# Patient Record
Sex: FEMALE | Race: WHITE | NOT HISPANIC OR LATINO | Employment: OTHER | URBAN - METROPOLITAN AREA
[De-identification: names, ages, dates, MRNs, and addresses within clinical notes are randomized per-mention and may not be internally consistent; named-entity substitution may affect disease eponyms.]

---

## 2017-04-27 ENCOUNTER — GENERIC CONVERSION - ENCOUNTER (OUTPATIENT)
Dept: OTHER | Facility: OTHER | Age: 52
End: 2017-04-27

## 2017-04-27 LAB
LEFT EYE DIABETIC RETINOPATHY: NORMAL
RIGHT EYE DIABETIC RETINOPATHY: NORMAL

## 2017-06-06 ENCOUNTER — OFFICE VISIT (OUTPATIENT)
Dept: LAB | Facility: CLINIC | Age: 52
End: 2017-06-06
Payer: OTHER MISCELLANEOUS

## 2017-06-06 ENCOUNTER — ALLSCRIPTS OFFICE VISIT (OUTPATIENT)
Dept: OTHER | Facility: OTHER | Age: 52
End: 2017-06-06

## 2017-06-06 ENCOUNTER — APPOINTMENT (OUTPATIENT)
Dept: LAB | Facility: CLINIC | Age: 52
End: 2017-06-06
Payer: OTHER MISCELLANEOUS

## 2017-06-06 ENCOUNTER — TRANSCRIBE ORDERS (OUTPATIENT)
Dept: LAB | Facility: CLINIC | Age: 52
End: 2017-06-06

## 2017-06-06 DIAGNOSIS — S73.191A TEAR OF RIGHT ACETABULAR LABRUM, INITIAL ENCOUNTER: Primary | ICD-10-CM

## 2017-06-06 DIAGNOSIS — S73.191A TEAR OF RIGHT ACETABULAR LABRUM, INITIAL ENCOUNTER: ICD-10-CM

## 2017-06-06 LAB
ANION GAP SERPL CALCULATED.3IONS-SCNC: 10 MMOL/L (ref 4–13)
ATRIAL RATE: 75 BPM
ATRIAL RATE: 76 BPM
BUN SERPL-MCNC: 10 MG/DL (ref 5–25)
CALCIUM SERPL-MCNC: 9.1 MG/DL (ref 8.3–10.1)
CHLORIDE SERPL-SCNC: 104 MMOL/L (ref 100–108)
CO2 SERPL-SCNC: 28 MMOL/L (ref 21–32)
CREAT SERPL-MCNC: 0.68 MG/DL (ref 0.6–1.3)
GFR SERPL CREATININE-BSD FRML MDRD: >60 ML/MIN/1.73SQ M
GLUCOSE SERPL-MCNC: 143 MG/DL (ref 65–140)
P AXIS: 60 DEGREES
P AXIS: 72 DEGREES
POTASSIUM SERPL-SCNC: 3.6 MMOL/L (ref 3.5–5.3)
PR INTERVAL: 162 MS
PR INTERVAL: 162 MS
QRS AXIS: 60 DEGREES
QRS AXIS: 68 DEGREES
QRSD INTERVAL: 74 MS
QRSD INTERVAL: 76 MS
QT INTERVAL: 390 MS
QT INTERVAL: 396 MS
QTC INTERVAL: 435 MS
QTC INTERVAL: 445 MS
SODIUM SERPL-SCNC: 142 MMOL/L (ref 136–145)
T WAVE AXIS: 15 DEGREES
T WAVE AXIS: 33 DEGREES
VENTRICULAR RATE: 75 BPM
VENTRICULAR RATE: 76 BPM

## 2017-06-06 PROCEDURE — 93005 ELECTROCARDIOGRAM TRACING: CPT

## 2017-06-06 PROCEDURE — 36415 COLL VENOUS BLD VENIPUNCTURE: CPT

## 2017-06-06 PROCEDURE — 80048 BASIC METABOLIC PNL TOTAL CA: CPT

## 2017-06-20 ENCOUNTER — ANESTHESIA EVENT (OUTPATIENT)
Dept: PERIOP | Facility: HOSPITAL | Age: 52
End: 2017-06-20
Payer: OTHER MISCELLANEOUS

## 2017-06-22 ENCOUNTER — GENERIC CONVERSION - ENCOUNTER (OUTPATIENT)
Dept: OTHER | Facility: OTHER | Age: 52
End: 2017-06-22

## 2017-07-13 ENCOUNTER — ANESTHESIA (OUTPATIENT)
Dept: PERIOP | Facility: HOSPITAL | Age: 52
End: 2017-07-13
Payer: OTHER MISCELLANEOUS

## 2017-07-13 ENCOUNTER — HOSPITAL ENCOUNTER (OUTPATIENT)
Facility: HOSPITAL | Age: 52
Setting detail: OUTPATIENT SURGERY
Discharge: HOME | End: 2017-07-13
Attending: ORTHOPAEDIC SURGERY | Admitting: ORTHOPAEDIC SURGERY
Payer: OTHER MISCELLANEOUS

## 2017-07-13 ENCOUNTER — APPOINTMENT (OUTPATIENT)
Dept: RADIOLOGY | Facility: HOSPITAL | Age: 52
End: 2017-07-13
Payer: OTHER MISCELLANEOUS

## 2017-07-13 VITALS
BODY MASS INDEX: 28.35 KG/M2 | SYSTOLIC BLOOD PRESSURE: 128 MMHG | HEART RATE: 86 BPM | WEIGHT: 160 LBS | OXYGEN SATURATION: 96 % | RESPIRATION RATE: 16 BRPM | DIASTOLIC BLOOD PRESSURE: 64 MMHG | HEIGHT: 63 IN | TEMPERATURE: 98.8 F

## 2017-07-13 LAB
GLUCOSE SERPL-MCNC: 143 MG/DL (ref 65–140)
GLUCOSE SERPL-MCNC: 168 MG/DL (ref 65–140)

## 2017-07-13 PROCEDURE — 82948 REAGENT STRIP/BLOOD GLUCOSE: CPT

## 2017-07-13 PROCEDURE — 73501 X-RAY EXAM HIP UNI 1 VIEW: CPT

## 2017-07-13 RX ORDER — ONDANSETRON 2 MG/ML
4 INJECTION INTRAMUSCULAR; INTRAVENOUS EVERY 6 HOURS PRN
Status: DISCONTINUED | OUTPATIENT
Start: 2017-07-13 | End: 2017-07-13 | Stop reason: HOSPADM

## 2017-07-13 RX ORDER — FENTANYL CITRATE/PF 50 MCG/ML
50 SYRINGE (ML) INJECTION
Status: DISCONTINUED | OUTPATIENT
Start: 2017-07-13 | End: 2017-07-13 | Stop reason: HOSPADM

## 2017-07-13 RX ORDER — ACETAMINOPHEN 325 MG/1
650 TABLET ORAL EVERY 4 HOURS PRN
Status: DISCONTINUED | OUTPATIENT
Start: 2017-07-13 | End: 2017-07-13 | Stop reason: HOSPADM

## 2017-07-13 RX ORDER — ONDANSETRON 2 MG/ML
INJECTION INTRAMUSCULAR; INTRAVENOUS AS NEEDED
Status: DISCONTINUED | OUTPATIENT
Start: 2017-07-13 | End: 2017-07-13 | Stop reason: SURG

## 2017-07-13 RX ORDER — HYDROCODONE BITARTRATE AND ACETAMINOPHEN 7.5; 325 MG/1; MG/1
TABLET ORAL
Qty: 50 TABLET | Refills: 0 | Status: SHIPPED | OUTPATIENT
Start: 2017-07-13 | End: 2018-06-05

## 2017-07-13 RX ORDER — PROPOFOL 10 MG/ML
INJECTION, EMULSION INTRAVENOUS AS NEEDED
Status: DISCONTINUED | OUTPATIENT
Start: 2017-07-13 | End: 2017-07-13 | Stop reason: SURG

## 2017-07-13 RX ORDER — LIDOCAINE HYDROCHLORIDE AND EPINEPHRINE 10; 10 MG/ML; UG/ML
INJECTION, SOLUTION INFILTRATION; PERINEURAL AS NEEDED
Status: DISCONTINUED | OUTPATIENT
Start: 2017-07-13 | End: 2017-07-13 | Stop reason: HOSPADM

## 2017-07-13 RX ORDER — SODIUM CHLORIDE, SODIUM LACTATE, POTASSIUM CHLORIDE, CALCIUM CHLORIDE 600; 310; 30; 20 MG/100ML; MG/100ML; MG/100ML; MG/100ML
INJECTION, SOLUTION INTRAVENOUS CONTINUOUS PRN
Status: DISCONTINUED | OUTPATIENT
Start: 2017-07-13 | End: 2017-07-13 | Stop reason: SURG

## 2017-07-13 RX ORDER — CLINDAMYCIN PHOSPHATE 150 MG/ML
INJECTION, SOLUTION INTRAVENOUS AS NEEDED
Status: DISCONTINUED | OUTPATIENT
Start: 2017-07-13 | End: 2017-07-13 | Stop reason: SURG

## 2017-07-13 RX ORDER — METOCLOPRAMIDE HYDROCHLORIDE 5 MG/ML
10 INJECTION INTRAMUSCULAR; INTRAVENOUS ONCE AS NEEDED
Status: DISCONTINUED | OUTPATIENT
Start: 2017-07-13 | End: 2017-07-13 | Stop reason: HOSPADM

## 2017-07-13 RX ORDER — ONDANSETRON 4 MG/1
4 TABLET, FILM COATED ORAL EVERY 8 HOURS PRN
Qty: 20 TABLET | Refills: 0 | Status: SHIPPED | OUTPATIENT
Start: 2017-07-13 | End: 2018-06-05

## 2017-07-13 RX ORDER — METOCLOPRAMIDE HYDROCHLORIDE 5 MG/ML
INJECTION INTRAMUSCULAR; INTRAVENOUS AS NEEDED
Status: DISCONTINUED | OUTPATIENT
Start: 2017-07-13 | End: 2017-07-13 | Stop reason: SURG

## 2017-07-13 RX ORDER — MORPHINE SULFATE 2 MG/ML
2 INJECTION, SOLUTION INTRAMUSCULAR; INTRAVENOUS EVERY 2 HOUR PRN
Status: DISCONTINUED | OUTPATIENT
Start: 2017-07-13 | End: 2017-07-13 | Stop reason: HOSPADM

## 2017-07-13 RX ORDER — CLINDAMYCIN PHOSPHATE 900 MG/50ML
900 INJECTION INTRAVENOUS ONCE
Status: DISCONTINUED | OUTPATIENT
Start: 2017-07-13 | End: 2017-07-13 | Stop reason: HOSPADM

## 2017-07-13 RX ORDER — EPHEDRINE SULFATE 50 MG/ML
INJECTION, SOLUTION INTRAVENOUS AS NEEDED
Status: DISCONTINUED | OUTPATIENT
Start: 2017-07-13 | End: 2017-07-13 | Stop reason: SURG

## 2017-07-13 RX ORDER — ONDANSETRON 2 MG/ML
4 INJECTION INTRAMUSCULAR; INTRAVENOUS ONCE AS NEEDED
Status: DISCONTINUED | OUTPATIENT
Start: 2017-07-13 | End: 2017-07-13 | Stop reason: HOSPADM

## 2017-07-13 RX ORDER — HYDROCODONE BITARTRATE AND ACETAMINOPHEN 5; 325 MG/1; MG/1
2 TABLET ORAL EVERY 4 HOURS PRN
Status: DISCONTINUED | OUTPATIENT
Start: 2017-07-13 | End: 2017-07-13 | Stop reason: HOSPADM

## 2017-07-13 RX ORDER — MIDAZOLAM HYDROCHLORIDE 1 MG/ML
INJECTION INTRAMUSCULAR; INTRAVENOUS AS NEEDED
Status: DISCONTINUED | OUTPATIENT
Start: 2017-07-13 | End: 2017-07-13 | Stop reason: SURG

## 2017-07-13 RX ORDER — FENTANYL CITRATE 50 UG/ML
INJECTION, SOLUTION INTRAMUSCULAR; INTRAVENOUS AS NEEDED
Status: DISCONTINUED | OUTPATIENT
Start: 2017-07-13 | End: 2017-07-13 | Stop reason: SURG

## 2017-07-13 RX ORDER — KETOROLAC TROMETHAMINE 30 MG/ML
INJECTION, SOLUTION INTRAMUSCULAR; INTRAVENOUS AS NEEDED
Status: DISCONTINUED | OUTPATIENT
Start: 2017-07-13 | End: 2017-07-13 | Stop reason: SURG

## 2017-07-13 RX ADMIN — EPHEDRINE SULFATE 10 MG: 50 INJECTION, SOLUTION INTRAMUSCULAR; INTRAVENOUS; SUBCUTANEOUS at 14:25

## 2017-07-13 RX ADMIN — HYDROMORPHONE HYDROCHLORIDE 0.2 MG: 1 INJECTION, SOLUTION INTRAMUSCULAR; INTRAVENOUS; SUBCUTANEOUS at 14:47

## 2017-07-13 RX ADMIN — CLINDAMYCIN PHOSPHATE 900 MG: 150 INJECTION, SOLUTION INTRAMUSCULAR; INTRAVENOUS at 14:15

## 2017-07-13 RX ADMIN — HYDROCODONE BITARTRATE AND ACETAMINOPHEN 2 TABLET: 5; 325 TABLET ORAL at 15:48

## 2017-07-13 RX ADMIN — ONDANSETRON 4 MG: 2 INJECTION INTRAMUSCULAR; INTRAVENOUS at 14:58

## 2017-07-13 RX ADMIN — KETOROLAC TROMETHAMINE 30 MG: 30 INJECTION, SOLUTION INTRAMUSCULAR at 14:58

## 2017-07-13 RX ADMIN — PROPOFOL 200 MG: 10 INJECTION, EMULSION INTRAVENOUS at 14:14

## 2017-07-13 RX ADMIN — METOCLOPRAMIDE HYDROCHLORIDE 10 MG: 5 INJECTION INTRAMUSCULAR; INTRAVENOUS at 14:20

## 2017-07-13 RX ADMIN — HYDROMORPHONE HYDROCHLORIDE 0.2 MG: 1 INJECTION, SOLUTION INTRAMUSCULAR; INTRAVENOUS; SUBCUTANEOUS at 14:53

## 2017-07-13 RX ADMIN — DEXAMETHASONE SODIUM PHOSPHATE 4 MG: 10 INJECTION INTRAMUSCULAR; INTRAVENOUS at 14:20

## 2017-07-13 RX ADMIN — HYDROMORPHONE HYDROCHLORIDE 0.2 MG: 1 INJECTION, SOLUTION INTRAMUSCULAR; INTRAVENOUS; SUBCUTANEOUS at 14:56

## 2017-07-13 RX ADMIN — FENTANYL CITRATE 25 MCG: 50 INJECTION INTRAMUSCULAR; INTRAVENOUS at 14:27

## 2017-07-13 RX ADMIN — HYDROMORPHONE HYDROCHLORIDE 0.2 MG: 1 INJECTION, SOLUTION INTRAMUSCULAR; INTRAVENOUS; SUBCUTANEOUS at 15:00

## 2017-07-13 RX ADMIN — SODIUM CHLORIDE, SODIUM LACTATE, POTASSIUM CHLORIDE, AND CALCIUM CHLORIDE: .6; .31; .03; .02 INJECTION, SOLUTION INTRAVENOUS at 14:08

## 2017-07-13 RX ADMIN — MIDAZOLAM HYDROCHLORIDE 2 MG: 1 INJECTION, SOLUTION INTRAMUSCULAR; INTRAVENOUS at 14:08

## 2017-07-13 RX ADMIN — FENTANYL CITRATE 25 MCG: 50 INJECTION INTRAMUSCULAR; INTRAVENOUS at 14:19

## 2017-07-13 RX ADMIN — HYDROMORPHONE HYDROCHLORIDE 0.2 MG: 1 INJECTION, SOLUTION INTRAMUSCULAR; INTRAVENOUS; SUBCUTANEOUS at 14:52

## 2017-07-25 ENCOUNTER — ALLSCRIPTS OFFICE VISIT (OUTPATIENT)
Dept: OTHER | Facility: OTHER | Age: 52
End: 2017-07-25

## 2017-07-25 DIAGNOSIS — S73.191D OTHER SPRAIN OF RIGHT HIP, SUBSEQUENT ENCOUNTER: ICD-10-CM

## 2017-08-03 ENCOUNTER — APPOINTMENT (OUTPATIENT)
Dept: PHYSICAL THERAPY | Facility: CLINIC | Age: 52
End: 2017-08-03
Payer: OTHER MISCELLANEOUS

## 2017-08-03 DIAGNOSIS — S73.191D OTHER SPRAIN OF RIGHT HIP, SUBSEQUENT ENCOUNTER: ICD-10-CM

## 2017-08-03 PROCEDURE — 97162 PT EVAL MOD COMPLEX 30 MIN: CPT

## 2017-08-03 PROCEDURE — 97110 THERAPEUTIC EXERCISES: CPT

## 2017-08-04 ENCOUNTER — GENERIC CONVERSION - ENCOUNTER (OUTPATIENT)
Dept: OTHER | Facility: OTHER | Age: 52
End: 2017-08-04

## 2017-08-10 ENCOUNTER — APPOINTMENT (OUTPATIENT)
Dept: PHYSICAL THERAPY | Facility: CLINIC | Age: 52
End: 2017-08-10
Payer: OTHER MISCELLANEOUS

## 2017-08-10 PROCEDURE — 97140 MANUAL THERAPY 1/> REGIONS: CPT

## 2017-08-10 PROCEDURE — 97110 THERAPEUTIC EXERCISES: CPT

## 2017-08-14 ENCOUNTER — APPOINTMENT (OUTPATIENT)
Dept: PHYSICAL THERAPY | Facility: CLINIC | Age: 52
End: 2017-08-14
Payer: OTHER MISCELLANEOUS

## 2017-08-14 PROCEDURE — 97140 MANUAL THERAPY 1/> REGIONS: CPT

## 2017-08-14 PROCEDURE — 97110 THERAPEUTIC EXERCISES: CPT

## 2017-08-18 ENCOUNTER — APPOINTMENT (OUTPATIENT)
Dept: PHYSICAL THERAPY | Facility: CLINIC | Age: 52
End: 2017-08-18
Payer: OTHER MISCELLANEOUS

## 2017-08-18 PROCEDURE — 97110 THERAPEUTIC EXERCISES: CPT

## 2017-08-18 PROCEDURE — 97140 MANUAL THERAPY 1/> REGIONS: CPT

## 2017-08-22 ENCOUNTER — APPOINTMENT (OUTPATIENT)
Dept: PHYSICAL THERAPY | Facility: CLINIC | Age: 52
End: 2017-08-22
Payer: OTHER MISCELLANEOUS

## 2017-08-22 ENCOUNTER — ALLSCRIPTS OFFICE VISIT (OUTPATIENT)
Dept: OTHER | Facility: OTHER | Age: 52
End: 2017-08-22

## 2017-08-22 PROCEDURE — 97140 MANUAL THERAPY 1/> REGIONS: CPT

## 2017-08-22 PROCEDURE — 97110 THERAPEUTIC EXERCISES: CPT

## 2017-08-24 ENCOUNTER — APPOINTMENT (OUTPATIENT)
Dept: PHYSICAL THERAPY | Facility: CLINIC | Age: 52
End: 2017-08-24
Payer: OTHER MISCELLANEOUS

## 2017-08-24 ENCOUNTER — GENERIC CONVERSION - ENCOUNTER (OUTPATIENT)
Dept: OTHER | Facility: OTHER | Age: 52
End: 2017-08-24

## 2017-08-24 PROCEDURE — 97140 MANUAL THERAPY 1/> REGIONS: CPT

## 2017-08-24 PROCEDURE — 97110 THERAPEUTIC EXERCISES: CPT

## 2017-08-29 ENCOUNTER — APPOINTMENT (OUTPATIENT)
Dept: PHYSICAL THERAPY | Facility: CLINIC | Age: 52
End: 2017-08-29
Payer: OTHER MISCELLANEOUS

## 2017-08-29 PROCEDURE — 97110 THERAPEUTIC EXERCISES: CPT

## 2017-08-29 PROCEDURE — 97140 MANUAL THERAPY 1/> REGIONS: CPT

## 2017-08-31 ENCOUNTER — APPOINTMENT (OUTPATIENT)
Dept: PHYSICAL THERAPY | Facility: CLINIC | Age: 52
End: 2017-08-31
Payer: OTHER MISCELLANEOUS

## 2017-08-31 PROCEDURE — 97140 MANUAL THERAPY 1/> REGIONS: CPT

## 2017-08-31 PROCEDURE — 97110 THERAPEUTIC EXERCISES: CPT

## 2017-09-05 ENCOUNTER — APPOINTMENT (OUTPATIENT)
Dept: PHYSICAL THERAPY | Facility: CLINIC | Age: 52
End: 2017-09-05
Payer: OTHER MISCELLANEOUS

## 2017-09-05 PROCEDURE — 97110 THERAPEUTIC EXERCISES: CPT

## 2017-09-05 PROCEDURE — 97140 MANUAL THERAPY 1/> REGIONS: CPT

## 2017-09-07 ENCOUNTER — APPOINTMENT (OUTPATIENT)
Dept: PHYSICAL THERAPY | Facility: CLINIC | Age: 52
End: 2017-09-07
Payer: OTHER MISCELLANEOUS

## 2017-09-07 PROCEDURE — 97110 THERAPEUTIC EXERCISES: CPT

## 2017-09-07 PROCEDURE — 97140 MANUAL THERAPY 1/> REGIONS: CPT

## 2017-09-12 ENCOUNTER — GENERIC CONVERSION - ENCOUNTER (OUTPATIENT)
Dept: OTHER | Facility: OTHER | Age: 52
End: 2017-09-12

## 2017-09-12 ENCOUNTER — APPOINTMENT (OUTPATIENT)
Dept: PHYSICAL THERAPY | Facility: CLINIC | Age: 52
End: 2017-09-12
Payer: OTHER MISCELLANEOUS

## 2017-09-12 PROCEDURE — 97140 MANUAL THERAPY 1/> REGIONS: CPT

## 2017-09-12 PROCEDURE — 97110 THERAPEUTIC EXERCISES: CPT

## 2017-09-14 ENCOUNTER — APPOINTMENT (OUTPATIENT)
Dept: PHYSICAL THERAPY | Facility: CLINIC | Age: 52
End: 2017-09-14
Payer: OTHER MISCELLANEOUS

## 2017-09-14 PROCEDURE — 97140 MANUAL THERAPY 1/> REGIONS: CPT

## 2017-09-14 PROCEDURE — 97110 THERAPEUTIC EXERCISES: CPT

## 2017-09-19 ENCOUNTER — APPOINTMENT (OUTPATIENT)
Dept: PHYSICAL THERAPY | Facility: CLINIC | Age: 52
End: 2017-09-19
Payer: OTHER MISCELLANEOUS

## 2017-09-21 ENCOUNTER — APPOINTMENT (OUTPATIENT)
Dept: PHYSICAL THERAPY | Facility: CLINIC | Age: 52
End: 2017-09-21
Payer: OTHER MISCELLANEOUS

## 2017-09-21 PROCEDURE — 97110 THERAPEUTIC EXERCISES: CPT

## 2017-09-21 PROCEDURE — 97140 MANUAL THERAPY 1/> REGIONS: CPT

## 2017-09-22 ENCOUNTER — APPOINTMENT (OUTPATIENT)
Dept: PHYSICAL THERAPY | Facility: CLINIC | Age: 52
End: 2017-09-22
Payer: OTHER MISCELLANEOUS

## 2017-09-22 ENCOUNTER — ALLSCRIPTS OFFICE VISIT (OUTPATIENT)
Dept: OTHER | Facility: OTHER | Age: 52
End: 2017-09-22

## 2017-09-22 PROCEDURE — 97110 THERAPEUTIC EXERCISES: CPT

## 2017-09-22 PROCEDURE — 97140 MANUAL THERAPY 1/> REGIONS: CPT

## 2017-09-26 ENCOUNTER — APPOINTMENT (OUTPATIENT)
Dept: PHYSICAL THERAPY | Facility: CLINIC | Age: 52
End: 2017-09-26
Payer: OTHER MISCELLANEOUS

## 2017-09-26 PROCEDURE — 97140 MANUAL THERAPY 1/> REGIONS: CPT

## 2017-09-26 PROCEDURE — 97110 THERAPEUTIC EXERCISES: CPT

## 2017-09-28 ENCOUNTER — APPOINTMENT (OUTPATIENT)
Dept: PHYSICAL THERAPY | Facility: CLINIC | Age: 52
End: 2017-09-28
Payer: OTHER MISCELLANEOUS

## 2017-09-28 PROCEDURE — 97110 THERAPEUTIC EXERCISES: CPT

## 2017-09-28 PROCEDURE — 97140 MANUAL THERAPY 1/> REGIONS: CPT

## 2017-10-03 ENCOUNTER — APPOINTMENT (OUTPATIENT)
Dept: PHYSICAL THERAPY | Facility: CLINIC | Age: 52
End: 2017-10-03
Payer: OTHER MISCELLANEOUS

## 2017-10-05 ENCOUNTER — APPOINTMENT (OUTPATIENT)
Dept: PHYSICAL THERAPY | Facility: CLINIC | Age: 52
End: 2017-10-05
Payer: OTHER MISCELLANEOUS

## 2017-10-05 PROCEDURE — 97140 MANUAL THERAPY 1/> REGIONS: CPT

## 2017-10-05 PROCEDURE — 97110 THERAPEUTIC EXERCISES: CPT

## 2017-10-10 ENCOUNTER — APPOINTMENT (OUTPATIENT)
Dept: PHYSICAL THERAPY | Facility: CLINIC | Age: 52
End: 2017-10-10
Payer: OTHER MISCELLANEOUS

## 2017-10-10 PROCEDURE — 97140 MANUAL THERAPY 1/> REGIONS: CPT

## 2017-10-10 PROCEDURE — 97110 THERAPEUTIC EXERCISES: CPT

## 2017-10-12 ENCOUNTER — APPOINTMENT (OUTPATIENT)
Dept: PHYSICAL THERAPY | Facility: CLINIC | Age: 52
End: 2017-10-12
Payer: OTHER MISCELLANEOUS

## 2017-10-12 PROCEDURE — 97140 MANUAL THERAPY 1/> REGIONS: CPT

## 2017-10-12 PROCEDURE — 97110 THERAPEUTIC EXERCISES: CPT

## 2017-10-17 ENCOUNTER — APPOINTMENT (OUTPATIENT)
Dept: PHYSICAL THERAPY | Facility: CLINIC | Age: 52
End: 2017-10-17
Payer: OTHER MISCELLANEOUS

## 2017-10-17 PROCEDURE — 97110 THERAPEUTIC EXERCISES: CPT

## 2017-10-17 PROCEDURE — 97140 MANUAL THERAPY 1/> REGIONS: CPT

## 2017-10-19 ENCOUNTER — APPOINTMENT (OUTPATIENT)
Dept: PHYSICAL THERAPY | Facility: CLINIC | Age: 52
End: 2017-10-19
Payer: OTHER MISCELLANEOUS

## 2017-10-19 PROCEDURE — G8979 MOBILITY GOAL STATUS: HCPCS

## 2017-10-19 PROCEDURE — 97140 MANUAL THERAPY 1/> REGIONS: CPT

## 2017-10-19 PROCEDURE — G8978 MOBILITY CURRENT STATUS: HCPCS

## 2017-10-19 PROCEDURE — 97110 THERAPEUTIC EXERCISES: CPT

## 2017-10-24 ENCOUNTER — ALLSCRIPTS OFFICE VISIT (OUTPATIENT)
Dept: OTHER | Facility: OTHER | Age: 52
End: 2017-10-24

## 2017-10-24 ENCOUNTER — APPOINTMENT (OUTPATIENT)
Dept: PHYSICAL THERAPY | Facility: CLINIC | Age: 52
End: 2017-10-24
Payer: OTHER MISCELLANEOUS

## 2017-10-24 PROCEDURE — 97140 MANUAL THERAPY 1/> REGIONS: CPT

## 2017-10-24 PROCEDURE — 97110 THERAPEUTIC EXERCISES: CPT

## 2017-10-24 PROCEDURE — 97530 THERAPEUTIC ACTIVITIES: CPT

## 2017-10-24 PROCEDURE — 97010 HOT OR COLD PACKS THERAPY: CPT

## 2017-10-25 NOTE — PROGRESS NOTES
Assessment  1  Tear of right acetabular labrum, subsequent encounter (V58 89,843 8) (S73 191D)   2  Primary localized osteoarthritis of right hip (715 15) (M16 11)    Plan  Primary localized osteoarthritis of right hip    · *1 - [facility]  *  Status: Hold For - Scheduling  Requested for:  13OCV6876  Care Summary provided  : Yes   · Fluoroscopic Guidance For Needle Placement; Status:Active; Requested [id number];     Discussion/Summary    Right hip arthroscopic labral debridement 7/13/17  intra-articular steroid injection with Radiology  physical therapy for now  is unable to work secondary to ongoing pain  in 4 weeks  Anticipate maximum medical improvement at that point  Chief Complaint  1  Hip Pain    Post-Op  HPI: Postop evaluation after right hip arthroscopic labral debridement on 7/13/17  Patient continues to participate in physical therapy  She has persistent pain localizing primarily to the anterior groin, worse with activity  She has remained out of work as a result  No significant improvement overall since her last visit on 9/22/17  Active Problems  1  Arthritis (716 90) (M19 90)   2  Depression (311) (F32 9)   3  Diabetes (250 00) (E11 9)   4  Heart disorder (429 9) (I51 9)   5  Hypertension (401 9) (I10)   6  Lumbar radiculopathy (724 4) (M54 16)   7  Stomach problems (536 9) (K31 9)   8  Tear of right acetabular labrum, subsequent encounter (V58 89,843 8) (S73 191D)    Social History   · Alcohol use (V49 89) (Z78 9)   · Caffeine use (V49 89) (F15 90)   · Current every day smoker (305 1) (F17 200)    Current Meds   1  Atorvastatin Calcium 20 MG Oral Tablet Recorded   2  CarBAMazepine  MG Oral Capsule Extended Release 12 Hour Recorded   3  ClonazePAM 0 5 MG Oral Tablet Recorded   4  Ecotrin TBEC Recorded   5  Lisinopril 10 MG Oral Tablet Recorded   6  MetFORMIN HCl TABS Recorded   7  Omeprazole 20 MG Oral Capsule Delayed Release Recorded    Allergies  1   cefdinir    Vitals Recorded: 67HOL8026 09:38AM   Heart Rate 98   Systolic 777   Diastolic 76   Height 5 ft 3 in   Weight 150 lb    BMI Calculated 26 57   BSA Calculated 1 72     Physical Exam  Right hip:  No gross deformity  Range of motion is limited by pain in all planes  No gross instability  FADDIR test is positive  ALLYSON test is positive  Log roll test is negative  Straight leg raise against resistance is positive  4/5 strength hip flexion limited by pain  Decreased sensation to light touch in the medial ankle and lower leg, otherwise intact throughout the right lower extremity  No lower extremity edema          Signatures   Electronically signed by : BANDAR Rodríguez ; Oct 24 2017  9:58AM EST                       (Author)

## 2017-10-26 ENCOUNTER — APPOINTMENT (OUTPATIENT)
Dept: PHYSICAL THERAPY | Facility: CLINIC | Age: 52
End: 2017-10-26
Payer: OTHER MISCELLANEOUS

## 2017-10-26 PROCEDURE — 97530 THERAPEUTIC ACTIVITIES: CPT

## 2017-10-26 PROCEDURE — 97140 MANUAL THERAPY 1/> REGIONS: CPT

## 2017-10-26 PROCEDURE — 97110 THERAPEUTIC EXERCISES: CPT

## 2017-10-31 ENCOUNTER — APPOINTMENT (OUTPATIENT)
Dept: PHYSICAL THERAPY | Facility: CLINIC | Age: 52
End: 2017-10-31
Payer: OTHER MISCELLANEOUS

## 2017-10-31 PROCEDURE — 97010 HOT OR COLD PACKS THERAPY: CPT

## 2017-10-31 PROCEDURE — 97110 THERAPEUTIC EXERCISES: CPT

## 2017-10-31 PROCEDURE — 97140 MANUAL THERAPY 1/> REGIONS: CPT

## 2017-11-02 ENCOUNTER — APPOINTMENT (OUTPATIENT)
Dept: PHYSICAL THERAPY | Facility: CLINIC | Age: 52
End: 2017-11-02
Payer: OTHER MISCELLANEOUS

## 2017-11-07 ENCOUNTER — APPOINTMENT (OUTPATIENT)
Dept: PHYSICAL THERAPY | Facility: CLINIC | Age: 52
End: 2017-11-07
Payer: OTHER MISCELLANEOUS

## 2017-11-07 PROCEDURE — 97110 THERAPEUTIC EXERCISES: CPT

## 2017-11-07 PROCEDURE — 97530 THERAPEUTIC ACTIVITIES: CPT

## 2017-11-07 PROCEDURE — 97140 MANUAL THERAPY 1/> REGIONS: CPT

## 2017-11-08 ENCOUNTER — ALLSCRIPTS OFFICE VISIT (OUTPATIENT)
Dept: OTHER | Facility: OTHER | Age: 52
End: 2017-11-08

## 2017-11-08 DIAGNOSIS — F32.9 MAJOR DEPRESSIVE DISORDER, SINGLE EPISODE: ICD-10-CM

## 2017-11-08 DIAGNOSIS — I10 ESSENTIAL (PRIMARY) HYPERTENSION: ICD-10-CM

## 2017-11-08 DIAGNOSIS — E11.9 TYPE 2 DIABETES MELLITUS WITHOUT COMPLICATIONS (HCC): ICD-10-CM

## 2017-11-08 DIAGNOSIS — E78.5 HYPERLIPIDEMIA: ICD-10-CM

## 2017-11-09 ENCOUNTER — APPOINTMENT (OUTPATIENT)
Dept: PHYSICAL THERAPY | Facility: CLINIC | Age: 52
End: 2017-11-09
Payer: OTHER MISCELLANEOUS

## 2017-11-09 PROCEDURE — 97110 THERAPEUTIC EXERCISES: CPT

## 2017-11-09 PROCEDURE — 97140 MANUAL THERAPY 1/> REGIONS: CPT

## 2017-11-09 PROCEDURE — 97530 THERAPEUTIC ACTIVITIES: CPT

## 2017-11-13 ENCOUNTER — GENERIC CONVERSION - ENCOUNTER (OUTPATIENT)
Dept: OTHER | Facility: OTHER | Age: 52
End: 2017-11-13

## 2017-11-13 ENCOUNTER — TRANSCRIBE ORDERS (OUTPATIENT)
Dept: LAB | Facility: CLINIC | Age: 52
End: 2017-11-13

## 2017-11-13 ENCOUNTER — APPOINTMENT (OUTPATIENT)
Dept: LAB | Facility: CLINIC | Age: 52
End: 2017-11-13
Payer: COMMERCIAL

## 2017-11-13 DIAGNOSIS — I10 ESSENTIAL HYPERTENSION, MALIGNANT: ICD-10-CM

## 2017-11-13 DIAGNOSIS — F01.50 VASCULAR DEMENTIA WITH DEPRESSED MOOD (HCC): Primary | ICD-10-CM

## 2017-11-13 DIAGNOSIS — E78.5 HYPERLIPIDEMIA: ICD-10-CM

## 2017-11-13 DIAGNOSIS — I10 ESSENTIAL (PRIMARY) HYPERTENSION: ICD-10-CM

## 2017-11-13 DIAGNOSIS — E11.9 TYPE 2 DIABETES MELLITUS WITHOUT COMPLICATIONS (HCC): ICD-10-CM

## 2017-11-13 DIAGNOSIS — E78.49 OTHER HYPERLIPIDEMIA: ICD-10-CM

## 2017-11-13 DIAGNOSIS — F32.9 MAJOR DEPRESSIVE DISORDER, SINGLE EPISODE: ICD-10-CM

## 2017-11-13 DIAGNOSIS — F32.A VASCULAR DEMENTIA WITH DEPRESSED MOOD (HCC): Primary | ICD-10-CM

## 2017-11-13 LAB
ALBUMIN SERPL BCP-MCNC: 3.6 G/DL (ref 3.5–5)
ALP SERPL-CCNC: 130 U/L (ref 46–116)
ALT SERPL W P-5'-P-CCNC: 18 U/L (ref 12–78)
ANION GAP SERPL CALCULATED.3IONS-SCNC: 7 MMOL/L (ref 4–13)
AST SERPL W P-5'-P-CCNC: 18 U/L (ref 5–45)
BASOPHILS # BLD AUTO: 0.07 THOUSANDS/ΜL (ref 0–0.1)
BASOPHILS NFR BLD AUTO: 1 % (ref 0–1)
BILIRUB SERPL-MCNC: 0.37 MG/DL (ref 0.2–1)
BUN SERPL-MCNC: 12 MG/DL (ref 5–25)
CALCIUM SERPL-MCNC: 9.3 MG/DL (ref 8.3–10.1)
CHLORIDE SERPL-SCNC: 101 MMOL/L (ref 100–108)
CHOLEST SERPL-MCNC: 225 MG/DL (ref 50–200)
CO2 SERPL-SCNC: 27 MMOL/L (ref 21–32)
CREAT SERPL-MCNC: 0.78 MG/DL (ref 0.6–1.3)
CREAT UR-MCNC: 73.3 MG/DL
EOSINOPHIL # BLD AUTO: 0.27 THOUSAND/ΜL (ref 0–0.61)
EOSINOPHIL NFR BLD AUTO: 3 % (ref 0–6)
ERYTHROCYTE [DISTWIDTH] IN BLOOD BY AUTOMATED COUNT: 13.3 % (ref 11.6–15.1)
EST. AVERAGE GLUCOSE BLD GHB EST-MCNC: 143 MG/DL
GFR SERPL CREATININE-BSD FRML MDRD: 87 ML/MIN/1.73SQ M
GLUCOSE P FAST SERPL-MCNC: 98 MG/DL (ref 65–99)
HBA1C MFR BLD: 6.6 % (ref 4.2–6.3)
HCT VFR BLD AUTO: 40 % (ref 34.8–46.1)
HDLC SERPL-MCNC: 50 MG/DL (ref 40–60)
HGB BLD-MCNC: 13.3 G/DL (ref 11.5–15.4)
LDLC SERPL CALC-MCNC: 143 MG/DL (ref 0–100)
LYMPHOCYTES # BLD AUTO: 3.62 THOUSANDS/ΜL (ref 0.6–4.47)
LYMPHOCYTES NFR BLD AUTO: 41 % (ref 14–44)
MCH RBC QN AUTO: 28.4 PG (ref 26.8–34.3)
MCHC RBC AUTO-ENTMCNC: 33.3 G/DL (ref 31.4–37.4)
MCV RBC AUTO: 86 FL (ref 82–98)
MICROALBUMIN UR-MCNC: <5 MG/L (ref 0–20)
MICROALBUMIN/CREAT 24H UR: <7 MG/G CREATININE (ref 0–30)
MONOCYTES # BLD AUTO: 0.77 THOUSAND/ΜL (ref 0.17–1.22)
MONOCYTES NFR BLD AUTO: 9 % (ref 4–12)
NEUTROPHILS # BLD AUTO: 4.01 THOUSANDS/ΜL (ref 1.85–7.62)
NEUTS SEG NFR BLD AUTO: 46 % (ref 43–75)
NRBC BLD AUTO-RTO: 0 /100 WBCS
PLATELET # BLD AUTO: 267 THOUSANDS/UL (ref 149–390)
PMV BLD AUTO: 11 FL (ref 8.9–12.7)
POTASSIUM SERPL-SCNC: 4.7 MMOL/L (ref 3.5–5.3)
PROT SERPL-MCNC: 7.6 G/DL (ref 6.4–8.2)
RBC # BLD AUTO: 4.68 MILLION/UL (ref 3.81–5.12)
SODIUM SERPL-SCNC: 135 MMOL/L (ref 136–145)
TRIGL SERPL-MCNC: 159 MG/DL
TSH SERPL DL<=0.05 MIU/L-ACNC: 2.78 UIU/ML (ref 0.36–3.74)
WBC # BLD AUTO: 8.75 THOUSAND/UL (ref 4.31–10.16)

## 2017-11-13 PROCEDURE — 84443 ASSAY THYROID STIM HORMONE: CPT

## 2017-11-13 PROCEDURE — 83036 HEMOGLOBIN GLYCOSYLATED A1C: CPT

## 2017-11-13 PROCEDURE — 82043 UR ALBUMIN QUANTITATIVE: CPT

## 2017-11-13 PROCEDURE — 85025 COMPLETE CBC W/AUTO DIFF WBC: CPT

## 2017-11-13 PROCEDURE — 80061 LIPID PANEL: CPT

## 2017-11-13 PROCEDURE — 82570 ASSAY OF URINE CREATININE: CPT

## 2017-11-13 PROCEDURE — 36415 COLL VENOUS BLD VENIPUNCTURE: CPT

## 2017-11-13 PROCEDURE — 80053 COMPREHEN METABOLIC PANEL: CPT

## 2017-11-14 ENCOUNTER — APPOINTMENT (OUTPATIENT)
Dept: PHYSICAL THERAPY | Facility: CLINIC | Age: 52
End: 2017-11-14
Payer: OTHER MISCELLANEOUS

## 2017-11-14 ENCOUNTER — GENERIC CONVERSION - ENCOUNTER (OUTPATIENT)
Dept: OTHER | Facility: OTHER | Age: 52
End: 2017-11-14

## 2017-11-14 PROCEDURE — 97110 THERAPEUTIC EXERCISES: CPT

## 2017-11-14 PROCEDURE — 97140 MANUAL THERAPY 1/> REGIONS: CPT

## 2017-11-16 ENCOUNTER — APPOINTMENT (OUTPATIENT)
Dept: PHYSICAL THERAPY | Facility: CLINIC | Age: 52
End: 2017-11-16
Payer: OTHER MISCELLANEOUS

## 2017-11-16 PROCEDURE — 97110 THERAPEUTIC EXERCISES: CPT

## 2017-11-16 PROCEDURE — 97140 MANUAL THERAPY 1/> REGIONS: CPT

## 2017-11-21 ENCOUNTER — APPOINTMENT (OUTPATIENT)
Dept: PHYSICAL THERAPY | Facility: CLINIC | Age: 52
End: 2017-11-21
Payer: OTHER MISCELLANEOUS

## 2017-11-22 ENCOUNTER — GENERIC CONVERSION - ENCOUNTER (OUTPATIENT)
Dept: OTHER | Facility: OTHER | Age: 52
End: 2017-11-22

## 2017-11-22 ENCOUNTER — APPOINTMENT (OUTPATIENT)
Dept: PHYSICAL THERAPY | Facility: CLINIC | Age: 52
End: 2017-11-22
Payer: OTHER MISCELLANEOUS

## 2017-11-22 ENCOUNTER — ALLSCRIPTS OFFICE VISIT (OUTPATIENT)
Dept: OTHER | Facility: OTHER | Age: 52
End: 2017-11-22

## 2017-11-29 ENCOUNTER — ALLSCRIPTS OFFICE VISIT (OUTPATIENT)
Dept: OTHER | Facility: OTHER | Age: 52
End: 2017-11-29

## 2018-01-09 ENCOUNTER — GENERIC CONVERSION - ENCOUNTER (OUTPATIENT)
Dept: OTHER | Facility: OTHER | Age: 53
End: 2018-01-09

## 2018-01-10 NOTE — MISCELLANEOUS
Message  Return to work or school:   Grisel Yates is under my professional care  She was seen in my office on 9/22/17  She is not able to return to work until the next office evaluation in 4 weeks        Rojas Clayton MD       Signatures   Electronically signed by : BANDAR Comer ; Sep 22 2017 11:59AM EST                       (Author)    Electronically signed by : BANDAR Comer ; Sep 22 2017 12:08PM EST                       (Author)

## 2018-01-10 NOTE — PROGRESS NOTES
Assessment    1  Encounter for preventive health examination (V70 0) (Z00 00)   2  Diabetes mellitus (250 00) (E11 9)   3  Hyperlipemia (272 4) (E78 5)   4  Chronic GERD (530 81) (K21 9)   5  Bipolar depression (296 50) (F31 30)    Plan  Bipolar depression    · ClonazePAM 0 5 MG Oral Tablet; Take 1 tablet 3 times daily as needed   · CarBAMazepine  MG Oral Capsule Extended Release 12 Hour; TAKE 1  CAPSULE EVERY 12 HOURS   · *1 - [facility]  *  Status: Hold For -  Scheduling,Retrospective Authorization  Requested for: [id number]  Health Referral Questions : Patient requires Psychiatry assessment/intake      appointment (with MD/DO)  Care Summary provided  : Yes  Chronic GERD    · Omeprazole 20 MG Oral Capsule Delayed Release; TAKE 1 CAPSULE DAILY  EVERY MORNING BEFORE BREAKFAST  Depression, Diabetes, Hyperlipemia, Hypertension    · (1) CBC/PLT/DIFF; Status:Active; Requested for:08Nov2017;    · (1) COMPREHENSIVE METABOLIC PANEL; Status:Active; Requested for:08Nov2017;    · (1) HEMOGLOBIN A1C; Status:Active; Requested [id number];    · (1) LIPID PANEL, FASTING; Status:Active; Requested for:08Nov2017;    · (Q) MICROALBUMIN, RANDOM URINE (W/CREATININE); Status:Active; Requested  for:08Nov2017;    · (Q) TSH, 3RD GENERATION W/REFLEX TO FT4; Status:Active; Requested  for:08Nov2017;    · Follow-up Visit in 4 Weeks Evaluation and Treatment  Follow-up  Status: Hold For -  Scheduling  Requested for: 01VOR2725  Diabetes, Diabetes mellitus, Diabetes mellitus due to underlying condition, controlled,  with other neurologic complication, without long-term current use of insulin    · MetFORMIN HCl - 500 MG Oral Tablet; TAKE 1 TABLET TWICE DAILY WITH  FOOD  Health Maintenance    · Eat foods that are high in calcium ; Status:Complete;   Done: 86PRK6531   · We encourage all of our patients to exercise regularly    30 minutes of exercise or physical  activity five or more days a week is recommended for children and adults ;  Status:Complete;   Done: 95YTI8056  Hyperlipemia    · Atorvastatin Calcium 20 MG Oral Tablet; take 1 tablet by mouth every day  Hypertension    · Lisinopril 10 MG Oral Tablet; Take 1 tablet daily  SocHx: Current every day smoker    · Regular aerobic exercise can help reduce stress ; Status:Complete;   Done: 89XHR6498  02:53PM   · You need to quit smoking ; Status:Complete;   Done: 44MOS7274 02:53PM    Discussion/Summary  health maintenance visit Currently, she eats a healthy diet and has an adequate exercise regimen  cervical cancer screening is current cervical cancer screening is managed by Dr Kylee Dillon Breast cancer screening: the risks and benefits of breast cancer screening were discussed and breast cancer screening is managed by Dr Kylee Dillon  Colorectal cancer screening: colorectal cancer screening is managed by [facility]  The patient declines immunizations  She was advised to be evaluated by Psychiatry  Advice and education were given regarding nutrition, aerobic exercise, weight loss, calcium supplements, vitamin D supplements, reproductive health and cardiovascular risk reduction  Patient provided medication refills  She will follow up in 4 weeks with labs to review them  The patient was counseled regarding instructions for management, risk factor reductions, patient and family education, impressions, importance of compliance with treatment  Possible side effects of new medications were reviewed with the patient/guardian today  The treatment plan was reviewed with the patient/guardian  The patient/guardian understands and agrees with the treatment plan      Chief Complaint  NP- Physical ( medication refills )      History of Present Illness  HM, Adult Female: The patient is being seen for a health maintenance evaluation  The last health maintenance visit was year(s) ago  Social History: She is   Work status: not currently employed     General Health: The patient's health since the last visit is described as good  She has regular dental visits  She denies vision problems  She denies hearing loss  Lifestyle:  She consumes a diverse and healthy diet  She does not exercise regularly  Reproductive health: the patient is postmenopausal   7 yrs ago  Screening: cancer screening reviewed and current  HPI: Patient is here to establish care  She has a history of hypertension, diabetes and hyperlipidemia  Patient suffered a TIA few years ago  Her recent set of labs were more than 6 months ago  Patient also has a history of bipolar disorder, stable on daily use of carbamazepine and clonazepam  Patient needs to establish care with a psychiatrist       Review of Systems    Constitutional: as noted in HPI    ENT: no complaints of earache, no loss of hearing, no nose bleeds, no nasal discharge, no sore throat, no hoarseness  Cardiovascular: No complaints of slow heart rate, no fast heart rate, no chest pain, no palpitations, no leg claudication, no lower extremity edema  Gastrointestinal: No complaints of abdominal pain, no constipation, no nausea or vomiting, no diarrhea, no bloody stools  Genitourinary: No complaints of dysuria, no incontinence, no pelvic pain, no dysmenorrhea, no vaginal discharge or bleeding  Musculoskeletal: as noted in HPI  Integumentary: No complaints of skin rash or lesions, no itching, no skin wounds, no breast pain or lump  Psychiatric: as noted in HPI  Endocrine: as noted in HPI  Hematologic/Lymphatic: No complaints of swollen glands, no swollen glands in the neck, does not bleed easily, does not bruise easily  Active Problems    1  Arthritis (716 90) (M19 90)   2  Depression (311) (F32 9)   3  Diabetes (250 00) (E11 9)   4  Heart disorder (429 9) (I51 9)   5  Hypertension (401 9) (I10)   6  Lumbar radiculopathy (724 4) (M54 16)   7  Primary localized osteoarthritis of right hip (715 15) (M16 11)   8   Stomach problems (536 9) (K31 9)    Past Medical History    · History of Tear of right acetabular labrum, subsequent encounter (V58 89,843 8)  (S73 191D)    Surgical History    · History of Bladder Surgery   · History of Sinus Surgery   · History of Tubal Ligation    Family History  Family History    · Family history of Arthritis (716 90) (M19 90)   · Family history of Cancer (199 1) (C80 1)   · Family history of Osteoporosis (733 00) (M81 0)    Social History    · Alcohol use (V49 89) (Z78 9)   · Caffeine use (V49 89) (F15 90)   · Current every day smoker (305 1) (F17 200)    Current Meds   1  Atorvastatin Calcium 20 MG Oral Tablet Recorded   2  CarBAMazepine  MG Oral Capsule Extended Release 12 Hour Recorded   3  Ecotrin TBEC Recorded   4  Lisinopril 10 MG Oral Tablet Recorded   5  MetFORMIN HCl TABS Recorded   6  Omeprazole 20 MG Oral Capsule Delayed Release Recorded    Allergies    1  cefdinir    Vitals   Recorded: 15ARI2377 01:52PM   Heart Rate 96   Respiration 17   Systolic 916   Diastolic 70   Height 5 ft 3 in   Weight 156 lb 4 oz   BMI Calculated 27 68   BSA Calculated 1 74   O2 Saturation 98     Physical Exam    Constitutional   General appearance: No acute distress, well appearing and well nourished  Ears, Nose, Mouth, and Throat   Otoscopic examination: Tympanic membranes translucent with normal light reflex  Canals patent without erythema  Oropharynx: Normal with no erythema, edema, exudate or lesions  Neck   Neck: Supple, symmetric, trachea midline, no masses  Pulmonary   Auscultation of lungs: Clear to auscultation  Cardiovascular   Auscultation of heart: Normal rate and rhythm, normal S1 and S2, no murmurs  Examination of extremities for edema and/or varicosities: Normal     Abdomen   Abdomen: Non-tender, no masses  Liver and spleen: No hepatomegaly or splenomegaly  Lymphatic   Palpation of lymph nodes in neck: No lymphadenopathy      Musculoskeletal   Digits and nails: Normal without clubbing or cyanosis  Stability: Normal     Neurologic   Cortical function: Normal mental status  Coordination: Normal finger to nose and heel to shin  Psychiatric   Orientation to person, place, and time: Normal     Mood and affect: Normal        Procedure    Procedure:   Results: 20/20/20 in both eyes with corrective device, 20/20/20 in the right eye with corrective device, 20/20/20 in the left eye with corrective device      Future Appointments    Date/Time Provider Specialty Site   11/22/2017 09:10 AM BANDAR Randolph   [facility] [address]     Signatures   Electronically signed by : Remedios Warner MD; Nov 8 2017  2:54PM EST                       (Author)

## 2018-01-10 NOTE — MISCELLANEOUS
Message  Return to work or school:   Nilo Maria is under my professional care  She was seen in my office on 10/24/17  She is not able to return to work until the next office evaluation in 4 weeks        Donald David MD       Signatures   Electronically signed by : BANDAR Longo ; Oct 24 2017  9:54AM EST                       (Author)

## 2018-01-11 NOTE — MISCELLANEOUS
Message  Return to work or school:  8/22/17    She is not able to return to work until cleared by physician      Larance Hashimoto PA-C        Signatures   Electronically signed by : ANGELES Forte; Aug 24 2017  1:39PM EST                       (Author)

## 2018-01-12 VITALS
BODY MASS INDEX: 26.58 KG/M2 | WEIGHT: 150 LBS | SYSTOLIC BLOOD PRESSURE: 122 MMHG | HEART RATE: 79 BPM | DIASTOLIC BLOOD PRESSURE: 76 MMHG | HEIGHT: 63 IN

## 2018-01-12 NOTE — PSYCH
Behavioral Health Outpatient Intake    Referred By: Dr Daya Lopez  Intake Questions: there are no developmental disabilities  the patient does not have a hearing impairment  the patient does not have an ICM or CTT  patient is not taking injectable psychiatric medications  Employment: The patient is employed part time at [employer]  Emergency Contact Information:   Emergency Contact: Audrey Borden   Relationship to Patient: [email or web address]   Phone Number: [phone]   Previous Psychiatric Treatment: She has previously been seen by a psychiatrist  Kamille Morin June 2017  She has not been previously seen by a therapist     She had seen for 5 years previous visits for psychiatric treatment   History: no  service  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

## 2018-01-13 VITALS
SYSTOLIC BLOOD PRESSURE: 110 MMHG | HEIGHT: 63 IN | DIASTOLIC BLOOD PRESSURE: 62 MMHG | BODY MASS INDEX: 27.46 KG/M2 | HEART RATE: 84 BPM | WEIGHT: 155 LBS | OXYGEN SATURATION: 98 % | RESPIRATION RATE: 16 BRPM

## 2018-01-13 VITALS
HEART RATE: 91 BPM | SYSTOLIC BLOOD PRESSURE: 154 MMHG | WEIGHT: 150 LBS | DIASTOLIC BLOOD PRESSURE: 83 MMHG | HEIGHT: 63 IN | BODY MASS INDEX: 26.58 KG/M2

## 2018-01-13 NOTE — RESULT NOTES
Verified Results  (1) CBC/PLT/DIFF [id number] 09:32AM Purcell Spatz Order Number: [id number]     Test Name Result Flag Reference   WBC COUNT 8 75 Thousand/uL  4 31-10 16   RBC COUNT 4 68 Million/uL  3 81-5 12   HEMOGLOBIN 13 3 g/dL  11 5-15 4   HEMATOCRIT 40 0 %  34 8-46  1   MCV 86 fL  82-98   MCH 28 4 pg  26 8-34 3   MCHC 33 3 g/dL  31 4-37 4   RDW 13 3 %  11 6-15 1   MPV 11 0 fL  8 9-12 7   PLATELET COUNT 490 Thousands/uL  149-390   nRBC AUTOMATED 0 /100 WBCs     NEUTROPHILS RELATIVE PERCENT 46 %  43-75   LYMPHOCYTES RELATIVE PERCENT 41 %  14-44   MONOCYTES RELATIVE PERCENT 9 %  4-12   EOSINOPHILS RELATIVE PERCENT 3 %  0-6   BASOPHILS RELATIVE PERCENT 1 %  0-1   NEUTROPHILS ABSOLUTE COUNT 4 01 Thousands/? ??L  1 85-7 62   LYMPHOCYTES ABSOLUTE COUNT 3 62 Thousands/? ??L  0 60-4 47   MONOCYTES ABSOLUTE COUNT 0 77 Thousand/? ??L  0 17-1 22   EOSINOPHILS ABSOLUTE COUNT 0 27 Thousand/? ??L  0 00-0 61   BASOPHILS ABSOLUTE COUNT 0 07 Thousands/? ??L  0 00-0 10     (1) COMPREHENSIVE METABOLIC PANEL [id number] 09:85NW Purcell Spatz Order Number: [id number]     Test Name Result Flag Reference   SODIUM 135 mmol/L L 136-145   POTASSIUM 4 7 mmol/L  3 5-5 3   CHLORIDE 101 mmol/L  100-108   CARBON DIOXIDE 27 mmol/L  21-32   ANION GAP (CALC) 7 mmol/L  4-13   BLOOD UREA NITROGEN 12 mg/dL  5-25   CREATININE 0 78 mg/dL  0 60-1 30   Standardized to IDMS reference method   CALCIUM 9 3 mg/dL  8 3-10 1   BILI, TOTAL 0 37 mg/dL  0 20-1 00   ALK PHOSPHATAS 130 U/L H    ALT (SGPT) 18 U/L  12-78   Specimen collection should occur prior to Sulfasalazine and/or Sulfapyridine administration due to the potential for falsely depressed results  AST(SGOT) 18 U/L  5-45   Specimen collection should occur prior to Sulfasalazine administration due to the potential for falsely depressed results     ALBUMIN 3 6 g/dL  3 5-5 0   TOTAL PROTEIN 7 6 g/dL  6 4-8 2   eGFR 87 ml/min/1 73sq m     National Kidney Disease Education Program recommendations are as follows:  GFR calculation is accurate only with a steady state creatinine  Chronic Kidney disease less than 60 ml/min/1 73 sq  meters  Kidney failure less than 15 ml/min/1 73 sq  meters  GLUCOSE FASTING 98 mg/dL  65-99   Specimen collection should occur prior to Sulfasalazine administration due to the potential for falsely depressed results  Specimen collection should occur prior to Sulfapyridine administration due to the potential for falsely elevated results  (1) LIPID PANEL, FASTING [id number] 09:32AM Didier Orn Order Number: [id number]     Test Name Result Flag Reference   CHOLESTEROL 225 mg/dL H    HDL,DIRECT 50 mg/dL  40-60   Specimen collection should occur prior to Metamizole administration due to the potential for falsley depressed results  LDL CHOLESTEROL CALCULATED 143 mg/dL H 0-100   Triglyceride:        Normal <150 mg/dl   Borderline High 150-199 mg/dl   High 200-499 mg/dl   Very High >499 mg/dl      Cholesterol:       Desirable <200 mg/dl    Borderline High 200-239 mg/dl    High >239 mg/dl      HDL Cholesterol:       High>59 mg/dL    Low <41 mg/dL      This screening LDL is a calculated result  It does not have the accuracy of the Direct Measured LDL in the monitoring of patients with hyperlipidemia and/or statin therapy  Direct Measure LDL (FFK122) must be ordered separately in these patients  TRIGLYCERIDES 159 mg/dL H <=150   Specimen collection should occur prior to N-Acetylcysteine or Metamizole administration due to the potential for falsely depressed results  (1) HEMOGLOBIN A1C [id number] 09:32AM Didier Orn Order Number: [id number]     Test Name Result Flag Reference   HEMOGLOBIN A1C 6 6 % H 4 2-6 3   EST  AVG   GLUCOSE 143 mg/dl       (1) TSH WITH FT4 REFLEX [id number] 09:29AM Laura Huertas     Test Name Result Flag Reference   TSH 2 780 uIU/mL  0 358-3 740   Patients undergoing fluorescein dye angiography may retain small amounts of fluorescein in the body for 48-72 hours post procedure  Samples containing fluorescein can produce falsely depressed TSH values  If the patient had this procedure,a specimen should be resubmitted post fluorescein clearance            The recommended reference ranges for TSH during pregnancy are as follows:  First trimester 0 1 to 2 5 uIU/mL  Second trimester  0 2 to 3 0 uIU/mL  Third trimester 0 3 to 3 0 uIU/m     (1) MICROALBUMIN CREATININE RATIO, RANDOM URINE [id number] 09:29AM Laura Huertas     Test Name Result Flag Reference   MICROALBUMIN/ CREAT R <7 mg/g creatinine  0-30   MICROALBUMIN,URINE <5 0 mg/L  0 0-20 0   CREATININE URINE 73 3 mg/dL

## 2018-01-14 VITALS
OXYGEN SATURATION: 98 % | DIASTOLIC BLOOD PRESSURE: 70 MMHG | RESPIRATION RATE: 17 BRPM | SYSTOLIC BLOOD PRESSURE: 118 MMHG | HEART RATE: 96 BPM | HEIGHT: 63 IN | WEIGHT: 156.25 LBS | BODY MASS INDEX: 27.68 KG/M2

## 2018-01-14 VITALS
SYSTOLIC BLOOD PRESSURE: 114 MMHG | HEART RATE: 84 BPM | WEIGHT: 150 LBS | HEIGHT: 63 IN | BODY MASS INDEX: 26.58 KG/M2 | DIASTOLIC BLOOD PRESSURE: 73 MMHG

## 2018-01-14 VITALS
HEIGHT: 63 IN | SYSTOLIC BLOOD PRESSURE: 117 MMHG | BODY MASS INDEX: 26.58 KG/M2 | DIASTOLIC BLOOD PRESSURE: 76 MMHG | HEART RATE: 98 BPM | WEIGHT: 150 LBS

## 2018-01-15 VITALS
HEIGHT: 63 IN | SYSTOLIC BLOOD PRESSURE: 118 MMHG | DIASTOLIC BLOOD PRESSURE: 79 MMHG | HEART RATE: 89 BPM | BODY MASS INDEX: 26.58 KG/M2 | WEIGHT: 150 LBS

## 2018-01-22 VITALS
BODY MASS INDEX: 27.68 KG/M2 | SYSTOLIC BLOOD PRESSURE: 118 MMHG | WEIGHT: 156.25 LBS | HEART RATE: 85 BPM | DIASTOLIC BLOOD PRESSURE: 80 MMHG | HEIGHT: 63 IN

## 2018-01-23 NOTE — MISCELLANEOUS
Message   Recorded as Task   Date: 01/09/2018 10:17 AM, Created By: Portillo Reyes   Task Name: Miscellaneous   Assigned To: Bhavin Avila   Regarding Patient: Akin Hartmann, Status: Active   CommentLora Span - 09 Jan 2018 10:17 AM     TASK CREATED  New Patient cancelled appointment for 1/15/18 at 2pm,stated does not have money to pay for appointment,going through workman's comp  Candy called to cancel appt for 1/15/2018, as per reason specified in task        Signatures   Electronically signed by : MARY Jain; Jan 9 2018 10:47AM EST                       (Author)

## 2018-02-02 RX ORDER — CARBAMAZEPINE 100 MG/1
100 TABLET, EXTENDED RELEASE ORAL 2 TIMES DAILY
Qty: 60 TABLET | Refills: 1 | Status: CANCELLED | OUTPATIENT
Start: 2018-02-02

## 2018-02-02 NOTE — TELEPHONE ENCOUNTER
Patient advised that she did not go to her appointment as it did not coincide with her schedule  I advised patient that Dr Ginny Mendoza does not usually fill these medications   Patient advised that's okay I will be okay with out it

## 2018-02-02 NOTE — TELEPHONE ENCOUNTER
Pt should call her psychiatrist for the medication  Per last note She was supposed to establish with them in January

## 2018-02-09 ENCOUNTER — TELEPHONE (OUTPATIENT)
Dept: OBGYN CLINIC | Facility: CLINIC | Age: 53
End: 2018-02-09

## 2018-02-12 ENCOUNTER — OFFICE VISIT (OUTPATIENT)
Dept: OBGYN CLINIC | Facility: OTHER | Age: 53
End: 2018-02-12
Payer: COMMERCIAL

## 2018-02-12 VITALS
WEIGHT: 152 LBS | BODY MASS INDEX: 26.93 KG/M2 | DIASTOLIC BLOOD PRESSURE: 75 MMHG | HEART RATE: 90 BPM | HEIGHT: 63 IN | SYSTOLIC BLOOD PRESSURE: 110 MMHG

## 2018-02-12 DIAGNOSIS — M16.11 PRIMARY LOCALIZED OSTEOARTHRITIS OF RIGHT HIP: Primary | ICD-10-CM

## 2018-02-12 PROBLEM — M19.90 ARTHRITIS: Status: ACTIVE | Noted: 2017-06-06

## 2018-02-12 PROBLEM — K31.9 STOMACH PROBLEMS: Status: ACTIVE | Noted: 2017-06-06

## 2018-02-12 PROBLEM — E78.5 HYPERLIPEMIA: Status: ACTIVE | Noted: 2017-11-08

## 2018-02-12 PROBLEM — I51.9 HEART DISORDER: Status: ACTIVE | Noted: 2017-06-06

## 2018-02-12 PROBLEM — I10 HYPERTENSION: Status: ACTIVE | Noted: 2017-06-06

## 2018-02-12 PROBLEM — M54.16 LUMBAR RADICULOPATHY: Status: ACTIVE | Noted: 2017-06-06

## 2018-02-12 PROBLEM — K21.9 CHRONIC GERD: Status: ACTIVE | Noted: 2017-11-08

## 2018-02-12 PROBLEM — F32.A DEPRESSION: Status: ACTIVE | Noted: 2017-06-06

## 2018-02-12 PROBLEM — E11.9 DIABETES (HCC): Status: ACTIVE | Noted: 2017-06-06

## 2018-02-12 PROBLEM — F31.9 BIPOLAR DEPRESSION (HCC): Status: ACTIVE | Noted: 2017-11-08

## 2018-02-12 PROCEDURE — 99213 OFFICE O/P EST LOW 20 MIN: CPT | Performed by: ORTHOPAEDIC SURGERY

## 2018-02-12 RX ORDER — CARBAMAZEPINE 100 MG/1
1 TABLET, EXTENDED RELEASE ORAL EVERY 12 HOURS
COMMUNITY
End: 2018-02-28 | Stop reason: SDUPTHER

## 2018-02-12 RX ORDER — ATORVASTATIN CALCIUM 20 MG/1
20 TABLET, FILM COATED ORAL DAILY
COMMUNITY
End: 2018-06-05 | Stop reason: SDUPTHER

## 2018-02-12 RX ORDER — OMEPRAZOLE 20 MG/1
1 CAPSULE, DELAYED RELEASE ORAL DAILY
COMMUNITY
End: 2018-06-05

## 2018-02-12 RX ORDER — ASPIRIN 81 MG/1
81 TABLET, CHEWABLE ORAL
COMMUNITY

## 2018-02-12 NOTE — PROGRESS NOTES
Patient Name:  Desi Braden  MRN:  [id number]    Assessment & Plan    Right hip and low back pain after arthroscopic labral debridement 7/13/17  Referred to radiology for intra-articular right hip steroid injection under fluoroscopic guidance  Continue home exercises, activity modification, and NSAIDs as needed  Cane as needed to ambulate  Follow-up in 1 month  Consider MRI arthrogram right hip if symptoms persist       Subjective    Follow-up evaluation of right hip pain after arthroscopic labral debridement on 7/13/17  Patient continues to have diffuse pain in her right hip localizing to the anterior, lateral, and posterior aspect as well as the low back and radiating distally into her thigh  The pain is worse with weightbearing activity or hip flexion activities such as forward bending  She reports a 2 block walking tolerance  No numbness or tingling  She tried a course of physical therapy after surgery and has been doing the home exercises as much as possible  She walks with a cane and takes OTC NSAIDs as needed  General ROS:  Negative for fever, lethargy/malaise, or night sweats  Objective    /75   Pulse 90   Ht 5' 3" (1 6 m)   Wt 68 9 kg (152 lb)   BMI 26 93 kg/m²     Right hip: No gross deformity  Tenderness to palpation diffusely over the anterior, lateral, and posterior hip as well as the sacroiliac region and lower lumbar spine  Full range of motion of the hip without instability  Impingement test is positive  Rockne Moores test is positive  Passive supine rotation test is positive  Straight leg raise against resistance is positive  4/5 strength globally in the right hip secondary to pain  Sensation intact to light touch L2-S1 distribution  No lower extremity edema

## 2018-02-19 ENCOUNTER — HOSPITAL ENCOUNTER (OUTPATIENT)
Dept: RADIOLOGY | Facility: HOSPITAL | Age: 53
Discharge: HOME | End: 2018-02-19
Attending: ORTHOPAEDIC SURGERY | Admitting: RADIOLOGY
Payer: COMMERCIAL

## 2018-02-19 DIAGNOSIS — M16.11 PRIMARY LOCALIZED OSTEOARTHRITIS OF RIGHT HIP: ICD-10-CM

## 2018-02-19 PROCEDURE — 20610 DRAIN/INJ JOINT/BURSA W/O US: CPT

## 2018-02-19 PROCEDURE — 77002 NEEDLE LOCALIZATION BY XRAY: CPT

## 2018-02-19 RX ORDER — BUPIVACAINE HYDROCHLORIDE 2.5 MG/ML
20 INJECTION, SOLUTION EPIDURAL; INFILTRATION; INTRACAUDAL ONCE
Status: COMPLETED | OUTPATIENT
Start: 2018-02-19 | End: 2018-02-19

## 2018-02-19 RX ORDER — LIDOCAINE HYDROCHLORIDE 10 MG/ML
15 INJECTION, SOLUTION EPIDURAL; INFILTRATION; INTRACAUDAL; PERINEURAL ONCE
Status: COMPLETED | OUTPATIENT
Start: 2018-02-19 | End: 2018-02-19

## 2018-02-19 RX ORDER — METHYLPREDNISOLONE ACETATE 80 MG/ML
80 INJECTION, SUSPENSION INTRA-ARTICULAR; INTRALESIONAL; INTRAMUSCULAR; SOFT TISSUE ONCE
Status: COMPLETED | OUTPATIENT
Start: 2018-02-19 | End: 2018-02-19

## 2018-02-19 RX ADMIN — BUPIVACAINE HYDROCHLORIDE 2 ML: 2.5 INJECTION, SOLUTION EPIDURAL; INFILTRATION; INTRACAUDAL; PERINEURAL at 12:20

## 2018-02-19 RX ADMIN — METHYLPREDNISOLONE ACETATE 1 MG: 80 INJECTION, SUSPENSION INTRA-ARTICULAR; INTRALESIONAL; INTRAMUSCULAR; SOFT TISSUE at 12:20

## 2018-02-19 RX ADMIN — LIDOCAINE HYDROCHLORIDE 7 ML: 10 INJECTION, SOLUTION EPIDURAL; INFILTRATION; INTRACAUDAL; PERINEURAL at 12:20

## 2018-02-19 RX ADMIN — IOHEXOL 2 ML: 300 INJECTION, SOLUTION INTRAVENOUS at 12:20

## 2018-02-28 ENCOUNTER — OFFICE VISIT (OUTPATIENT)
Dept: FAMILY MEDICINE CLINIC | Facility: CLINIC | Age: 53
End: 2018-02-28
Payer: COMMERCIAL

## 2018-02-28 VITALS
SYSTOLIC BLOOD PRESSURE: 115 MMHG | BODY MASS INDEX: 27.11 KG/M2 | DIASTOLIC BLOOD PRESSURE: 74 MMHG | HEIGHT: 63 IN | HEART RATE: 94 BPM | OXYGEN SATURATION: 98 % | WEIGHT: 153 LBS

## 2018-02-28 DIAGNOSIS — F32.A DEPRESSION, UNSPECIFIED DEPRESSION TYPE: ICD-10-CM

## 2018-02-28 DIAGNOSIS — F31.9 BIPOLAR DEPRESSION (HCC): ICD-10-CM

## 2018-02-28 DIAGNOSIS — E11.9 TYPE 2 DIABETES MELLITUS WITHOUT COMPLICATION, WITHOUT LONG-TERM CURRENT USE OF INSULIN (HCC): Primary | ICD-10-CM

## 2018-02-28 PROBLEM — M25.551 HIP PAIN, RIGHT: Status: ACTIVE | Noted: 2018-02-28

## 2018-02-28 PROBLEM — Z00.00 PREVENTATIVE HEALTH CARE: Status: ACTIVE | Noted: 2018-02-28

## 2018-02-28 PROCEDURE — 99396 PREV VISIT EST AGE 40-64: CPT | Performed by: FAMILY MEDICINE

## 2018-02-28 PROCEDURE — 99213 OFFICE O/P EST LOW 20 MIN: CPT | Performed by: FAMILY MEDICINE

## 2018-02-28 RX ORDER — CARBAMAZEPINE 100 MG/1
100 TABLET, EXTENDED RELEASE ORAL EVERY 12 HOURS
Qty: 60 TABLET | Refills: 2 | Status: SHIPPED | OUTPATIENT
Start: 2018-02-28 | End: 2018-05-29 | Stop reason: SDUPTHER

## 2018-02-28 RX ORDER — LANCETS
EACH MISCELLANEOUS
Qty: 100 EACH | Refills: 1 | Status: SHIPPED | OUTPATIENT
Start: 2018-02-28 | End: 2018-12-11 | Stop reason: SDUPTHER

## 2018-02-28 RX ORDER — CLONAZEPAM 0.5 MG/1
0.5 TABLET ORAL 2 TIMES DAILY PRN
Qty: 60 TABLET | Refills: 2 | Status: SHIPPED | OUTPATIENT
Start: 2018-02-28 | End: 2018-05-29 | Stop reason: SDUPTHER

## 2018-02-28 NOTE — ASSESSMENT & PLAN NOTE
Depression is stable on current dose of medications  Patient will continue taking the medications as prescribed  PD MP checked  Patient encouraged to maintain her follow-up with psychiatrist in May 2018

## 2018-02-28 NOTE — PROGRESS NOTES
Assessment/Plan:    Preventative health care   Patient is up-to-date on all her preventative screenings  She is going to provide us records from [facility]    patient will call us back when she is due for mammogram       Depression    Depression is stable on current dose of medications  Patient will continue taking the medications as prescribed  PD MP checked  Patient encouraged to maintain her follow-up with psychiatrist in May 2018  Diabetes ([facility] [address] )   Last A1c 6 6, at goal   Patient will continue monitoring her blood sugar 1 to 2 times per day  Routine labs advised 4 April 2018  Patient will follow up thereafter  Diagnoses and all orders for this visit:    Type 2 diabetes mellitus without complication, without long-term current use of insulin ([facility])  -     glucose blood (ONE TOUCH ULTRA TEST) test strip; Check blood sugar twice daily   -     Lancets (ONETOUCH ULTRASOFT) lancets; Check blood sugar twice daily  Bipolar depression ([facility] [address] )  -     clonazePAM (KlonoPIN) 0 5 mg tablet; Take 1 tablet (0 5 mg total) by mouth 2 (two) times a day as needed for seizures  -     carBAMazepine (TEGretol XR) 100 mg 12 hr tablet; Take 1 tablet (100 mg total) by mouth every 12 (twelve) hours    Depression, unspecified depression type  -     clonazePAM (KlonoPIN) 0 5 mg tablet; Take 1 tablet (0 5 mg total) by mouth 2 (two) times a day as needed for seizures  -     carBAMazepine (TEGretol XR) 100 mg 12 hr tablet; Take 1 tablet (100 mg total) by mouth every 12 (twelve) hours          Subjective:      Patient ID: Vickie Newell is a 47 y o  female      Prior PCP: THIS OFFICE  Previous Dental Visit: NONE  Previous Eye Exam: NONE    Prior Vaccines:   - Last Flu vaccine: DECLINES  - Last Tetanus vaccine: 2009    Diet: HEALTHY  Exercise: CANNOT DO NOW  DUE TO RIGHT HIP PAIN    LMP: 7 YRS AGO, S/P ENDOMETRIAL ABLATION  Duration: NA  Menarche: NA    Cancer screening  Last Pap: 2016  Last Mammo: 2017  Colonoscopy 2013     HPI  PATIENT IS HERE FOR ANNUAL PHYSICAL  She has applied for social security disability,  Because of pain in her right hip  Patient was a ,  Who resigned from her job  On 1/10/ 2018 due to pain in her right hip     patient was evaluated by Rose and suggested an intra-articular right hip steroid injection  Patient states that she has not experienced any symptom relief after the injection  She is here in tears reporting  Persisting pain in the right hip  She is also experiencing tingling numbness in the right leg  Patient states that her leg feels weak,  So she bought a cane and is walking with the help of a cane  She has not experienced any falls yet,  But  she feels that the leg might give in  Patient has a follow-up with Ortho after 4 weeks  she is also experiencing pain radiating to her lower back now  Patient states that she has a history of herniated lumbar discs  patient is also requesting a refill of her anxiety medications  She has a history of anxiety and depression for which she is on carbamazepine and clonazepam 0 5 milligram daily  Patient Mr  Psychiatry appointment which was due in January 2018  Unfortunately due to her hip pain she missed her appointment and does not have a follow-up with psych till May 2018  The following portions of the patient's history were reviewed and updated as appropriate: allergies, current medications, past family history, past medical history, past social history, past surgical history and problem list     Review of Systems   Constitutional: Negative  HENT: Negative  Eyes: Negative  Respiratory: Negative  Cardiovascular: Negative  Gastrointestinal: Negative  Endocrine: Negative  Genitourinary: Negative  Musculoskeletal: Positive for arthralgias, back pain and gait problem  Right hip pain, rt leg pain  Skin: Negative  Psychiatric/Behavioral: Negative for suicidal ideas   The patient is nervous/anxious  Social History     Social History    Marital status:      Spouse name: N/A    Number of children: N/A    Years of education: N/A     Occupational History    Not on file  Social History Main Topics    Smoking status: Current Every Day Smoker     Packs/day: 1 00     Years: 25 00    Smokeless tobacco: Never Used    Alcohol use Yes      Comment: occ    Drug use: No    Sexual activity: Not on file     Other Topics Concern    Not on file     Social History Narrative    Caffeine use             Objective:  /74   Pulse 94   Ht 5' 3" (1 6 m)   Wt 69 4 kg (153 lb)   SpO2 98%   BMI 27 10 kg/m²      Physical Exam   Constitutional: She is oriented to person, place, and time  She appears well-developed and well-nourished  HENT:   Head: Normocephalic  Right Ear: External ear normal    Left Ear: External ear normal    Eyes: Pupils are equal, round, and reactive to light  Neck: Normal range of motion  Neck supple  Cardiovascular: Normal rate and regular rhythm  Pulmonary/Chest: Effort normal and breath sounds normal    Abdominal: Soft  Bowel sounds are normal    Musculoskeletal: Normal range of motion  Rt hip pain, with movements of the right leg  Rt LE strength 4/5  Lt LE strength 5/5   Neurological: She is alert and oriented to person, place, and time  No cranial nerve deficit  Psychiatric: She has a normal mood and affect   Her behavior is normal  Judgment normal

## 2018-02-28 NOTE — ASSESSMENT & PLAN NOTE
Last A1c 6 6, at goal   Patient will continue monitoring her blood sugar 1 to 2 times per day  Routine labs advised 4 April 2018  Patient will follow up thereafter

## 2018-02-28 NOTE — ASSESSMENT & PLAN NOTE
Patient is up-to-date on all her preventative screenings    She is going to provide us records from [facility]    patient will call us back when she is due for mammogram

## 2018-03-14 ENCOUNTER — OFFICE VISIT (OUTPATIENT)
Dept: OBGYN CLINIC | Facility: CLINIC | Age: 53
End: 2018-03-14
Payer: COMMERCIAL

## 2018-03-14 VITALS
HEIGHT: 63 IN | BODY MASS INDEX: 26.93 KG/M2 | DIASTOLIC BLOOD PRESSURE: 79 MMHG | SYSTOLIC BLOOD PRESSURE: 120 MMHG | WEIGHT: 152 LBS | HEART RATE: 97 BPM

## 2018-03-14 DIAGNOSIS — M25.551 RIGHT HIP PAIN: Primary | ICD-10-CM

## 2018-03-14 PROCEDURE — 99910: CPT | Performed by: ORTHOPAEDIC SURGERY

## 2018-03-14 PROCEDURE — 99213 OFFICE O/P EST LOW 20 MIN: CPT | Performed by: ORTHOPAEDIC SURGERY

## 2018-03-14 NOTE — PROGRESS NOTES
Patient Name:  Sylvie Cid  MRN:  [id number]    Assessment & Plan    Right hip pain after arthroscopic labral debridement 7/13/17  1  Patient dropped off functional capacity forms for completion  2  Lack of improvement after right hip intra-articular steroid injection suggests extra-articular etiology for pain  Additional imaging of the right hip is not indicated at this time  3  Patient has maximized her treatment options in my opinion  Recommended second opinion, and I will defer to the second opinion physician regarding any additional treatment  No additional follow-up with me is planned  Subjective    Patient returns today reporting ongoing moderate to severe pain localized to the lateral aspect of her hip  She also describes pain radiating along the anterior thigh and pain in the posterior hip as well  She reports generalized numbness in her foot  She had an intra-articular steroid injection on 2/19/18 with no relief at all  She tried steroid injections for her lumbar spine in the past, also without relief  She uses a cane for ambulation  General ROS:  Negative for fever, lethargy/malaise, or night sweats  Objective    /79   Pulse 97   Ht 5' 3" (1 6 m)   Wt 68 9 kg (152 lb)   BMI 26 93 kg/m²     Right hip:  Tenderness to palpation lateral hip and proximal thigh diffusely  Range of motion is flexion 70°, external rotation 20°, and internal rotation 5°, limited by guarding secondary to pain in all planes  Margueritte Josefa FADDIR test is positive  ALLYSON test is positive  Passive supine rotation test is negative  Supine straight leg raise is positive  Straight leg raise against resistance is positive  No lower extremity edema  Sensation intact to light touch in the L2 through S1 distribution with the exception of her right foot, which is diminished throughout  Weakness globally in the right lower extremity secondary to pain

## 2018-04-30 ENCOUNTER — LAB (OUTPATIENT)
Dept: LAB | Facility: CLINIC | Age: 53
End: 2018-04-30
Payer: COMMERCIAL

## 2018-04-30 DIAGNOSIS — E11.9 TYPE 2 DIABETES MELLITUS WITHOUT COMPLICATIONS (HCC): ICD-10-CM

## 2018-04-30 DIAGNOSIS — I10 ESSENTIAL (PRIMARY) HYPERTENSION: ICD-10-CM

## 2018-04-30 DIAGNOSIS — E78.5 HYPERLIPIDEMIA: ICD-10-CM

## 2018-04-30 DIAGNOSIS — F31.30 BIPOLAR DISORDER, CURRENT EPISODE DEPRESSED, MILD OR MODERATE SEVERITY, UNSPECIFIED (HCC): ICD-10-CM

## 2018-04-30 LAB
ALBUMIN SERPL BCP-MCNC: 3.6 G/DL (ref 3.5–5)
ALP SERPL-CCNC: 153 U/L (ref 46–116)
ALT SERPL W P-5'-P-CCNC: 21 U/L (ref 12–78)
ANION GAP SERPL CALCULATED.3IONS-SCNC: 8 MMOL/L (ref 4–13)
AST SERPL W P-5'-P-CCNC: 19 U/L (ref 5–45)
BASOPHILS # BLD AUTO: 0.04 THOUSANDS/ΜL (ref 0–0.1)
BASOPHILS NFR BLD AUTO: 0 % (ref 0–1)
BILIRUB SERPL-MCNC: 0.35 MG/DL (ref 0.2–1)
BUN SERPL-MCNC: 13 MG/DL (ref 5–25)
CALCIUM SERPL-MCNC: 9.5 MG/DL (ref 8.3–10.1)
CHLORIDE SERPL-SCNC: 99 MMOL/L (ref 100–108)
CHOLEST SERPL-MCNC: 181 MG/DL (ref 50–200)
CO2 SERPL-SCNC: 27 MMOL/L (ref 21–32)
CREAT SERPL-MCNC: 0.71 MG/DL (ref 0.6–1.3)
EOSINOPHIL # BLD AUTO: 0.21 THOUSAND/ΜL (ref 0–0.61)
EOSINOPHIL NFR BLD AUTO: 2 % (ref 0–6)
ERYTHROCYTE [DISTWIDTH] IN BLOOD BY AUTOMATED COUNT: 13.7 % (ref 11.6–15.1)
EST. AVERAGE GLUCOSE BLD GHB EST-MCNC: 134 MG/DL
GFR SERPL CREATININE-BSD FRML MDRD: 97 ML/MIN/1.73SQ M
GLUCOSE P FAST SERPL-MCNC: 126 MG/DL (ref 65–99)
HBA1C MFR BLD: 6.3 % (ref 4.2–6.3)
HCT VFR BLD AUTO: 39.3 % (ref 34.8–46.1)
HDLC SERPL-MCNC: 55 MG/DL (ref 40–60)
HGB BLD-MCNC: 13.4 G/DL (ref 11.5–15.4)
LDLC SERPL CALC-MCNC: 96 MG/DL (ref 0–100)
LYMPHOCYTES # BLD AUTO: 3.98 THOUSANDS/ΜL (ref 0.6–4.47)
LYMPHOCYTES NFR BLD AUTO: 40 % (ref 14–44)
MCH RBC QN AUTO: 29.1 PG (ref 26.8–34.3)
MCHC RBC AUTO-ENTMCNC: 34.1 G/DL (ref 31.4–37.4)
MCV RBC AUTO: 85 FL (ref 82–98)
MONOCYTES # BLD AUTO: 0.71 THOUSAND/ΜL (ref 0.17–1.22)
MONOCYTES NFR BLD AUTO: 7 % (ref 4–12)
NEUTROPHILS # BLD AUTO: 5.09 THOUSANDS/ΜL (ref 1.85–7.62)
NEUTS SEG NFR BLD AUTO: 51 % (ref 43–75)
NONHDLC SERPL-MCNC: 126 MG/DL
NRBC BLD AUTO-RTO: 0 /100 WBCS
PLATELET # BLD AUTO: 292 THOUSANDS/UL (ref 149–390)
PMV BLD AUTO: 11.4 FL (ref 8.9–12.7)
POTASSIUM SERPL-SCNC: 4.7 MMOL/L (ref 3.5–5.3)
PROT SERPL-MCNC: 7.6 G/DL (ref 6.4–8.2)
RBC # BLD AUTO: 4.61 MILLION/UL (ref 3.81–5.12)
SODIUM SERPL-SCNC: 134 MMOL/L (ref 136–145)
TRIGL SERPL-MCNC: 152 MG/DL
WBC # BLD AUTO: 10.05 THOUSAND/UL (ref 4.31–10.16)

## 2018-04-30 PROCEDURE — 36415 COLL VENOUS BLD VENIPUNCTURE: CPT

## 2018-04-30 PROCEDURE — 80053 COMPREHEN METABOLIC PANEL: CPT

## 2018-04-30 PROCEDURE — 85025 COMPLETE CBC W/AUTO DIFF WBC: CPT

## 2018-04-30 PROCEDURE — 80061 LIPID PANEL: CPT

## 2018-04-30 PROCEDURE — 83036 HEMOGLOBIN GLYCOSYLATED A1C: CPT

## 2018-05-29 ENCOUNTER — OFFICE VISIT (OUTPATIENT)
Dept: PSYCHIATRY | Facility: CLINIC | Age: 53
End: 2018-05-29
Payer: COMMERCIAL

## 2018-05-29 VITALS
BODY MASS INDEX: 27.43 KG/M2 | WEIGHT: 154.8 LBS | HEIGHT: 63 IN | DIASTOLIC BLOOD PRESSURE: 76 MMHG | HEART RATE: 93 BPM | SYSTOLIC BLOOD PRESSURE: 112 MMHG

## 2018-05-29 DIAGNOSIS — F41.0 PANIC ATTACKS: ICD-10-CM

## 2018-05-29 DIAGNOSIS — F32.A DEPRESSION, UNSPECIFIED DEPRESSION TYPE: ICD-10-CM

## 2018-05-29 DIAGNOSIS — F31.9 BIPOLAR DEPRESSION (HCC): ICD-10-CM

## 2018-05-29 DIAGNOSIS — M25.551 RIGHT HIP PAIN: ICD-10-CM

## 2018-05-29 DIAGNOSIS — F41.1 GENERALIZED ANXIETY DISORDER: ICD-10-CM

## 2018-05-29 DIAGNOSIS — F33.1 MODERATE RECURRENT MAJOR DEPRESSION (HCC): Primary | ICD-10-CM

## 2018-05-29 PROCEDURE — 99205 OFFICE O/P NEW HI 60 MIN: CPT | Performed by: PHYSICIAN ASSISTANT

## 2018-05-29 RX ORDER — CLONAZEPAM 0.5 MG/1
TABLET ORAL
Qty: 60 TABLET | Refills: 2 | Status: SHIPPED | OUTPATIENT
Start: 2018-05-29 | End: 2018-07-23 | Stop reason: SDUPTHER

## 2018-05-29 RX ORDER — CARBAMAZEPINE 100 MG/1
100 TABLET, EXTENDED RELEASE ORAL
Qty: 30 TABLET | Refills: 2 | Status: SHIPPED | OUTPATIENT
Start: 2018-05-29 | End: 2018-07-23 | Stop reason: ALTCHOICE

## 2018-05-29 RX ORDER — PAROXETINE HYDROCHLORIDE 20 MG/1
TABLET, FILM COATED ORAL
Qty: 30 TABLET | Refills: 2 | Status: SHIPPED | OUTPATIENT
Start: 2018-05-29 | End: 2018-07-23 | Stop reason: SDUPTHER
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
## 2018-06-05 ENCOUNTER — OFFICE VISIT (OUTPATIENT)
Dept: FAMILY MEDICINE CLINIC | Facility: CLINIC | Age: 53
End: 2018-06-05
Payer: COMMERCIAL

## 2018-06-05 VITALS
BODY MASS INDEX: 27.29 KG/M2 | WEIGHT: 154 LBS | HEART RATE: 75 BPM | DIASTOLIC BLOOD PRESSURE: 80 MMHG | OXYGEN SATURATION: 97 % | SYSTOLIC BLOOD PRESSURE: 134 MMHG | HEIGHT: 63 IN

## 2018-06-05 DIAGNOSIS — Z12.11 SCREEN FOR COLON CANCER: ICD-10-CM

## 2018-06-05 DIAGNOSIS — Z12.31 SCREENING MAMMOGRAM, ENCOUNTER FOR: Primary | ICD-10-CM

## 2018-06-05 DIAGNOSIS — I10 ESSENTIAL HYPERTENSION: ICD-10-CM

## 2018-06-05 DIAGNOSIS — E78.5 HYPERLIPIDEMIA, UNSPECIFIED HYPERLIPIDEMIA TYPE: ICD-10-CM

## 2018-06-05 DIAGNOSIS — E11.9 TYPE 2 DIABETES MELLITUS WITHOUT COMPLICATION, WITHOUT LONG-TERM CURRENT USE OF INSULIN (HCC): ICD-10-CM

## 2018-06-05 DIAGNOSIS — K21.9 CHRONIC GERD: ICD-10-CM

## 2018-06-05 PROCEDURE — 3079F DIAST BP 80-89 MM HG: CPT | Performed by: FAMILY MEDICINE

## 2018-06-05 PROCEDURE — 99213 OFFICE O/P EST LOW 20 MIN: CPT | Performed by: FAMILY MEDICINE

## 2018-06-05 PROCEDURE — 3075F SYST BP GE 130 - 139MM HG: CPT | Performed by: FAMILY MEDICINE

## 2018-06-05 PROCEDURE — 4010F ACE/ARB THERAPY RXD/TAKEN: CPT | Performed by: FAMILY MEDICINE

## 2018-06-05 RX ORDER — ATORVASTATIN CALCIUM 20 MG/1
20 TABLET, FILM COATED ORAL DAILY
Qty: 90 TABLET | Refills: 1 | Status: SHIPPED | OUTPATIENT
Start: 2018-06-05 | End: 2018-12-06 | Stop reason: SDUPTHER

## 2018-06-05 RX ORDER — LISINOPRIL 10 MG/1
10 TABLET ORAL DAILY
Qty: 90 TABLET | Refills: 1 | Status: SHIPPED | OUTPATIENT
Start: 2018-06-05 | End: 2018-12-11

## 2018-06-05 RX ORDER — OMEPRAZOLE 20 MG/1
20 CAPSULE, DELAYED RELEASE ORAL DAILY
Qty: 90 CAPSULE | Refills: 1 | Status: SHIPPED | OUTPATIENT
Start: 2018-06-05 | End: 2018-12-06 | Stop reason: SDUPTHER

## 2018-06-05 NOTE — PROGRESS NOTES
Subjective:      Patient ID: Mike Shepherd is a 46 y o  female  PATIENT IS HERE FOR ROUTINE 6 MONTH FOLLOW-UP  SHE HAS HISTORY TYPE 2 DIABETES WITH AN A1C OF 6 3  Also has a history of hypertension hyperlipidemia with an numbers at goal     Her cholesterol panel has improved significantly  on statin  Patient is due for screening colonoscopy and a mammogram scripts  Patient has a history of GERD for which she has seen Dr Roseline Lew in The past   Patient is due for her follow-up with her G I  Her last Pap smear was  2 years ago with Dr Betancourt  Patient states that previous Paps have all been normal     Patient has a history of anxiety and depression for which she sees a psychiatrist   Diabetes   She presents for her follow-up diabetic visit  She has type 2 diabetes mellitus  No MedicAlert identification noted  Her disease course has been stable  There are no hypoglycemic associated symptoms  Pertinent negatives for hypoglycemia include no dizziness, headaches, nervousness/anxiousness or sweats  There are no diabetic associated symptoms  Pertinent negatives for diabetes include no chest pain  There are no hypoglycemic complications  Symptoms are stable (A1c 6 3)  There are no diabetic complications  Current diabetic treatments: Metformin 500 milligram twice daily  She is compliant with treatment all of the time  Her weight is stable  She is following a generally healthy diet  Meal planning includes ADA exchanges  Her breakfast blood glucose range is generally 70-90 mg/dl  Her lunch blood glucose range is generally 130-140 mg/dl  An ACE inhibitor/angiotensin II receptor blocker is being taken  She does not see a podiatrist Eye exam is not current  Hypertension   This is a chronic problem  The current episode started more than 1 year ago  The problem is unchanged  The problem is controlled   Pertinent negatives include no chest pain, headaches, palpitations, peripheral edema, shortness of breath or sweats  Risk factors for coronary artery disease include dyslipidemia, diabetes mellitus, post-menopausal state and sedentary lifestyle  Treatments tried: Lisinopril 10 milligram daily  The current treatment provides significant improvement  There are no compliance problems  Hyperlipidemia   This is a chronic problem  The current episode started more than 1 year ago  The problem is controlled  Recent lipid tests were reviewed and are normal (LDL 96)  Pertinent negatives include no chest pain, myalgias or shortness of breath  Current antihyperlipidemic treatment includes statins  The current treatment provides significant improvement of lipids  There are no compliance problems  Risk factors for coronary artery disease include diabetes mellitus, dyslipidemia, hypertension, post-menopausal and a sedentary lifestyle  Past Medical History:   Diagnosis Date    Diabetes mellitus ([address] )     GERD (gastroesophageal reflux disease)     Hypertension     Psychiatric disorder     bipolar       Family History   Problem Relation Age of Onset    Arthritis Family     Cancer Family     Osteoporosis Family     Cervical cancer Mother     Hypertension Father     Other Father      pre-diabetes    Diabetes type I Sister     Osteoporosis Sister     Depression Sister     Heart attack Brother       of an MI at 52y/o    Diabetes Brother     Other Paternal Grandmother      Parkinson's Disease    Heart attack Paternal Grandfather     Alcohol abuse Paternal Uncle     Seizures Other     Seizures Other        Past Surgical History:   Procedure Laterality Date    BLADDER SURGERY      Sling    [place] [address] BODY,PLASTY/RESECTN Right 2017    Procedure: RIGHT HIP ARTHROSCOPIC LABRAL DEBRIDEMENT;  Surgeon: Fab Christine MD;  [facility];  Service: Orthopedics    SINUS SURGERY      3 surgeries     TUBAL LIGATION          reports that she has been smoking  She has a 25 00 pack-year smoking history  She has never used smokeless tobacco  She reports that she drinks alcohol  She reports that she uses drugs, including Marijuana  Current Outpatient Prescriptions:     aspirin 325 mg tablet, Take 325 mg by mouth, Disp: , Rfl:     atorvastatin (LIPITOR) 20 mg tablet, Take 1 tablet (20 mg total) by mouth daily, Disp: 90 tablet, Rfl: 1    carBAMazepine (TEGretol XR) 100 mg 12 hr tablet, Take 1 tablet (100 mg total) by mouth daily at bedtime, Disp: 30 tablet, Rfl: 2    clonazePAM (KlonoPIN) 0 5 mg tablet, Take 1/2 tab by mouth twice daily and 1 full tab nightly, Disp: 60 tablet, Rfl: 2    glucose blood (ONE TOUCH ULTRA TEST) test strip, Check blood sugar twice daily  , Disp: 100 each, Rfl: 1    Lancets (ONETOUCH ULTRASOFT) lancets, Check blood sugar twice daily  , Disp: 100 each, Rfl: 1    lisinopril (ZESTRIL) 10 mg tablet, Take 1 tablet (10 mg total) by mouth daily, Disp: 90 tablet, Rfl: 1    metFORMIN (GLUCOPHAGE) 500 mg tablet, Take 1 tablet (500 mg total) by mouth 2 (two) times a day with meals, Disp: 90 tablet, Rfl: 1    omeprazole (PriLOSEC) 20 mg delayed release capsule, Take 1 capsule (20 mg total) by mouth daily, Disp: 90 capsule, Rfl: 1    PARoxetine (PAXIL) 20 mg tablet, Take 1/2 tab by mouth daily for 1 week, then take 1 full tab daily, Disp: 30 tablet, Rfl: 2    The following portions of the patient's history were reviewed and updated as appropriate: allergies, current medications, past family history, past medical history, past social history, past surgical history and problem list     Review of Systems   Constitutional: Negative  Respiratory: Negative  Negative for shortness of breath  Cardiovascular: Negative  Negative for chest pain and palpitations  Gastrointestinal: Negative  Musculoskeletal: Negative for myalgias  Neurological: Negative  Negative for dizziness and headaches  Psychiatric/Behavioral: Positive for behavioral problems (  ANXIETY WITH DEPRESSION)   Negative for decreased concentration and suicidal ideas  The patient is not nervous/anxious  Objective:    /80   Pulse 75   Ht 5' 3" (1 6 m)   Wt 69 9 kg (154 lb)   SpO2 97%   BMI 27 28 kg/m²      Physical Exam   Constitutional: She is oriented to person, place, and time  She appears well-developed and well-nourished  Cardiovascular: Normal rate, regular rhythm and normal heart sounds  Pulmonary/Chest: Effort normal and breath sounds normal    Abdominal: Soft  Bowel sounds are normal    Neurological: She is alert and oriented to person, place, and time     Psychiatric: Her behavior is normal  Judgment normal          Recent Results (from the past 1008 hour(s))   CBC and differential    Collection Time: 04/30/18  9:27 AM   Result Value Ref Range    WBC 10 05 4 31 - 10 16 Thousand/uL    RBC 4 61 3 81 - 5 12 Million/uL    Hemoglobin 13 4 11 5 - 15 4 g/dL    Hematocrit 39 3 34 8 - 46 1 %    MCV 85 82 - 98 fL    MCH 29 1 26 8 - 34 3 pg    MCHC 34 1 31 4 - 37 4 g/dL    RDW 13 7 11 6 - 15 1 %    MPV 11 4 8 9 - 12 7 fL    Platelets 672 013 - 001 Thousands/uL    nRBC 0 /100 WBCs    Neutrophils Relative 51 43 - 75 %    Lymphocytes Relative 40 14 - 44 %    Monocytes Relative 7 4 - 12 %    Eosinophils Relative 2 0 - 6 %    Basophils Relative 0 0 - 1 %    Neutrophils Absolute 5 09 1 85 - 7 62 Thousands/µL    Lymphocytes Absolute 3 98 0 60 - 4 47 Thousands/µL    Monocytes Absolute 0 71 0 17 - 1 22 Thousand/µL    Eosinophils Absolute 0 21 0 00 - 0 61 Thousand/µL    Basophils Absolute 0 04 0 00 - 0 10 Thousands/µL   Comprehensive metabolic panel    Collection Time: 04/30/18  9:27 AM   Result Value Ref Range    Sodium 134 (L) 136 - 145 mmol/L    Potassium 4 7 3 5 - 5 3 mmol/L    Chloride 99 (L) 100 - 108 mmol/L    CO2 27 21 - 32 mmol/L    Anion Gap 8 4 - 13 mmol/L    BUN 13 5 - 25 mg/dL    Creatinine 0 71 0 60 - 1 30 mg/dL    Glucose, Fasting 126 (H) 65 - 99 mg/dL    Calcium 9 5 8 3 - 10 1 mg/dL    AST 19 5 - 45 U/L ALT 21 12 - 78 U/L    Alkaline Phosphatase 153 (H) 46 - 116 U/L    Total Protein 7 6 6 4 - 8 2 g/dL    Albumin 3 6 3 5 - 5 0 g/dL    Total Bilirubin 0 35 0 20 - 1 00 mg/dL    eGFR 97 ml/min/1 73sq m   Hemoglobin A1c    Collection Time: 04/30/18  9:27 AM   Result Value Ref Range    Hemoglobin A1C 6 3 4 2 - 6 3 %     mg/dl   Lipid panel    Collection Time: 04/30/18  9:27 AM   Result Value Ref Range    Cholesterol 181 50 - 200 mg/dL    Triglycerides 152 (H) <=150 mg/dL    HDL, Direct 55 40 - 60 mg/dL    LDL Calculated 96 0 - 100 mg/dL    Non-HDL-Chol (CHOL-HDL) 126 mg/dl       Assessment/Plan:      Type 2 diabetes with A1c and  Blood pressure at goal   Patient will continue taking all the medications as prescribed  Advised to eat healthy and to exercise on a daily basis to help improve her LDL  She will continue statin  Provided prescriptions for preventative screening  Also encouraged to go for an annual eye exam to rule out diabetic retinopathy  Routine follow-up with labs after 6 months advised  Encouraged to follow up with the psychiatrist  For management of anxiety and depression  Diagnoses and all orders for this visit:    Screening mammogram, encounter for  -     Mammo screening bilateral w cad; Future  -     CBC and differential  -     Comprehensive metabolic panel    Type 2 diabetes mellitus without complication, without long-term current use of insulin (HCC)  -     glucose blood (ONE TOUCH ULTRA TEST) test strip; Check blood sugar twice daily  -     metFORMIN (GLUCOPHAGE) 500 mg tablet; Take 1 tablet (500 mg total) by mouth 2 (two) times a day with meals  -     CBC and differential  -     Comprehensive metabolic panel  -     HEMOGLOBIN A1C W/ EAG ESTIMATION; Future  -     Microalbumin / creatinine urine ratio; Future    Chronic GERD  -     omeprazole (PriLOSEC) 20 mg delayed release capsule;  Take 1 capsule (20 mg total) by mouth daily  -     CBC and differential  -     Comprehensive metabolic panel    Essential hypertension  -     lisinopril (ZESTRIL) 10 mg tablet; Take 1 tablet (10 mg total) by mouth daily  -     CBC and differential  -     Comprehensive metabolic panel  -     Microalbumin / creatinine urine ratio; Future    Hyperlipidemia, unspecified hyperlipidemia type  -     atorvastatin (LIPITOR) 20 mg tablet; Take 1 tablet (20 mg total) by mouth daily  -     CBC and differential  -     Comprehensive metabolic panel  -     Lipid panel    Screen for colon cancer  -     Ambulatory referral to Gastroenterology;  Future

## 2018-07-18 ENCOUNTER — TELEPHONE (OUTPATIENT)
Dept: PSYCHIATRY | Facility: CLINIC | Age: 53
End: 2018-07-18

## 2018-07-18 ENCOUNTER — APPOINTMENT (EMERGENCY)
Dept: RADIOLOGY | Facility: HOSPITAL | Age: 53
End: 2018-07-18
Payer: COMMERCIAL

## 2018-07-18 ENCOUNTER — HOSPITAL ENCOUNTER (EMERGENCY)
Facility: HOSPITAL | Age: 53
Discharge: HOME | End: 2018-07-18
Attending: EMERGENCY MEDICINE | Admitting: EMERGENCY MEDICINE
Payer: COMMERCIAL

## 2018-07-18 ENCOUNTER — APPOINTMENT (OUTPATIENT)
Dept: LAB | Facility: CLINIC | Age: 53
End: 2018-07-18
Payer: COMMERCIAL

## 2018-07-18 VITALS
SYSTOLIC BLOOD PRESSURE: 118 MMHG | RESPIRATION RATE: 20 BRPM | DIASTOLIC BLOOD PRESSURE: 60 MMHG | BODY MASS INDEX: 26.57 KG/M2 | TEMPERATURE: 98.3 F | WEIGHT: 150 LBS | HEART RATE: 74 BPM | OXYGEN SATURATION: 93 %

## 2018-07-18 DIAGNOSIS — S42.001A RIGHT CLAVICLE FRACTURE: Primary | ICD-10-CM

## 2018-07-18 DIAGNOSIS — F41.1 GENERALIZED ANXIETY DISORDER: ICD-10-CM

## 2018-07-18 DIAGNOSIS — F33.1 MODERATE RECURRENT MAJOR DEPRESSION (HCC): ICD-10-CM

## 2018-07-18 LAB
ANION GAP SERPL CALCULATED.3IONS-SCNC: 8 MMOL/L (ref 4–13)
BASOPHILS # BLD AUTO: 0.06 THOUSANDS/ΜL (ref 0–0.1)
BASOPHILS NFR BLD AUTO: 1 % (ref 0–1)
BUN SERPL-MCNC: 6 MG/DL (ref 5–25)
CALCIUM SERPL-MCNC: 9.2 MG/DL (ref 8.3–10.1)
CHLORIDE SERPL-SCNC: 98 MMOL/L (ref 100–108)
CO2 SERPL-SCNC: 26 MMOL/L (ref 21–32)
CREAT SERPL-MCNC: 0.87 MG/DL (ref 0.6–1.3)
EOSINOPHIL # BLD AUTO: 0.19 THOUSAND/ΜL (ref 0–0.61)
EOSINOPHIL NFR BLD AUTO: 2 % (ref 0–6)
ERYTHROCYTE [DISTWIDTH] IN BLOOD BY AUTOMATED COUNT: 13.5 % (ref 11.6–15.1)
GFR SERPL CREATININE-BSD FRML MDRD: 76 ML/MIN/1.73SQ M
GLUCOSE P FAST SERPL-MCNC: 159 MG/DL (ref 65–99)
HCT VFR BLD AUTO: 41.2 % (ref 34.8–46.1)
HGB BLD-MCNC: 13.4 G/DL (ref 11.5–15.4)
IMM GRANULOCYTES # BLD AUTO: 0.03 THOUSAND/UL (ref 0–0.2)
IMM GRANULOCYTES NFR BLD AUTO: 0 % (ref 0–2)
LYMPHOCYTES # BLD AUTO: 3.06 THOUSANDS/ΜL (ref 0.6–4.47)
LYMPHOCYTES NFR BLD AUTO: 31 % (ref 14–44)
MCH RBC QN AUTO: 28.5 PG (ref 26.8–34.3)
MCHC RBC AUTO-ENTMCNC: 32.5 G/DL (ref 31.4–37.4)
MCV RBC AUTO: 88 FL (ref 82–98)
MONOCYTES # BLD AUTO: 0.77 THOUSAND/ΜL (ref 0.17–1.22)
MONOCYTES NFR BLD AUTO: 8 % (ref 4–12)
NEUTROPHILS # BLD AUTO: 5.73 THOUSANDS/ΜL (ref 1.85–7.62)
NEUTS SEG NFR BLD AUTO: 58 % (ref 43–75)
NRBC BLD AUTO-RTO: 0 /100 WBCS
PLATELET # BLD AUTO: 269 THOUSANDS/UL (ref 149–390)
PMV BLD AUTO: 11.8 FL (ref 8.9–12.7)
POTASSIUM SERPL-SCNC: 3.8 MMOL/L (ref 3.5–5.3)
RBC # BLD AUTO: 4.7 MILLION/UL (ref 3.81–5.12)
SODIUM SERPL-SCNC: 132 MMOL/L (ref 136–145)
TSH 30M P TRH SERPL-ACNC: 2.34 UIU/ML
TSH SERPL DL<=0.05 MIU/L-ACNC: 2.37 UIU/ML
WBC # BLD AUTO: 9.84 THOUSAND/UL (ref 4.31–10.16)

## 2018-07-18 PROCEDURE — 80048 BASIC METABOLIC PNL TOTAL CA: CPT

## 2018-07-18 PROCEDURE — 36415 COLL VENOUS BLD VENIPUNCTURE: CPT

## 2018-07-18 PROCEDURE — 84443 ASSAY THYROID STIM HORMONE: CPT

## 2018-07-18 PROCEDURE — 99283 EMERGENCY DEPT VISIT LOW MDM: CPT

## 2018-07-18 PROCEDURE — 73030 X-RAY EXAM OF SHOULDER: CPT

## 2018-07-18 PROCEDURE — 85025 COMPLETE CBC W/AUTO DIFF WBC: CPT

## 2018-07-18 PROCEDURE — 72100 X-RAY EXAM L-S SPINE 2/3 VWS: CPT

## 2018-07-18 PROCEDURE — 86592 SYPHILIS TEST NON-TREP QUAL: CPT

## 2018-07-18 RX ORDER — IBUPROFEN 600 MG/1
600 TABLET ORAL EVERY 6 HOURS PRN
Qty: 30 TABLET | Refills: 0 | Status: SHIPPED | OUTPATIENT
Start: 2018-07-18 | End: 2020-09-21 | Stop reason: ALTCHOICE

## 2018-07-18 NOTE — DISCHARGE INSTRUCTIONS
Ibuprofen 600mg by mouth every 6 hours as needed for mild to moderate pain or fever  Acetaminophen 650mg by mouth every 8 hours as needed for mild to moderate pain or fever  You can take Ibuprofen and Acetaminophen together safely  Clavicle Fracture   WHAT YOU NEED TO KNOW:   A clavicle fracture is a crack or break in your clavicle (collarbone)  DISCHARGE INSTRUCTIONS:   Return to the emergency department if:   · Your shoulder, arm, hand, or fingers turn bluish or pale, or feel cold or numb  · Your pain gets worse, even after rest and medicine  · Your splint feels tight, or you have increased swelling  · You cannot move your fingers  Contact your healthcare provider if:   · Your sling or wrap comes off or gets damaged  · You have questions about your condition or care  Medicines: You may  need any of the following:  · Acetaminophen  decreases pain and fever  It is available without a doctor's order  Ask how much to take and how often to take it  Follow directions  Read the labels of all other medicines you are using to see if they also contain acetaminophen, or ask your doctor or pharmacist  Acetaminophen can cause liver damage if not taken correctly  Do not use more than 4 grams (4,000 milligrams) total of acetaminophen in one day  · NSAIDs , such as ibuprofen, help decrease swelling, pain, and fever  This medicine is available with or without a doctor's order  NSAIDs can cause stomach bleeding or kidney problems in certain people  If you take blood thinner medicine, always ask your healthcare provider if NSAIDs are safe for you  Always read the medicine label and follow directions  · Take your medicine as directed  Contact your healthcare provider if you think your medicine is not helping or if you have side effects  Tell him or her if you are allergic to any medicine  Keep a list of the medicines, vitamins, and herbs you take   Include the amounts, and when and why you take them  Bring the list or the pill bottles to follow-up visits  Carry your medicine list with you in case of an emergency  Follow up with your healthcare provider within 1 week or as directed: You may need to return for more x-rays to see how well your clavicle is healing  Write down your questions so you remember to ask them during your visits  Sling or brace care: You will have a sling or a brace to keep your clavicle from moving while it heals  Ask your healthcare provider for more information on how to care for the sling or brace, including how to adjust it  Ice:  Apply ice on your clavicle for 15 to 20 minutes every hour or as directed  Use an ice pack, or put crushed ice in a plastic bag  Cover it with a towel  Ice decreases swelling and pain  Activity:  Limit activity as directed by your healthcare provider  Slowly start to do more each day as the pain decreases  Physical therapy:  Your healthcare provider may recommend physical therapy after your clavicle heals  A physical therapist teaches you exercises to help improve movement and strength, and to decrease pain  © 2017 [facility] Information is for End User's use only and may not be sold, redistributed or otherwise used for commercial purposes  All illustrations and images included in CareNotes® are the copyrighted property of A D A M , Inc  or Michael Fwoler  The above information is an  only  It is not intended as medical advice for individual conditions or treatments  Talk to your doctor, nurse or pharmacist before following any medical regimen to see if it is safe and effective for you

## 2018-07-18 NOTE — TELEPHONE ENCOUNTER
Natalie's 7/18/2018 labwork returned, with CBC and TSH WNL, and BMP significant for low NA+ of 132 and   The RPR result is still pending at this time,   I attempted to contact Pt on home and cell phone #s of record but there was no answer on each, and I had to leave a msg on both voice mails for her to call me to discuss results  Also that It seems her PMD Dr Waqar Zepeda has seen the results and made a comment in the system to review results at next f/u visit

## 2018-07-18 NOTE — ED PROVIDER NOTES
History  Chief Complaint   Patient presents with    Fall     c/o injury/pain of right clavical/shoulder, tailbone, left elbow, back, and left toes with multiple areas of bruising noted  Pt admitted to falling down " a half of a flight of stairs and crashing into a metal shelfing 2 days ago"  Pt denies LOC, c-spine tenderness, or numbness/tingling at present time  50-year-old female with past medical history of GERD, diabetes, hypertension, who presents to the emergency department for right shoulder pain and right sacral pain status post fall down 8 steps 3 days ago  She has pain with movement of the right shoulder, which is described as a 6/10 sharp pain  Did not take anything for pain prior to arrival   Patient denies any head strike or loss of consciousness  Denies any neck pain or stiffness  Denies any numbness, tingling, weakness, swelling, redness, warmth  History provided by:  Patient   used: No        Prior to Admission Medications   Prescriptions Last Dose Informant Patient Reported? Taking? Lancets (ONETOUCH ULTRASOFT) lancets   No Yes   Sig: Check blood sugar twice daily  PARoxetine (PAXIL) 20 mg tablet   No Yes   Sig: Take 1/2 tab by mouth daily for 1 week, then take 1 full tab daily   aspirin 325 mg tablet   Yes Yes   Sig: Take 325 mg by mouth   atorvastatin (LIPITOR) 20 mg tablet   No Yes   Sig: Take 1 tablet (20 mg total) by mouth daily   carBAMazepine (TEGretol XR) 100 mg 12 hr tablet   No Yes   Sig: Take 1 tablet (100 mg total) by mouth daily at bedtime   clonazePAM (KlonoPIN) 0 5 mg tablet   No Yes   Sig: Take 1/2 tab by mouth twice daily and 1 full tab nightly   glucose blood (ONE TOUCH ULTRA TEST) test strip   No Yes   Sig: Check blood sugar twice daily     lisinopril (ZESTRIL) 10 mg tablet   No Yes   Sig: Take 1 tablet (10 mg total) by mouth daily   metFORMIN (GLUCOPHAGE) 500 mg tablet   No Yes   Sig: Take 1 tablet (500 mg total) by mouth 2 (two) times a day with meals   omeprazole (PriLOSEC) 20 mg delayed release capsule   No Yes   Sig: Take 1 capsule (20 mg total) by mouth daily      Facility-Administered Medications: None       Past Medical History:   Diagnosis Date    Diabetes mellitus ([address] )     GERD (gastroesophageal reflux disease)     Hypertension     Psychiatric disorder     bipolar       Past Surgical History:   Procedure Laterality Date    BLADDER SURGERY      Sling    [place] HIP SCOPE/REMV BODY,PLASTY/RESECTN Right 2017    Procedure: RIGHT HIP ARTHROSCOPIC LABRAL DEBRIDEMENT;  Surgeon: Sadie Fairchild MD;  [facility];  Service: Orthopedics    SINUS SURGERY      3 surgeries     TUBAL LIGATION         Family History   Problem Relation Age of Onset    Arthritis Family     Cancer Family     Osteoporosis Family     Cervical cancer Mother     Hypertension Father     Other Father         pre-diabetes    Diabetes type I Sister     Osteoporosis Sister     Depression Sister     Heart attack Brother          of an MI at 50y/o    Diabetes Brother     Other Paternal Grandmother         Parkinson's Disease    Heart attack Paternal Grandfather     Alcohol abuse Paternal Uncle     Seizures Other     Seizures Other      I have reviewed and agree with the history as documented  Social History   Substance Use Topics    Smoking status: Current Every Day Smoker     Packs/day: 1 00     Years: 25 00    Smokeless tobacco: Never Used    Alcohol use Yes      Comment: will have a couple of beers or a couple of rum and cokes once per week        Review of Systems   Constitutional: Negative for chills and fever  HENT: Negative  Eyes: Negative  Respiratory: Negative for cough, chest tightness, shortness of breath and wheezing  Cardiovascular: Negative for chest pain, palpitations and leg swelling  Gastrointestinal: Negative for abdominal pain, constipation, diarrhea, nausea and vomiting     Genitourinary: Negative for dysuria, flank pain, frequency, hematuria and urgency  Musculoskeletal: Positive for arthralgias and back pain  Negative for gait problem, joint swelling, myalgias, neck pain and neck stiffness  Skin: Positive for color change  Negative for pallor, rash and wound  Neurological: Negative for dizziness, syncope, weakness, light-headedness, numbness and headaches  Psychiatric/Behavioral: Negative  Physical Exam  Physical Exam   Constitutional: She is oriented to person, place, and time  She appears well-developed and well-nourished  No distress  HENT:   Head: Normocephalic and atraumatic  Mouth/Throat: Oropharynx is clear and moist    Eyes: Conjunctivae and EOM are normal  Pupils are equal, round, and reactive to light  Neck: Normal range of motion  Neck supple  Cardiovascular: Normal rate, regular rhythm and intact distal pulses  Pulmonary/Chest: Effort normal and breath sounds normal  She has no wheezes  She has no rales  Abdominal: Soft  Bowel sounds are normal  She exhibits no distension  There is no tenderness  There is no rebound and no guarding  Musculoskeletal: She exhibits tenderness (Distal right clavicle, right posterior shoulder, right humeral head  L5-S3 TTP )  She exhibits no edema  Decreased range of motion of the right shoulder with flexion, extension, abduction secondary to pain  Neurological: She is alert and oriented to person, place, and time  No cranial nerve deficit or sensory deficit  She exhibits normal muscle tone  Coordination normal    Skin: Skin is warm and dry  Capillary refill takes less than 2 seconds  She is not diaphoretic  Psychiatric: She has a normal mood and affect  Her behavior is normal    Nursing note and vitals reviewed        Vital Signs  ED Triage Vitals [07/18/18 1020]   Temperature Pulse Respirations Blood Pressure SpO2   98 3 °F (36 8 °C) 82 20 153/83 100 %      Temp Source Heart Rate Source Patient Position - Orthostatic VS BP Location FiO2 (%) Oral Monitor Sitting Right arm --      Pain Score       7           Vitals:    07/18/18 1020 07/18/18 1244   BP: 153/83 118/60   Pulse: 82 67   Patient Position - Orthostatic VS: Sitting Lying       Visual Acuity      ED Medications  Medications - No data to display    Diagnostic Studies  Results Reviewed     None                 XR spine lumbar 2 or 3 views injury   Final Result by Selina Schmitz MD (07/18 1236)      No acute findings  Workstation performed: [id number]         XR shoulder 2+ views RIGHT   Final Result by Selina Schmitz MD (07/18 1235)      There is a fracture of the distal right clavicle without significant displacement  Workstation performed: [id number]                    Procedures  Orthopedic Injury  Date/Time: 7/18/2018 1:01 PM  Performed by: Ever Villa  Authorized by: Lakeshia Duncan     Patient Location:  ED  Verbal consent obtained?: Yes    Risks and benefits: Risks, benefits and alternatives were discussed    Consent given by:  Patient  Patient identity confirmed:  Verbally with patient  Injury location:  Other (comment) (right clavicle)  Injury type:  Fracture  Neurovascular status: Neurovascularly intact    Distal perfusion: normal    Neurological function: normal    Range of motion: reduced    Local anesthesia used?: No    General anesthesia used?: No    Manipulation performed?: No    Immobilization:  Sling  Neurovascular status: Neurovascularly intact    Distal perfusion: normal    Neurological function: normal    Range of motion: unchanged    Patient tolerance:  Patient tolerated the procedure well with no immediate complications   Sling applied by technician  Evaluated by me prior to d/c             Phone Contacts  ED Phone Contact    ED Course  ED Course as of Jul 18 1304   Wed Jul 18, 2018   1151 Patient declining pain Rx on initial eval                                 MDM  Number of Diagnoses or Management Options  Diagnosis management comments: 18-year-old female with past medical history of GERD, diabetes, hypertension, who presents to the emergency department for right shoulder pain and right sacral pain status post fall down 8 steps 3 days ago  Differential Diagnosis includes but is not limited to: sprain/strain, contusion, fracture/dislocation, musculoskeletal pain  XR LS shows no acute osseous abnormality  XR shoulder right shows distal clavicle fx  Discussed with patient and placed in sling  Dc home with ortho follow up  Amount and/or Complexity of Data Reviewed  Tests in the radiology section of CPT®: ordered and reviewed      CritCare Time    Disposition  Final diagnoses:   Right clavicle fracture     Time reflects when diagnosis was documented in both MDM as applicable and the Disposition within this note     Time User Action Codes Description Comment    7/18/2018  1:03 PM Monica Magaña Add [[id number]] Right clavicle fracture       ED Disposition     ED Disposition Condition Comment    Discharge  Natalie Groves discharge to home/self care  Condition at discharge: Good        Follow-up Information     Follow up With Specialties Details Why [address] [facility] In 1 week  [address] [facility] [address]  [phone]          Patient's Medications   Discharge Prescriptions    IBUPROFEN (MOTRIN) 600 MG TABLET    Take 1 tablet (600 mg total) by mouth every 6 (six) hours as needed for mild pain       Start Date: 7/18/2018 End Date: --       Order Dose: 600 mg       Quantity: 30 tablet    Refills: 0     No discharge procedures on file      ED Provider  Electronically Signed by           Rosie Coon PA-C  07/18/18 4951

## 2018-07-19 ENCOUNTER — TELEPHONE (OUTPATIENT)
Dept: FAMILY MEDICINE CLINIC | Facility: CLINIC | Age: 53
End: 2018-07-19

## 2018-07-19 ENCOUNTER — TELEPHONE (OUTPATIENT)
Dept: PSYCHIATRY | Facility: CLINIC | Age: 53
End: 2018-07-19

## 2018-07-19 LAB — RPR SER QL: NORMAL

## 2018-07-19 NOTE — TELEPHONE ENCOUNTER
Patient called in questioning the labs results that were forwarded to you from her psychiatrist   Patient stated that she was informed that her results would be reviewed at her next follow up however that is not until December and she would like to know what was abnormal on the results  Can this be discussed over the phone or should she be scheduled to follow up sooner

## 2018-07-19 NOTE — TELEPHONE ENCOUNTER
Candy's 7/18/2018 RPR results returned (non reactive)  Second attempt to contact Pt on cell phone # of record today was successful and discussed all results from 7/18/2018 with her  She presently denies any Sxs of significant hyponatremia or excessive thirst, and states she normally just drinks a lot of water daily out of personal habit --drinks 4-5 20oz bottles of water per day  Carbamazepine can be a culprit, but she has been taking this medicine for a long time without very much reduction in Na +  Also, she states she actually stopped the Carbamazepine 3 weeks ago of her own volition since I was already reducing it  She feels mood stable at present and will f/u with the undersigned on 7/23/2018

## 2018-07-20 DIAGNOSIS — E87.1 HYPONATREMIA: Primary | ICD-10-CM

## 2018-07-20 NOTE — TELEPHONE ENCOUNTER
Call patient back and left a message on her answering system regarding her low-sodium  Patient is on antipsychotics which can  Be the cause for hyponatremia  I advised her to recheck her sodium level after 5 days  If the number continues to be below normal then she needs to discuss changing the medications with her psychiatrist   She can come  this order for rechecking sodium from the office and we can mail a copy to her as well  Patient can call us back if she has any questions or concerns

## 2018-07-23 ENCOUNTER — OFFICE VISIT (OUTPATIENT)
Dept: PSYCHIATRY | Facility: CLINIC | Age: 53
End: 2018-07-23
Payer: COMMERCIAL

## 2018-07-23 ENCOUNTER — APPOINTMENT (OUTPATIENT)
Dept: LAB | Facility: CLINIC | Age: 53
End: 2018-07-23
Payer: COMMERCIAL

## 2018-07-23 VITALS — HEIGHT: 63 IN | WEIGHT: 150 LBS | BODY MASS INDEX: 26.58 KG/M2

## 2018-07-23 DIAGNOSIS — E87.1 HYPONATREMIA: ICD-10-CM

## 2018-07-23 DIAGNOSIS — M54.16 LUMBAR RADICULOPATHY: ICD-10-CM

## 2018-07-23 DIAGNOSIS — F41.0 PANIC ATTACKS: ICD-10-CM

## 2018-07-23 DIAGNOSIS — S42.001A CLOSED NONDISPLACED FRACTURE OF RIGHT CLAVICLE, UNSPECIFIED PART OF CLAVICLE, INITIAL ENCOUNTER: ICD-10-CM

## 2018-07-23 DIAGNOSIS — F32.A DEPRESSION, UNSPECIFIED DEPRESSION TYPE: ICD-10-CM

## 2018-07-23 DIAGNOSIS — F33.1 MODERATE RECURRENT MAJOR DEPRESSION (HCC): Primary | ICD-10-CM

## 2018-07-23 DIAGNOSIS — F41.1 GENERALIZED ANXIETY DISORDER: ICD-10-CM

## 2018-07-23 PROBLEM — F31.9 BIPOLAR AFFECTIVE DISORDER (HCC): Status: ACTIVE | Noted: 2018-07-23

## 2018-07-23 LAB — SODIUM SERPL-SCNC: 138 MMOL/L (ref 136–145)

## 2018-07-23 PROCEDURE — 36415 COLL VENOUS BLD VENIPUNCTURE: CPT

## 2018-07-23 PROCEDURE — 99214 OFFICE O/P EST MOD 30 MIN: CPT | Performed by: PHYSICIAN ASSISTANT

## 2018-07-23 PROCEDURE — 84295 ASSAY OF SERUM SODIUM: CPT

## 2018-07-23 RX ORDER — CLONAZEPAM 0.5 MG/1
TABLET ORAL
Qty: 60 TABLET | Refills: 1 | Status: SHIPPED | OUTPATIENT
Start: 2018-07-23 | End: 2018-09-18 | Stop reason: SDUPTHER

## 2018-07-23 RX ORDER — PAROXETINE HYDROCHLORIDE 20 MG/1
TABLET, FILM COATED ORAL
Qty: 30 TABLET | Refills: 1 | Status: SHIPPED | OUTPATIENT
Start: 2018-07-23 | End: 2018-09-18 | Stop reason: SDUPTHER

## 2018-07-23 RX ORDER — ARIPIPRAZOLE 2 MG/1
2 TABLET ORAL
Qty: 30 TABLET | Refills: 1 | Status: SHIPPED | OUTPATIENT
Start: 2018-07-23 | End: 2018-09-18 | Stop reason: SDUPTHER
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
## 2018-07-26 ENCOUNTER — OFFICE VISIT (OUTPATIENT)
Dept: OBGYN CLINIC | Facility: OTHER | Age: 53
End: 2018-07-26
Payer: COMMERCIAL

## 2018-07-26 VITALS
WEIGHT: 153.8 LBS | DIASTOLIC BLOOD PRESSURE: 73 MMHG | HEART RATE: 84 BPM | HEIGHT: 63 IN | BODY MASS INDEX: 27.25 KG/M2 | SYSTOLIC BLOOD PRESSURE: 122 MMHG

## 2018-07-26 DIAGNOSIS — M25.511 ACUTE PAIN OF RIGHT SHOULDER: ICD-10-CM

## 2018-07-26 DIAGNOSIS — S42.031A CLOSED DISPLACED FRACTURE OF ACROMIAL END OF RIGHT CLAVICLE, INITIAL ENCOUNTER: Primary | ICD-10-CM

## 2018-07-26 PROCEDURE — 99214 OFFICE O/P EST MOD 30 MIN: CPT | Performed by: INTERNAL MEDICINE

## 2018-07-26 NOTE — PROGRESS NOTES
Assessment/Plan:  Assessment/Plan   Diagnoses and all orders for this visit:    Closed displaced fracture of acromial end of right clavicle, initial encounter    Acute pain of right shoulder       I have recommended that she continue using the shoulder sling for now  She will follow up for re-evaluation in 2-3 weeks  I will repeat her x-ray during that encounter  If there is any significant sign of healing, we will discontinue the shoulder sling at that time she will most likely require physical therapy rehabilitation as she continues to heal    At the meantime, she can take Tylenol, Aleve, or ibuprofen p r n  For pain control  Subjective:   Patient ID: Mike Shepherd is a 46 y o  female  HPI      Ms  Rustam Jeronimo presents for initial evaluation of a right shoulder injury which she sustained After losing her footing on the basement stairs at home and fell 8 steps dowm on 7/15/2018  She subsequently went to the ED on 7/18/2018 with shoulder x-ray was done and he her injury was noted  She was placed in a shoulder sling and referred to my service for further evaluation and management  on today's presentation, she reports that she has noticed some "bruising in the anterior aspect of her shoulder  She has a pre-existing history of  intermittent unsteadiness which she sometimes use straight cane for ambulation  She think that this may have contributed to her fall at home  Otherwise, no other complaints  The following portions of the patient's history were reviewed and updated as appropriate: allergies, current medications, past family history, past medical history, past social history, past surgical history and problem list     Review of Systems  Review of Systems   Constitutional: Negative  HENT: Negative  Eyes: Negative  Respiratory: Negative  Cardiovascular: Negative  Musculoskeletal:        As per history of present illness   Skin: Negative      Neurological:   Negative Psychiatric/Behavioral: Negative  Objective:  Vitals:    07/26/18 1319   BP: 122/73   Pulse: 84   Weight: 69 8 kg (153 lb 12 8 oz)   Height: 5' 3" (1 6 m)       Right Shoulder Exam     Tenderness   Right shoulder tenderness location: Mild tenderness over the right sternoclavicular joint  Right clavicular tenderness  Exquisite tenderness over the distal right clavicle  Muscle Strength   Right shoulder normal muscle strength: Detail muscle strength examination was deferred today  However, she is able to move her elbow, all of her fingers  Other   Erythema: absent  Sensation: normal  Pulse: present            Physical Exam  Constitutional: Oriented to person, place, and time  Well-developed and well-nourished  HENT:   Head: Normocephalic and atraumatic  Eyes: Conjunctivae are normal    Cardiovascular: Normal rate  Pulmonary/Chest: Effort normal    Neurological: Alert and oriented to person, place, and time  Skin: Skin is warm and dry  Psychiatric: Normal mood and affect  I have personally reviewed pertinent films in PACS and my interpretation is Right shoulder x-ray done on 7/18/2018 is significant for a a nondisplaced distal the clavicular fracture  Tate Spruce

## 2018-08-13 ENCOUNTER — APPOINTMENT (OUTPATIENT)
Dept: RADIOLOGY | Facility: CLINIC | Age: 53
End: 2018-08-13
Payer: COMMERCIAL

## 2018-08-13 ENCOUNTER — OFFICE VISIT (OUTPATIENT)
Dept: OBGYN CLINIC | Facility: CLINIC | Age: 53
End: 2018-08-13
Payer: COMMERCIAL

## 2018-08-13 VITALS
HEIGHT: 63 IN | HEART RATE: 82 BPM | WEIGHT: 153 LBS | BODY MASS INDEX: 27.11 KG/M2 | DIASTOLIC BLOOD PRESSURE: 89 MMHG | SYSTOLIC BLOOD PRESSURE: 163 MMHG

## 2018-08-13 DIAGNOSIS — S42.031A CLOSED DISPLACED FRACTURE OF ACROMIAL END OF RIGHT CLAVICLE, INITIAL ENCOUNTER: ICD-10-CM

## 2018-08-13 DIAGNOSIS — S42.031A CLOSED DISPLACED FRACTURE OF ACROMIAL END OF RIGHT CLAVICLE, INITIAL ENCOUNTER: Primary | ICD-10-CM

## 2018-08-13 DIAGNOSIS — M75.31 CALCIFIC TENDONITIS OF RIGHT SHOULDER: ICD-10-CM

## 2018-08-13 PROCEDURE — 73030 X-RAY EXAM OF SHOULDER: CPT

## 2018-08-13 PROCEDURE — 73000 X-RAY EXAM OF COLLAR BONE: CPT

## 2018-08-13 PROCEDURE — 99213 OFFICE O/P EST LOW 20 MIN: CPT | Performed by: INTERNAL MEDICINE

## 2018-08-13 NOTE — PROGRESS NOTES
Assessment/Plan:  Assessment/Plan   Diagnoses and all orders for this visit:    Closed displaced fracture of acromial end of right clavicle, initial encounter  -     XR shoulder 2+ vw right; Future  -     XR clavicle right; Future  -     Ambulatory referral to Physical Therapy; Future    Calcific tendonitis of right shoulder  -     Ambulatory referral to Physical Therapy; Future        I have referred her to physical therapy for rehabilitation with initial focus on shoulder range of motion and subsequently progressed shoulder strengthening and functional rehabilitation  She will follow up with my partner (Dr Laura Singletary) for re-evaluation in 2 months  We will repeat her clavicular x-ray during that encounter  Subjective:   Patient ID: Rae Bain is a 46 y o  female  HPI    Pleasant 58-year-old female presents for follow-up re-evaluation of her right distal clavicular fracture (type 1)  On today's presentation, she reports that she stopped using her shoulder sling approximately 1 week ago  She reports some residual mild right shoulder pain with use of her shoulder especially when doing her hair and over the head activities  Otherwise, no other complaints  The following portions of the patient's history were reviewed and updated as appropriate: allergies, current medications, past family history, past medical history, past social history, past surgical history and problem list     Review of Systems   Constitutional: Negative  HENT: Negative  Eyes: Negative  Respiratory: Negative  Cardiovascular: Negative  Musculoskeletal:        As per history of present illness   Skin: Negative  Neurological:  Negative   Psychiatric/Behavioral: Negative          Objective:  Vitals:    08/13/18 0945   BP: 163/89   Pulse: 82   Weight: 69 4 kg (153 lb)   Height: 5' 3" (1 6 m)     Right Shoulder Exam     Tenderness   The patient is experiencing tenderness in the acromioclavicular joint (The distal clavicular and the acromioclavicular joint tenderness is mild  )  Comments:  Mild right shoulder stiffness noted  Physical Exam  Constitutional: Oriented to person, place, and time  Well-developed and well-nourished  HENT:   Head: Normocephalic and atraumatic  Eyes: Conjunctivae are normal    Cardiovascular: Normal rate  Pulmonary/Chest: Effort normal    Neurological: Alert and oriented to person, place, and time  Skin: Skin is warm and dry  Psychiatric: Normal mood and affect  I have personally reviewed pertinent films in PACS and my interpretation is Right clavicular x-ray done today shows interval callus formation  The residual Fracture line is still visible underneath the callus formation     Anatomical position is maintained

## 2018-08-21 ENCOUNTER — APPOINTMENT (OUTPATIENT)
Dept: PHYSICAL THERAPY | Facility: CLINIC | Age: 53
End: 2018-08-21
Payer: COMMERCIAL

## 2018-08-27 DIAGNOSIS — E11.9 TYPE 2 DIABETES MELLITUS WITHOUT COMPLICATION, WITHOUT LONG-TERM CURRENT USE OF INSULIN (HCC): ICD-10-CM

## 2018-08-29 DIAGNOSIS — E11.9 TYPE 2 DIABETES MELLITUS WITHOUT COMPLICATION, WITHOUT LONG-TERM CURRENT USE OF INSULIN (HCC): ICD-10-CM

## 2018-08-29 NOTE — TELEPHONE ENCOUNTER
PHARMACY CALLED FOR RAIMUNDO JUST WANTING TO LET HER KNOW THAT June 5TH 2018 PATIENT WAS GIVEN A QUAINTLY OF 90 WHICH WAS ONLY A 45 DAY SUPPLY  THEY NEED A NEW SCRIPT FOR PATIENT AND I DID NOT CALL ONE IN BECAUSE THEY WANTED TO SPEAK WITH RAIMUNDO TO MAKE SURE SHE CALLED IN THE SCRIPT IN THE VM

## 2018-08-30 ENCOUNTER — EVALUATION (OUTPATIENT)
Dept: PHYSICAL THERAPY | Facility: CLINIC | Age: 53
End: 2018-08-30
Payer: COMMERCIAL

## 2018-08-30 DIAGNOSIS — M75.31 CALCIFIC TENDONITIS OF RIGHT SHOULDER: ICD-10-CM

## 2018-08-30 DIAGNOSIS — S42.031A CLOSED DISPLACED FRACTURE OF ACROMIAL END OF RIGHT CLAVICLE, INITIAL ENCOUNTER: Primary | ICD-10-CM

## 2018-08-30 DIAGNOSIS — E11.9 CONTROLLED TYPE 2 DIABETES MELLITUS WITHOUT COMPLICATION, WITHOUT LONG-TERM CURRENT USE OF INSULIN (HCC): Primary | ICD-10-CM

## 2018-08-30 DIAGNOSIS — E11.9 TYPE 2 DIABETES MELLITUS WITHOUT COMPLICATION, WITHOUT LONG-TERM CURRENT USE OF INSULIN (HCC): ICD-10-CM

## 2018-08-30 PROCEDURE — G8985 CARRY GOAL STATUS: HCPCS | Performed by: PHYSICAL THERAPIST

## 2018-08-30 PROCEDURE — 97140 MANUAL THERAPY 1/> REGIONS: CPT | Performed by: PHYSICAL THERAPIST

## 2018-08-30 PROCEDURE — 97110 THERAPEUTIC EXERCISES: CPT | Performed by: PHYSICAL THERAPIST

## 2018-08-30 PROCEDURE — G8984 CARRY CURRENT STATUS: HCPCS | Performed by: PHYSICAL THERAPIST

## 2018-08-30 PROCEDURE — 97162 PT EVAL MOD COMPLEX 30 MIN: CPT | Performed by: PHYSICAL THERAPIST

## 2018-08-30 NOTE — PROGRESS NOTES
PT Evaluation     Today's date: 2018  Patient name: Everton Barajas  : 1965  MRN: [id number]  Referring provider: Jany Irvin MD  Dx:   Encounter Diagnosis     ICD-10-CM    1  Closed displaced fracture of acromial end of right clavicle, initial encounter S42 [id number] Ambulatory referral to Physical Therapy   2  Calcific tendonitis of right shoulder M75 [id number] Ambulatory referral to Physical Therapy                  Assessment  Impairments: abnormal or restricted ROM, activity intolerance, impaired physical strength, lacks appropriate home exercise program and pain with function    Assessment details: Pt presents with signs and symptoms synonymous of admitting diagnosis  Pt presents with pain, decreased strength, decreased range, flexibility, as well as tolerance to activity and postural awareness  Pt would benefit skilled PT intervention in order to address these impairments in order to be able to perform all desired activities with minimal to nil symptom exacerbation  She is going to have difficulty attending formal PT intervention at this time because of financial reasons and is requesting every other week which at this time and as long as she is compliant with PT intervention and HEP will perform  If there is minimal improvement then more frequency will be necessary to visit  Thank you very much for this referral      Understanding of Dx/Px/POC: good   Prognosis: good    Goals  STG 4 Weeks:  Decrease pain at worst to 5/10  Improve range by 10 degrees all planes AROM   Improve strength to 4- within range  Independent with HEP  LTG 8 Weeks:  Decrease pain at worst to 2/10  Improve range to equal that of L  Improve strength to 4/5 within range    Able to perform all desired activities with minimal to nil symptom exacerbation      Plan  Patient would benefit from: skilled speech therapy  Planned modality interventions: cryotherapy  Planned therapy interventions: joint mobilization, manual therapy, patient education, postural training, strengthening, stretching, therapeutic activities, therapeutic exercise, therapeutic training, home exercise program, graded motor, graded exercise, graded activity, gait training, functional ROM exercises and flexibility  Duration in weeks: 12  Treatment plan discussed with: patient  Plan details: 1 visit every other week per financial reasons or unless otherwise specified  Subjective Evaluation    History of Present Illness  Date of onset: 2018  Mechanism of injury: Pt is a 46 yofemale who is RHD and presents today stating 18 she fell on her [place]  She reports that her L hip gave out and fell on her R shoulder  Pt reports that she had a lot of pain and she states that she thought it had only been a bruise  Pt decided to go to the ER on 18 where an X-ray was indicative of fractured R collar bone  Pt reports that she was placed in a sling for 3 weeks and followed up with Dr Marifer De Leon who indicated that the shoulder was slowly healing as well as calcific tendonitis  Pt reports there are periods of time without pain but at worst it can get up to a 7/10  Pt reports that she is having trouble bathing, reaching, cooking, cleaning and generalized activity  Pt reports that her goals at this time are to be able decrease pain, improve range and be able to perform all desired activities as she was prior to injury  Pt reports that she follows up with another physician in October  Pt reports that she is on a strict income and a 40 dollar copay is tight for her so she would like to meet once every other week      Pain  Current pain ratin  At best pain ratin  At worst pain ratin  Quality: burning, dull ache and sharp  Relieving factors: ice and rest  Aggravating factors: overhead activity and lifting  Progression: improved      Diagnostic Tests  X-ray: abnormal  Patient Goals  Patient goals for therapy: increased motion, decreased pain, increased strength and return to sport/leisure activities          Objective     Active Range of Motion   Left Shoulder   Flexion: 160 degrees   Abduction: 150 degrees   External rotation BTH: C7   Internal rotation BTB: T10     Right Shoulder   Flexion: 110 degrees with pain  Abduction: 95 degrees with pain  External rotation BTH: C5     Additional Active Range of Motion Details  Forward head, rounded shoulders  B/L Sensation intact to light touch C3,4,5,6,7,8,T1,T2  MMT  L Flex 3-  Abd 3- ER 3 IR 3  R Flex 4 Abd 4 ER 5 IR 5  Elbow screen on L strong and painless, on R elbow strong and painless   L 35# R 35#  CS Screen WFL and without Pain     PROM   R Flex 140, Abd 130, ER 50 IR 50  L WFL  Palpation: pain along posterior Scapula, mid distal clavical          Flowsheet Rows      Most Recent Value   PT/OT G-Codes   Current Score  53   Projected Score  64   Assessment Type  Evaluation   G code set  Carrying, Moving & Handling Objects   Carrying, Moving and Handling Objects Current Status ()  CK   Carrying, Moving and Handling Objects Goal Status ()  CI        Precautions: DM2, Falls, HTN, Hx of Depression    Daily Treatment Diary       Manual 8/30            PROM of R shoulder 10 mins                                                                Exercise Diary             Pulleys             Table slide flex, abd, er 6" x 10            scaption 2 x 10            Scap Add  10" x 10            Wall Climbs             Wall Slides             Tband Row + Ext  YTB           Tband IR + ER  YTB                                                                                                                                                                                    Modalities             CP prn

## 2018-08-30 NOTE — TELEPHONE ENCOUNTER
Please send a # 90 day supply to the pharmacy last refill was supposed to be for a 90 day supply but on # 90 was sent needed to be # 180

## 2018-09-12 ENCOUNTER — OFFICE VISIT (OUTPATIENT)
Dept: PHYSICAL THERAPY | Facility: CLINIC | Age: 53
End: 2018-09-12
Payer: COMMERCIAL

## 2018-09-12 DIAGNOSIS — S42.031A CLOSED DISPLACED FRACTURE OF ACROMIAL END OF RIGHT CLAVICLE, INITIAL ENCOUNTER: Primary | ICD-10-CM

## 2018-09-12 DIAGNOSIS — M75.31 CALCIFIC TENDONITIS OF RIGHT SHOULDER: ICD-10-CM

## 2018-09-12 PROCEDURE — 97140 MANUAL THERAPY 1/> REGIONS: CPT

## 2018-09-12 PROCEDURE — 97110 THERAPEUTIC EXERCISES: CPT

## 2018-09-12 NOTE — PROGRESS NOTES
Daily Note     Today's date: 2018  Patient name: Ortiz Wong  : 1965  MRN: [id number]  Referring provider: Ashok Donaldson MD  Dx:   Encounter Diagnosis     ICD-10-CM    1  Closed displaced fracture of acromial end of right clavicle, initial encounter S42 031A    2  Calcific tendonitis of right shoulder M75 31                   Subjective: Patient states that he has been complaint with HEP  States that she notices "fatigue and heaviness" in R shoulder when complete  Objective: See treatment diary below  Precautions: DM2, Falls, HTN, Hx of Depression    Daily Treatment Diary       Manual            PROM of R shoulder 10 mins 10 min                                                               Exercise Diary             Pulleys  3'/3'           Table slide flex, abd, er 6" x 10 5" x 10           scaption 2 x 10            Scap Add  10" x 10            Wall Climbs  :10x10            Wall Slides- flex/abd  :05x10           Tband Row + Ext  YTB x 20            Tband IR + ER  YTB x 20                                                                                                                                                                                     Modalities             CP prn                                  Assessment: Tolerated treatment well  Patient exhibited good technique with therapeutic exercises      Plan: Continue per plan of care

## 2018-09-18 ENCOUNTER — OFFICE VISIT (OUTPATIENT)
Dept: PSYCHIATRY | Facility: CLINIC | Age: 53
End: 2018-09-18
Payer: COMMERCIAL

## 2018-09-18 VITALS — BODY MASS INDEX: 27.11 KG/M2 | HEIGHT: 63 IN | WEIGHT: 153 LBS

## 2018-09-18 DIAGNOSIS — F41.1 GENERALIZED ANXIETY DISORDER: ICD-10-CM

## 2018-09-18 DIAGNOSIS — F33.1 MODERATE RECURRENT MAJOR DEPRESSION (HCC): Primary | ICD-10-CM

## 2018-09-18 DIAGNOSIS — F41.0 PANIC ATTACKS: ICD-10-CM

## 2018-09-18 DIAGNOSIS — M16.11 PRIMARY OSTEOARTHRITIS OF RIGHT HIP: ICD-10-CM

## 2018-09-18 DIAGNOSIS — M54.16 LUMBAR RADICULOPATHY: ICD-10-CM

## 2018-09-18 DIAGNOSIS — S42.031D CLOSED DISPLACED FRACTURE OF ACROMIAL END OF RIGHT CLAVICLE WITH ROUTINE HEALING, SUBSEQUENT ENCOUNTER: ICD-10-CM

## 2018-09-18 PROBLEM — S42.031A: Status: ACTIVE | Noted: 2018-07-23

## 2018-09-18 PROCEDURE — 99214 OFFICE O/P EST MOD 30 MIN: CPT | Performed by: PHYSICIAN ASSISTANT

## 2018-09-18 RX ORDER — PAROXETINE HYDROCHLORIDE 20 MG/1
20 TABLET, FILM COATED ORAL
Qty: 30 TABLET | Refills: 2 | Status: SHIPPED | OUTPATIENT
Start: 2018-09-18 | End: 2018-11-27 | Stop reason: SDUPTHER

## 2018-09-18 RX ORDER — ARIPIPRAZOLE 2 MG/1
1 TABLET ORAL
Qty: 15 TABLET | Refills: 2 | Status: SHIPPED | OUTPATIENT
Start: 2018-09-18 | End: 2018-11-27 | Stop reason: SDUPTHER

## 2018-09-18 RX ORDER — CLONAZEPAM 0.5 MG/1
TABLET ORAL
Qty: 60 TABLET | Refills: 2 | Status: SHIPPED | OUTPATIENT
Start: 2018-09-18 | End: 2018-11-27 | Stop reason: SDUPTHER
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
## 2018-09-24 ENCOUNTER — HOSPITAL ENCOUNTER (OUTPATIENT)
Dept: MAMMOGRAPHY | Facility: HOSPITAL | Age: 53
Discharge: HOME | End: 2018-09-24
Payer: COMMERCIAL

## 2018-09-24 DIAGNOSIS — Z12.31 SCREENING MAMMOGRAM, ENCOUNTER FOR: ICD-10-CM

## 2018-09-24 PROCEDURE — 77067 SCR MAMMO BI INCL CAD: CPT

## 2018-09-26 ENCOUNTER — EVALUATION (OUTPATIENT)
Dept: PHYSICAL THERAPY | Facility: CLINIC | Age: 53
End: 2018-09-26
Payer: COMMERCIAL

## 2018-09-26 DIAGNOSIS — S42.031A CLOSED DISPLACED FRACTURE OF ACROMIAL END OF RIGHT CLAVICLE, INITIAL ENCOUNTER: Primary | ICD-10-CM

## 2018-09-26 DIAGNOSIS — M75.31 CALCIFIC TENDONITIS OF RIGHT SHOULDER: ICD-10-CM

## 2018-09-26 PROCEDURE — 97140 MANUAL THERAPY 1/> REGIONS: CPT | Performed by: PHYSICAL THERAPIST

## 2018-09-26 PROCEDURE — 97110 THERAPEUTIC EXERCISES: CPT | Performed by: PHYSICAL THERAPIST

## 2018-09-26 PROCEDURE — G8985 CARRY GOAL STATUS: HCPCS | Performed by: PHYSICAL THERAPIST

## 2018-09-26 PROCEDURE — G8986 CARRY D/C STATUS: HCPCS | Performed by: PHYSICAL THERAPIST

## 2018-09-26 PROCEDURE — G8984 CARRY CURRENT STATUS: HCPCS | Performed by: PHYSICAL THERAPIST

## 2018-09-26 NOTE — PROGRESS NOTES
PT Evaluation     Today's date: 2018  Patient name: Mike Shepherd  : 1965  MRN: [id number]  Referring provider: Amy Aguiar MD  Dx:   Encounter Diagnosis     ICD-10-CM    1  Closed displaced fracture of acromial end of right clavicle, initial encounter S42 031A    2  Calcific tendonitis of right shoulder M75 31                   Assessment  Impairments: abnormal or restricted ROM, activity intolerance, impaired physical strength, lacks appropriate home exercise program and pain with function    Assessment details: Pt is progressing well at this time  They have demonstrated improvement in pain levels, range, strength, tolerance to activity as well  They have achieved all STGs sought out for them as well as by them with the exception of pain and this may be generalized soreness with increased activity  They will benefit continued Skilled PT intervention in order to achieve all LTGs sought out for them as well as by them in order to perform all desired activities with minimal to nil symptom exacerbation  Thank you very much for this kind and motivated referral         Understanding of Dx/Px/POC: good   Prognosis: good    Goals  STG 4 Weeks:  Decrease pain at worst to 5/10 -partial  Improve range by 10 degrees all planes AROM  -met  Improve strength to 4- within range -met  Independent with HEP -met  LTG 8 Weeks:  Decrease pain at worst to 2/10  Improve range to equal that of L  Improve strength to 4/5 within range    Able to perform all desired activities with minimal to nil symptom exacerbation      Plan  Patient would benefit from: skilled speech therapy  Planned modality interventions: cryotherapy  Planned therapy interventions: joint mobilization, manual therapy, patient education, postural training, strengthening, stretching, therapeutic activities, therapeutic exercise, therapeutic training, home exercise program, graded motor, graded exercise, graded activity, gait training, functional ROM exercises and flexibility  Duration in weeks: 12  Treatment plan discussed with: patient  Plan details: 1 visit every other week per financial reasons or unless otherwise specified  Subjective Evaluation    History of Present Illness  Date of onset: 2018  Mechanism of injury: Pt presents today stating that her range and use is getting better with range and functional use, but she is now encountering generalized achiness from use of her shoulder  Pt reports compliance with HEP and states that sleeping is a slight challenge in regards of using that side  Pt wishes to continue to decrease her pain levels, improve her strength and use of the shoulder  Pt follows up with her physician on 10/4/18  Quality of life: good    Pain  Current pain ratin  At best pain ratin  At worst pain ratin  Quality: burning, dull ache and sharp  Relieving factors: ice and rest  Aggravating factors: overhead activity and lifting  Progression: improved      Diagnostic Tests  X-ray: abnormal  Treatments  Current treatment: physical therapy  Patient Goals  Patient goals for therapy: increased motion, decreased pain, increased strength and return to sport/leisure activities          Objective     Active Range of Motion   Left Shoulder   Flexion: 160 degrees   Abduction: 150 degrees   External rotation BTH: C7   Internal rotation BTB: T6     Right Shoulder   Flexion: 150 degrees with pain  Abduction: 125 degrees with pain  External rotation BTH: C7   Internal rotation BTB: T8     Additional Active Range of Motion Details  Forward head, rounded shoulders  B/L Sensation intact to light touch C3,4,5,6,7,8,T1,T2  MMT  L Flex 4-  Abd 4- ER 4-IR 4-  (within range)  R Flex 4 Abd 4 ER 5 IR 5  Elbow screen on L strong and painless, on R elbow strong and painless   L 35# R 35#  CS Screen WFL and without Pain     PROM   R Flex 160, Abd 140, ER 55 IR   L WFL  Palpation: pain along posterior Scapula, mid distal clavicle (improved less)            Precautions: DM2, Falls, HTN, Hx of Depression    Daily Treatment Diary       Manual 8/30 9/12 9/26          PROM of R shoulder 10 mins 10 min 10 mins                                                              Exercise Diary             Pulleys  3'/3' 3/3          Table slide flex, abd, er 6" x 10 5" x 10 5" x 10          scaption 2 x 10  2 x 10          Scap Add  10" x 10  10" x 10          Wall Climbs  :10x10  10" x 10          Wall Slides- flex/abd  :05x10 5" x 10          Tband Row + Ext  YTB x 20  YTB x 20          Tband IR + ER  YTB x 20  YTB x 20                                                                                                                                                                                   Modalities             CP prn   HP x 10

## 2018-09-27 NOTE — PROGRESS NOTES
Your mammogram has been reported normal   Please call the office to schedule a follow-up if you have any questions or concerns with the results

## 2018-10-04 ENCOUNTER — APPOINTMENT (OUTPATIENT)
Dept: RADIOLOGY | Facility: CLINIC | Age: 53
End: 2018-10-04
Payer: COMMERCIAL

## 2018-10-04 ENCOUNTER — OFFICE VISIT (OUTPATIENT)
Dept: OBGYN CLINIC | Facility: CLINIC | Age: 53
End: 2018-10-04
Payer: COMMERCIAL

## 2018-10-04 VITALS
HEIGHT: 63 IN | BODY MASS INDEX: 29.06 KG/M2 | HEART RATE: 72 BPM | DIASTOLIC BLOOD PRESSURE: 85 MMHG | SYSTOLIC BLOOD PRESSURE: 159 MMHG | WEIGHT: 164 LBS

## 2018-10-04 DIAGNOSIS — S42.031G CLOSED DISPLACED FRACTURE OF ACROMIAL END OF RIGHT CLAVICLE WITH DELAYED HEALING, SUBSEQUENT ENCOUNTER: ICD-10-CM

## 2018-10-04 DIAGNOSIS — M89.8X1 CLAVICLE PAIN: ICD-10-CM

## 2018-10-04 DIAGNOSIS — M25.511 RIGHT SHOULDER PAIN, UNSPECIFIED CHRONICITY: ICD-10-CM

## 2018-10-04 DIAGNOSIS — M89.8X1 CLAVICLE PAIN: Primary | ICD-10-CM

## 2018-10-04 PROCEDURE — 73000 X-RAY EXAM OF COLLAR BONE: CPT

## 2018-10-04 PROCEDURE — 99213 OFFICE O/P EST LOW 20 MIN: CPT | Performed by: ORTHOPAEDIC SURGERY

## 2018-10-04 PROCEDURE — 73030 X-RAY EXAM OF SHOULDER: CPT

## 2018-10-04 NOTE — PROGRESS NOTES
Assessment:       1  Clavicle pain    2  Right shoulder pain, unspecified chronicity    3  Closed displaced fracture of acromial end of right clavicle with delayed healing, subsequent encounter          Plan:        Explained my current clinical findings and reviewed radiological findings with Mrs Abi Zacarias  I explained that the lateral end clavicular fractures do have somewhat high risk of delayed or nonhealing  However, she is clinically doing very well with good progression  Radiologically, she still has some persistent fracture line but there is some evidence of callus formation  Hence, I have advised her to continue with her shoulder exercises at this time  I will see her back in about 4 weeks time  She still persists to have any tenderness or no progression of healing on the radiograph, we may consider the use of a bone stimulator as needed  Subjective:     Patient ID: Marian Graf is a 46 y o  female  Chief Complaint:    HPI  Mrs Abi Zacarias is a 58-year-old right-hand-dominant lady who is here today for a follow-up hope her right clavicular closed displaced fracture of the lateral end of the right clavicle  This injury was sustained over 10 weeks ago on 7/15/2018 when she lost her footing on the basement stairs at home and fell down about 8 steps  She has initially been evaluated in the emergency room on 7/18/2018 and subsequently seen by my colleague Dr Carmina Chavez on 7/26/2018 and thereafter on 8/13/2018  She has been treated in a right arm sling  She stopped using her sling approximately 6 weeks ago and has been doing physical therapy rehabilitation  Today, she reports good improvement of her right shoulder range of motion as well as pain but still complains of some discomfort on the superior aspect of her right shoulder as well as some discomfort while lying on her affected side  Notably, she does have a history of calcific tendinitis of the right shoulder    She denies any significant radiation of her right shoulder pain and is currently able to perform most of her activities of daily living from the right shoulder without much difficulty  Social History     Occupational History    Unemployed due to Rt hip injury      and lower back injury    Used to be a       Social History Main Topics    Smoking status: Current Every Day Smoker     Packs/day: 1 00     Years: 25 00    Smokeless tobacco: Never Used    Alcohol use Yes      Comment: will have a couple of beers or a couple of rum and cokes once per week    Drug use: Yes     Types: Marijuana      Comment: Smoked THC between 19 and 30y/o    Sexual activity: Not Currently      Review of Systems   Constitutional: Negative  HENT: Negative  Eyes: Negative  Respiratory: Negative  Cardiovascular: Negative  Gastrointestinal: Negative  Endocrine: Negative  Genitourinary: Negative  Skin: Negative  Allergic/Immunologic: Negative  Neurological: Negative  Hematological: Negative  Psychiatric/Behavioral: Negative  Objective:     Ortho ExamPhysical Exam   Constitutional: She is oriented to person, place, and time  She appears well-developed and well-nourished  HENT:   Head: Normocephalic and atraumatic  Eyes: Pupils are equal, round, and reactive to light  Conjunctivae are normal    Cardiovascular: Normal rate and regular rhythm  Pulmonary/Chest: Effort normal  No respiratory distress  Neurological: She is alert and oriented to person, place, and time  No cranial nerve deficit  Skin: Skin is warm and dry  No erythema  Psychiatric: She has a normal mood and affect  Her behavior is normal  Judgment and thought content normal        Right shoulder examination:   No swelling or deformity noted  No significant step-off of the acromioclavicular joint noted  There is some persistent tenderness over the lateral end of the clavicle   Some discomfort with acromioclavicular Jobes compression test   Forward flexion is 170°, abduction is 170°, internal rotation is at the level of T11-T12  External rotation in abduction is 70° and in abduction is 85°  Abduction strength grade 4 +/5, internal rotation strength grade 4 +/5, external rotation strength grade 5/5 forward flexion strength grade 4 +/5  No shoulder apprehension noted  Negative Craft sign  Equivocal Neer's test     I have personally reviewed pertinent films in PACS and my interpretation is Plain radiograph of the right shoulder and clavicle done today reveals a persistent fracture line of the distal lateral end of the right clavicle  However, some bony callus formation is now evident on the radiograph  Wilfred Red

## 2018-10-17 NOTE — PROGRESS NOTES
PT Discharge    Today's date: 10/17/2018  Patient name: Cindy Roca  : 1965  MRN: [id number]  Referring provider: Favian Kaur MD  Dx:   Encounter Diagnosis     ICD-10-CM    1  Closed displaced fracture of acromial end of right clavicle, initial encounter S42 031A PT plan of care cert/re-cert   2  Calcific tendonitis of right shoulder M75 31 PT plan of care cert/re-cert       Start Time: 0900  Stop Time: 945  Total time in clinic (min): 45 minutes    Assessment/Plan Pt has not been present since 18  She also requested self dc due to copay reasons  Pt's chart will be DC in compliance of facility policy as all Charts are DC within 30 days of last scheduled visit          Subjective    Objective    Flowsheet Rows      Most Recent Value   PT/OT G-Codes   Current Score  62   Projected Score  64   Assessment Type  Discharge   G code set  Carrying, Moving & Handling Objects   Carrying, Moving and Handling Objects Current Status ()  CJ   Carrying, Moving and Handling Objects Goal Status ()  CI   Carrying, Moving and Handling Objects Discharge Status ()  CI

## 2018-11-01 ENCOUNTER — APPOINTMENT (OUTPATIENT)
Dept: RADIOLOGY | Facility: CLINIC | Age: 53
End: 2018-11-01
Payer: COMMERCIAL

## 2018-11-01 ENCOUNTER — OFFICE VISIT (OUTPATIENT)
Dept: OBGYN CLINIC | Facility: CLINIC | Age: 53
End: 2018-11-01
Payer: COMMERCIAL

## 2018-11-01 VITALS
WEIGHT: 164 LBS | BODY MASS INDEX: 29.06 KG/M2 | HEART RATE: 91 BPM | SYSTOLIC BLOOD PRESSURE: 146 MMHG | DIASTOLIC BLOOD PRESSURE: 89 MMHG | HEIGHT: 63 IN

## 2018-11-01 DIAGNOSIS — Z09 FRACTURE FOLLOW-UP: Primary | ICD-10-CM

## 2018-11-01 DIAGNOSIS — S42.031G CLOSED DISPLACED FRACTURE OF ACROMIAL END OF RIGHT CLAVICLE WITH DELAYED HEALING, SUBSEQUENT ENCOUNTER: ICD-10-CM

## 2018-11-01 DIAGNOSIS — R20.2 PARESTHESIAS IN RIGHT HAND: ICD-10-CM

## 2018-11-01 DIAGNOSIS — Z09 FRACTURE FOLLOW-UP: ICD-10-CM

## 2018-11-01 PROCEDURE — 99214 OFFICE O/P EST MOD 30 MIN: CPT | Performed by: ORTHOPAEDIC SURGERY

## 2018-11-01 PROCEDURE — 73000 X-RAY EXAM OF COLLAR BONE: CPT

## 2018-11-01 NOTE — PROGRESS NOTES
Assessment:       1  Fracture follow-up    2  Closed displaced fracture of acromial end of right clavicle with delayed healing, subsequent encounter    3  Paresthesias in right hand          Plan:        Explained my current clinical findings and reviewed radiological findings with Natalie:  1  Right clavicle fracture- would obtain a CT scan of the right shoulder for further assessment regarding any bony callus formation  Would also request a bone stimulator for the same  She does have good range of motion of her right shoulder with a mild diffuse decrease of shoulder strength  Will follow up after CT scan of the right shoulder  If no significant callus formation, consider taking surgical opinion    2   Right hand tingling and numbness- clinical suspicion of a potential carpal tunnel syndrome for which I will request a nerve conduction study/EMG study    3  Hand tremors - I do suspect that this could potentially be related to her or medications example Abilify  Follow-up after upper extremity EMG study and also suggest follow-up by psychiatric with regards to her psychiatric medications  Subjective:     Patient ID: Jennifer Marina is a 46 y o  female  Chief Complaint:  Follow-up right clavicle fracture    HPI  50-year-old right-hand-dominant female with a history of right clavicle lateral and minimally displaced fracture sustained over 3 months ago on 7/15/2018 after falling down 8 steps  Initially evaluated and treated conservatively by Dr Leopold Offer on 7/26/2018 and subsequently on 8/13/2018  Last office visit was on 10/4/2018 by myself when the patient had reported good improvement of her right shoulder range of motion along with some improvement of her right shoulder pain  Background history is also significant for right shoulder calcific tendinitis but denies any significant symptoms of her right shoulder prior to her most recent injury    Today, she continues to experience intermittent mild to moderate discomfort of her right shoulder mostly on the superior and slightly on the lateral aspect  Also reports some mild generalized weakness of her right upper extremity with tremors  Over the last 2-3 months she has also noticed some tingling and numbness of her right hand mostly on the radial aspect with a mild decrease of  strength  Complains of some associated neck pain as well  Other background medical history is significant for moderate recurrent major depression, bipolar affective disorder, panic attacks, generalized anxiety disorder and type 2 diabetes mellitus  Social History     Occupational History    Unemployed due to Rt hip injury      and lower back injury    Used to be a       Social History Main Topics    Smoking status: Current Every Day Smoker     Packs/day: 1 00     Years: 25 00    Smokeless tobacco: Never Used    Alcohol use Yes      Comment: will have a couple of beers or a couple of rum and cokes once per week    Drug use: Yes     Types: Marijuana      Comment: Smoked THC between 19 and 30y/o    Sexual activity: Not Currently      Review of Systems   Constitutional: Negative  HENT: Negative  Eyes: Negative  Respiratory: Negative  Cardiovascular: Negative  Gastrointestinal: Negative  Endocrine: Negative  Genitourinary: Negative  Skin: Negative  Allergic/Immunologic: Negative  Neurological: Positive for tremors and numbness  Hematological: Negative  Psychiatric/Behavioral: Negative  Objective:         Neurological Testing     Additional Neurological Details  Coarse intentional tremors noted of bilateral upper extremities worse on the right  Physical Exam   Constitutional: She is oriented to person, place, and time  She appears well-developed and well-nourished  HENT:   Head: Normocephalic and atraumatic  Eyes: Pupils are equal, round, and reactive to light   Conjunctivae are normal    Cardiovascular: Normal rate and regular rhythm  Pulmonary/Chest: Effort normal  No respiratory distress  Neurological: She is alert and oriented to person, place, and time  No cranial nerve deficit  Coarse intentional tremors noted of bilateral upper extremities worse on the right   Skin: Skin is warm and dry  No erythema  Psychiatric: She has a normal mood and affect  Her behavior is normal  Judgment and thought content normal        Right shoulder examination:  There is tenderness to palpation over the acromioclavicular joint and the lateral and of the right clavicle  Active range of motion is forward flexion of 170°, abduction of 170°, internal rotation of T12-L1 compared to T8-T10 on the contralateral side  External rotation of 85 ° in abduction compared to 90° on the contralateral side  External rotation in abduction of about 70°  Right shoulder strength is grade 4 +/5 diffusely in all directions  Negative Craft and negative Neer's  Mild discomfort on acromioclavicular Jobes compression test   No apprehension  Negative speed's and negative Yergason's  Mild discomfort with McKenzie's  Right wrist examination:   No thenar or  hypothenar atrophy  No significant tenderness over the carpal tunnel  Positive Phalen's test   Positive carpal tunnel compression test   Tinel's negative over the carpal tunnel  Right thumb abduction and opposition is grade 4 +/5  Mild hypoesthesia to light touch diffusely in all digits but more prominently in the right thumb compared to the contralateral side  Mild decrease of right hand  strength compared to the left side    Cervical spine examination:   Mild tenderness at the C6-C7-C7-T1  Negative cervical axial load and equivocal right Spurling's  I have personally reviewed pertinent films in PACS and my interpretation is Plain radiograph of the right shoulder is unchanged in its appearance compared to the last shoulder radiograph on 10/4/2018    There is presence of calcific tendinitis as well as a persistent fracture line of the lateral end of the clavicle  A mild sclerosis of the fracture margin is noted indicative of potential delayed/nonhealing

## 2018-11-12 ENCOUNTER — HOSPITAL ENCOUNTER (OUTPATIENT)
Dept: CT IMAGING | Facility: HOSPITAL | Age: 53
Discharge: HOME | End: 2018-11-12
Attending: ORTHOPAEDIC SURGERY
Payer: COMMERCIAL

## 2018-11-12 DIAGNOSIS — S42.031G CLOSED DISPLACED FRACTURE OF ACROMIAL END OF RIGHT CLAVICLE WITH DELAYED HEALING, SUBSEQUENT ENCOUNTER: ICD-10-CM

## 2018-11-12 PROCEDURE — 73200 CT UPPER EXTREMITY W/O DYE: CPT

## 2018-11-13 ENCOUNTER — TELEPHONE (OUTPATIENT)
Dept: OBGYN CLINIC | Facility: HOSPITAL | Age: 53
End: 2018-11-13

## 2018-11-13 NOTE — TELEPHONE ENCOUNTER
Patient sees Dr Ekaterina Logan from radiology calling to let the doctor know there are significant findings on the patient's CT scan in [facility]

## 2018-11-14 DIAGNOSIS — F41.0 PANIC ATTACKS: ICD-10-CM

## 2018-11-14 DIAGNOSIS — F33.1 MODERATE RECURRENT MAJOR DEPRESSION (HCC): ICD-10-CM

## 2018-11-14 DIAGNOSIS — F41.1 GENERALIZED ANXIETY DISORDER: ICD-10-CM

## 2018-11-14 DIAGNOSIS — E78.5 HYPERLIPIDEMIA, UNSPECIFIED HYPERLIPIDEMIA TYPE: ICD-10-CM

## 2018-11-14 RX ORDER — ARIPIPRAZOLE 2 MG/1
1 TABLET ORAL
Qty: 15 TABLET | Refills: 0 | OUTPATIENT
Start: 2018-11-14 | End: 2018-12-14

## 2018-11-14 RX ORDER — CLONAZEPAM 0.5 MG/1
TABLET ORAL
Qty: 60 TABLET | Refills: 0 | OUTPATIENT
Start: 2018-11-14

## 2018-11-14 RX ORDER — PAROXETINE HYDROCHLORIDE 20 MG/1
20 TABLET, FILM COATED ORAL
Qty: 30 TABLET | Refills: 0 | OUTPATIENT
Start: 2018-11-14 | End: 2018-12-14

## 2018-11-20 ENCOUNTER — DOCUMENTATION (OUTPATIENT)
Dept: PSYCHIATRY | Facility: CLINIC | Age: 53
End: 2018-11-20

## 2018-11-21 ENCOUNTER — APPOINTMENT (OUTPATIENT)
Dept: LAB | Facility: CLINIC | Age: 53
End: 2018-11-21
Payer: COMMERCIAL

## 2018-11-21 DIAGNOSIS — E11.9 TYPE 2 DIABETES MELLITUS WITHOUT COMPLICATION, WITHOUT LONG-TERM CURRENT USE OF INSULIN (HCC): ICD-10-CM

## 2018-11-21 DIAGNOSIS — I10 ESSENTIAL HYPERTENSION: ICD-10-CM

## 2018-11-21 LAB
ALBUMIN SERPL BCP-MCNC: 3.2 G/DL (ref 3.5–5)
ALP SERPL-CCNC: 131 U/L (ref 46–116)
ALT SERPL W P-5'-P-CCNC: 25 U/L (ref 12–78)
ANION GAP SERPL CALCULATED.3IONS-SCNC: 4 MMOL/L (ref 4–13)
AST SERPL W P-5'-P-CCNC: 23 U/L (ref 5–45)
BASOPHILS # BLD AUTO: 0.06 THOUSANDS/ΜL (ref 0–0.1)
BASOPHILS NFR BLD AUTO: 1 % (ref 0–1)
BILIRUB SERPL-MCNC: 0.62 MG/DL (ref 0.2–1)
BUN SERPL-MCNC: 9 MG/DL (ref 5–25)
CALCIUM SERPL-MCNC: 9.4 MG/DL (ref 8.3–10.1)
CHLORIDE SERPL-SCNC: 102 MMOL/L (ref 100–108)
CHOLEST SERPL-MCNC: 167 MG/DL (ref 50–200)
CO2 SERPL-SCNC: 29 MMOL/L (ref 21–32)
CREAT SERPL-MCNC: 0.76 MG/DL (ref 0.6–1.3)
CREAT UR-MCNC: 127 MG/DL
EOSINOPHIL # BLD AUTO: 0.33 THOUSAND/ΜL (ref 0–0.61)
EOSINOPHIL NFR BLD AUTO: 4 % (ref 0–6)
ERYTHROCYTE [DISTWIDTH] IN BLOOD BY AUTOMATED COUNT: 14.7 % (ref 11.6–15.1)
EST. AVERAGE GLUCOSE BLD GHB EST-MCNC: 134 MG/DL
GFR SERPL CREATININE-BSD FRML MDRD: 89 ML/MIN/1.73SQ M
GLUCOSE P FAST SERPL-MCNC: 108 MG/DL (ref 65–99)
HBA1C MFR BLD: 6.3 % (ref 4.2–6.3)
HCT VFR BLD AUTO: 39.4 % (ref 34.8–46.1)
HDLC SERPL-MCNC: 67 MG/DL (ref 40–60)
HGB BLD-MCNC: 12.8 G/DL (ref 11.5–15.4)
IMM GRANULOCYTES # BLD AUTO: 0.02 THOUSAND/UL (ref 0–0.2)
IMM GRANULOCYTES NFR BLD AUTO: 0 % (ref 0–2)
LDLC SERPL CALC-MCNC: 78 MG/DL (ref 0–100)
LYMPHOCYTES # BLD AUTO: 2.59 THOUSANDS/ΜL (ref 0.6–4.47)
LYMPHOCYTES NFR BLD AUTO: 33 % (ref 14–44)
MCH RBC QN AUTO: 27.8 PG (ref 26.8–34.3)
MCHC RBC AUTO-ENTMCNC: 32.5 G/DL (ref 31.4–37.4)
MCV RBC AUTO: 86 FL (ref 82–98)
MICROALBUMIN UR-MCNC: 77.5 MG/L (ref 0–20)
MICROALBUMIN/CREAT 24H UR: 61 MG/G CREATININE (ref 0–30)
MONOCYTES # BLD AUTO: 0.46 THOUSAND/ΜL (ref 0.17–1.22)
MONOCYTES NFR BLD AUTO: 6 % (ref 4–12)
NEUTROPHILS # BLD AUTO: 4.38 THOUSANDS/ΜL (ref 1.85–7.62)
NEUTS SEG NFR BLD AUTO: 56 % (ref 43–75)
NONHDLC SERPL-MCNC: 100 MG/DL
NRBC BLD AUTO-RTO: 0 /100 WBCS
PLATELET # BLD AUTO: 219 THOUSANDS/UL (ref 149–390)
PMV BLD AUTO: 11.1 FL (ref 8.9–12.7)
POTASSIUM SERPL-SCNC: 4.1 MMOL/L (ref 3.5–5.3)
PROT SERPL-MCNC: 7 G/DL (ref 6.4–8.2)
RBC # BLD AUTO: 4.6 MILLION/UL (ref 3.81–5.12)
SODIUM SERPL-SCNC: 135 MMOL/L (ref 136–145)
TRIGL SERPL-MCNC: 112 MG/DL
WBC # BLD AUTO: 7.84 THOUSAND/UL (ref 4.31–10.16)

## 2018-11-21 PROCEDURE — 85025 COMPLETE CBC W/AUTO DIFF WBC: CPT | Performed by: FAMILY MEDICINE

## 2018-11-21 PROCEDURE — 83036 HEMOGLOBIN GLYCOSYLATED A1C: CPT

## 2018-11-21 PROCEDURE — 80053 COMPREHEN METABOLIC PANEL: CPT | Performed by: FAMILY MEDICINE

## 2018-11-21 PROCEDURE — 36415 COLL VENOUS BLD VENIPUNCTURE: CPT | Performed by: FAMILY MEDICINE

## 2018-11-21 PROCEDURE — 82570 ASSAY OF URINE CREATININE: CPT

## 2018-11-21 PROCEDURE — 80061 LIPID PANEL: CPT | Performed by: FAMILY MEDICINE

## 2018-11-21 PROCEDURE — 82043 UR ALBUMIN QUANTITATIVE: CPT

## 2018-11-21 PROCEDURE — 3060F POS MICROALBUMINURIA REV: CPT | Performed by: FAMILY MEDICINE

## 2018-11-26 DIAGNOSIS — F33.1 MODERATE RECURRENT MAJOR DEPRESSION (HCC): ICD-10-CM

## 2018-11-26 DIAGNOSIS — F41.1 GENERALIZED ANXIETY DISORDER: ICD-10-CM

## 2018-11-26 DIAGNOSIS — F41.0 PANIC ATTACKS: ICD-10-CM

## 2018-11-27 RX ORDER — ARIPIPRAZOLE 2 MG/1
2 TABLET ORAL
Qty: 30 TABLET | Refills: 0 | Status: SHIPPED | OUTPATIENT
Start: 2018-11-27 | End: 2018-12-06 | Stop reason: DRUGHIGH

## 2018-11-27 RX ORDER — CLONAZEPAM 0.5 MG/1
TABLET ORAL
Qty: 60 TABLET | Refills: 0 | Status: SHIPPED | OUTPATIENT
Start: 2018-11-27 | End: 2019-01-10 | Stop reason: SDUPTHER

## 2018-11-27 RX ORDER — PAROXETINE HYDROCHLORIDE 20 MG/1
20 TABLET, FILM COATED ORAL
Qty: 30 TABLET | Refills: 0 | Status: SHIPPED | OUTPATIENT
Start: 2018-11-27 | End: 2019-01-10 | Stop reason: SDUPTHER

## 2018-11-27 NOTE — TELEPHONE ENCOUNTER
Natalie rescheduled her appt to 1/10/2019  Her current Rxs will last her until 12/17/2018    I e-scribed the following to her designated [place]:  Aripiprazole 2mg (1) tab po qhs # 30   Paroxetine 20mg (1) tab po qhs # 30   Clonazepam 0 5mg (1/2) tab po bid and (1) tab po qhs # 60--not to be filled before 12/14/2018

## 2018-11-29 ENCOUNTER — OFFICE VISIT (OUTPATIENT)
Dept: OBGYN CLINIC | Facility: CLINIC | Age: 53
End: 2018-11-29
Payer: COMMERCIAL

## 2018-11-29 VITALS
SYSTOLIC BLOOD PRESSURE: 175 MMHG | BODY MASS INDEX: 28.14 KG/M2 | DIASTOLIC BLOOD PRESSURE: 98 MMHG | HEART RATE: 82 BPM | WEIGHT: 158.8 LBS | HEIGHT: 63 IN

## 2018-11-29 DIAGNOSIS — S42.031K CLOSED DISPLACED FRACTURE OF ACROMIAL END OF RIGHT CLAVICLE WITH NONUNION, SUBSEQUENT ENCOUNTER: Primary | ICD-10-CM

## 2018-11-29 DIAGNOSIS — M75.31 CALCIFIC TENDINITIS OF RIGHT SHOULDER: ICD-10-CM

## 2018-11-29 PROCEDURE — 99213 OFFICE O/P EST LOW 20 MIN: CPT | Performed by: ORTHOPAEDIC SURGERY

## 2018-11-29 NOTE — PROGRESS NOTES
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, subsequent encounter    2  Calcific tendinitis of right shoulder          Plan:        Explained my current clinical and radiological findings to [facility] today  Her right shoulder discomfort is likely multifactorial possibly more likely secondary to her calcific tendinitis of the shoulder  I explained her the possibility of ultrasound-guided barbotage and lavage which would likely help with her lateral shoulder pain  She does have good range of motion and functional strength of her right shoulder  In case, she does persist with right shoulder discomfort despite her calcific tendinitis needle barbotage, we may consider surgical opinion for possible surgical excision of her lateral clavicular nonunion fragment  I will see her back next week for her right shoulder calcific tendinitis ultrasound-guided needle barbotage  Subjective:     Patient ID: Abril Campo is a 46 y o  female  Chief Complaint:  Follow-up right shoulder pain    HPI  [facility]) is here today for a follow-up of her right shoulder pain  She has a history of right lateral and clavicle fracture for which she was earlier under the care of Dr Marc Bejarano and was being treated conservatively  Her care was then transferred over to me and conservative management was continued  Her follow-up plain radiograph of the right shoulder did not reveal any significant bone healing and hence CT scan was obtained  This reveals a 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  She does have calcific tendinitis of the right shoulder as well as evidenced on her plain radiograph and the CT scan  She is currently using a bone stimulator the last 2 weeks for her clavicular fracture non union    Currently, Natalie does report good improvement of her right shoulder range of motion but continues to experience discomfort which is mostly lateral and posterior lateral and intermittently superior  It is made worse with arm abduction  This sometimes radiates down her right lateral arm  No new injuries in this regard  Social History     Occupational History    Unemployed due to Rt hip injury      and lower back injury    Used to be a       Social History Main Topics    Smoking status: Current Every Day Smoker     Packs/day: 1 00     Years: 25 00    Smokeless tobacco: Never Used    Alcohol use Yes      Comment: will have a couple of beers or a couple of rum and cokes once per week    Drug use: Yes     Types: Marijuana      Comment: Smoked THC between 19 and 28y/o    Sexual activity: Not Currently      Review of Systems   Constitutional: Negative  HENT: Negative  Eyes: Negative  Respiratory: Negative  Cardiovascular: Negative  Gastrointestinal: Negative  Endocrine: Negative  Genitourinary: Negative  Skin: Negative  Allergic/Immunologic: Negative  Neurological: Negative  Hematological: Negative  Psychiatric/Behavioral: Negative  Objective:     Ortho ExamPhysical Exam   Constitutional: She is oriented to person, place, and time  She appears well-developed and well-nourished  HENT:   Head: Normocephalic and atraumatic  Eyes: Pupils are equal, round, and reactive to light  Conjunctivae are normal    Cardiovascular: Normal rate and regular rhythm  Pulmonary/Chest: Effort normal  No respiratory distress  Neurological: She is alert and oriented to person, place, and time  No cranial nerve deficit  Skin: Skin is warm and dry  No erythema  Psychiatric: She has a normal mood and affect  Her behavior is normal  Judgment and thought content normal        Right shoulder exam:  There is mild tenderness to palpation over the lateral end of the clavicle  There is also tenderness to palpation over the subacromial bursa and the supraspinatus  Active range of motion is forward flexion up to about 170°    Abduction is 170°  Internal rotation is at the level of T12-L1 and external rotation in adduction is 70°  Right shoulder abduction is 4+/5, internal rotation is 4+/5, external rotation is 4+/5 and forward flexion is 4+/5  I have personally reviewed pertinent films in PACS and my interpretation is As noted in the HPI

## 2018-12-01 DIAGNOSIS — E78.5 HYPERLIPIDEMIA, UNSPECIFIED HYPERLIPIDEMIA TYPE: ICD-10-CM

## 2018-12-01 DIAGNOSIS — K21.9 CHRONIC GERD: ICD-10-CM

## 2018-12-03 DIAGNOSIS — K21.9 CHRONIC GERD: ICD-10-CM

## 2018-12-03 RX ORDER — OMEPRAZOLE 20 MG/1
CAPSULE, DELAYED RELEASE ORAL
Qty: 90 CAPSULE | Refills: 1 | OUTPATIENT
Start: 2018-12-03

## 2018-12-03 RX ORDER — ATORVASTATIN CALCIUM 20 MG/1
TABLET, FILM COATED ORAL
Qty: 90 TABLET | Refills: 1 | OUTPATIENT
Start: 2018-12-03

## 2018-12-06 DIAGNOSIS — F41.0 PANIC ATTACKS: ICD-10-CM

## 2018-12-06 DIAGNOSIS — E78.5 HYPERLIPIDEMIA, UNSPECIFIED HYPERLIPIDEMIA TYPE: ICD-10-CM

## 2018-12-06 DIAGNOSIS — F41.1 GENERALIZED ANXIETY DISORDER: ICD-10-CM

## 2018-12-06 DIAGNOSIS — K21.9 CHRONIC GERD: ICD-10-CM

## 2018-12-06 DIAGNOSIS — F33.1 MODERATE RECURRENT MAJOR DEPRESSION (HCC): ICD-10-CM

## 2018-12-06 RX ORDER — ARIPIPRAZOLE 2 MG/1
2 TABLET ORAL
Qty: 30 TABLET | Refills: 0 | OUTPATIENT
Start: 2018-12-06 | End: 2019-01-05

## 2018-12-06 RX ORDER — OMEPRAZOLE 20 MG/1
20 CAPSULE, DELAYED RELEASE ORAL DAILY
Qty: 30 CAPSULE | Refills: 0 | Status: SHIPPED | OUTPATIENT
Start: 2018-12-06 | End: 2018-12-11 | Stop reason: SDUPTHER

## 2018-12-06 RX ORDER — ATORVASTATIN CALCIUM 20 MG/1
20 TABLET, FILM COATED ORAL DAILY
Qty: 30 TABLET | Refills: 0 | Status: SHIPPED | OUTPATIENT
Start: 2018-12-06 | End: 2018-12-11 | Stop reason: SDUPTHER

## 2018-12-06 RX ORDER — ARIPIPRAZOLE 2 MG/1
1 TABLET ORAL
Qty: 15 TABLET | Refills: 0 | Status: SHIPPED | OUTPATIENT
Start: 2018-12-06 | End: 2019-01-10 | Stop reason: SDUPTHER

## 2018-12-06 NOTE — TELEPHONE ENCOUNTER
She stated that the rx for aripiprazole was only for 15 days b/c she was only taking a 1/2 tablet to start

## 2018-12-06 NOTE — TELEPHONE ENCOUNTER
Pt called and stated she needs another refill for 15 days on ariprizole for this month  States last rx was only sent to pharmacy w/ 15 days

## 2018-12-06 NOTE — TELEPHONE ENCOUNTER
Patient called stating that she is completely out of her Omeprazole and Atorvastatin  She is scheduled for an appointment on 12/11/18 with you for her 6 month follow up  She requested a 90 day supply which I advised her that we may only be able to send over a short supply until her appointment on Tuesday  Pharmacy is [address]  Please call patient

## 2018-12-07 ENCOUNTER — OFFICE VISIT (OUTPATIENT)
Dept: OBGYN CLINIC | Facility: OTHER | Age: 53
End: 2018-12-07
Payer: COMMERCIAL

## 2018-12-07 VITALS
SYSTOLIC BLOOD PRESSURE: 166 MMHG | HEART RATE: 79 BPM | WEIGHT: 158 LBS | HEIGHT: 63 IN | DIASTOLIC BLOOD PRESSURE: 94 MMHG | BODY MASS INDEX: 28 KG/M2

## 2018-12-07 DIAGNOSIS — M75.31 CALCIFIC TENDINITIS OF RIGHT SHOULDER: Primary | ICD-10-CM

## 2018-12-07 PROCEDURE — 99214 OFFICE O/P EST MOD 30 MIN: CPT | Performed by: ORTHOPAEDIC SURGERY

## 2018-12-07 PROCEDURE — 20611 DRAIN/INJ JOINT/BURSA W/US: CPT | Performed by: ORTHOPAEDIC SURGERY

## 2018-12-07 RX ADMIN — LIDOCAINE HYDROCHLORIDE 10 ML: 10 INJECTION, SOLUTION EPIDURAL; INFILTRATION; INTRACAUDAL; PERINEURAL at 14:47

## 2018-12-07 RX ADMIN — BETAMETHASONE SODIUM PHOSPHATE AND BETAMETHASONE ACETATE 6 MG: 3; 3 INJECTION, SUSPENSION INTRA-ARTICULAR; INTRALESIONAL; INTRAMUSCULAR; SOFT TISSUE at 14:47

## 2018-12-07 NOTE — PROGRESS NOTES
Assessment:       1  Calcific tendinitis of right shoulder          Plan:        Explained my current clinical findings to [facility] today  She also received a right shoulder calcific tendinitis needling procedure followed by a right subacromial cortisone injection on today's office visit  I have advised her to avoid doing any heavy lifting pulling pushing activities of the right upper extremity over 5 lb though she is encouraged to continue with range-of-motion exercises  In about a week or 10 days when she starts feeling better, she may gradually increase her right shoulder strengthening exercises  I will see her back for clinical reassessment in about 4 weeks time  Subjective:     Patient ID: Liz Cortez is a 46 y o  female  Chief Complaint:  Right shoulder pain    HPI  [facility] is here today for a follow-up of her right shoulder pain  She has a history of right shoulder calcific tendinitis as well as a 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  She is here today for a follow-up of her right shoulder pain which is mostly lateral in location and sometimes radiates posterolaterally as well as lateral upper arm  It is made worse with terminal arm abduction  Denies any new onset tingling numbness or weakness of the right upper extremity  Social History     Occupational History    Unemployed due to Rt hip injury      and lower back injury    Used to be a       Social History Main Topics    Smoking status: Current Every Day Smoker     Packs/day: 1 00     Years: 25 00    Smokeless tobacco: Never Used    Alcohol use Yes      Comment: will have a couple of beers or a couple of rum and cokes once per week    Drug use: Yes     Types: Marijuana      Comment: Smoked THC between 19 and 28y/o    Sexual activity: Not Currently      Review of Systems   Constitutional: Negative  HENT: Negative  Eyes: Negative  Respiratory: Negative  Cardiovascular: Negative  Gastrointestinal: Negative  Endocrine: Negative  Genitourinary: Negative  Skin: Negative  Allergic/Immunologic: Negative  Neurological: Negative  Hematological: Negative  Psychiatric/Behavioral: Negative  Objective:     Ortho ExamPhysical Exam   Constitutional: She is oriented to person, place, and time  She appears well-developed and well-nourished  HENT:   Head: Normocephalic and atraumatic  Eyes: Pupils are equal, round, and reactive to light  Conjunctivae are normal    Cardiovascular: Normal rate and regular rhythm  Pulmonary/Chest: Effort normal  No respiratory distress  Neurological: She is alert and oriented to person, place, and time  No cranial nerve deficit  Skin: Skin is warm and dry  No erythema  Psychiatric: She has a normal mood and affect  Her behavior is normal  Judgment and thought content normal          Right shoulder exam:  Minimal tenderness to palpation over the lateral end of the clavicle  There is tenderness to palpation over the subacromial bursa and the supraspinatus with active range of motion of forward flexion of 170° and abduction of 170°  Internal rotation is at the level of T12-L1 and external rotation in adduction is about 70°  Right shoulder abduction is 4+/5, internal rotation is 4+/5, external rotation is 4+/5 and forward flexion is 4+/5  Equivocal Craft and Neer's  Large joint arthrocentesis  Date/Time: 12/7/2018 2:47 PM  Consent given by: patient  Site marked: site marked  Timeout: Immediately prior to procedure a time out was called to verify the correct patient, procedure, equipment, support staff and site/side marked as required   Supporting Documentation  Indications: pain   Procedure Details  Location: shoulder - Shoulder joint: Dry needling procedure of the right shoulder subacromial calcific tendinitis as well as right subacromial cortisone injection    Preparation: Patient was prepped and draped in the usual sterile fashion  Needle size: 16 G  Ultrasound guidance: yes  Approach: lateral  Medications administered: 10 mL lidocaine (PF) 1 %; 6 mg betamethasone acetate-betamethasone sodium phosphate 6 (3-3) mg/mL    Patient tolerance: patient tolerated the procedure well with no immediate complications  Dressing:  Sterile dressing applied     Using a linear transducer and sterile probe with sterile gel, the right shoulder suprascapular spinatus calcific tendinitis was visualized  A dry needling of the calcific tendinitis was performed using a 16 gauge needle  Subsequently, the right subacromial bursa was injected with 4 mL of 1% plain lidocaine and 1 mL of 6 mg betamethasone  I have personally reviewed pertinent films in PACS

## 2018-12-10 RX ORDER — LIDOCAINE HYDROCHLORIDE 10 MG/ML
10 INJECTION, SOLUTION EPIDURAL; INFILTRATION; INTRACAUDAL; PERINEURAL
Status: COMPLETED | OUTPATIENT
Start: 2018-12-07 | End: 2018-12-07

## 2018-12-10 RX ORDER — BETAMETHASONE SODIUM PHOSPHATE AND BETAMETHASONE ACETATE 3; 3 MG/ML; MG/ML
6 INJECTION, SUSPENSION INTRA-ARTICULAR; INTRALESIONAL; INTRAMUSCULAR; SOFT TISSUE
Status: COMPLETED | OUTPATIENT
Start: 2018-12-07 | End: 2018-12-07

## 2018-12-11 ENCOUNTER — OFFICE VISIT (OUTPATIENT)
Dept: FAMILY MEDICINE CLINIC | Facility: CLINIC | Age: 53
End: 2018-12-11
Payer: COMMERCIAL

## 2018-12-11 VITALS
SYSTOLIC BLOOD PRESSURE: 140 MMHG | RESPIRATION RATE: 16 BRPM | BODY MASS INDEX: 28.53 KG/M2 | WEIGHT: 161 LBS | HEIGHT: 63 IN | HEART RATE: 83 BPM | OXYGEN SATURATION: 98 % | DIASTOLIC BLOOD PRESSURE: 76 MMHG

## 2018-12-11 DIAGNOSIS — E78.5 HYPERLIPIDEMIA, UNSPECIFIED HYPERLIPIDEMIA TYPE: ICD-10-CM

## 2018-12-11 DIAGNOSIS — F33.1 MODERATE RECURRENT MAJOR DEPRESSION (HCC): ICD-10-CM

## 2018-12-11 DIAGNOSIS — F31.31 BIPOLAR AFFECTIVE DISORDER, CURRENTLY DEPRESSED, MILD (HCC): ICD-10-CM

## 2018-12-11 DIAGNOSIS — R80.9 MICROALBUMINURIA: ICD-10-CM

## 2018-12-11 DIAGNOSIS — I10 ESSENTIAL HYPERTENSION: ICD-10-CM

## 2018-12-11 DIAGNOSIS — E11.9 TYPE 2 DIABETES MELLITUS WITHOUT COMPLICATION, WITHOUT LONG-TERM CURRENT USE OF INSULIN (HCC): ICD-10-CM

## 2018-12-11 DIAGNOSIS — E11.9 CONTROLLED TYPE 2 DIABETES MELLITUS WITHOUT COMPLICATION, WITHOUT LONG-TERM CURRENT USE OF INSULIN (HCC): Primary | ICD-10-CM

## 2018-12-11 DIAGNOSIS — K21.9 CHRONIC GERD: ICD-10-CM

## 2018-12-11 PROCEDURE — 3066F NEPHROPATHY DOC TX: CPT | Performed by: FAMILY MEDICINE

## 2018-12-11 PROCEDURE — 3008F BODY MASS INDEX DOCD: CPT | Performed by: FAMILY MEDICINE

## 2018-12-11 PROCEDURE — 4010F ACE/ARB THERAPY RXD/TAKEN: CPT | Performed by: FAMILY MEDICINE

## 2018-12-11 PROCEDURE — 99214 OFFICE O/P EST MOD 30 MIN: CPT | Performed by: FAMILY MEDICINE

## 2018-12-11 RX ORDER — LANCETS
EACH MISCELLANEOUS
Qty: 100 EACH | Refills: 0 | Status: SHIPPED | OUTPATIENT
Start: 2018-12-11

## 2018-12-11 RX ORDER — ATORVASTATIN CALCIUM 20 MG/1
20 TABLET, FILM COATED ORAL DAILY
Qty: 90 TABLET | Refills: 1 | Status: SHIPPED | OUTPATIENT
Start: 2018-12-11 | End: 2019-06-27 | Stop reason: SDUPTHER

## 2018-12-11 RX ORDER — LISINOPRIL 20 MG/1
20 TABLET ORAL DAILY
Qty: 90 TABLET | Refills: 1 | Status: SHIPPED | OUTPATIENT
Start: 2018-12-11 | End: 2019-06-11 | Stop reason: SDUPTHER

## 2018-12-11 RX ORDER — OMEPRAZOLE 20 MG/1
20 CAPSULE, DELAYED RELEASE ORAL DAILY
Qty: 90 CAPSULE | Refills: 1 | Status: SHIPPED | OUTPATIENT
Start: 2018-12-11 | End: 2019-06-27 | Stop reason: SDUPTHER

## 2018-12-11 NOTE — PROGRESS NOTES
Subjective:      Patient ID: Jagdish Simmons is a 46 y o  female  Diabetes   She presents for her follow-up diabetic visit  She has type 2 diabetes mellitus  Disease course: A1c 6 3  There are no hypoglycemic associated symptoms  Pertinent negatives for diabetes include no blurred vision, no chest pain, no fatigue, no foot paresthesias, no polydipsia, no polyphagia, no polyuria, no visual change, no weakness and no weight loss  There are no hypoglycemic complications  Symptoms are stable  There are no diabetic complications  Risk factors for coronary artery disease include diabetes mellitus, hypertension, post-menopausal and sedentary lifestyle  Current diabetic treatments: metformin 500 mg bid  She is compliant with treatment all of the time  An ACE inhibitor/angiotensin II receptor blocker is being taken  She does not see a podiatrist Eye exam is current  Hypertension   This is a chronic problem  The current episode started more than 1 year ago  The problem is unchanged  The problem is uncontrolled  Pertinent negatives include no blurred vision, chest pain, palpitations or shortness of breath  Risk factors for coronary artery disease include diabetes mellitus, dyslipidemia, post-menopausal state, stress and smoking/tobacco exposure  Treatments tried: lisinopril 10 mg daily  The current treatment provides moderate improvement  There are no compliance problems  Hyperlipidemia   This is a chronic problem  The current episode started more than 1 year ago  The problem is controlled  Pertinent negatives include no chest pain, myalgias or shortness of breath  Current antihyperlipidemic treatment includes statins  The current treatment provides significant improvement of lipids  There are no compliance problems  Risk factors for coronary artery disease include diabetes mellitus, dyslipidemia, hypertension, stress and post-menopausal    Heartburn   She complains of heartburn   She reports no abdominal pain or no chest pain  This is a chronic problem  The current episode started more than 1 year ago  The heartburn is located in the substernum  The symptoms are aggravated by certain foods  Pertinent negatives include no fatigue or weight loss  There are no known risk factors  She has tried a PPI for the symptoms  The treatment provided significant relief  Past procedures include an EGD  Past Medical History:   Diagnosis Date    Diabetes mellitus ([address] )     GERD (gastroesophageal reflux disease)     Hypertension     Psychiatric disorder     bipolar    Right clavicle fracture        Family History   Problem Relation Age of Onset    Arthritis Family     Cancer Family     Osteoporosis Family     Cervical cancer Mother     Hypertension Father     Other Father         pre-diabetes    Diabetes type I Sister     Osteoporosis Sister     Depression Sister     Heart attack Brother          of an MI at 52y/o    Diabetes Brother     Other Paternal Grandmother         Parkinson's Disease    Heart attack Paternal Grandfather     Alcohol abuse Paternal Uncle     Seizures Other     Seizures Other        Past Surgical History:   Procedure Laterality Date    BLADDER SURGERY      Sling    DC HIP [address] BODY,PLASTY/RESECTN Right 2017    Procedure: RIGHT HIP ARTHROSCOPIC LABRAL DEBRIDEMENT;  Surgeon: Yang Vincent MD;  [facility];  Service: Orthopedics    SINUS SURGERY      3 surgeries     TUBAL LIGATION          reports that she has been smoking  She has a 25 00 pack-year smoking history  She has never used smokeless tobacco  She reports that she drinks alcohol  She reports that she uses drugs, including Marijuana        Current Outpatient Prescriptions:     ARIPiprazole (ABILIFY) 2 mg tablet, Take 0 5 tablets (1 mg total) by mouth daily at bedtime for 30 days, Disp: 15 tablet, Rfl: 0    aspirin 325 mg tablet, Take 325 mg by mouth, Disp: , Rfl:     atorvastatin (LIPITOR) 20 mg tablet, Take 1 tablet (20 mg total) by mouth daily, Disp: 90 tablet, Rfl: 1    clonazePAM (KlonoPIN) 0 5 mg tablet, Take 1/2 tab by mouth twice daily and 1 full tab nightly, Disp: 60 tablet, Rfl: 0    glucose blood (ONE TOUCH ULTRA TEST) test strip, Check blood sugar twice/ week , Disp: 100 each, Rfl: 2    ibuprofen (MOTRIN) 600 mg tablet, Take 1 tablet (600 mg total) by mouth every 6 (six) hours as needed for mild pain, Disp: 30 tablet, Rfl: 0    Lancets (ONETOUCH ULTRASOFT) lancets, Check blood sugar twice/ week , Disp: 100 each, Rfl: 0    metFORMIN (GLUCOPHAGE) 500 mg tablet, Take 1 tablet (500 mg total) by mouth 2 (two) times a day with meals, Disp: 180 tablet, Rfl: 1    omeprazole (PriLOSEC) 20 mg delayed release capsule, Take 1 capsule (20 mg total) by mouth daily, Disp: 90 capsule, Rfl: 1    PARoxetine (PAXIL) 20 mg tablet, Take 1 tablet (20 mg total) by mouth daily at bedtime for 30 days, Disp: 30 tablet, Rfl: 0    lisinopril (ZESTRIL) 20 mg tablet, Take 1 tablet (20 mg total) by mouth daily, Disp: 90 tablet, Rfl: 1    The following portions of the patient's history were reviewed and updated as appropriate: allergies, current medications, past family history, past medical history, past social history, past surgical history and problem list     Review of Systems   Constitutional: Negative  Negative for fatigue and weight loss  Eyes: Negative for blurred vision  Respiratory: Negative  Negative for shortness of breath  Cardiovascular: Negative  Negative for chest pain and palpitations  Gastrointestinal: Positive for heartburn  Negative for abdominal pain  Endocrine: Negative for polydipsia, polyphagia and polyuria  Musculoskeletal: Negative for myalgias  Neurological: Negative  Negative for weakness  Psychiatric/Behavioral: Negative              Objective:    /76   Pulse 83   Resp 16   Ht 5' 3" (1 6 m)   Wt 73 kg (161 lb)   SpO2 98%   BMI 28 52 kg/m²      Physical Exam   Constitutional: She is oriented to person, place, and time  She appears well-developed and well-nourished  Cardiovascular: Normal rate, regular rhythm and normal heart sounds  Pulses are no weak pulses  Pulmonary/Chest: Effort normal and breath sounds normal    Abdominal: Soft  Bowel sounds are normal    Feet:   Right Foot:   Skin Integrity: Negative for ulcer, skin breakdown, erythema, warmth, callus or dry skin  Left Foot:   Skin Integrity: Negative for ulcer, skin breakdown, erythema, warmth, callus or dry skin  Neurological: She is alert and oriented to person, place, and time  Psychiatric: Her behavior is normal  Judgment normal        Patient's shoes and socks removed  Right Foot/Ankle   Right Foot Inspection  Skin Exam: skin normal and skin intact no dry skin, no warmth, no callus, no erythema, no maceration, no abnormal color, no pre-ulcer, no ulcer and no callus                          Toe Exam: ROM and strength within normal limits  Sensory   Vibration: intact  Proprioception: intact   Monofilament testing: intact  Vascular  Capillary refills: < 3 seconds      Left Foot/Ankle  Left Foot Inspection  Skin Exam: skin normal and skin intactno dry skin, no warmth, no erythema, no maceration, normal color, no pre-ulcer, no ulcer and no callus                         Toe Exam: ROM and strength within normal limits                   Sensory   Vibration: intact  Proprioception: intact  Monofilament: intact  Vascular  Capillary refills: < 3 seconds    Assign Risk Category:  No deformity present; No loss of protective sensation;  No weak pulses       Risk: 0    Recent Results (from the past 1008 hour(s))   CBC and differential    Collection Time: 11/21/18  9:11 AM   Result Value Ref Range    WBC 7 84 4 31 - 10 16 Thousand/uL    RBC 4 60 3 81 - 5 12 Million/uL    Hemoglobin 12 8 11 5 - 15 4 g/dL    Hematocrit 39 4 34 8 - 46 1 %    MCV 86 82 - 98 fL    MCH 27 8 26 8 - 34 3 pg    MCHC 32 5 31 4 - 37 4 g/dL    RDW 14 7 11 6 - 15 1 %    MPV 11 1 8 9 - 12 7 fL    Platelets 482 479 - 423 Thousands/uL    nRBC 0 /100 WBCs    Neutrophils Relative 56 43 - 75 %    Immat GRANS % 0 0 - 2 %    Lymphocytes Relative 33 14 - 44 %    Monocytes Relative 6 4 - 12 %    Eosinophils Relative 4 0 - 6 %    Basophils Relative 1 0 - 1 %    Neutrophils Absolute 4 38 1 85 - 7 62 Thousands/µL    Immature Grans Absolute 0 02 0 00 - 0 20 Thousand/uL    Lymphocytes Absolute 2 59 0 60 - 4 47 Thousands/µL    Monocytes Absolute 0 46 0 17 - 1 22 Thousand/µL    Eosinophils Absolute 0 33 0 00 - 0 61 Thousand/µL    Basophils Absolute 0 06 0 00 - 0 10 Thousands/µL   Comprehensive metabolic panel    Collection Time: 11/21/18  9:11 AM   Result Value Ref Range    Sodium 135 (L) 136 - 145 mmol/L    Potassium 4 1 3 5 - 5 3 mmol/L    Chloride 102 100 - 108 mmol/L    CO2 29 21 - 32 mmol/L    ANION GAP 4 4 - 13 mmol/L    BUN 9 5 - 25 mg/dL    Creatinine 0 76 0 60 - 1 30 mg/dL    Glucose, Fasting 108 (H) 65 - 99 mg/dL    Calcium 9 4 8 3 - 10 1 mg/dL    AST 23 5 - 45 U/L    ALT 25 12 - 78 U/L    Alkaline Phosphatase 131 (H) 46 - 116 U/L    Total Protein 7 0 6 4 - 8 2 g/dL    Albumin 3 2 (L) 3 5 - 5 0 g/dL    Total Bilirubin 0 62 0 20 - 1 00 mg/dL    eGFR 89 ml/min/1 73sq m   Lipid panel    Collection Time: 11/21/18  9:11 AM   Result Value Ref Range    Cholesterol 167 50 - 200 mg/dL    Triglycerides 112 <=150 mg/dL    HDL, Direct 67 (H) 40 - 60 mg/dL    LDL Calculated 78 0 - 100 mg/dL    Non-HDL-Chol (CHOL-HDL) 100 mg/dl   HEMOGLOBIN A1C W/ EAG ESTIMATION    Collection Time: 11/21/18  9:11 AM   Result Value Ref Range    Hemoglobin A1C 6 3 4 2 - 6 3 %     mg/dl   Microalbumin / creatinine urine ratio    Collection Time: 11/21/18  9:12 AM   Result Value Ref Range    Creatinine, Ur 127 0 mg/dL    Microalbum  ,U,Random 77 5 (H) 0 0 - 20 0 mg/L    Microalb Creat Ratio 61 (H) 0 - 30 mg/g creatinine       Assessment/Plan:  Patient's diabetes is at goal, continue metformin 500 milligram twice daily  Her blood pressure is elevated and she has microalbuminuria  I advised her to increase lisinopril to 20 milligram daily  Patient advised to follow up after 2 weeks for blood pressure check  LDL at goal continue statin  Continue low-dose PPI for GERD  Patient encouraged to go for eye check up to rule out diabetic retinopathy  Patient declines  both Adacel and Prevnar today  Patient is currently smoking but wants to quit  Educate on the negative effects of Tobacco   Recommend quitting smoking  Listed cessation options,  Including smoking cessation program    Patient wants to  Make an effort on his own by reducing the number of cigarettes  If he is unable to do so then he will follow up again to discuss treatment options  Continue management of depression and bipolar disorder per Psychiatry  Healthy diet and regular exercise emphasized  Labs with 6 month followup advised  I have spent 25 minutes with Patient  today in which greater than 50% of this time was spent in counseling/coordination of care regarding Diagnostic results, Prognosis, Risks and benefits of tx options, Intructions for management, Patient and family education, Importance of tx compliance, Risk factor reductions and Impressions  Diagnoses and all orders for this visit:    Controlled type 2 diabetes mellitus without complication, without long-term current use of insulin ([facility] [address] )  -     Diabetic foot exam; Future    Hyperlipidemia, unspecified hyperlipidemia type  -     atorvastatin (LIPITOR) 20 mg tablet; Take 1 tablet (20 mg total) by mouth daily  -     Lipid panel; Future    Type 2 diabetes mellitus without complication, without long-term current use of insulin ([facility])  -     glucose blood (ONE TOUCH ULTRA TEST) test strip; Check blood sugar twice/ week  -     Lancets (ONETOUCH ULTRASOFT) lancets; Check blood sugar twice/ week  -     metFORMIN (GLUCOPHAGE) 500 mg tablet;  Take 1 tablet (500 mg total) by mouth 2 (two) times a day with meals  -     Comprehensive metabolic panel; Future  -     Hemoglobin A1C; Future    Chronic GERD  -     omeprazole (PriLOSEC) 20 mg delayed release capsule; Take 1 capsule (20 mg total) by mouth daily    Essential hypertension  -     lisinopril (ZESTRIL) 20 mg tablet; Take 1 tablet (20 mg total) by mouth daily    Moderate recurrent major depression (HCC)    Bipolar affective disorder, currently depressed, mild (HCC)    Microalbuminuria  -     Microalbumin / creatinine urine ratio;  Future    Other orders  -     Cancel: TDAP VACCINE GREATER THAN OR EQUAL TO 6YO IM

## 2019-01-08 ENCOUNTER — ANNUAL EXAM (OUTPATIENT)
Dept: FAMILY MEDICINE CLINIC | Facility: CLINIC | Age: 54
End: 2019-01-08
Payer: COMMERCIAL

## 2019-01-08 ENCOUNTER — OFFICE VISIT (OUTPATIENT)
Dept: OBGYN CLINIC | Facility: OTHER | Age: 54
End: 2019-01-08
Payer: COMMERCIAL

## 2019-01-08 VITALS
OXYGEN SATURATION: 98 % | HEART RATE: 72 BPM | HEIGHT: 63 IN | WEIGHT: 161 LBS | DIASTOLIC BLOOD PRESSURE: 90 MMHG | SYSTOLIC BLOOD PRESSURE: 158 MMHG | BODY MASS INDEX: 28.53 KG/M2

## 2019-01-08 VITALS
BODY MASS INDEX: 28.52 KG/M2 | WEIGHT: 160.94 LBS | HEIGHT: 63 IN | DIASTOLIC BLOOD PRESSURE: 93 MMHG | SYSTOLIC BLOOD PRESSURE: 167 MMHG | HEART RATE: 70 BPM

## 2019-01-08 DIAGNOSIS — G25.2 COARSE TREMORS: ICD-10-CM

## 2019-01-08 DIAGNOSIS — M75.31 CALCIFIC TENDINITIS OF RIGHT SHOULDER: Primary | ICD-10-CM

## 2019-01-08 DIAGNOSIS — R80.9 MICROALBUMINURIA: ICD-10-CM

## 2019-01-08 DIAGNOSIS — E11.21 DIABETIC NEPHROPATHY ASSOCIATED WITH TYPE 2 DIABETES MELLITUS (HCC): ICD-10-CM

## 2019-01-08 DIAGNOSIS — F41.0 PANIC ATTACKS: ICD-10-CM

## 2019-01-08 DIAGNOSIS — F33.1 MODERATE RECURRENT MAJOR DEPRESSION (HCC): ICD-10-CM

## 2019-01-08 DIAGNOSIS — E11.9 CONTROLLED TYPE 2 DIABETES MELLITUS WITHOUT COMPLICATION, WITHOUT LONG-TERM CURRENT USE OF INSULIN (HCC): ICD-10-CM

## 2019-01-08 DIAGNOSIS — Z01.419 ENCOUNTER FOR ANNUAL ROUTINE GYNECOLOGICAL EXAMINATION: Primary | ICD-10-CM

## 2019-01-08 DIAGNOSIS — F41.1 GENERALIZED ANXIETY DISORDER: ICD-10-CM

## 2019-01-08 DIAGNOSIS — S42.031K CLOSED DISPLACED FRACTURE OF ACROMIAL END OF RIGHT CLAVICLE WITH NONUNION, SUBSEQUENT ENCOUNTER: ICD-10-CM

## 2019-01-08 DIAGNOSIS — I10 ESSENTIAL HYPERTENSION: ICD-10-CM

## 2019-01-08 PROBLEM — M75.101 ROTATOR CUFF SYNDROME OF RIGHT SHOULDER: Status: ACTIVE | Noted: 2019-01-08

## 2019-01-08 PROCEDURE — 90471 IMMUNIZATION ADMIN: CPT | Performed by: FAMILY MEDICINE

## 2019-01-08 PROCEDURE — 99213 OFFICE O/P EST LOW 20 MIN: CPT | Performed by: ORTHOPAEDIC SURGERY

## 2019-01-08 PROCEDURE — 99396 PREV VISIT EST AGE 40-64: CPT | Performed by: FAMILY MEDICINE

## 2019-01-08 PROCEDURE — 99213 OFFICE O/P EST LOW 20 MIN: CPT | Performed by: FAMILY MEDICINE

## 2019-01-08 PROCEDURE — 90670 PCV13 VACCINE IM: CPT | Performed by: FAMILY MEDICINE

## 2019-01-08 PROCEDURE — 3066F NEPHROPATHY DOC TX: CPT | Performed by: FAMILY MEDICINE

## 2019-01-08 RX ORDER — PAROXETINE HYDROCHLORIDE 20 MG/1
TABLET, FILM COATED ORAL
Qty: 30 TABLET | Refills: 0 | OUTPATIENT
Start: 2019-01-08

## 2019-01-08 RX ORDER — HYDROCHLOROTHIAZIDE 12.5 MG/1
12.5 CAPSULE, GELATIN COATED ORAL DAILY
Qty: 30 CAPSULE | Refills: 0 | Status: SHIPPED | OUTPATIENT
Start: 2019-01-08 | End: 2019-02-05 | Stop reason: SDUPTHER

## 2019-01-08 NOTE — PROGRESS NOTES
Subjective:      Patient ID: Chino Vasquez is a 46 y o  female  Hypertension   This is a chronic problem  The current episode started more than 1 year ago  The problem is unchanged  The problem is uncontrolled  Pertinent negatives include no blurred vision, chest pain, headaches, palpitations, peripheral edema or shortness of breath  Risk factors for coronary artery disease include smoking/tobacco exposure, stress, sedentary lifestyle, diabetes mellitus and dyslipidemia  Treatments tried: lisinopril 20 mg daily  The current treatment provides no improvement  There are no compliance problems  Past Medical History:   Diagnosis Date    Diabetes mellitus ([address] )     GERD (gastroesophageal reflux disease)     Hypertension     Psychiatric disorder     bipolar    Right clavicle fracture        Family History   Problem Relation Age of Onset    Arthritis Family     Cancer Family     Osteoporosis Family     Cervical cancer Mother     Hypertension Father     Other Father         pre-diabetes    Diabetes type I Sister     Osteoporosis Sister     Depression Sister     Heart attack Brother          of an MI at 52y/o    Diabetes Brother     Other Paternal Grandmother         Parkinson's Disease    Heart attack Paternal Grandfather     Alcohol abuse Paternal Uncle     Seizures Other     Seizures Other        Past Surgical History:   Procedure Laterality Date    BLADDER SURGERY      Sling    [place] HIP [address] BODY,PLASTY/RESECTN Right 2017    Procedure: RIGHT HIP ARTHROSCOPIC LABRAL DEBRIDEMENT;  Surgeon: Rojas Clayton MD;  [facility];  Service: Orthopedics    SINUS SURGERY      3 surgeries     TUBAL LIGATION          reports that she has been smoking  She has a 25 00 pack-year smoking history  She has never used smokeless tobacco  She reports that she drinks alcohol  She reports that she uses drugs, including Marijuana        Current Outpatient Prescriptions:     aspirin 325 mg tablet, Take 325 mg by mouth, Disp: , Rfl:     atorvastatin (LIPITOR) 20 mg tablet, Take 1 tablet (20 mg total) by mouth daily, Disp: 90 tablet, Rfl: 1    clonazePAM (KlonoPIN) 0 5 mg tablet, Take 1/2 tab by mouth twice daily and 1 full tab nightly, Disp: 60 tablet, Rfl: 0    glucose blood (ONE TOUCH ULTRA TEST) test strip, Check blood sugar twice/ week , Disp: 100 each, Rfl: 2    ibuprofen (MOTRIN) 600 mg tablet, Take 1 tablet (600 mg total) by mouth every 6 (six) hours as needed for mild pain, Disp: 30 tablet, Rfl: 0    Lancets (ONETOUCH ULTRASOFT) lancets, Check blood sugar twice/ week , Disp: 100 each, Rfl: 0    lisinopril (ZESTRIL) 20 mg tablet, Take 1 tablet (20 mg total) by mouth daily, Disp: 90 tablet, Rfl: 1    metFORMIN (GLUCOPHAGE) 500 mg tablet, Take 1 tablet (500 mg total) by mouth 2 (two) times a day with meals, Disp: 180 tablet, Rfl: 1    omeprazole (PriLOSEC) 20 mg delayed release capsule, Take 1 capsule (20 mg total) by mouth daily, Disp: 90 capsule, Rfl: 1    ARIPiprazole (ABILIFY) 2 mg tablet, Take 0 5 tablets (1 mg total) by mouth daily at bedtime for 30 days, Disp: 15 tablet, Rfl: 0    PARoxetine (PAXIL) 20 mg tablet, Take 1 tablet (20 mg total) by mouth daily at bedtime for 30 days, Disp: 30 tablet, Rfl: 0    The following portions of the patient's history were reviewed and updated as appropriate: allergies, current medications, past family history, past medical history, past social history, past surgical history and problem list     Review of Systems   Constitutional: Negative  Eyes: Negative for blurred vision  Respiratory: Negative  Negative for shortness of breath  Cardiovascular: Negative  Negative for chest pain and palpitations  Musculoskeletal: Negative for myalgias  Neurological: Negative  Negative for headaches  Psychiatric/Behavioral: Negative              Objective:    /90   Pulse 72   Ht 5' 3" (1 6 m)   Wt 73 kg (161 lb)   LMP  (Within Years) SpO2 98%   Breastfeeding? No Comment: last apa 2 5 years   BMI 28 52 kg/m²      Physical Exam   Constitutional: She is oriented to person, place, and time  She appears well-developed and well-nourished  Cardiovascular: Normal rate, regular rhythm and normal heart sounds  Pulmonary/Chest: Effort normal and breath sounds normal    Abdominal: Soft  Bowel sounds are normal    Neurological: She is alert and oriented to person, place, and time  Psychiatric: Her behavior is normal  Judgment normal          No results found for this or any previous visit (from the past 1008 hour(s))  Assessment/Plan:    Patient's blood pressure did not improve on increasing the lisinopril to 20 milligram daily  Her goal blood pressure is less than 130/80 considering that she has type 2 diabetes with microalbuminuria  Patient advised to start hydrochlorothiazide  Follow-up 4 weeks for blood pressure check  Diagnoses and all orders for this visit:    Encounter for annual routine gynecological examination    Essential hypertension  -     hydrochlorothiazide (MICROZIDE) 12 5 mg capsule; Take 1 capsule (12 5 mg total) by mouth daily    Controlled type 2 diabetes mellitus without complication, without long-term current use of insulin (HCC)  -     PNEUMOCOCCAL CONJUGATE VACCINE 13-VALENT GREATER THAN 6 MONTHS    Coarse tremors  -     Ambulatory referral to Neurology;  Future    Other orders  -     Cancel: TDAP VACCINE GREATER THAN OR EQUAL TO 6YO IM

## 2019-01-08 NOTE — PROGRESS NOTES
ASSESSMENT & PLAN: Dania Henao is a 46 y o  No obstetric history on file  with normal gynecologic exam     1   Routine well woman exam done today  2    Pap and HPV:Pap with HPV was done today  Current ASCCP Guidelines reviewed  Last Pap  [unfilled] :  no abnormalities  3   The patient declined STD testing  HPV testing performed  Safe sex practices have been discussed  4  The patient is sexually active  She declined contraception and options have been discussed  5  The following were reviewed in today's visit: breast self exam, adequate intake of calcium and vitamin D, exercise, healthy diet and tobacco cessation  6  Patient to return to office in 12 months for preventative  All questions have been answered to her satisfaction  CC:  Annual Gynecologic Examination    HPI: Dania Henao is a 46 y o  No obstetric history on file  who presents for annual gynecologic examination  She has the following concerns:      Health Maintenance:    She exercises 5 days per week  She wears her seatbelt routinely  She does perform regular monthly self breast exams  She feels safe at home  Patients does follow a healthy diet  Past Medical History:   Diagnosis Date    Diabetes mellitus ([address] )     GERD (gastroesophageal reflux disease)     Hypertension     Psychiatric disorder     bipolar    Right clavicle fracture        Past Surgical History:   Procedure Laterality Date    BLADDER SURGERY      Sling    [place] HIP SCOPE/REMV BODY,PLASTY/RESECTN Right 7/13/2017    Procedure: RIGHT HIP ARTHROSCOPIC LABRAL DEBRIDEMENT;  Surgeon: Tommi Boas, MD;  [facility];  Service: Orthopedics    SINUS SURGERY      3 surgeries     TUBAL LIGATION         Past OB/Gyn History:   No LMP recorded (within years)  Patient has had an ablation  History of sexually transmitted infection No  Patient is currently sexually active  Birth control: none  Last Pap 2015 :  no abnormalities      Family History Problem Relation Age of Onset    Arthritis Family     Cancer Family     Osteoporosis Family     Cervical cancer Mother     Hypertension Father     Other Father         pre-diabetes    Diabetes type I Sister     Osteoporosis Sister     Depression Sister     Heart attack Brother          of an MI at 52y/o    Diabetes Brother     Other Paternal Grandmother         Parkinson's Disease    Heart attack Paternal Grandfather     Alcohol abuse Paternal Uncle     Seizures Other     Seizures Other        Social History:  Social History     Social History    Marital status:      Spouse name: N/A    Number of children: 2    Years of education: N/A     Occupational History    Unemployed due to Rt hip injury      and lower back injury    Used to be a       Social History Main Topics    Smoking status: Current Every Day Smoker     Packs/day: 1 00     Years: 25 00    Smokeless tobacco: Never Used    Alcohol use Yes      Comment: will have a couple of beers or a couple of rum and cokes once per week    Drug use: Yes     Types: Marijuana      Comment: Smoked THC between 20 and 28y/o    Sexual activity: Not Currently     Other Topics Concern    Not on file     Social History Narrative    Caffeine use        Home: Lives with boyfriend        Children from first  and most recent boyfriend respectively: Son born  Daughter         Education:    Pt denies any h/o learning disabilities and reached childhood milestones on time as far as she knows    Graduated HS     Did a 9 month Business Ops course in the              Presently lives with   Patient is     Patient is currently unemployed   Allergies   Allergen Reactions    Cefdinir Hives       Current Outpatient Prescriptions:     aspirin 325 mg tablet, Take 325 mg by mouth, Disp: , Rfl:     atorvastatin (LIPITOR) 20 mg tablet, Take 1 tablet (20 mg total) by mouth daily, Disp: 90 tablet, Rfl: 1    clonazePAM (KlonoPIN) 0 5 mg tablet, Take 1/2 tab by mouth twice daily and 1 full tab nightly, Disp: 60 tablet, Rfl: 0    glucose blood (ONE TOUCH ULTRA TEST) test strip, Check blood sugar twice/ week , Disp: 100 each, Rfl: 2    ibuprofen (MOTRIN) 600 mg tablet, Take 1 tablet (600 mg total) by mouth every 6 (six) hours as needed for mild pain, Disp: 30 tablet, Rfl: 0    Lancets (ONETOUCH ULTRASOFT) lancets, Check blood sugar twice/ week , Disp: 100 each, Rfl: 0    lisinopril (ZESTRIL) 20 mg tablet, Take 1 tablet (20 mg total) by mouth daily, Disp: 90 tablet, Rfl: 1    metFORMIN (GLUCOPHAGE) 500 mg tablet, Take 1 tablet (500 mg total) by mouth 2 (two) times a day with meals, Disp: 180 tablet, Rfl: 1    omeprazole (PriLOSEC) 20 mg delayed release capsule, Take 1 capsule (20 mg total) by mouth daily, Disp: 90 capsule, Rfl: 1    ARIPiprazole (ABILIFY) 2 mg tablet, Take 0 5 tablets (1 mg total) by mouth daily at bedtime for 30 days, Disp: 15 tablet, Rfl: 0    PARoxetine (PAXIL) 20 mg tablet, Take 1 tablet (20 mg total) by mouth daily at bedtime for 30 days, Disp: 30 tablet, Rfl: 0    Review of Systems:  A complete review of systems was performed and was negative, except as listed  Denies weight changes, excessive fatigue, urinary incontinence, urinary frequency, constipation or bowel changes, blood in stool, severe headaches, vaginal bleeding, vaginal discharge  Physical Exam:  /90   Pulse 72   Ht 5' 3" (1 6 m)   Wt 73 kg (161 lb)   LMP  (Within Years)   SpO2 98%   Breastfeeding? No Comment: last apa 2 5 years   BMI 28 52 kg/m²    GEN: The patient was alert and oriented x3, pleasant well-appearing female in no acute distress  HEENT:  Unremarkable, no anterior or posterior lymphadenopathy, no thyromegaly  CV:  RRR, no murmurs  RESP:  Clear to auscultation bilaterally  BREAST:  Symmetric breasts with no palpable breast masses or obvious breast lesions   She has no retractions or nipple discharge  She has no axillary abnormalities or palpable masses  Self breast exam is taught  ABD:  Soft, nontender, non-distended  EXT: nontender, no edema  PELVIC:  Normal appearing external female genitalia, normal vaginal epithelium, No discharge  Cervix present   Bimanual: absent CMT,  normal uterus, non-tender  No palpable adnexal masses  Pap was collected

## 2019-01-09 DIAGNOSIS — F33.1 MODERATE RECURRENT MAJOR DEPRESSION (HCC): ICD-10-CM

## 2019-01-09 DIAGNOSIS — F41.0 PANIC ATTACKS: ICD-10-CM

## 2019-01-09 DIAGNOSIS — F41.1 GENERALIZED ANXIETY DISORDER: ICD-10-CM

## 2019-01-09 LAB
CLINICAL INFO: NORMAL
CYTO CVX: NORMAL
CYTOLOGY CMNT CVX/VAG CYTO-IMP: NORMAL
DATE PREVIOUS BX: NORMAL
HPV E6+E7 MRNA CVX QL NAA+PROBE: NOT DETECTED
LMP START DATE: NORMAL
SL AMB PREV. PAP:: NORMAL
SPECIMEN SOURCE CVX/VAG CYTO: NORMAL

## 2019-01-09 RX ORDER — PAROXETINE HYDROCHLORIDE 20 MG/1
TABLET, FILM COATED ORAL
Qty: 30 TABLET | Refills: 0 | OUTPATIENT
Start: 2019-01-09

## 2019-01-09 NOTE — PROGRESS NOTES
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, subsequent encounter          Plan:        I explained my current clinical findings to Natalie today  Unfortunately, she does persist with right shoulder pain and stiffness  I suspect that this is more attributable to her calcific tendinitis and rotator cuff syndrome since she has reasonably good range of motion of the right shoulder and no significant tenderness of her lateral and clavicle  I will request an MRI of her right shoulder to exclude any underlying rotator cuff tear in conjunction with her other pathology of calcific tendinitis and lateral end clavicular fracture  I will see her back in the office following her shoulder MRI  Subjective:     Patient ID: Tomas Moy is a 46 y o  female  Chief Complaint:   Follow-up right shoulder pain    HPI  Elaine Zaldivar is here today for a follow-up of her right shoulder pain  Her background history significant for a 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  Additionally, she does have a history of right shoulder calcific tendinitis  At her last office visit about 4 weeks ago, she had an ultrasound-guided needle barbotage procedure of her calcific tendinitis  The calcification was found to be hard and hence only a dry needling was attempted  The procedure was followed with subacromial cortisone injection  She had very good relief of symptoms but this only lasted a few days following which her right shoulder pain has recurred  Currently she denies any significant superior shoulder pain and most of her symptoms are lateral and posterior lateral in location  It is made worse with arm abduction and reaching behind her back  Denies any associated neck pain or distal tingling numbness or weakness of the right upper extremity       Social History     Occupational History    Unemployed due to Rt hip injury      and lower back injury    Used to be a       Social History Main Topics    Smoking status: Current Every Day Smoker     Packs/day: 1 00     Years: 25 00    Smokeless tobacco: Never Used    Alcohol use Yes      Comment: will have a couple of beers or a couple of rum and cokes once per week    Drug use: Yes     Types: Marijuana      Comment: Smoked THC between 19 and 28y/o    Sexual activity: Not Currently      Review of Systems   Constitutional: Negative  HENT: Negative  Eyes: Negative  Respiratory: Negative  Cardiovascular: Negative  Gastrointestinal: Negative  Endocrine: Negative  Genitourinary: Negative  Skin: Negative  Allergic/Immunologic: Negative  Neurological: Negative  Hematological: Negative  Psychiatric/Behavioral: Negative  Objective:     Ortho ExamPhysical Exam   Constitutional: She is oriented to person, place, and time  She appears well-developed and well-nourished  HENT:   Head: Normocephalic and atraumatic  Eyes: Pupils are equal, round, and reactive to light  Conjunctivae are normal    Cardiovascular: Normal rate and regular rhythm  Pulmonary/Chest: Effort normal  No respiratory distress  Neurological: She is alert and oriented to person, place, and time  No cranial nerve deficit  Skin: Skin is warm and dry  No erythema  Psychiatric: She has a normal mood and affect  Her behavior is normal  Judgment and thought content normal          Right shoulder examination:  No significant swelling or deformity noted  There is tenderness to palpation over the subacromial bursa  This only mild discomfort on palpation of the lateral end of the clavicle in the acromioclavicular joint  Forward flexion is 170° and abduction is 160°  Internal rotation is at the level of L1-L2 and external rotation in adduction is about 70°  Craft and Neer's are positive    Right shoulder abduction strength is 4/5, internal rotation is 4/5 and external rotation is 4+/5, forward flexion is 4+/5  Equivocal Nipomo's and negative speed's, negative Roxanne's  I have personally reviewed pertinent films in PACS

## 2019-01-10 ENCOUNTER — OFFICE VISIT (OUTPATIENT)
Dept: PSYCHIATRY | Facility: CLINIC | Age: 54
End: 2019-01-10
Payer: COMMERCIAL

## 2019-01-10 VITALS — BODY MASS INDEX: 27.82 KG/M2 | WEIGHT: 157 LBS | HEIGHT: 63 IN

## 2019-01-10 DIAGNOSIS — F41.1 GENERALIZED ANXIETY DISORDER: ICD-10-CM

## 2019-01-10 DIAGNOSIS — M25.551 RIGHT HIP PAIN: ICD-10-CM

## 2019-01-10 DIAGNOSIS — R29.818 EXTRAPYRAMIDAL SYMPTOM: ICD-10-CM

## 2019-01-10 DIAGNOSIS — R20.2 PARESTHESIAS IN RIGHT HAND: ICD-10-CM

## 2019-01-10 DIAGNOSIS — F33.1 MODERATE RECURRENT MAJOR DEPRESSION (HCC): Primary | ICD-10-CM

## 2019-01-10 DIAGNOSIS — M75.31 CALCIFIC TENDINITIS OF RIGHT SHOULDER: ICD-10-CM

## 2019-01-10 DIAGNOSIS — F41.0 PANIC ATTACKS: ICD-10-CM

## 2019-01-10 PROCEDURE — 99214 OFFICE O/P EST MOD 30 MIN: CPT | Performed by: PHYSICIAN ASSISTANT

## 2019-01-10 RX ORDER — ARIPIPRAZOLE 2 MG/1
1 TABLET ORAL
Qty: 15 TABLET | Refills: 0 | Status: SHIPPED | OUTPATIENT
Start: 2019-01-10 | End: 2019-02-25 | Stop reason: SDUPTHER

## 2019-01-10 RX ORDER — PAROXETINE HYDROCHLORIDE 20 MG/1
30 TABLET, FILM COATED ORAL
Qty: 30 TABLET | Refills: 1 | Status: SHIPPED | OUTPATIENT
Start: 2019-01-10 | End: 2019-03-19 | Stop reason: SDUPTHER

## 2019-01-10 RX ORDER — BENZTROPINE MESYLATE 1 MG/1
1 TABLET ORAL
Qty: 30 TABLET | Refills: 1 | Status: SHIPPED | OUTPATIENT
Start: 2019-01-10 | End: 2019-03-19 | Stop reason: SINTOL

## 2019-01-10 RX ORDER — CLONAZEPAM 0.5 MG/1
TABLET ORAL
Qty: 60 TABLET | Refills: 1 | Status: SHIPPED | OUTPATIENT
Start: 2019-01-10 | End: 2019-03-19 | Stop reason: SDUPTHER

## 2019-01-22 ENCOUNTER — HOSPITAL ENCOUNTER (OUTPATIENT)
Dept: MRI IMAGING | Facility: HOSPITAL | Age: 54
Discharge: HOME | End: 2019-01-22
Attending: ORTHOPAEDIC SURGERY
Payer: COMMERCIAL

## 2019-01-22 DIAGNOSIS — M75.31 CALCIFIC TENDINITIS OF RIGHT SHOULDER: ICD-10-CM

## 2019-01-22 DIAGNOSIS — S42.031K CLOSED DISPLACED FRACTURE OF ACROMIAL END OF RIGHT CLAVICLE WITH NONUNION, SUBSEQUENT ENCOUNTER: ICD-10-CM

## 2019-01-22 PROCEDURE — 73221 MRI JOINT UPR EXTREM W/O DYE: CPT

## 2019-01-24 ENCOUNTER — OFFICE VISIT (OUTPATIENT)
Dept: OBGYN CLINIC | Facility: CLINIC | Age: 54
End: 2019-01-24
Payer: COMMERCIAL

## 2019-01-24 VITALS
SYSTOLIC BLOOD PRESSURE: 114 MMHG | WEIGHT: 160 LBS | HEART RATE: 97 BPM | DIASTOLIC BLOOD PRESSURE: 79 MMHG | BODY MASS INDEX: 28.35 KG/M2 | HEIGHT: 63 IN

## 2019-01-24 DIAGNOSIS — M75.101 ROTATOR CUFF SYNDROME OF RIGHT SHOULDER: ICD-10-CM

## 2019-01-24 DIAGNOSIS — S42.034K: ICD-10-CM

## 2019-01-24 DIAGNOSIS — M75.31 CALCIFIC TENDINITIS OF RIGHT SHOULDER: Primary | ICD-10-CM

## 2019-01-24 PROCEDURE — 99213 OFFICE O/P EST LOW 20 MIN: CPT | Performed by: ORTHOPAEDIC SURGERY

## 2019-01-24 RX ORDER — LIDOCAINE 50 MG/G
1 PATCH TOPICAL DAILY
Qty: 30 PATCH | Refills: 1 | Status: SHIPPED | OUTPATIENT
Start: 2019-01-24 | End: 2019-04-10

## 2019-01-24 NOTE — PROGRESS NOTES
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          Plan:         I had a very detailed discussion with Natalie with regards to her prolonged course of right shoulder discomfort  This appears to be multifactorial but I do suspect that the major contributor of her right shoulder pain is her calcific tendinitis  Today, she did have some mild tenderness over the lateral and clavicle as well  Moving forward, I explained the option of re-trial  Ultrasound-guided right infraspinatus calcific tendinitis barbotage /needing procedure verses arthroscopic debridement of the same with/without lateral and clavicle excision  She is currently not keen on any surgical intervention and is also on sure on a retrial of the ultrasound procedure  She would like to call us back after giving more thought in this regard  In the interim, I have advised her to continue with range-of-motion exercises of her right shoulder to avoid any worsening of her shoulder stiffness  Subjective:     Patient ID: Cheryl Plasencia is a 46 y o  female  Chief Complaint:  Right shoulder pain    HPI     Valentine Torrez is here today for f/u of right shoulder pain  She is right-hand dominant and had an injury to the right shoulder after falling about  8 steps down at home on 7/15/2018  X-ray revealed a nondisplaced lateral end of the clavicle  Fracture  She was initially evaluated by Dr Isa Betancourt and was treated conservatively  I  Initially evaluated her on 10/ 4/18  She had continued discomfort of the right shoulder and further radiographic studies including follow-up radial x-ray and CT scan revealed a 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  Additionally, she was noted to have calcific tendinitis measuring about 8 mm of the right infraspinatus    Her clinical exam finding on 11/29/2018 revealed only minimal tenderness of the lateral clavicle and most of her discomfort  was with rotator cuff testing  Hence,  She was offered an ultrasound-guided needling/ Barbotage procedure  Subsequently, an ultrasound-guided needling of the right shoulder calcific tendinitis was performed on 12/7/2018  No significant calcium was aspirated and hence only dry needling could be performed  This was combined with a subacromial cortisone injection  Natalie   Had some good symptom relief which only lasted about 10 days following which her right shoulder discomfort returned  Hence, an MRI of the right shoulder was performed to exclude an underlying rotator cuff injury  This has revealed a small subacromial spur with supraspinatus and infraspinatus insertional  Tendinosis including infraspinatus insertional calcific tendinitis  The calcific tendinitis measures up to 8 mm on the MRI scan  Some mild biceps tendinosis without tear along with a tiny intra-articular body formation noted along the biceps tendon sheath  Glenohumeral degenerative changes were noted along with an intact glenoid labrum  Additionally, a chronic nonunited distal clavicle fracture was noted  Today, Natalie  Reports continued discomfort of the right shoulder  She points to her lateral and posterior lateral shoulder as well as reports occasional pain for right superior shoulder  This some radiation down her right lateral arm  Symptoms are made worse with lifting activities, reaching behind her back  Denies any new onset tingling or numbness in this regard  No new injuries in this regard             Social History     Occupational History    Unemployed due to Rt hip injury      and lower back injury    Used to be a       Social History Main Topics    Smoking status: Current Every Day Smoker     Packs/day: 1 00     Years: 25 00    Smokeless tobacco: Never Used    Alcohol use Yes      Comment: will have a couple of beers or a couple of rum and cokes once per week    Drug use: Yes     Types: Marijuana      Comment: Smoked THC between 19 and 30y/o    Sexual activity: Yes     Partners: Male      Comment: Boyfriend      Review of Systems   Constitutional: Negative  HENT: Negative  Eyes: Negative  Respiratory: Negative  Cardiovascular: Negative  Gastrointestinal: Negative  Endocrine: Negative  Genitourinary: Negative  Skin: Negative  Allergic/Immunologic: Negative  Neurological: Negative  Hematological: Negative  Psychiatric/Behavioral: Negative  Objective:     Ortho ExamPhysical Exam   Constitutional: She is oriented to person, place, and time  She appears well-developed and well-nourished  HENT:   Head: Normocephalic and atraumatic  Eyes: Pupils are equal, round, and reactive to light  Conjunctivae are normal    Cardiovascular: Normal rate and regular rhythm  Pulmonary/Chest: Effort normal  No respiratory distress  Neurological: She is alert and oriented to person, place, and time  No cranial nerve deficit  Skin: Skin is warm and dry  No erythema  Psychiatric: She has a normal mood and affect   Her behavior is normal  Judgment and thought content normal        right Shoulder    Tenderness:   Tender to palpation over the subacromial bursa as well as mild tenderness over the lateral end of the clavicle       Range of Motion:      Active Abduction: 150     Passive Abduction: 150     Forward Flexion: 120     Extension: Normal     External Rotation: 70     Internal Rotation 0 Deg: L3     Internal Rotation 90 De       Muscle Strength:      Abduction: 4+/5     Internal Rotation: 4+/5     External Rotation: 4/5     Supraspinatus: 4+/5     Subscapularis: 5/5     Biceps: 5/5       Tests:      Impingement: Positive     Craft Post: Positive     Cross Arm: Negative     Apprehension: Negative     Sulcus SIgn: Negative     Drop Arm Test: Not Tested     I have personally reviewed pertinent films in PACS and my interpretation is  as noted in the HPI

## 2019-02-05 ENCOUNTER — OFFICE VISIT (OUTPATIENT)
Dept: FAMILY MEDICINE CLINIC | Facility: CLINIC | Age: 54
End: 2019-02-05
Payer: COMMERCIAL

## 2019-02-05 VITALS
HEART RATE: 89 BPM | OXYGEN SATURATION: 97 % | BODY MASS INDEX: 28.35 KG/M2 | DIASTOLIC BLOOD PRESSURE: 60 MMHG | SYSTOLIC BLOOD PRESSURE: 110 MMHG | HEIGHT: 63 IN | WEIGHT: 160 LBS

## 2019-02-05 DIAGNOSIS — I10 ESSENTIAL HYPERTENSION: Primary | ICD-10-CM

## 2019-02-05 PROCEDURE — 99214 OFFICE O/P EST MOD 30 MIN: CPT | Performed by: FAMILY MEDICINE

## 2019-02-05 PROCEDURE — 3078F DIAST BP <80 MM HG: CPT | Performed by: FAMILY MEDICINE

## 2019-02-05 PROCEDURE — 3074F SYST BP LT 130 MM HG: CPT | Performed by: FAMILY MEDICINE

## 2019-02-05 RX ORDER — HYDROCHLOROTHIAZIDE 12.5 MG/1
12.5 CAPSULE, GELATIN COATED ORAL DAILY
Qty: 90 CAPSULE | Refills: 1 | Status: SHIPPED | OUTPATIENT
Start: 2019-02-05 | End: 2019-06-27

## 2019-02-05 NOTE — PROGRESS NOTES
Subjective:      Patient ID: Kinsey Pena is a 46 y o  female  Patient is here for blood pressure check  Started on hydrochlorothiazide 12 5 milligram it addition to lisinopril that she was already on  Patient has tolerated both the medications well  Denies any side effects  Does not report any headaches or dizziness with sudden changes in posture  Hypertension   This is a chronic problem  The current episode started more than 1 year ago  The problem is controlled  Pertinent negatives include no blurred vision, chest pain, headaches, palpitations or shortness of breath  Risk factors for coronary artery disease include diabetes mellitus, dyslipidemia and post-menopausal state  Treatments tried: Lisinopril 20 mg, hctz 12 5 mg daily  The current treatment provides significant improvement  There are no compliance problems  She is a known type 2 diabetic with an A1c of 6 3 November 2018  Currently on metformin 500 milligram twice daily  History of hyperlipidemia, LDL at goal on statin  Will continue same  Patient encouraged to go for eye exam to rule out diabetic retinopathy  Continues to decline flu vaccine        Past Medical History:   Diagnosis Date    Diabetes mellitus ([address] )     GERD (gastroesophageal reflux disease)     Hypertension     Psychiatric disorder     bipolar    Right clavicle fracture        Family History   Problem Relation Age of Onset    Arthritis Family     Cancer Family     Osteoporosis Family     Cervical cancer Mother     Hypertension Father     Other Father         pre-diabetes    Diabetes type I Sister     Osteoporosis Sister     Depression Sister     Heart attack Brother          of an MI at 52y/o    Diabetes Brother     Other Paternal Grandmother         Parkinson's Disease    Heart attack Paternal Grandfather     Alcohol abuse Paternal Uncle     Seizures Other     Seizures Other        Past Surgical History:   Procedure Laterality Date    BLADDER SURGERY      Sling    NM HIP [address] BODY,PLASTY/RESECTN Right 7/13/2017    Procedure: RIGHT HIP ARTHROSCOPIC LABRAL DEBRIDEMENT;  Surgeon: Pepito Connor MD;  Location: [facility];  Service: Orthopedics    SINUS SURGERY      3 surgeries     TUBAL LIGATION          reports that she has been smoking  She has a 25 00 pack-year smoking history  She has never used smokeless tobacco  She reports that she drinks alcohol  She reports that she uses drugs, including Marijuana        Current Outpatient Prescriptions:     ARIPiprazole (ABILIFY) 2 mg tablet, Take 0 5 tablets (1 mg total) by mouth daily at bedtime for 30 days, Disp: 15 tablet, Rfl: 0    aspirin 325 mg tablet, Take 325 mg by mouth, Disp: , Rfl:     atorvastatin (LIPITOR) 20 mg tablet, Take 1 tablet (20 mg total) by mouth daily, Disp: 90 tablet, Rfl: 1    benztropine (COGENTIN) 1 mg tablet, Take 1 tablet (1 mg total) by mouth daily at bedtime as needed for tremors for up to 30 days, Disp: 30 tablet, Rfl: 1    clonazePAM (KlonoPIN) 0 5 mg tablet, Take 1/2 tab by mouth twice daily and 1 full tab nightly, Disp: 60 tablet, Rfl: 1    glucose blood (ONE TOUCH ULTRA TEST) test strip, Check blood sugar twice/ week , Disp: 100 each, Rfl: 2    hydrochlorothiazide (MICROZIDE) 12 5 mg capsule, Take 1 capsule (12 5 mg total) by mouth daily, Disp: 90 capsule, Rfl: 1    ibuprofen (MOTRIN) 600 mg tablet, Take 1 tablet (600 mg total) by mouth every 6 (six) hours as needed for mild pain, Disp: 30 tablet, Rfl: 0    Lancets (ONETOUCH ULTRASOFT) lancets, Check blood sugar twice/ week , Disp: 100 each, Rfl: 0    lidocaine (LIDODERM) 5 %, Apply 1 patch topically daily Remove & Discard patch within 12 hours or as directed by MD, Disp: 30 patch, Rfl: 1    lisinopril (ZESTRIL) 20 mg tablet, Take 1 tablet (20 mg total) by mouth daily, Disp: 90 tablet, Rfl: 1    metFORMIN (GLUCOPHAGE) 500 mg tablet, Take 1 tablet (500 mg total) by mouth 2 (two) times a day with meals, Disp: 180 tablet, Rfl: 1    omeprazole (PriLOSEC) 20 mg delayed release capsule, Take 1 capsule (20 mg total) by mouth daily, Disp: 90 capsule, Rfl: 1    PARoxetine (PAXIL) 20 mg tablet, Take 1 5 tablets (30 mg total) by mouth daily at bedtime for 30 days, Disp: 30 tablet, Rfl: 1    The following portions of the patient's history were reviewed and updated as appropriate: allergies, current medications, past family history, past medical history, past social history, past surgical history and problem list     Review of Systems   Constitutional: Negative  Eyes: Negative for blurred vision  Respiratory: Negative  Negative for shortness of breath  Cardiovascular: Negative  Negative for chest pain and palpitations  Musculoskeletal: Negative for myalgias  Neurological: Negative  Negative for headaches  Psychiatric/Behavioral: Negative  Objective:    /60   Pulse 89   Ht 5' 3" (1 6 m)   Wt 72 6 kg (160 lb)   SpO2 97%   BMI 28 34 kg/m²      Physical Exam   Constitutional: She is oriented to person, place, and time  She appears well-developed and well-nourished  Cardiovascular: Normal rate, regular rhythm and normal heart sounds  Pulmonary/Chest: Effort normal and breath sounds normal    Abdominal: Soft  Bowel sounds are normal    Neurological: She is alert and oriented to person, place, and time  Psychiatric: Her behavior is normal  Judgment normal          Recent Results (from the past 1008 hour(s))   Thinprep Tis and HPV mRNA E6/E7    Collection Time: 01/08/19 12:00 AM   Result Value Ref Range    WILLIAM ADORNO CLINICAL INFORMATION Normal exam     LMP 7 YEARS  AGO     Prev  PAP 3 YEARS     Prev  BX NA     Source Cervix     Statement of Adequacy      Interpretation/Result Negative for intraepithelial lesion or malignancy  Comment       This Pap test has been evaluated with computer assisted technology      WILLIAM ADORNO CYTOTECHNOLOGIST:      Comment      HPV mRNA E6/E7 Not Detected Not Detected Assessment/Plan:       Diagnoses and all orders for this visit:    Essential hypertension  -     hydrochlorothiazide (MICROZIDE) 12 5 mg capsule; Take 1 capsule (12 5 mg total) by mouth daily      BP at goal, continue lisinopril, hctz  Fup x 3 months with labs  Continue metformin, statin for control of diabetes and hyperlipidemia  Eye exam emphasized

## 2019-02-25 DIAGNOSIS — F33.1 MODERATE RECURRENT MAJOR DEPRESSION (HCC): ICD-10-CM

## 2019-02-25 RX ORDER — ARIPIPRAZOLE 2 MG/1
TABLET ORAL
Qty: 15 TABLET | Refills: 0 | Status: SHIPPED | OUTPATIENT
Start: 2019-02-25 | End: 2019-03-19 | Stop reason: SDUPTHER

## 2019-02-28 ENCOUNTER — DOCUMENTATION (OUTPATIENT)
Dept: PSYCHIATRY | Facility: CLINIC | Age: 54
End: 2019-02-28

## 2019-02-28 NOTE — PROGRESS NOTES
Treatment Plan not completed within required time limits due to:  cancelled appointment  on 3/1/2019
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
## 2019-03-19 ENCOUNTER — OFFICE VISIT (OUTPATIENT)
Dept: PSYCHIATRY | Facility: CLINIC | Age: 54
End: 2019-03-19
Payer: COMMERCIAL

## 2019-03-19 VITALS — HEIGHT: 63 IN | BODY MASS INDEX: 27.5 KG/M2 | WEIGHT: 155.2 LBS

## 2019-03-19 DIAGNOSIS — F41.1 GENERALIZED ANXIETY DISORDER: ICD-10-CM

## 2019-03-19 DIAGNOSIS — M75.101 ROTATOR CUFF SYNDROME OF RIGHT SHOULDER: ICD-10-CM

## 2019-03-19 DIAGNOSIS — M25.551 RIGHT HIP PAIN: ICD-10-CM

## 2019-03-19 DIAGNOSIS — F41.0 PANIC ATTACKS: ICD-10-CM

## 2019-03-19 DIAGNOSIS — F33.1 MODERATE RECURRENT MAJOR DEPRESSION (HCC): Primary | ICD-10-CM

## 2019-03-19 DIAGNOSIS — R29.818 EXTRAPYRAMIDAL SYMPTOM: ICD-10-CM

## 2019-03-19 PROCEDURE — 99214 OFFICE O/P EST MOD 30 MIN: CPT | Performed by: PHYSICIAN ASSISTANT

## 2019-03-19 RX ORDER — ARIPIPRAZOLE 2 MG/1
2 TABLET ORAL
Qty: 90 TABLET | Refills: 0 | Status: SHIPPED | OUTPATIENT
Start: 2019-03-19 | End: 2019-05-06 | Stop reason: SDUPTHER

## 2019-03-19 RX ORDER — CLONAZEPAM 0.5 MG/1
TABLET ORAL
Qty: 60 TABLET | Refills: 1 | Status: SHIPPED | OUTPATIENT
Start: 2019-03-19 | End: 2019-05-06 | Stop reason: SDUPTHER

## 2019-03-19 RX ORDER — PAROXETINE 30 MG/1
30 TABLET, FILM COATED ORAL
Qty: 90 TABLET | Refills: 0 | Status: SHIPPED | OUTPATIENT
Start: 2019-03-19 | End: 2019-05-06 | Stop reason: SDUPTHER

## 2019-04-10 ENCOUNTER — OFFICE VISIT (OUTPATIENT)
Dept: FAMILY MEDICINE CLINIC | Facility: CLINIC | Age: 54
End: 2019-04-10
Payer: COMMERCIAL

## 2019-04-10 VITALS
WEIGHT: 162 LBS | SYSTOLIC BLOOD PRESSURE: 100 MMHG | HEART RATE: 95 BPM | DIASTOLIC BLOOD PRESSURE: 52 MMHG | OXYGEN SATURATION: 97 % | HEIGHT: 63 IN | BODY MASS INDEX: 28.7 KG/M2

## 2019-04-10 DIAGNOSIS — M62.838 MUSCLE SPASM: ICD-10-CM

## 2019-04-10 DIAGNOSIS — M54.50 ACUTE RIGHT-SIDED LOW BACK PAIN WITHOUT SCIATICA: Primary | ICD-10-CM

## 2019-04-10 PROCEDURE — 3008F BODY MASS INDEX DOCD: CPT | Performed by: FAMILY MEDICINE

## 2019-04-10 PROCEDURE — 99214 OFFICE O/P EST MOD 30 MIN: CPT | Performed by: FAMILY MEDICINE

## 2019-04-10 RX ORDER — CYCLOBENZAPRINE HCL 5 MG
10 TABLET ORAL
Qty: 15 TABLET | Refills: 0 | Status: SHIPPED | OUTPATIENT
Start: 2019-04-10 | End: 2019-11-27

## 2019-05-06 ENCOUNTER — OFFICE VISIT (OUTPATIENT)
Dept: PSYCHIATRY | Facility: CLINIC | Age: 54
End: 2019-05-06
Payer: COMMERCIAL

## 2019-05-06 VITALS — BODY MASS INDEX: 29.06 KG/M2 | HEIGHT: 63 IN | WEIGHT: 164 LBS

## 2019-05-06 DIAGNOSIS — F41.0 PANIC ATTACKS: ICD-10-CM

## 2019-05-06 DIAGNOSIS — F33.1 MODERATE RECURRENT MAJOR DEPRESSION (HCC): ICD-10-CM

## 2019-05-06 DIAGNOSIS — F41.1 GENERALIZED ANXIETY DISORDER: ICD-10-CM

## 2019-05-06 PROCEDURE — 99213 OFFICE O/P EST LOW 20 MIN: CPT | Performed by: PHYSICIAN ASSISTANT

## 2019-05-06 RX ORDER — CLONAZEPAM 0.5 MG/1
TABLET ORAL
Qty: 60 TABLET | Refills: 1 | Status: SHIPPED | OUTPATIENT
Start: 2019-05-06 | End: 2019-07-17 | Stop reason: SDUPTHER

## 2019-05-06 RX ORDER — PAROXETINE HYDROCHLORIDE 20 MG/1
20 TABLET, FILM COATED ORAL
Qty: 90 TABLET | Refills: 0 | Status: SHIPPED | OUTPATIENT
Start: 2019-05-06 | End: 2019-06-27

## 2019-05-06 RX ORDER — ARIPIPRAZOLE 2 MG/1
2 TABLET ORAL
Qty: 90 TABLET | Refills: 0 | Status: SHIPPED | OUTPATIENT
Start: 2019-05-06 | End: 2019-07-17 | Stop reason: SDUPTHER

## 2019-06-10 DIAGNOSIS — I10 ESSENTIAL HYPERTENSION: ICD-10-CM

## 2019-06-10 DIAGNOSIS — E11.9 TYPE 2 DIABETES MELLITUS WITHOUT COMPLICATION, WITHOUT LONG-TERM CURRENT USE OF INSULIN (HCC): ICD-10-CM

## 2019-06-11 DIAGNOSIS — I10 ESSENTIAL HYPERTENSION: ICD-10-CM

## 2019-06-11 RX ORDER — LISINOPRIL 20 MG/1
20 TABLET ORAL DAILY
Qty: 30 TABLET | Refills: 0 | Status: SHIPPED | OUTPATIENT
Start: 2019-06-11 | End: 2019-06-27 | Stop reason: SDUPTHER

## 2019-06-11 RX ORDER — LISINOPRIL 20 MG/1
TABLET ORAL
Qty: 90 TABLET | Refills: 1 | OUTPATIENT
Start: 2019-06-11

## 2019-06-18 ENCOUNTER — DOCUMENTATION (OUTPATIENT)
Dept: PSYCHIATRY | Facility: CLINIC | Age: 54
End: 2019-06-18

## 2019-06-20 ENCOUNTER — APPOINTMENT (OUTPATIENT)
Dept: LAB | Facility: CLINIC | Age: 54
End: 2019-06-20
Payer: COMMERCIAL

## 2019-06-20 ENCOUNTER — TELEPHONE (OUTPATIENT)
Dept: PSYCHIATRY | Facility: CLINIC | Age: 54
End: 2019-06-20

## 2019-06-20 DIAGNOSIS — E11.9 TYPE 2 DIABETES MELLITUS WITHOUT COMPLICATION, WITHOUT LONG-TERM CURRENT USE OF INSULIN (HCC): ICD-10-CM

## 2019-06-20 DIAGNOSIS — F33.1 MODERATE RECURRENT MAJOR DEPRESSION (HCC): ICD-10-CM

## 2019-06-20 DIAGNOSIS — E78.5 HYPERLIPIDEMIA, UNSPECIFIED HYPERLIPIDEMIA TYPE: ICD-10-CM

## 2019-06-20 DIAGNOSIS — R80.9 MICROALBUMINURIA: ICD-10-CM

## 2019-06-20 LAB
ALBUMIN SERPL BCP-MCNC: 3.6 G/DL (ref 3.5–5)
ALP SERPL-CCNC: 134 U/L (ref 46–116)
ALT SERPL W P-5'-P-CCNC: 22 U/L (ref 12–78)
ANION GAP SERPL CALCULATED.3IONS-SCNC: 8 MMOL/L (ref 4–13)
AST SERPL W P-5'-P-CCNC: 18 U/L (ref 5–45)
BILIRUB SERPL-MCNC: 0.47 MG/DL (ref 0.2–1)
BUN SERPL-MCNC: 15 MG/DL (ref 5–25)
CALCIUM SERPL-MCNC: 9.2 MG/DL (ref 8.3–10.1)
CHLORIDE SERPL-SCNC: 97 MMOL/L (ref 100–108)
CHOLEST SERPL-MCNC: 176 MG/DL (ref 50–200)
CO2 SERPL-SCNC: 28 MMOL/L (ref 21–32)
CREAT SERPL-MCNC: 0.99 MG/DL (ref 0.6–1.3)
CREAT UR-MCNC: 55.3 MG/DL
EST. AVERAGE GLUCOSE BLD GHB EST-MCNC: 140 MG/DL
GFR SERPL CREATININE-BSD FRML MDRD: 64 ML/MIN/1.73SQ M
GLUCOSE P FAST SERPL-MCNC: 120 MG/DL (ref 65–99)
HBA1C MFR BLD: 6.5 % (ref 4.2–6.3)
HDLC SERPL-MCNC: 63 MG/DL (ref 40–60)
LDLC SERPL CALC-MCNC: 83 MG/DL (ref 0–100)
MICROALBUMIN UR-MCNC: <5 MG/L (ref 0–20)
MICROALBUMIN/CREAT 24H UR: <9 MG/G CREATININE (ref 0–30)
NONHDLC SERPL-MCNC: 113 MG/DL
POTASSIUM SERPL-SCNC: 4.2 MMOL/L (ref 3.5–5.3)
PROT SERPL-MCNC: 7.5 G/DL (ref 6.4–8.2)
SODIUM SERPL-SCNC: 133 MMOL/L (ref 136–145)
TRIGL SERPL-MCNC: 149 MG/DL

## 2019-06-20 PROCEDURE — 82570 ASSAY OF URINE CREATININE: CPT

## 2019-06-20 PROCEDURE — 82043 UR ALBUMIN QUANTITATIVE: CPT

## 2019-06-20 PROCEDURE — 80053 COMPREHEN METABOLIC PANEL: CPT

## 2019-06-20 PROCEDURE — 83036 HEMOGLOBIN GLYCOSYLATED A1C: CPT

## 2019-06-20 PROCEDURE — 80061 LIPID PANEL: CPT

## 2019-06-20 PROCEDURE — 36415 COLL VENOUS BLD VENIPUNCTURE: CPT

## 2019-06-27 ENCOUNTER — OFFICE VISIT (OUTPATIENT)
Dept: FAMILY MEDICINE CLINIC | Facility: CLINIC | Age: 54
End: 2019-06-27
Payer: COMMERCIAL

## 2019-06-27 VITALS
HEART RATE: 84 BPM | SYSTOLIC BLOOD PRESSURE: 122 MMHG | WEIGHT: 164 LBS | OXYGEN SATURATION: 98 % | DIASTOLIC BLOOD PRESSURE: 72 MMHG | BODY MASS INDEX: 29.06 KG/M2 | HEIGHT: 63 IN

## 2019-06-27 DIAGNOSIS — R80.9 MICROALBUMINURIA: ICD-10-CM

## 2019-06-27 DIAGNOSIS — K21.9 CHRONIC GERD: ICD-10-CM

## 2019-06-27 DIAGNOSIS — E78.5 HYPERLIPIDEMIA, UNSPECIFIED HYPERLIPIDEMIA TYPE: ICD-10-CM

## 2019-06-27 DIAGNOSIS — Z12.31 VISIT FOR SCREENING MAMMOGRAM: ICD-10-CM

## 2019-06-27 DIAGNOSIS — E87.1 HYPONATREMIA: ICD-10-CM

## 2019-06-27 DIAGNOSIS — E11.9 CONTROLLED TYPE 2 DIABETES MELLITUS WITHOUT COMPLICATION, WITHOUT LONG-TERM CURRENT USE OF INSULIN (HCC): Primary | ICD-10-CM

## 2019-06-27 DIAGNOSIS — F33.1 MODERATE RECURRENT MAJOR DEPRESSION (HCC): ICD-10-CM

## 2019-06-27 DIAGNOSIS — I10 ESSENTIAL HYPERTENSION: ICD-10-CM

## 2019-06-27 DIAGNOSIS — E11.9 TYPE 2 DIABETES MELLITUS WITHOUT COMPLICATION, WITHOUT LONG-TERM CURRENT USE OF INSULIN (HCC): ICD-10-CM

## 2019-06-27 PROCEDURE — 3074F SYST BP LT 130 MM HG: CPT | Performed by: FAMILY MEDICINE

## 2019-06-27 PROCEDURE — 4010F ACE/ARB THERAPY RXD/TAKEN: CPT | Performed by: FAMILY MEDICINE

## 2019-06-27 PROCEDURE — 3078F DIAST BP <80 MM HG: CPT | Performed by: FAMILY MEDICINE

## 2019-06-27 PROCEDURE — 99215 OFFICE O/P EST HI 40 MIN: CPT | Performed by: FAMILY MEDICINE

## 2019-06-27 RX ORDER — OMEPRAZOLE 20 MG/1
20 CAPSULE, DELAYED RELEASE ORAL DAILY
Qty: 90 CAPSULE | Refills: 1 | Status: SHIPPED | OUTPATIENT
Start: 2019-06-27 | End: 2019-11-27 | Stop reason: SDUPTHER

## 2019-06-27 RX ORDER — ATORVASTATIN CALCIUM 20 MG/1
20 TABLET, FILM COATED ORAL DAILY
Qty: 90 TABLET | Refills: 1 | Status: SHIPPED | OUTPATIENT
Start: 2019-06-27 | End: 2019-11-27 | Stop reason: SDUPTHER

## 2019-06-27 RX ORDER — LISINOPRIL 20 MG/1
20 TABLET ORAL DAILY
Qty: 30 TABLET | Refills: 0 | Status: SHIPPED | OUTPATIENT
Start: 2019-06-27 | End: 2019-07-18 | Stop reason: SDUPTHER

## 2019-07-16 ENCOUNTER — APPOINTMENT (OUTPATIENT)
Dept: LAB | Facility: CLINIC | Age: 54
End: 2019-07-16
Payer: COMMERCIAL

## 2019-07-16 LAB
ALBUMIN SERPL BCP-MCNC: 3.4 G/DL (ref 3.5–5)
ALP SERPL-CCNC: 136 U/L (ref 46–116)
ALT SERPL W P-5'-P-CCNC: 24 U/L (ref 12–78)
ANION GAP SERPL CALCULATED.3IONS-SCNC: 8 MMOL/L (ref 4–13)
AST SERPL W P-5'-P-CCNC: 19 U/L (ref 5–45)
BILIRUB SERPL-MCNC: 0.43 MG/DL (ref 0.2–1)
BUN SERPL-MCNC: 9 MG/DL (ref 5–25)
CALCIUM SERPL-MCNC: 8.9 MG/DL (ref 8.3–10.1)
CHLORIDE SERPL-SCNC: 106 MMOL/L (ref 100–108)
CO2 SERPL-SCNC: 27 MMOL/L (ref 21–32)
CREAT SERPL-MCNC: 0.83 MG/DL (ref 0.6–1.3)
GFR SERPL CREATININE-BSD FRML MDRD: 80 ML/MIN/1.73SQ M
GLUCOSE P FAST SERPL-MCNC: 138 MG/DL (ref 65–99)
POTASSIUM SERPL-SCNC: 4.8 MMOL/L (ref 3.5–5.3)
PROT SERPL-MCNC: 6.7 G/DL (ref 6.4–8.2)
SODIUM SERPL-SCNC: 141 MMOL/L (ref 136–145)

## 2019-07-16 PROCEDURE — 36415 COLL VENOUS BLD VENIPUNCTURE: CPT | Performed by: FAMILY MEDICINE

## 2019-07-16 PROCEDURE — 80053 COMPREHEN METABOLIC PANEL: CPT | Performed by: FAMILY MEDICINE

## 2019-07-17 DIAGNOSIS — F41.0 PANIC ATTACKS: ICD-10-CM

## 2019-07-17 DIAGNOSIS — F41.1 GENERALIZED ANXIETY DISORDER: ICD-10-CM

## 2019-07-17 DIAGNOSIS — F33.1 MODERATE RECURRENT MAJOR DEPRESSION (HCC): ICD-10-CM

## 2019-07-17 RX ORDER — ARIPIPRAZOLE 2 MG/1
2 TABLET ORAL
Qty: 90 TABLET | Refills: 0 | Status: SHIPPED | OUTPATIENT
Start: 2019-07-17 | End: 2019-09-26 | Stop reason: SDUPTHER

## 2019-07-17 RX ORDER — PAROXETINE HYDROCHLORIDE 20 MG/1
20 TABLET, FILM COATED ORAL DAILY
Qty: 90 TABLET | Refills: 0 | Status: SHIPPED | OUTPATIENT
Start: 2019-07-17 | End: 2019-09-26 | Stop reason: SDUPTHER

## 2019-07-17 RX ORDER — CLONAZEPAM 0.5 MG/1
TABLET ORAL
Qty: 60 TABLET | Refills: 1 | Status: SHIPPED | OUTPATIENT
Start: 2019-07-17 | End: 2019-08-27 | Stop reason: SDUPTHER

## 2019-07-17 NOTE — TELEPHONE ENCOUNTER
Rafael Seen has returned and 08/29/2019, and will require renewal of all her medications    I e-scribed the following to her her designated pharmacy:  Paroxetine 20mg (1) tab po qhs # 90  Aripiprazole 2mg (1) tab po qhs # 90  Clonazepam 0 5mg (1/2) tab po bid and (1) qhs # 60 R1

## 2019-07-18 ENCOUNTER — OFFICE VISIT (OUTPATIENT)
Dept: FAMILY MEDICINE CLINIC | Facility: CLINIC | Age: 54
End: 2019-07-18
Payer: COMMERCIAL

## 2019-07-18 VITALS
SYSTOLIC BLOOD PRESSURE: 122 MMHG | BODY MASS INDEX: 30.16 KG/M2 | RESPIRATION RATE: 18 BRPM | OXYGEN SATURATION: 98 % | HEIGHT: 63 IN | DIASTOLIC BLOOD PRESSURE: 70 MMHG | HEART RATE: 90 BPM | WEIGHT: 170.2 LBS

## 2019-07-18 DIAGNOSIS — M25.551 RIGHT HIP PAIN: Primary | ICD-10-CM

## 2019-07-18 DIAGNOSIS — E11.9 TYPE 2 DIABETES MELLITUS WITHOUT COMPLICATION, WITHOUT LONG-TERM CURRENT USE OF INSULIN (HCC): ICD-10-CM

## 2019-07-18 DIAGNOSIS — R26.89 BALANCE PROBLEM: ICD-10-CM

## 2019-07-18 DIAGNOSIS — I10 ESSENTIAL HYPERTENSION: ICD-10-CM

## 2019-07-18 PROCEDURE — 4010F ACE/ARB THERAPY RXD/TAKEN: CPT | Performed by: FAMILY MEDICINE

## 2019-07-18 PROCEDURE — 3074F SYST BP LT 130 MM HG: CPT | Performed by: FAMILY MEDICINE

## 2019-07-18 PROCEDURE — 3078F DIAST BP <80 MM HG: CPT | Performed by: FAMILY MEDICINE

## 2019-07-18 PROCEDURE — 99214 OFFICE O/P EST MOD 30 MIN: CPT | Performed by: FAMILY MEDICINE

## 2019-07-18 PROCEDURE — 3008F BODY MASS INDEX DOCD: CPT | Performed by: FAMILY MEDICINE

## 2019-07-18 RX ORDER — LISINOPRIL 20 MG/1
20 TABLET ORAL DAILY
Qty: 90 TABLET | Refills: 1 | Status: SHIPPED | OUTPATIENT
Start: 2019-07-18 | End: 2019-11-27 | Stop reason: SDUPTHER

## 2019-07-18 NOTE — ASSESSMENT & PLAN NOTE
Persisting despite intra-articular injection  Patient has sustained fall due to loss of balance and fractured her  Suggested to follow up with Ortho to discuss other treatment options

## 2019-07-18 NOTE — PROGRESS NOTES
Subjective:      Patient ID: Noemy Doctor is a 53 y o  female  Hypertension   This is a chronic problem  The current episode started more than 1 year ago  The problem is controlled  Pertinent negatives include no anxiety, blurred vision, chest pain, headaches, palpitations or shortness of breath  Treatments tried: LISINOPRIL 20 MG DAILY  STOPPED HCTZ DUE TO HYPONATREMIA  The current treatment provides significant improvement  There are no compliance problems  PATIENT'S BLOOD PRESSURE IS AT GOAL, HYPONATREMIA HAS RESOLVED  Patient is requesting a disability placard, since she has chronic Rt hip pain  Says that she sometimes looses balance due to this  She has received intra-articular steroid injection in the right hip which according to patient did not provide any relief of symptoms  She is also reporting a fall last year, due to which she fractured her clavicle  According to patient she uses cane intermittently    Past Medical History:   Diagnosis Date    Diabetes mellitus ([address] )     GERD (gastroesophageal reflux disease)     Hypertension     Psychiatric disorder     bipolar    Right clavicle fracture        Family History   Problem Relation Age of Onset    Arthritis Family     Cancer Family     Osteoporosis Family     Cervical cancer Mother     Hypertension Father     Other Father         pre-diabetes    Diabetes type I Sister     Osteoporosis Sister     Depression Sister     Heart attack Brother          of an MI at 52y/o    Diabetes Brother     Other Paternal Grandmother         Parkinson's Disease    Heart attack Paternal Grandfather     Alcohol abuse Paternal Uncle     Seizures Other     Seizures Other        Past Surgical History:   Procedure Laterality Date    BLADDER SURGERY      Sling    IN HIP [address] BODY,PLASTY/RESECTN Right 2017    Procedure: RIGHT HIP ARTHROSCOPIC LABRAL DEBRIDEMENT;  Surgeon: Windy Galaviz MD;  Location: [facility];  Service: Orthopedics    SINUS SURGERY      3 surgeries     TUBAL LIGATION          reports that she has been smoking  She has a 25 00 pack-year smoking history  She has never used smokeless tobacco  She reports that she drinks alcohol  She reports that she has current or past drug history  Drug: Marijuana        Current Outpatient Medications:     ARIPiprazole (ABILIFY) 2 mg tablet, Take 1 tablet (2 mg total) by mouth daily at bedtime for 90 days, Disp: 90 tablet, Rfl: 0    aspirin 325 mg tablet, Take 325 mg by mouth, Disp: , Rfl:     atorvastatin (LIPITOR) 20 mg tablet, Take 1 tablet (20 mg total) by mouth daily, Disp: 90 tablet, Rfl: 1    clonazePAM (KlonoPIN) 0 5 mg tablet, Take 1/2 tab by mouth twice daily and 1 full tab nightly, Disp: 60 tablet, Rfl: 1    cyclobenzaprine (FLEXERIL) 5 mg tablet, Take 2 tablets (10 mg total) by mouth daily at bedtime, Disp: 15 tablet, Rfl: 0    glucose blood (ONE TOUCH ULTRA TEST) test strip, Check blood sugar once daily, Disp: 100 each, Rfl: 2    ibuprofen (MOTRIN) 600 mg tablet, Take 1 tablet (600 mg total) by mouth every 6 (six) hours as needed for mild pain, Disp: 30 tablet, Rfl: 0    Lancets (ONETOUCH ULTRASOFT) lancets, Check blood sugar twice/ week , Disp: 100 each, Rfl: 0    lisinopril (ZESTRIL) 20 mg tablet, Take 1 tablet (20 mg total) by mouth daily, Disp: 90 tablet, Rfl: 1    metFORMIN (GLUCOPHAGE) 500 mg tablet, Take 1 tablet (500 mg total) by mouth 2 (two) times a day with meals, Disp: 180 tablet, Rfl: 1    omeprazole (PriLOSEC) 20 mg delayed release capsule, Take 1 capsule (20 mg total) by mouth daily, Disp: 90 capsule, Rfl: 1    PARoxetine (PAXIL) 20 mg tablet, Take 1 tablet (20 mg total) by mouth daily, Disp: 90 tablet, Rfl: 0    The following portions of the patient's history were reviewed and updated as appropriate: allergies, current medications, past family history, past medical history, past social history, past surgical history and problem list     Review of Systems   Constitutional: Negative  Eyes: Negative for blurred vision  Respiratory: Negative  Negative for shortness of breath  Cardiovascular: Negative  Negative for chest pain and palpitations  Musculoskeletal: Positive for gait problem  Negative for myalgias  Rt hip pain   Neurological: Negative for headaches  Psychiatric/Behavioral: Negative  Objective:    /70   Pulse 90   Resp 18   Ht 5' 3" (1 6 m)   Wt 77 2 kg (170 lb 3 2 oz)   SpO2 98%   BMI 30 15 kg/m²      Physical Exam   Constitutional: She is oriented to person, place, and time  She appears well-developed and well-nourished  Cardiovascular: Normal rate, regular rhythm and normal heart sounds  Pulmonary/Chest: Effort normal and breath sounds normal    Abdominal: Soft  Bowel sounds are normal    Neurological: She is alert and oriented to person, place, and time     Psychiatric: Her behavior is normal  Judgment normal          Recent Results (from the past 1008 hour(s))   Comprehensive metabolic panel    Collection Time: 06/20/19  9:10 AM   Result Value Ref Range    Sodium 133 (L) 136 - 145 mmol/L    Potassium 4 2 3 5 - 5 3 mmol/L    Chloride 97 (L) 100 - 108 mmol/L    CO2 28 21 - 32 mmol/L    ANION GAP 8 4 - 13 mmol/L    BUN 15 5 - 25 mg/dL    Creatinine 0 99 0 60 - 1 30 mg/dL    Glucose, Fasting 120 (H) 65 - 99 mg/dL    Calcium 9 2 8 3 - 10 1 mg/dL    AST 18 5 - 45 U/L    ALT 22 12 - 78 U/L    Alkaline Phosphatase 134 (H) 46 - 116 U/L    Total Protein 7 5 6 4 - 8 2 g/dL    Albumin 3 6 3 5 - 5 0 g/dL    Total Bilirubin 0 47 0 20 - 1 00 mg/dL    eGFR 64 ml/min/1 73sq m   Hemoglobin A1C    Collection Time: 06/20/19  9:10 AM   Result Value Ref Range    Hemoglobin A1C 6 5 (H) 4 2 - 6 3 %     mg/dl   Lipid panel    Collection Time: 06/20/19  9:10 AM   Result Value Ref Range    Cholesterol 176 50 - 200 mg/dL    Triglycerides 149 <=150 mg/dL    HDL, Direct 63 (H) 40 - 60 mg/dL    LDL Calculated 83 0 - 100 mg/dL    Non-HDL-Chol (CHOL-HDL) 113 mg/dl   Microalbumin / creatinine urine ratio    Collection Time: 06/20/19  9:10 AM   Result Value Ref Range    Creatinine, Ur 55 3 mg/dL    Microalbum  ,U,Random <5 0 0 0 - 20 0 mg/L    Microalb Creat Ratio <9 0 - 30 mg/g creatinine   Comprehensive metabolic panel    Collection Time: 07/16/19  7:57 AM   Result Value Ref Range    Sodium 141 136 - 145 mmol/L    Potassium 4 8 3 5 - 5 3 mmol/L    Chloride 106 100 - 108 mmol/L    CO2 27 21 - 32 mmol/L    ANION GAP 8 4 - 13 mmol/L    BUN 9 5 - 25 mg/dL    Creatinine 0 83 0 60 - 1 30 mg/dL    Glucose, Fasting 138 (H) 65 - 99 mg/dL    Calcium 8 9 8 3 - 10 1 mg/dL    AST 19 5 - 45 U/L    ALT 24 12 - 78 U/L    Alkaline Phosphatase 136 (H) 46 - 116 U/L    Total Protein 6 7 6 4 - 8 2 g/dL    Albumin 3 4 (L) 3 5 - 5 0 g/dL    Total Bilirubin 0 43 0 20 - 1 00 mg/dL    eGFR 80 ml/min/1 73sq m       Assessment/Plan:         Problem List Items Addressed This Visit        Endocrine    Diabetes ([facility] [address] )    Relevant Medications    glucose blood (ONE TOUCH ULTRA TEST) test strip       Cardiovascular and Mediastinum    Hypertension     Blood pressure is stable lisinopril 20  Hyponatremia improved since patient stopped the hydrochlorothiazide  Continue monitoring  Is due for routine labs and follow-up after 3 months  Relevant Medications    lisinopril (ZESTRIL) 20 mg tablet       Other    Right hip pain - Primary     Persisting despite intra-articular injection  Patient has sustained fall due to loss of balance and fractured her  Suggested to follow up with Ortho to discuss other treatment options  Relevant Orders    Ambulatory referral to Orthopedic Surgery    Balance problem     Suggested physical therapy  Follow-up if symptoms do not improved           Relevant Orders    Ambulatory referral to Physical Therapy

## 2019-07-18 NOTE — ASSESSMENT & PLAN NOTE
Blood pressure is stable lisinopril 20  Hyponatremia improved since patient stopped the hydrochlorothiazide  Continue monitoring  Is due for routine labs and follow-up after 3 months

## 2019-08-14 LAB
LEFT EYE DIABETIC RETINOPATHY: NORMAL
RIGHT EYE DIABETIC RETINOPATHY: NORMAL

## 2019-08-26 ENCOUNTER — TELEPHONE (OUTPATIENT)
Dept: BEHAVIORAL/MENTAL HEALTH CLINIC | Facility: CLINIC | Age: 54
End: 2019-08-26

## 2019-08-26 DIAGNOSIS — F33.1 MODERATE RECURRENT MAJOR DEPRESSION (HCC): ICD-10-CM

## 2019-08-26 DIAGNOSIS — F41.0 PANIC ATTACKS: ICD-10-CM

## 2019-08-26 DIAGNOSIS — F41.1 GENERALIZED ANXIETY DISORDER: ICD-10-CM

## 2019-08-27 RX ORDER — CLONAZEPAM 0.5 MG/1
TABLET ORAL
Qty: 60 TABLET | Refills: 0 | Status: SHIPPED | OUTPATIENT
Start: 2019-08-27 | End: 2019-09-26 | Stop reason: SDUPTHER

## 2019-08-27 NOTE — TELEPHONE ENCOUNTER
Juan Francisco Richey has a f/u appt 9/26/2019 and per the Rx log, she only needs Clonazepam at this time     I e-scribed a Rx to her designated [facility]:  Clonazepam 0 5mg (1/2) tab po bid and (1) qhs # 60

## 2019-09-25 ENCOUNTER — DOCUMENTATION (OUTPATIENT)
Dept: PSYCHIATRY | Facility: CLINIC | Age: 54
End: 2019-09-25
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
## 2019-09-25 NOTE — PROGRESS NOTES
Treatment Plan not completed within required time limits due to : Provider will be out of the office and will reschedule at a later time

## 2019-09-26 ENCOUNTER — OFFICE VISIT (OUTPATIENT)
Dept: PSYCHIATRY | Facility: CLINIC | Age: 54
End: 2019-09-26
Payer: COMMERCIAL

## 2019-09-26 VITALS — WEIGHT: 168 LBS | BODY MASS INDEX: 29.77 KG/M2 | HEIGHT: 63 IN

## 2019-09-26 DIAGNOSIS — F33.1 MODERATE RECURRENT MAJOR DEPRESSION (HCC): Primary | ICD-10-CM

## 2019-09-26 DIAGNOSIS — M25.551 RIGHT HIP PAIN: ICD-10-CM

## 2019-09-26 DIAGNOSIS — F41.1 GENERALIZED ANXIETY DISORDER: ICD-10-CM

## 2019-09-26 DIAGNOSIS — F41.0 PANIC ATTACKS: ICD-10-CM

## 2019-09-26 PROBLEM — F31.9 BIPOLAR AFFECTIVE DISORDER (HCC): Status: RESOLVED | Noted: 2018-07-23 | Resolved: 2019-09-26

## 2019-09-26 PROCEDURE — 99213 OFFICE O/P EST LOW 20 MIN: CPT | Performed by: PHYSICIAN ASSISTANT

## 2019-09-26 RX ORDER — CLONAZEPAM 0.5 MG/1
TABLET ORAL
Qty: 60 TABLET | Refills: 0 | Status: SHIPPED | OUTPATIENT
Start: 2019-09-26 | End: 2020-01-13 | Stop reason: SDUPTHER

## 2019-09-26 RX ORDER — ARIPIPRAZOLE 2 MG/1
2 TABLET ORAL
Qty: 90 TABLET | Refills: 0 | Status: SHIPPED | OUTPATIENT
Start: 2019-09-26 | End: 2020-07-17 | Stop reason: SDUPTHER

## 2019-09-26 RX ORDER — PAROXETINE HYDROCHLORIDE 20 MG/1
20 TABLET, FILM COATED ORAL DAILY
Qty: 90 TABLET | Refills: 0 | Status: SHIPPED | OUTPATIENT
Start: 2019-09-26 | End: 2020-01-13 | Stop reason: SDUPTHER

## 2019-09-26 NOTE — BH TREATMENT PLAN
TREATMENT PLAN (Medication Management Only)        [facility]    Name and Date of Birth:  Branden Hong 53 y o  1965  Date of Treatment Plan: September 26, 2019  Diagnosis/Diagnoses:    1  Moderate recurrent major depression ([address] )    2  Generalized anxiety disorder    3  Panic attacks    4  Right hip pain      Strengths/Personal Resources for Self-Care: "Learning to deal with problems better, and how to handle them"  Area/Areas of need (in own words): "I've been trying to let it blow off some"--in regards to anxiety, so that it does not worsen  Anxiety had increased after 1/2019  "  1  Long Term Goal:  Maintain mood stability and anxiety control  Target Date: 2 months - 11/26/2019  Person/Persons responsible for completion of goal: Chino Lisa 2  Short Term Objective (s) - How will we reach this goal?:   A  Provider new recommended medication/dosage changes and/or continue medication(s): Pt is having moderate depression and anxiety, but no recent panic attacks  She has been able to cope on the present regimen and feels she can continue to cope  Pt is not interested in having psychotherapy  Treatment plan done and Pt accepts the plan     Continue:  Paroxetine 20mg (1) tab po qhs # 90  Aripiprazole 2mg (1) tab po qhs # 90  Clonazepam 0 5mg (1/2) tab po bid and (1) qhs # 60 R2  Pt to have CMP (for recent low Na+ and elevated BG), Lipids, HgbA1C, Microalbumin/Creatinine urine ratio as ordered by PMD   Add a CBC with diff  F/U PMD for hip pain   Return 12 weeks--Pt cannot return sooner, call sooner prn     Target Date: 3-6 months  Person/Persons Responsible for Completion of Goal: Laura Estrada   Progress Towards Goals: stable, continuing treatment  Treatment Modality: Medication mgt  Review due 90 to 120 days from date of this plan: 3 months - 12/26/2019  Expected length of service: ongoing treatment  My Physician/PA/NP and I have developed this plan together and I agree to work on the goals and objectives  I understand the treatment goals that were developed for my treatment

## 2019-11-20 ENCOUNTER — APPOINTMENT (OUTPATIENT)
Dept: LAB | Facility: CLINIC | Age: 54
End: 2019-11-20
Payer: MEDICARE

## 2019-11-20 DIAGNOSIS — E78.5 HYPERLIPIDEMIA, UNSPECIFIED HYPERLIPIDEMIA TYPE: ICD-10-CM

## 2019-11-20 DIAGNOSIS — E11.9 CONTROLLED TYPE 2 DIABETES MELLITUS WITHOUT COMPLICATION, WITHOUT LONG-TERM CURRENT USE OF INSULIN (HCC): ICD-10-CM

## 2019-11-20 DIAGNOSIS — F41.1 GENERALIZED ANXIETY DISORDER: ICD-10-CM

## 2019-11-20 DIAGNOSIS — R80.9 MICROALBUMINURIA: ICD-10-CM

## 2019-11-20 DIAGNOSIS — I10 ESSENTIAL HYPERTENSION: ICD-10-CM

## 2019-11-20 LAB
ALBUMIN SERPL BCP-MCNC: 3.5 G/DL (ref 3.5–5)
ALP SERPL-CCNC: 143 U/L (ref 46–116)
ALT SERPL W P-5'-P-CCNC: 30 U/L (ref 12–78)
ANION GAP SERPL CALCULATED.3IONS-SCNC: 5 MMOL/L (ref 4–13)
AST SERPL W P-5'-P-CCNC: 23 U/L (ref 5–45)
BASOPHILS # BLD AUTO: 0.1 THOUSANDS/ΜL (ref 0–0.1)
BASOPHILS NFR BLD AUTO: 1 % (ref 0–1)
BILIRUB SERPL-MCNC: 0.49 MG/DL (ref 0.2–1)
BUN SERPL-MCNC: 8 MG/DL (ref 5–25)
CALCIUM SERPL-MCNC: 9.6 MG/DL (ref 8.3–10.1)
CHLORIDE SERPL-SCNC: 100 MMOL/L (ref 100–108)
CHOLEST SERPL-MCNC: 202 MG/DL (ref 50–200)
CO2 SERPL-SCNC: 28 MMOL/L (ref 21–32)
CREAT SERPL-MCNC: 0.9 MG/DL (ref 0.6–1.3)
CREAT UR-MCNC: 124 MG/DL
EOSINOPHIL # BLD AUTO: 0.42 THOUSAND/ΜL (ref 0–0.61)
EOSINOPHIL NFR BLD AUTO: 4 % (ref 0–6)
ERYTHROCYTE [DISTWIDTH] IN BLOOD BY AUTOMATED COUNT: 14.5 % (ref 11.6–15.1)
EST. AVERAGE GLUCOSE BLD GHB EST-MCNC: 154 MG/DL
GFR SERPL CREATININE-BSD FRML MDRD: 72 ML/MIN/1.73SQ M
GLUCOSE P FAST SERPL-MCNC: 159 MG/DL (ref 65–99)
HBA1C MFR BLD: 7 % (ref 4.2–6.3)
HCT VFR BLD AUTO: 43.2 % (ref 34.8–46.1)
HDLC SERPL-MCNC: 66 MG/DL
HGB BLD-MCNC: 13.8 G/DL (ref 11.5–15.4)
IMM GRANULOCYTES # BLD AUTO: 0.04 THOUSAND/UL (ref 0–0.2)
IMM GRANULOCYTES NFR BLD AUTO: 0 % (ref 0–2)
LDLC SERPL CALC-MCNC: 104 MG/DL (ref 0–100)
LYMPHOCYTES # BLD AUTO: 2.89 THOUSANDS/ΜL (ref 0.6–4.47)
LYMPHOCYTES NFR BLD AUTO: 30 % (ref 14–44)
MCH RBC QN AUTO: 27.9 PG (ref 26.8–34.3)
MCHC RBC AUTO-ENTMCNC: 31.9 G/DL (ref 31.4–37.4)
MCV RBC AUTO: 87 FL (ref 82–98)
MICROALBUMIN UR-MCNC: 20.3 MG/L (ref 0–20)
MICROALBUMIN/CREAT 24H UR: 16 MG/G CREATININE (ref 0–30)
MONOCYTES # BLD AUTO: 0.73 THOUSAND/ΜL (ref 0.17–1.22)
MONOCYTES NFR BLD AUTO: 8 % (ref 4–12)
NEUTROPHILS # BLD AUTO: 5.46 THOUSANDS/ΜL (ref 1.85–7.62)
NEUTS SEG NFR BLD AUTO: 57 % (ref 43–75)
NONHDLC SERPL-MCNC: 136 MG/DL
NRBC BLD AUTO-RTO: 0 /100 WBCS
PLATELET # BLD AUTO: 227 THOUSANDS/UL (ref 149–390)
PMV BLD AUTO: 10.9 FL (ref 8.9–12.7)
POTASSIUM SERPL-SCNC: 4.8 MMOL/L (ref 3.5–5.3)
PROT SERPL-MCNC: 7.2 G/DL (ref 6.4–8.2)
RBC # BLD AUTO: 4.95 MILLION/UL (ref 3.81–5.12)
SODIUM SERPL-SCNC: 133 MMOL/L (ref 136–145)
TRIGL SERPL-MCNC: 161 MG/DL
WBC # BLD AUTO: 9.64 THOUSAND/UL (ref 4.31–10.16)

## 2019-11-20 PROCEDURE — 83036 HEMOGLOBIN GLYCOSYLATED A1C: CPT

## 2019-11-20 PROCEDURE — 80061 LIPID PANEL: CPT

## 2019-11-20 PROCEDURE — 36415 COLL VENOUS BLD VENIPUNCTURE: CPT

## 2019-11-20 PROCEDURE — 82570 ASSAY OF URINE CREATININE: CPT

## 2019-11-20 PROCEDURE — 85025 COMPLETE CBC W/AUTO DIFF WBC: CPT

## 2019-11-20 PROCEDURE — 82043 UR ALBUMIN QUANTITATIVE: CPT

## 2019-11-20 PROCEDURE — 80053 COMPREHEN METABOLIC PANEL: CPT

## 2019-11-27 ENCOUNTER — OFFICE VISIT (OUTPATIENT)
Dept: FAMILY MEDICINE CLINIC | Facility: CLINIC | Age: 54
End: 2019-11-27
Payer: MEDICARE

## 2019-11-27 VITALS
DIASTOLIC BLOOD PRESSURE: 80 MMHG | BODY MASS INDEX: 30.12 KG/M2 | OXYGEN SATURATION: 97 % | WEIGHT: 170 LBS | SYSTOLIC BLOOD PRESSURE: 128 MMHG | HEIGHT: 63 IN | HEART RATE: 89 BPM

## 2019-11-27 DIAGNOSIS — F41.1 GENERALIZED ANXIETY DISORDER: ICD-10-CM

## 2019-11-27 DIAGNOSIS — Z23 NEED FOR VACCINATION: Primary | ICD-10-CM

## 2019-11-27 DIAGNOSIS — R80.9 MICROALBUMINURIA: ICD-10-CM

## 2019-11-27 DIAGNOSIS — K21.9 CHRONIC GERD: ICD-10-CM

## 2019-11-27 DIAGNOSIS — E11.9 TYPE 2 DIABETES MELLITUS WITHOUT COMPLICATION, WITHOUT LONG-TERM CURRENT USE OF INSULIN (HCC): ICD-10-CM

## 2019-11-27 DIAGNOSIS — E78.5 HYPERLIPIDEMIA, UNSPECIFIED HYPERLIPIDEMIA TYPE: ICD-10-CM

## 2019-11-27 DIAGNOSIS — I10 ESSENTIAL HYPERTENSION: ICD-10-CM

## 2019-11-27 DIAGNOSIS — E11.9 CONTROLLED TYPE 2 DIABETES MELLITUS WITHOUT COMPLICATION, WITHOUT LONG-TERM CURRENT USE OF INSULIN (HCC): ICD-10-CM

## 2019-11-27 PROBLEM — E87.1 HYPONATREMIA: Status: RESOLVED | Noted: 2018-07-20 | Resolved: 2019-11-27

## 2019-11-27 PROCEDURE — G0008 ADMIN INFLUENZA VIRUS VAC: HCPCS | Performed by: FAMILY MEDICINE

## 2019-11-27 PROCEDURE — G0402 INITIAL PREVENTIVE EXAM: HCPCS | Performed by: FAMILY MEDICINE

## 2019-11-27 PROCEDURE — 99214 OFFICE O/P EST MOD 30 MIN: CPT | Performed by: FAMILY MEDICINE

## 2019-11-27 PROCEDURE — 90682 RIV4 VACC RECOMBINANT DNA IM: CPT | Performed by: FAMILY MEDICINE

## 2019-11-27 PROCEDURE — 4010F ACE/ARB THERAPY RXD/TAKEN: CPT | Performed by: FAMILY MEDICINE

## 2019-11-27 RX ORDER — ATORVASTATIN CALCIUM 20 MG/1
20 TABLET, FILM COATED ORAL DAILY
Qty: 90 TABLET | Refills: 1 | Status: SHIPPED | OUTPATIENT
Start: 2019-11-27 | End: 2020-06-04 | Stop reason: SDUPTHER

## 2019-11-27 RX ORDER — LISINOPRIL 20 MG/1
20 TABLET ORAL DAILY
Qty: 90 TABLET | Refills: 1 | Status: SHIPPED | OUTPATIENT
Start: 2019-11-27 | End: 2020-03-12

## 2019-11-27 RX ORDER — OMEPRAZOLE 20 MG/1
20 CAPSULE, DELAYED RELEASE ORAL DAILY
Qty: 90 CAPSULE | Refills: 1 | Status: SHIPPED | OUTPATIENT
Start: 2019-11-27 | End: 2020-06-04 | Stop reason: SDUPTHER

## 2019-11-27 NOTE — ASSESSMENT & PLAN NOTE
Lab Results   Component Value Date    HGBA1C 7 0 (H) 11/20/2019     Patient's A1c is trending upwards  She is upset about her weight gain  Is unable to exercise due to chronic hip pain  Advised to increase metformin to 1000 milligram twice daily  Recheck A1c after 3 months and follow up thereafter  Patient encouraged to start walking for shorter duration of time throughout the day to help improve glycemic control

## 2019-11-27 NOTE — PROGRESS NOTES
Subjective:      Patient ID: Mohamud Conroy is a 53 y o  female  Diabetes   She presents for her follow-up diabetic visit  She has type 2 diabetes mellitus  Her disease course has been worsening (A1c 7 0)  There are no hypoglycemic associated symptoms  Pertinent negatives for hypoglycemia include no headaches  Pertinent negatives for diabetes include no chest pain, no fatigue, no foot paresthesias, no polydipsia, no polyphagia, no polyuria, no weakness and no weight loss  There are no hypoglycemic complications  Symptoms are stable  There are no diabetic complications  Risk factors for coronary artery disease include diabetes mellitus, dyslipidemia, hypertension, obesity, post-menopausal and tobacco exposure  Current diabetic treatments: Metfomin 500 mg bid  She is compliant with treatment all of the time  She is following a generally healthy diet  Meal planning includes avoidance of concentrated sweets  She participates in exercise intermittently  An ACE inhibitor/angiotensin II receptor blocker is being taken  She does not see a podiatrist Eye exam is current  Hypertension   This is a chronic problem  The current episode started more than 1 year ago  The problem has been gradually improving since onset  The problem is controlled  Pertinent negatives include no chest pain, headaches, orthopnea, palpitations, peripheral edema or shortness of breath  Risk factors for coronary artery disease include diabetes mellitus, dyslipidemia, obesity, post-menopausal state, sedentary lifestyle, smoking/tobacco exposure and stress  Treatments tried: Lisinopril 20 mg daily  The current treatment provides significant improvement  There are no compliance problems  Hyperlipidemia   This is a chronic problem  The current episode started more than 1 year ago  The problem is uncontrolled  Recent lipid tests were reviewed and are high ()  Pertinent negatives include no chest pain, myalgias or shortness of breath  Current antihyperlipidemic treatment includes statins  The current treatment provides mild improvement of lipids  There are no compliance problems  Risk factors for coronary artery disease include diabetes mellitus, dyslipidemia, hypertension, post-menopausal, a sedentary lifestyle and stress  Heartburn   She complains of heartburn  She reports no chest pain  This is a chronic problem  The current episode started more than 1 year ago  The problem occurs occasionally  The heartburn is located in the substernum  The heartburn is of mild intensity  Pertinent negatives include no fatigue or weight loss  She has tried a PPI for the symptoms  The treatment provided significant relief  Past procedures include an EGD  Past Medical History:   Diagnosis Date    [facility] [address] [facility]     GERD (gastroesophageal reflux disease)     Hypertension     Psychiatric disorder     bipolar    Right clavicle fracture        Family History   Problem Relation Age of Onset    Arthritis Family     Cancer Family     Osteoporosis Family     Cervical cancer Mother     Hypertension Father     Other Father         pre-diabetes    Diabetes type I Sister     Osteoporosis Sister     Depression Sister     Heart attack Brother          of an MI at 50y/o    Diabetes Brother     Other Paternal Grandmother         Parkinson's Disease    Heart attack Paternal Grandfather     Alcohol abuse Paternal Uncle     Seizures Other     Seizures Other        Past Surgical History:   Procedure Laterality Date    BLADDER SURGERY      Sling    MS HIP [address] BODY,PLASTY/RESECTN Right 2017    Procedure: RIGHT HIP ARTHROSCOPIC LABRAL DEBRIDEMENT;  Surgeon: Jericho Maria MD;  [facility];  Service: Orthopedics    SINUS SURGERY      3 surgeries     TUBAL LIGATION          reports that she has been smoking  She has a 25 00 pack-year smoking history   She has never used smokeless tobacco  She reports that she drinks alcohol  She reports that she has current or past drug history  Drug: Marijuana  Current Outpatient Medications:     ARIPiprazole (ABILIFY) 2 mg tablet, Take 1 tablet (2 mg total) by mouth daily at bedtime, Disp: 90 tablet, Rfl: 0    aspirin 325 mg tablet, Take 325 mg by mouth, Disp: , Rfl:     atorvastatin (LIPITOR) 20 mg tablet, Take 1 tablet (20 mg total) by mouth daily, Disp: 90 tablet, Rfl: 1    clonazePAM (KlonoPIN) 0 5 mg tablet, Take 1/2 tab by mouth twice daily and 1 full tab nightly, Disp: 60 tablet, Rfl: 0    glucose blood (ONE TOUCH ULTRA TEST) test strip, Check blood sugar once daily, Disp: 100 each, Rfl: 2    ibuprofen (MOTRIN) 600 mg tablet, Take 1 tablet (600 mg total) by mouth every 6 (six) hours as needed for mild pain, Disp: 30 tablet, Rfl: 0    Lancets (ONETOUCH ULTRASOFT) lancets, Check blood sugar twice/ week , Disp: 100 each, Rfl: 0    lisinopril (ZESTRIL) 20 mg tablet, Take 1 tablet (20 mg total) by mouth daily, Disp: 90 tablet, Rfl: 1    omeprazole (PriLOSEC) 20 mg delayed release capsule, Take 1 capsule (20 mg total) by mouth daily, Disp: 90 capsule, Rfl: 1    PARoxetine (PAXIL) 20 mg tablet, Take 1 tablet (20 mg total) by mouth daily, Disp: 90 tablet, Rfl: 0    metFORMIN (GLUCOPHAGE) 1000 MG tablet, Take 1 tablet (1,000 mg total) by mouth 2 (two) times a day with meals, Disp: 180 tablet, Rfl: 1    The following portions of the patient's history were reviewed and updated as appropriate: allergies, current medications, past family history, past medical history, past social history, past surgical history and problem list     Review of Systems   Constitutional: Negative  Negative for fatigue and weight loss  Eyes: Negative  Respiratory: Negative  Negative for shortness of breath  Cardiovascular: Negative  Negative for chest pain, palpitations and orthopnea  Gastrointestinal: Positive for heartburn  Endocrine: Negative    Negative for polydipsia, polyphagia and polyuria  Genitourinary: Negative  Musculoskeletal: Negative  Negative for myalgias  Neurological: Negative  Negative for weakness and headaches  Psychiatric/Behavioral: Negative  Objective:    /80 (BP Location: Left arm, Patient Position: Sitting, Cuff Size: Large)   Pulse 89   Ht 5' 3" (1 6 m)   Wt 77 1 kg (170 lb)   SpO2 97%   BMI 30 11 kg/m²   Repeat 128/80   Physical Exam   Constitutional: She is oriented to person, place, and time  She appears well-developed and well-nourished  HENT:   Head: Normocephalic  Right Ear: External ear normal    Left Ear: External ear normal    Eyes: Pupils are equal, round, and reactive to light  Neck: Normal range of motion  Neck supple  Cardiovascular: Normal rate and regular rhythm  Pulses are no weak pulses  Pulmonary/Chest: Effort normal and breath sounds normal    Abdominal: Soft  Bowel sounds are normal    Musculoskeletal: Normal range of motion  Feet:   Right Foot:   Skin Integrity: Negative for ulcer, skin breakdown, erythema, warmth, callus or dry skin  Left Foot:   Skin Integrity: Negative for ulcer, skin breakdown, erythema, warmth, callus or dry skin  Neurological: She is alert and oriented to person, place, and time  Psychiatric: She has a normal mood and affect  Her behavior is normal  Judgment normal      Patient's shoes and socks removed  Right Foot/Ankle   Right Foot Inspection  Skin Exam: skin normal and skin intact no dry skin, no warmth, no callus, no erythema, no maceration, no abnormal color, no pre-ulcer, no ulcer and no callus                          Toe Exam: ROM and strength within normal limits  Sensory   Vibration: intact  Proprioception: intact   Monofilament testing: intact  Vascular  Capillary refills: < 3 seconds      Left Foot/Ankle  Left Foot Inspection  Skin Exam: skin normal and skin intactno dry skin, no warmth, no erythema, no maceration, normal color, no pre-ulcer, no ulcer and no callus Toe Exam: ROM and strength within normal limits                   Sensory   Vibration: intact  Proprioception: intact  Monofilament: intact  Vascular  Capillary refills: < 3 seconds    Assign Risk Category:  No deformity present; No loss of protective sensation; No weak pulses       Risk: 0      Recent Results (from the past 1008 hour(s))   Comprehensive metabolic panel    Collection Time: 11/20/19  8:10 AM   Result Value Ref Range    Sodium 133 (L) 136 - 145 mmol/L    Potassium 4 8 3 5 - 5 3 mmol/L    Chloride 100 100 - 108 mmol/L    CO2 28 21 - 32 mmol/L    ANION GAP 5 4 - 13 mmol/L    BUN 8 5 - 25 mg/dL    Creatinine 0 90 0 60 - 1 30 mg/dL    Glucose, Fasting 159 (H) 65 - 99 mg/dL    Calcium 9 6 8 3 - 10 1 mg/dL    AST 23 5 - 45 U/L    ALT 30 12 - 78 U/L    Alkaline Phosphatase 143 (H) 46 - 116 U/L    Total Protein 7 2 6 4 - 8 2 g/dL    Albumin 3 5 3 5 - 5 0 g/dL    Total Bilirubin 0 49 0 20 - 1 00 mg/dL    eGFR 72 ml/min/1 73sq m   Hemoglobin A1C    Collection Time: 11/20/19  8:10 AM   Result Value Ref Range    Hemoglobin A1C 7 0 (H) 4 2 - 6 3 %     mg/dl   Lipid panel    Collection Time: 11/20/19  8:10 AM   Result Value Ref Range    Cholesterol 202 (H) 50 - 200 mg/dL    Triglycerides 161 (H) <=150 mg/dL    HDL, Direct 66 >=40 mg/dL    LDL Calculated 104 (H) 0 - 100 mg/dL    Non-HDL-Chol (CHOL-HDL) 136 mg/dl   Microalbumin / creatinine urine ratio    Collection Time: 11/20/19  8:10 AM   Result Value Ref Range    Creatinine, Ur 124 0 mg/dL    Microalbum  ,U,Random 20 3 (H) 0 0 - 20 0 mg/L    Microalb Creat Ratio 16 0 - 30 mg/g creatinine   CBC and differential    Collection Time: 11/20/19  8:10 AM   Result Value Ref Range    WBC 9 64 4 31 - 10 16 Thousand/uL    RBC 4 95 3 81 - 5 12 Million/uL    Hemoglobin 13 8 11 5 - 15 4 g/dL    Hematocrit 43 2 34 8 - 46 1 %    MCV 87 82 - 98 fL    MCH 27 9 26 8 - 34 3 pg    MCHC 31 9 31 4 - 37 4 g/dL    RDW 14 5 11 6 - 15 1 %    MPV 10 9 8 9 - 12 7 fL Platelets 500 503 - 127 Thousands/uL    nRBC 0 /100 WBCs    Neutrophils Relative 57 43 - 75 %    Immat GRANS % 0 0 - 2 %    Lymphocytes Relative 30 14 - 44 %    Monocytes Relative 8 4 - 12 %    Eosinophils Relative 4 0 - 6 %    Basophils Relative 1 0 - 1 %    Neutrophils Absolute 5 46 1 85 - 7 62 Thousands/µL    Immature Grans Absolute 0 04 0 00 - 0 20 Thousand/uL    Lymphocytes Absolute 2 89 0 60 - 4 47 Thousands/µL    Monocytes Absolute 0 73 0 17 - 1 22 Thousand/µL    Eosinophils Absolute 0 42 0 00 - 0 61 Thousand/µL    Basophils Absolute 0 10 0 00 - 0 10 Thousands/µL       Assessment/Plan:       Problem List Items Addressed This Visit        Digestive    Chronic GERD     Symptoms stable on omeprazole 20 milligram   Patient encouraged to call [facility] gastroenterology to check regarding EGD  Relevant Medications    omeprazole (PriLOSEC) 20 mg delayed release capsule       Endocrine    Diabetes ([facility] [address] )       Lab Results   Component Value Date    HGBA1C 7 0 (H) 11/20/2019     Patient's A1c is trending upwards  She is upset about her weight gain  Is unable to exercise due to chronic hip pain  Advised to increase metformin to 1000 milligram twice daily  Recheck A1c after 3 months and follow up thereafter  Patient encouraged to start walking for shorter duration of time throughout the day to help improve glycemic control  Relevant Medications    metFORMIN (GLUCOPHAGE) 1000 MG tablet    glucose blood (ONE TOUCH ULTRA TEST) test strip    Controlled type 2 diabetes mellitus without complication, without long-term current use of insulin (HCC)    Relevant Medications    metFORMIN (GLUCOPHAGE) 1000 MG tablet    Other Relevant Orders    Comprehensive metabolic panel    Hemoglobin A1C       Cardiovascular and Mediastinum    Hypertension     Patient's blood pressure is at goal on lisinopril 20 milligram   Continue same           Relevant Medications    lisinopril (ZESTRIL) 20 mg tablet       Other Hyperlipemia     Continue atorvastatin 20 milligram   Recheck lipid panel at 6 month interval         Relevant Medications    atorvastatin (LIPITOR) 20 mg tablet    Generalized anxiety disorder     Continue management per psychiatrist          Microalbuminuria     Patient on ACE-inhibitor    Continue monitoring         Need for vaccination - Primary    Relevant Orders    influenza vaccine, [id number], quadrivalent, recombinant, PF, 0 5 mL, for patients 18 yr+ (FLUBLOK) (Completed)

## 2019-11-27 NOTE — PROGRESS NOTES
Assessment and Plan:     Problem List Items Addressed This Visit     None           Preventive health issues were discussed with patient, and age appropriate screening tests were ordered as noted in patient's After Visit Summary  Personalized health advice and appropriate referrals for health education or preventive services given if needed, as noted in patient's After Visit Summary       History of Present Illness:     Patient presents for Medicare Annual Wellness visit    Patient Care Team:  Gordon Perez MD as PCP - General (Family Medicine)  MD Tadeo Vivar MD     Problem List:     Patient Active Problem List   Diagnosis    Arthritis    Moderate recurrent major depression ([address] )    Chronic GERD    Diabetes ([address] )    Heart disorder    Hyperlipemia    Hypertension    Lumbar radiculopathy    Primary osteoarthritis of right hip    Stomach problems    Encounter for annual routine gynecological examination    Generalized anxiety disorder    Panic attacks    Right hip pain    Visit for screening mammogram    Hyponatremia    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    Controlled type 2 diabetes mellitus without complication, without long-term current use of insulin ([address] )    Paresthesias in right hand    Calcific tendinitis of right shoulder    Coarse tremors    Microalbuminuria    Rotator cuff syndrome of right shoulder    Extrapyramidal symptom    Acute right-sided low back pain without sciatica    Muscle spasm    Balance problem      Past Medical and Surgical History:     Past Medical History:   Diagnosis Date    Diabetes mellitus ([address] )     GERD (gastroesophageal reflux disease)     Hypertension     Psychiatric disorder     bipolar    Right clavicle fracture      Past Surgical History:   Procedure Laterality Date    BLADDER SURGERY      Sling    OR HIP SCOPE/REMV BODY,PLASTY/RESECTN Right 7/13/2017    Procedure: RIGHT HIP ARTHROSCOPIC LABRAL DEBRIDEMENT;  Surgeon: Marcus Smiley MD;  [facility];  Service: Orthopedics    SINUS SURGERY      3 surgeries     TUBAL LIGATION        Family History:     Family History   Problem Relation Age of Onset    Arthritis Family     Cancer Family     Osteoporosis Family     Cervical cancer Mother     Hypertension Father     Other Father         pre-diabetes    Diabetes type I Sister     Osteoporosis Sister     Depression Sister     Heart attack Brother          of an MI at 50y/o    Diabetes Brother     Other Paternal Grandmother         Parkinson's Disease    Heart attack Paternal Grandfather     Alcohol abuse Paternal Uncle     Seizures Other     Seizures Other       Social History:     Social History     Socioeconomic History    Marital status:      Spouse name: None    Number of children: 2    Years of education: None    Highest education level: None   Occupational History    Occupation: Unemployed due to Rt hip injury     Comment: and lower back injury    Occupation: Used to be a    Social Needs    Financial resource strain: Hard    Food insecurity:     Worry: Never true     Inability: Never true    Transportation needs:     Medical: No     Non-medical: No   Tobacco Use    Smoking status: Current Every Day Smoker     Packs/day: 1 00     Years: 25 00     Pack years: 25 00    Smokeless tobacco: Never Used   Substance and Sexual Activity    Alcohol use: Yes     Comment: will have a couple of beers or a couple of rum and cokes once per week    Drug use: Yes     Types: Marijuana     Comment: Smoked THC between 19 and 30y/o    Sexual activity: Yes     Partners: Male     Comment: Boyfriend   Lifestyle    Physical activity:     Days per week: 0 days     Minutes per session: None    Stress: None   Relationships    Social connections:     Talks on phone: None     Gets together: None     Attends Muslim service: None     Active member of club or organization: None     Attends meetings of clubs or organizations: None     Relationship status: None    Intimate partner violence:     Fear of current or ex partner: None     Emotionally abused: None     Physically abused: None     Forced sexual activity: None   Other Topics Concern    None   Social History Narrative    Caffeine use        Home: Lives with boyfriend        Children from first  and most recent boyfriend respectively: Son born 1984 Daughter 1989        Education:    Pt denies any h/o learning disabilities and reached childhood milestones on time as far as she knows    Graduated HS 1982    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           Medications and Allergies:     Current Outpatient Medications   Medication Sig Dispense Refill    ARIPiprazole (ABILIFY) 2 mg tablet Take 1 tablet (2 mg total) by mouth daily at bedtime 90 tablet 0    aspirin 325 mg tablet Take 325 mg by mouth      atorvastatin (LIPITOR) 20 mg tablet Take 1 tablet (20 mg total) by mouth daily 90 tablet 1    clonazePAM (KlonoPIN) 0 5 mg tablet Take 1/2 tab by mouth twice daily and 1 full tab nightly 60 tablet 0    glucose blood (ONE TOUCH ULTRA TEST) test strip Check blood sugar once daily 100 each 2    ibuprofen (MOTRIN) 600 mg tablet Take 1 tablet (600 mg total) by mouth every 6 (six) hours as needed for mild pain 30 tablet 0    Lancets (ONETOUCH ULTRASOFT) lancets Check blood sugar twice/ week  100 each 0    lisinopril (ZESTRIL) 20 mg tablet Take 1 tablet (20 mg total) by mouth daily 90 tablet 1    metFORMIN (GLUCOPHAGE) 500 mg tablet Take 1 tablet (500 mg total) by mouth 2 (two) times a day with meals 180 tablet 1    omeprazole (PriLOSEC) 20 mg delayed release capsule Take 1 capsule (20 mg total) by mouth daily 90 capsule 1    PARoxetine (PAXIL) 20 mg tablet Take 1 tablet (20 mg total) by mouth daily 90 tablet 0     No current facility-administered medications for this visit        Allergies Allergen Reactions    Cefdinir Hives      Immunizations:     Immunization History   Administered Date(s) Administered    INFLUENZA 01/01/2013    Pneumococcal Conjugate 13-Valent 01/08/2019    Tetanus, adsorbed 01/01/2009      Health Maintenance:         Topic Date Due    Hepatitis C Screening  1965    MAMMOGRAM  09/24/2019    Cervical Cancer Screening  01/08/2022    CRC Screening: Colonoscopy  12/02/2023         Topic Date Due    DTaP,Tdap,and Td Vaccines (1 - Tdap) 04/05/1975      Medicare Health Risk Assessment:     /80 (BP Location: Left arm, Patient Position: Sitting, Cuff Size: Large)   Pulse 89   Ht 5' 3" (1 6 m)   Wt 77 1 kg (170 lb)   SpO2 97%   BMI 30 11 kg/m²      Jacinto Rodriguez is here for her Welcome to Medicare visit  Health Risk Assessment:   Patient rates overall health as good  Patient feels that their physical health rating is same  Eyesight was rated as same  Hearing was rated as same  Patient feels that their emotional and mental health rating is slightly better  Pain experienced in the last 7 days has been some  Patient's pain rating has been 4/10  Patient states that she has experienced no weight loss or gain in last 6 months  Depression Screening:   PHQ-2 Score: 0  PHQ-9 Score: 0      Fall Risk Screening: In the past year, patient has experienced: no history of falling in past year      Urinary Incontinence Screening:   Patient has not leaked urine accidently in the last six months  Home Safety:  Patient does not have trouble with stairs inside or outside of their home  Patient has working smoke alarms and has working carbon monoxide detector  Home safety hazards include: none  Nutrition:   Current diet is Regular  Activities of Daily Living (ADLs)/Instrumental Activities of Daily Living (IADLs):   Walk and transfer into and out of bed and chair?: Yes  Dress and groom yourself?: Yes    Bathe or shower yourself?: Yes    Feed yourself?  Yes  Do your laundry/housekeeping?: Yes  Manage your money, pay your bills and track your expenses?: Yes  Make your own meals?: Yes    Do your own shopping?: Yes    Previous Hospitalizations:   Any hospitalizations or ED visits within the last 12 months?: No      Advance Care Planning:   Living will: No    Durable POA for healthcare: No    Advanced directive: Yes    Provider agrees with end of life decisions: Yes      PREVENTIVE SCREENINGS      Cardiovascular Screening:    General: Screening Not Indicated and History Lipid Disorder      Diabetes Screening:     General: Screening Not Indicated and History Diabetes      Colorectal Cancer Screening:     General: Screening Current      Breast Cancer Screening:     General: Screening Current      Cervical Cancer Screening:    General: Screening Current      Osteoporosis Screening:    General: Screening Not Indicated      Abdominal Aortic Aneurysm (AAA) Screening:        General: Screening Not Indicated      Lung Cancer Screening:     General: Patient Declines    Other Counseling Topics:   Car/seat belt/driving safety, skin self-exam, sunscreen and regular weightbearing exercise and calcium and vitamin D intake         David Kovacs MD

## 2019-11-27 NOTE — ASSESSMENT & PLAN NOTE
Symptoms stable on omeprazole 20 milligram   Patient encouraged to call [facility] gastroenterology to check regarding EGD

## 2019-11-27 NOTE — PATIENT INSTRUCTIONS
Medicare Preventive Visit Patient Instructions  Thank you for completing your Welcome to Medicare Visit or Medicare Annual Wellness Visit today  Your next wellness visit will be due in one year (11/27/2020)  The screening/preventive services that you may require over the next 5-10 years are detailed below  Some tests may not apply to you based off risk factors and/or age  Screening tests ordered at today's visit but not completed yet may show as past due  Also, please note that scanned in results may not display below  Preventive Screenings:  Service Recommendations Previous Testing/Comments   Colorectal Cancer Screening  * Colonoscopy    * Fecal Occult Blood Test (FOBT)/Fecal Immunochemical Test (FIT)  * Fecal DNA/Cologuard Test  * Flexible Sigmoidoscopy Age: 54-65 years old   Colonoscopy: every 10 years (may be performed more frequently if at higher risk)  OR  FOBT/FIT: every 1 year  OR  Cologuard: every 3 years  OR  Sigmoidoscopy: every 5 years  Screening may be recommended earlier than age 48 if at higher risk for colorectal cancer  Also, an individualized decision between you and your healthcare provider will decide whether screening between the ages of 74-80 would be appropriate  Colonoscopy: 12/02/2013  FOBT/FIT: Not on file  Cologuard: Not on file  Sigmoidoscopy: Not on file    Screening Current     Breast Cancer Screening Age: 36 years old  Frequency: every 1-2 years  Not required if history of left and right mastectomy Mammogram: 09/24/2018    Screening Current   Cervical Cancer Screening Between the ages of 21-29, pap smear recommended once every 3 years  Between the ages of 33-67, can perform pap smear with HPV co-testing every 5 years     Recommendations may differ for women with a history of total hysterectomy, cervical cancer, or abnormal pap smears in past  Pap Smear: 01/08/2019    Screening Current   Hepatitis C Screening Once for adults born between 1945 and 1965  More frequently in patients at high risk for Hepatitis C Hep C Antibody: Not on file       Diabetes Screening 1-2 times per year if you're at risk for diabetes or have pre-diabetes Fasting glucose: 159 mg/dL   A1C: 7 0 %    Screening Not Indicated  History Diabetes   Cholesterol Screening Once every 5 years if you don't have a lipid disorder  May order more often based on risk factors  Lipid panel: 11/20/2019    Screening Not Indicated  History Lipid Disorder     Other Preventive Screenings Covered by Medicare:  1  Abdominal Aortic Aneurysm (AAA) Screening: covered once if your at risk  You're considered to be at risk if you have a family history of AAA  2  Lung Cancer Screening: covers low dose CT scan once per year if you meet all of the following conditions: (1) Age 50-69; (2) No signs or symptoms of lung cancer; (3) Current smoker or have quit smoking within the last 15 years; (4) You have a tobacco smoking history of at least 30 pack years (packs per day multiplied by number of years you smoked); (5) You get a written order from a healthcare provider  3  Glaucoma Screening: covered annually if you're considered high risk: (1) You have diabetes OR (2) Family history of glaucoma OR (3)  aged 48 and older OR (3)  American aged 72 and older  3  Osteoporosis Screening: covered every 2 years if you meet one of the following conditions: (1) You're estrogen deficient and at risk for osteoporosis based off medical history and other findings; (2) Have a vertebral abnormality; (3) On glucocorticoid therapy for more than 3 months; (4) Have primary hyperparathyroidism; (5) On osteoporosis medications and need to assess response to drug therapy  · Last bone density test (DXA Scan): Not on file  5  HIV Screening: covered annually if you're between the age of 12-76  Also covered annually if you are younger than 13 and older than 72 with risk factors for HIV infection   For pregnant patients, it is covered up to 3 times per pregnancy  Immunizations:  Immunization Recommendations   Influenza Vaccine Annual influenza vaccination during flu season is recommended for all persons aged >= 6 months who do not have contraindications   Pneumococcal Vaccine (Prevnar and Pneumovax)  * Prevnar = PCV13  * Pneumovax = PPSV23   Adults 25-60 years old: 1-3 doses may be recommended based on certain risk factors  Adults 72 years old: Prevnar (PCV13) vaccine recommended followed by Pneumovax (PPSV23) vaccine  If already received PPSV23 since turning 65, then PCV13 recommended at least one year after PPSV23 dose  Hepatitis B Vaccine 3 dose series if at intermediate or high risk (ex: diabetes, end stage renal disease, liver disease)   Tetanus (Td) Vaccine - COST NOT COVERED BY MEDICARE PART B Following completion of primary series, a booster dose should be given every 10 years to maintain immunity against tetanus  Td may also be given as tetanus wound prophylaxis  Tdap Vaccine - COST NOT COVERED BY MEDICARE PART B Recommended at least once for all adults  For pregnant patients, recommended with each pregnancy  Shingles Vaccine (Shingrix) - COST NOT COVERED BY MEDICARE PART B  2 shot series recommended in those aged 48 and above     Health Maintenance Due:      Topic Date Due    Hepatitis C Screening  1965    MAMMOGRAM  09/24/2019    Cervical Cancer Screening  01/08/2022    CRC Screening: Colonoscopy  12/02/2023     Immunizations Due:      Topic Date Due    DTaP,Tdap,and Td Vaccines (1 - Tdap) 04/05/1975     Advance Directives   What are advance directives? Advance directives are legal documents that state your wishes and plans for medical care  These plans are made ahead of time in case you lose your ability to make decisions for yourself  Advance directives can apply to any medical decision, such as the treatments you want, and if you want to donate organs  What are the types of advance directives?   There are many types of advance directives, and each state has rules about how to use them  You may choose a combination of any of the following:  · Living will: This is a written record of the treatment you want  You can also choose which treatments you do not want, which to limit, and which to stop at a certain time  This includes surgery, medicine, IV fluid, and tube feedings  · Durable power of  for healthcare [facility]): This is a written record that states who you want to make healthcare choices for you when you are unable to make them for yourself  This person, called a proxy, is usually a family member or a friend  You may choose more than 1 proxy  · Do not resuscitate (DNR) order:  A DNR order is used in case your heart stops beating or you stop breathing  It is a request not to have certain forms of treatment, such as CPR  A DNR order may be included in other types of advance directives  · Medical directive: This covers the care that you want if you are in a coma, near death, or unable to make decisions for yourself  You can list the treatments you want for each condition  Treatment may include pain medicine, surgery, blood transfusions, dialysis, IV or tube feedings, and a ventilator (breathing machine)  · Values history: This document has questions about your views, beliefs, and how you feel and think about life  This information can help others choose the care that you would choose  Why are advance directives important? An advance directive helps you control your care  Although spoken wishes may be used, it is better to have your wishes written down  Spoken wishes can be misunderstood, or not followed  Treatments may be given even if you do not want them  An advance directive may make it easier for your family to make difficult choices about your care  Cigarette Smoking and Your Health   Risks to your health if you smoke:  Nicotine and other chemicals found in tobacco damage every cell in your body   Even if you are a light smoker, you have an increased risk for cancer, heart disease, and lung disease  If you are pregnant or have diabetes, smoking increases your risk for complications  Benefits to your health if you stop smoking:   · You decrease respiratory symptoms such as coughing, wheezing, and shortness of breath  · You reduce your risk for cancers of the lung, mouth, throat, kidney, bladder, pancreas, stomach, and cervix  If you already have cancer, you increase the benefits of chemotherapy  You also reduce your risk for cancer returning or a second cancer from developing  · You reduce your risk for heart disease, blood clots, heart attack, and stroke  · You reduce your risk for lung infections, and diseases such as pneumonia, asthma, chronic bronchitis, and emphysema  · Your circulation improves  More oxygen can be delivered to your body  If you have diabetes, you lower your risk for complications, such as kidney, artery, and eye diseases  You also lower your risk for nerve damage  Nerve damage can lead to amputations, poor vision, and blindness  · You improve your body's ability to heal and to fight infections  For more information and support to stop smoking:   · [employer]  Phone: 7- 640 - [phone]  Web Address: www [place]  Weight Management   Why it is important to manage your weight:  Being overweight increases your risk of health conditions such as heart disease, high blood pressure, type 2 diabetes, and certain types of cancer  It can also increase your risk for osteoarthritis, sleep apnea, and other respiratory problems  Aim for a slow, steady weight loss  Even a small amount of weight loss can lower your risk of health problems  How to lose weight safely:  A safe and healthy way to lose weight is to eat fewer calories and get regular exercise  You can lose up about 1 pound a week by decreasing the number of calories you eat by 500 calories each day     Healthy meal plan for weight management: A healthy meal plan includes a variety of foods, contains fewer calories, and helps you stay healthy  A healthy meal plan includes the following:  · Eat whole-grain foods more often  A healthy meal plan should contain fiber  Fiber is the part of grains, fruits, and vegetables that is not broken down by your body  Whole-grain foods are healthy and provide extra fiber in your diet  Some examples of whole-grain foods are whole-wheat breads and pastas, oatmeal, brown rice, and bulgur  · Eat a variety of vegetables every day  Include dark, leafy greens such as spinach, kale, nissa greens, and mustard greens  Eat yellow and orange vegetables such as carrots, sweet potatoes, and winter squash  · Eat a variety of fruits every day  Choose fresh or canned fruit (canned in its own juice or light syrup) instead of juice  Fruit juice has very little or no fiber  · Eat low-fat dairy foods  Drink fat-free (skim) milk or 1% milk  Eat fat-free yogurt and low-fat cottage cheese  Try low-fat cheeses such as mozzarella and other reduced-fat cheeses  · Choose meat and other protein foods that are low in fat  Choose beans or other legumes such as split peas or lentils  Choose fish, skinless poultry (chicken or turkey), or lean cuts of red meat (beef or pork)  Before you cook meat or poultry, cut off any visible fat  · Use less fat and oil  Try baking foods instead of frying them  Add less fat, such as margarine, sour cream, regular salad dressing and mayonnaise to foods  Eat fewer high-fat foods  Some examples of high-fat foods include french fries, doughnuts, ice cream, and cakes  · Eat fewer sweets  Limit foods and drinks that are high in sugar  This includes candy, cookies, regular soda, and sweetened drinks  Exercise:  Exercise at least 30 minutes per day on most days of the week  Some examples of exercise include walking, biking, dancing, and swimming   You can also fit in more physical activity by taking the stairs instead of the elevator or parking farther away from stores  Ask your healthcare provider about the best exercise plan for you  © Copyright [employer] 2018 Information is for End User's use only and may not be sold, redistributed or otherwise used for commercial purposes   All illustrations and images included in CareNotes® are the copyrighted property of [employer]  or [address] [place] [address]

## 2019-12-16 ENCOUNTER — HOSPITAL ENCOUNTER (OUTPATIENT)
Dept: MAMMOGRAPHY | Facility: HOSPITAL | Age: 54
Discharge: HOME | End: 2019-12-16
Payer: MEDICARE

## 2019-12-16 VITALS — HEIGHT: 63 IN | BODY MASS INDEX: 30.12 KG/M2 | WEIGHT: 170 LBS

## 2019-12-16 DIAGNOSIS — Z12.31 VISIT FOR SCREENING MAMMOGRAM: ICD-10-CM

## 2019-12-16 PROCEDURE — 77067 SCR MAMMO BI INCL CAD: CPT

## 2019-12-17 DIAGNOSIS — E11.9 TYPE 2 DIABETES MELLITUS WITHOUT COMPLICATION, WITHOUT LONG-TERM CURRENT USE OF INSULIN (HCC): ICD-10-CM

## 2020-01-09 ENCOUNTER — HOSPITAL ENCOUNTER (OUTPATIENT)
Dept: RADIOLOGY | Facility: HOSPITAL | Age: 55
Discharge: HOME | End: 2020-01-09
Payer: MEDICARE

## 2020-01-09 ENCOUNTER — TRANSCRIBE ORDERS (OUTPATIENT)
Dept: ADMINISTRATIVE | Facility: HOSPITAL | Age: 55
End: 2020-01-09

## 2020-01-09 VITALS — BODY MASS INDEX: 30.12 KG/M2 | HEIGHT: 63 IN | WEIGHT: 170 LBS

## 2020-01-09 DIAGNOSIS — R92.8 ABNORMAL MAMMOGRAM: ICD-10-CM

## 2020-01-09 PROCEDURE — G0279 TOMOSYNTHESIS, MAMMO: HCPCS

## 2020-01-09 PROCEDURE — 77065 DX MAMMO INCL CAD UNI: CPT

## 2020-01-09 PROCEDURE — 76642 ULTRASOUND BREAST LIMITED: CPT

## 2020-01-10 NOTE — RESULT ENCOUNTER NOTE
Please call patient and notify that results are normal   Diagnostic mammogram of the right breast showed that the abnormality noted on mammogram were a cluster of micro cysts which are completely benign    Radiologist recommended resuming annual screening mammography next year

## 2020-01-13 ENCOUNTER — OFFICE VISIT (OUTPATIENT)
Dept: PSYCHIATRY | Facility: CLINIC | Age: 55
End: 2020-01-13
Payer: MEDICARE

## 2020-01-13 VITALS
BODY MASS INDEX: 30.64 KG/M2 | HEIGHT: 63 IN | DIASTOLIC BLOOD PRESSURE: 88 MMHG | HEART RATE: 81 BPM | SYSTOLIC BLOOD PRESSURE: 147 MMHG | WEIGHT: 172.9 LBS

## 2020-01-13 DIAGNOSIS — F33.1 MODERATE RECURRENT MAJOR DEPRESSION (HCC): Primary | ICD-10-CM

## 2020-01-13 DIAGNOSIS — F41.1 GENERALIZED ANXIETY DISORDER: ICD-10-CM

## 2020-01-13 DIAGNOSIS — M25.551 RIGHT HIP PAIN: ICD-10-CM

## 2020-01-13 DIAGNOSIS — F41.0 PANIC ATTACKS: ICD-10-CM

## 2020-01-13 PROCEDURE — 99214 OFFICE O/P EST MOD 30 MIN: CPT | Performed by: PHYSICIAN ASSISTANT

## 2020-01-13 RX ORDER — PAROXETINE HYDROCHLORIDE 20 MG/1
20 TABLET, FILM COATED ORAL DAILY
Qty: 90 TABLET | Refills: 0 | Status: SHIPPED | OUTPATIENT
Start: 2020-01-13 | End: 2020-07-17 | Stop reason: SDUPTHER

## 2020-01-13 RX ORDER — CLONAZEPAM 0.5 MG/1
TABLET ORAL
Qty: 60 TABLET | Refills: 1 | Status: SHIPPED | OUTPATIENT
Start: 2020-01-13 | End: 2020-07-17 | Stop reason: SDUPTHER
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
## 2020-02-24 ENCOUNTER — APPOINTMENT (OUTPATIENT)
Dept: LAB | Facility: CLINIC | Age: 55
End: 2020-02-24
Payer: MEDICARE

## 2020-02-24 DIAGNOSIS — E11.9 CONTROLLED TYPE 2 DIABETES MELLITUS WITHOUT COMPLICATION, WITHOUT LONG-TERM CURRENT USE OF INSULIN (HCC): ICD-10-CM

## 2020-02-24 LAB
ALBUMIN SERPL BCP-MCNC: 3.1 G/DL (ref 3.5–5)
ALP SERPL-CCNC: 150 U/L (ref 46–116)
ALT SERPL W P-5'-P-CCNC: 19 U/L (ref 12–78)
ANION GAP SERPL CALCULATED.3IONS-SCNC: 7 MMOL/L (ref 4–13)
AST SERPL W P-5'-P-CCNC: 13 U/L (ref 5–45)
BILIRUB SERPL-MCNC: 0.36 MG/DL (ref 0.2–1)
BUN SERPL-MCNC: 10 MG/DL (ref 5–25)
CALCIUM SERPL-MCNC: 8.9 MG/DL (ref 8.3–10.1)
CHLORIDE SERPL-SCNC: 103 MMOL/L (ref 100–108)
CO2 SERPL-SCNC: 27 MMOL/L (ref 21–32)
CREAT SERPL-MCNC: 0.89 MG/DL (ref 0.6–1.3)
EST. AVERAGE GLUCOSE BLD GHB EST-MCNC: 157 MG/DL
GFR SERPL CREATININE-BSD FRML MDRD: 73 ML/MIN/1.73SQ M
GLUCOSE P FAST SERPL-MCNC: 159 MG/DL (ref 65–99)
HBA1C MFR BLD: 7.1 %
POTASSIUM SERPL-SCNC: 4.2 MMOL/L (ref 3.5–5.3)
PROT SERPL-MCNC: 7.1 G/DL (ref 6.4–8.2)
SODIUM SERPL-SCNC: 137 MMOL/L (ref 136–145)

## 2020-02-24 PROCEDURE — 83036 HEMOGLOBIN GLYCOSYLATED A1C: CPT

## 2020-02-24 PROCEDURE — 36415 COLL VENOUS BLD VENIPUNCTURE: CPT

## 2020-02-24 PROCEDURE — 80053 COMPREHEN METABOLIC PANEL: CPT

## 2020-02-27 ENCOUNTER — OFFICE VISIT (OUTPATIENT)
Dept: FAMILY MEDICINE CLINIC | Facility: CLINIC | Age: 55
End: 2020-02-27
Payer: MEDICARE

## 2020-02-27 VITALS
DIASTOLIC BLOOD PRESSURE: 88 MMHG | HEART RATE: 88 BPM | OXYGEN SATURATION: 98 % | RESPIRATION RATE: 18 BRPM | SYSTOLIC BLOOD PRESSURE: 140 MMHG | HEIGHT: 63 IN | BODY MASS INDEX: 30.55 KG/M2 | WEIGHT: 172.4 LBS

## 2020-02-27 DIAGNOSIS — Z23 NEED FOR VACCINATION: ICD-10-CM

## 2020-02-27 DIAGNOSIS — R80.9 MICROALBUMINURIA: ICD-10-CM

## 2020-02-27 DIAGNOSIS — I10 ESSENTIAL HYPERTENSION: ICD-10-CM

## 2020-02-27 DIAGNOSIS — E78.5 HYPERLIPIDEMIA, UNSPECIFIED HYPERLIPIDEMIA TYPE: ICD-10-CM

## 2020-02-27 DIAGNOSIS — M16.11 PRIMARY OSTEOARTHRITIS OF RIGHT HIP: ICD-10-CM

## 2020-02-27 DIAGNOSIS — E11.9 CONTROLLED TYPE 2 DIABETES MELLITUS WITHOUT COMPLICATION, WITHOUT LONG-TERM CURRENT USE OF INSULIN (HCC): Primary | ICD-10-CM

## 2020-02-27 DIAGNOSIS — E11.21 DIABETIC NEPHROPATHY ASSOCIATED WITH TYPE 2 DIABETES MELLITUS (HCC): ICD-10-CM

## 2020-02-27 PROBLEM — M54.16 LUMBAR RADICULOPATHY: Status: RESOLVED | Noted: 2017-06-06 | Resolved: 2020-02-27

## 2020-02-27 PROCEDURE — 3066F NEPHROPATHY DOC TX: CPT | Performed by: FAMILY MEDICINE

## 2020-02-27 PROCEDURE — 99214 OFFICE O/P EST MOD 30 MIN: CPT | Performed by: FAMILY MEDICINE

## 2020-02-27 PROCEDURE — 3079F DIAST BP 80-89 MM HG: CPT | Performed by: FAMILY MEDICINE

## 2020-02-27 PROCEDURE — 3008F BODY MASS INDEX DOCD: CPT | Performed by: FAMILY MEDICINE

## 2020-02-27 PROCEDURE — 3051F HG A1C>EQUAL 7.0%<8.0%: CPT | Performed by: FAMILY MEDICINE

## 2020-02-27 PROCEDURE — 3077F SYST BP >= 140 MM HG: CPT | Performed by: FAMILY MEDICINE

## 2020-02-27 PROCEDURE — 2022F DILAT RTA XM EVC RTNOPTHY: CPT | Performed by: FAMILY MEDICINE

## 2020-02-27 PROCEDURE — G0009 ADMIN PNEUMOCOCCAL VACCINE: HCPCS | Performed by: FAMILY MEDICINE

## 2020-02-27 PROCEDURE — 90732 PPSV23 VACC 2 YRS+ SUBQ/IM: CPT | Performed by: FAMILY MEDICINE

## 2020-02-27 NOTE — ASSESSMENT & PLAN NOTE
Patient uses a cane to walk    Is requesting a temporary disability placard since she is experiencing intermittent episodes of pain

## 2020-02-27 NOTE — PROGRESS NOTES
Subjective:      Patient ID: Dafne Fuchs is a 53 y o  female  Diabetes   She presents for her follow-up diabetic visit  She has type 2 diabetes mellitus  Disease course: A1c 7 1, trending up  There are no hypoglycemic associated symptoms  Pertinent negatives for hypoglycemia include no headaches  Pertinent negatives for diabetes include no chest pain, no fatigue, no foot paresthesias, no polydipsia, no polyphagia and no polyuria  There are no hypoglycemic complications  There are no diabetic complications  Risk factors for coronary artery disease include diabetes mellitus, dyslipidemia, hypertension, obesity and sedentary lifestyle  Current diabetic treatments: metformin 1000 milligram b i d  She is compliant with treatment all of the time  She is following a generally healthy diet  Meal planning includes avoidance of concentrated sweets  An ACE inhibitor/angiotensin II receptor blocker is being taken  She does not see a podiatrist Eye exam is current  Hypertension   This is a chronic problem  The current episode started more than 1 year ago  The problem is unchanged  The problem is uncontrolled  Pertinent negatives include no chest pain, headaches, palpitations, peripheral edema or shortness of breath  Risk factors for coronary artery disease include diabetes mellitus, dyslipidemia and post-menopausal state  Treatments tried: Lisinopril 20 milligram  The current treatment provides significant improvement  There are no compliance problems  Hyperlipidemia   This is a chronic problem  The current episode started more than 1 year ago  The problem is controlled  Recent lipid tests were reviewed and are normal  Pertinent negatives include no chest pain, myalgias or shortness of breath  Current antihyperlipidemic treatment includes statins  The current treatment provides moderate improvement of lipids  There are no compliance problems    Risk factors for coronary artery disease include diabetes mellitus, dyslipidemia, hypertension, obesity and post-menopausal    Patient is requesting a disability placard complaining of symptoms of right hip and low back pain  Apparently she had an arthroscopic labral debridement in 2017  Patient continues to experience diffuse pain in her right lip localized to the anterior lateral and posterior aspect as well as low back radiating to her thigh  She was thereafter referred for an intra-articular steroid injection  According to the note from her orthopedic specialist in 2018 her symptoms did not improved  She was advised a 2nd opinion by the specialist for any further treatment  Patient states that sometimes her symptoms flare up and she is unable to walk  Uses a cane to walk      Past Medical History:   Diagnosis Date    Diabetes mellitus ([facility] [address] )     GERD (gastroesophageal reflux disease)     Hypertension     Psychiatric disorder     bipolar    Right clavicle fracture        Family History   Problem Relation Age of Onset    Arthritis Family     Cancer Family     Osteoporosis Family     Cervical cancer Mother     Ovarian cancer Mother 35    Hypertension Father     Other Father         pre-diabetes    Diabetes type I Sister     Osteoporosis Sister     Depression Sister     Breast cancer Sister     Heart attack Brother          of an MI at 52y/o    Diabetes Brother     No Known Problems Maternal Grandmother     No Known Problems Maternal Grandfather     Other Paternal Grandmother         Parkinson's Disease    Heart attack Paternal Grandfather     Alcohol abuse Paternal Uncle     Seizures Other     Seizures Other     No Known Problems Daughter     No Known Problems Sister     No Known Problems Sister     No Known Problems Sister     No Known Problems Son        Past Surgical History:   Procedure Laterality Date    BLADDER SURGERY      Sling    OH HIP [address] BODY,PLASTY/RESECTN Right 2017    Procedure: RIGHT HIP ARTHROSCOPIC LABRAL DEBRIDEMENT;  Surgeon: Todd Harmon MD;  Location: [facility];  Service: Orthopedics    SINUS SURGERY      3 surgeries     TUBAL LIGATION          reports that she has been smoking  She has a 25 00 pack-year smoking history  She has never used smokeless tobacco  She reports that she drinks alcohol  She reports that she has current or past drug history  Drug: Marijuana        Current Outpatient Medications:     aspirin 325 mg tablet, Take 325 mg by mouth, Disp: , Rfl:     atorvastatin (LIPITOR) 20 mg tablet, Take 1 tablet (20 mg total) by mouth daily, Disp: 90 tablet, Rfl: 1    clonazePAM (KlonoPIN) 0 5 mg tablet, Take 1/2 tab by mouth twice daily and 1 full tab nightly, Disp: 60 tablet, Rfl: 1    glucose blood (ONE TOUCH ULTRA TEST) test strip, Check blood sugar once daily, Disp: 100 each, Rfl: 2    ibuprofen (MOTRIN) 600 mg tablet, Take 1 tablet (600 mg total) by mouth every 6 (six) hours as needed for mild pain (Patient taking differently: Take 200 mg by mouth every 6 (six) hours as needed for mild pain (only as needed) ), Disp: 30 tablet, Rfl: 0    Lancets (ONETOUCH ULTRASOFT) lancets, Check blood sugar twice/ week , Disp: 100 each, Rfl: 0    lisinopril (ZESTRIL) 20 mg tablet, Take 1 tablet (20 mg total) by mouth daily, Disp: 90 tablet, Rfl: 1    metFORMIN (GLUCOPHAGE) 1000 MG tablet, Take 1 tablet (1,000 mg total) by mouth 2 (two) times a day with meals, Disp: 180 tablet, Rfl: 1    omeprazole (PriLOSEC) 20 mg delayed release capsule, Take 1 capsule (20 mg total) by mouth daily, Disp: 90 capsule, Rfl: 1    PARoxetine (PAXIL) 20 mg tablet, Take 1 tablet (20 mg total) by mouth daily, Disp: 90 tablet, Rfl: 0    ARIPiprazole (ABILIFY) 2 mg tablet, Take 1 tablet (2 mg total) by mouth daily at bedtime, Disp: 90 tablet, Rfl: 0    The following portions of the patient's history were reviewed and updated as appropriate: allergies, current medications, past family history, past medical history, past social history, past surgical history and problem list     Review of Systems   Constitutional: Negative  Negative for fatigue  Eyes: Negative  Respiratory: Negative  Negative for shortness of breath  Cardiovascular: Negative  Negative for chest pain and palpitations  Gastrointestinal: Negative  Endocrine: Negative  Negative for polydipsia, polyphagia and polyuria  Genitourinary: Negative  Musculoskeletal: Negative  Negative for myalgias  Neurological: Negative  Negative for headaches  Psychiatric/Behavioral: Negative  Objective:    /88   Pulse 88   Resp 18   Ht 5' 3" (1 6 m)   Wt 78 2 kg (172 lb 6 4 oz)   SpO2 98%   BMI 30 54 kg/m²      Physical Exam   Constitutional: She is oriented to person, place, and time  She appears well-developed and well-nourished  HENT:   Mouth/Throat: Oropharynx is clear and moist    Eyes: Pupils are equal, round, and reactive to light  Neck: Normal range of motion  Cardiovascular: Normal rate, regular rhythm and normal heart sounds  Pulmonary/Chest: Effort normal and breath sounds normal    Abdominal: Soft  Bowel sounds are normal    Musculoskeletal: Normal range of motion  Uses cane to balance   Neurological: She is alert and oriented to person, place, and time     Psychiatric: Her behavior is normal  Judgment normal          Recent Results (from the past 1008 hour(s))   Comprehensive metabolic panel    Collection Time: 02/24/20  7:11 AM   Result Value Ref Range    Sodium 137 136 - 145 mmol/L    Potassium 4 2 3 5 - 5 3 mmol/L    Chloride 103 100 - 108 mmol/L    CO2 27 21 - 32 mmol/L    ANION GAP 7 4 - 13 mmol/L    BUN 10 5 - 25 mg/dL    Creatinine 0 89 0 60 - 1 30 mg/dL    Glucose, Fasting 159 (H) 65 - 99 mg/dL    Calcium 8 9 8 3 - 10 1 mg/dL    AST 13 5 - 45 U/L    ALT 19 12 - 78 U/L    Alkaline Phosphatase 150 (H) 46 - 116 U/L    Total Protein 7 1 6 4 - 8 2 g/dL    Albumin 3 1 (L) 3 5 - 5 0 g/dL    Total Bilirubin 0 36 0 20 - 1 00 mg/dL    eGFR 73 ml/min/1 73sq m   Hemoglobin A1C    Collection Time: 02/24/20  7:11 AM   Result Value Ref Range    Hemoglobin A1C 7 1 (H) Normal 3 8-5 6%; PreDiabetic 5 7-6 4%; Diabetic >=6 5%; Glycemic control for adults with diabetes <7 0% %     mg/dl       Assessment/Plan:             Problem List Items Addressed This Visit        Endocrine    Controlled type 2 diabetes mellitus without complication, without long-term current use of insulin ([facility] ) - Primary       Lab Results   Component Value Date    HGBA1C 7 1 (H) 02/24/2020     Continue metformin 1000 milligram b i d   Patient advised to reduce intake carbs in diet specially during dinner since fasting blood sugar elevated  Will repeat A1c after 3 months follow up thereafter  Relevant Orders    Comprehensive metabolic panel    Hemoglobin A1C    Diabetic nephropathy associated with type 2 diabetes mellitus ([facility] )       Cardiovascular and Mediastinum    Hypertension     Patient's blood pressure is borderline high  Suggested to increase lisinopril to 40 milligram   Patient will take 2 of the 20 milligram tablets  Will follow up after 2 weeks to recheck blood pressure  Musculoskeletal and Integument    Primary osteoarthritis of right hip     Patient uses a cane to walk  Is requesting a temporary disability placard since she is experiencing intermittent episodes of pain            Other    Hyperlipemia     Continue statin  Metabolic labs at 3 months interval          Relevant Orders    Lipid panel    Microalbuminuria     Continue lisinopril           Relevant Orders    Microalbumin / creatinine urine ratio    Need for vaccination    Relevant Orders    PNEUMOCOCCAL POLYSACCHARIDE VACCINE 23-VALENT =>1YO SQ IM (Completed)

## 2020-02-27 NOTE — ASSESSMENT & PLAN NOTE
Lab Results   Component Value Date    HGBA1C 7 1 (H) 02/24/2020     Continue metformin 1000 milligram b i d   Patient advised to reduce intake carbs in diet specially during dinner since fasting blood sugar elevated  Will repeat A1c after 3 months follow up thereafter

## 2020-02-27 NOTE — ASSESSMENT & PLAN NOTE
Patient's blood pressure is borderline high  Suggested to increase lisinopril to 40 milligram   Patient will take 2 of the 20 milligram tablets  Will follow up after 2 weeks to recheck blood pressure

## 2020-03-12 ENCOUNTER — OFFICE VISIT (OUTPATIENT)
Dept: FAMILY MEDICINE CLINIC | Facility: CLINIC | Age: 55
End: 2020-03-12
Payer: MEDICARE

## 2020-03-12 VITALS
OXYGEN SATURATION: 97 % | BODY MASS INDEX: 31.01 KG/M2 | HEART RATE: 93 BPM | SYSTOLIC BLOOD PRESSURE: 120 MMHG | DIASTOLIC BLOOD PRESSURE: 70 MMHG | HEIGHT: 63 IN | WEIGHT: 175 LBS

## 2020-03-12 DIAGNOSIS — F17.200 SMOKING: ICD-10-CM

## 2020-03-12 DIAGNOSIS — I10 ESSENTIAL HYPERTENSION: Primary | ICD-10-CM

## 2020-03-12 PROBLEM — IMO0001 SMOKING: Status: ACTIVE | Noted: 2020-03-12

## 2020-03-12 PROCEDURE — 3074F SYST BP LT 130 MM HG: CPT | Performed by: FAMILY MEDICINE

## 2020-03-12 PROCEDURE — 3051F HG A1C>EQUAL 7.0%<8.0%: CPT | Performed by: FAMILY MEDICINE

## 2020-03-12 PROCEDURE — 3078F DIAST BP <80 MM HG: CPT | Performed by: FAMILY MEDICINE

## 2020-03-12 PROCEDURE — 3008F BODY MASS INDEX DOCD: CPT | Performed by: FAMILY MEDICINE

## 2020-03-12 PROCEDURE — 2022F DILAT RTA XM EVC RTNOPTHY: CPT | Performed by: FAMILY MEDICINE

## 2020-03-12 PROCEDURE — 3066F NEPHROPATHY DOC TX: CPT | Performed by: FAMILY MEDICINE

## 2020-03-12 PROCEDURE — 4010F ACE/ARB THERAPY RXD/TAKEN: CPT | Performed by: FAMILY MEDICINE

## 2020-03-12 PROCEDURE — 99213 OFFICE O/P EST LOW 20 MIN: CPT | Performed by: FAMILY MEDICINE

## 2020-03-12 RX ORDER — VARENICLINE TARTRATE 25 MG
KIT ORAL
Qty: 53 TABLET | Refills: 0 | Status: SHIPPED | OUTPATIENT
Start: 2020-03-12 | End: 2020-09-21 | Stop reason: SDUPTHER

## 2020-03-12 RX ORDER — LISINOPRIL 40 MG/1
40 TABLET ORAL DAILY
Qty: 90 TABLET | Refills: 1 | Status: SHIPPED | OUTPATIENT
Start: 2020-03-12 | End: 2020-06-04 | Stop reason: SDUPTHER

## 2020-03-12 NOTE — ASSESSMENT & PLAN NOTE
Blood pressure is improved increasing lisinopril 40 milligram   Continue same  Is due for metabolic labs and follow-up after 3 months

## 2020-03-12 NOTE — PROGRESS NOTES
Subjective:      Patient ID: Godfrey Mejia is a 53 y o  female  Hypertension   This is a chronic problem  The current episode started more than 1 year ago  The problem is controlled  Pertinent negatives include no blurred vision, chest pain, headaches, orthopnea, palpitations, peripheral edema or shortness of breath  Risk factors for coronary artery disease include dyslipidemia, diabetes mellitus, post-menopausal state and obesity  Treatments tried: lisinopril 40 mg daily  The current treatment provides significant improvement  There are no compliance problems          Past Medical History:   Diagnosis Date    Diabetes mellitus ([address] )     GERD (gastroesophageal reflux disease)     Hypertension     Psychiatric disorder     bipolar    Right clavicle fracture        Family History   Problem Relation Age of Onset    Arthritis Family     Cancer Family     Osteoporosis Family     Cervical cancer Mother     Ovarian cancer Mother 35    Hypertension Father     Other Father         pre-diabetes    Diabetes type I Sister     Osteoporosis Sister     Depression Sister     Breast cancer Sister     Heart attack Brother          of an MI at 50y/o    Diabetes Brother     No Known Problems Maternal Grandmother     No Known Problems Maternal Grandfather     Other Paternal Grandmother         Parkinson's Disease    Heart attack Paternal Grandfather     Alcohol abuse Paternal Uncle     Seizures Other     Seizures Other     No Known Problems Daughter     No Known Problems Sister     No Known Problems Sister     No Known Problems Sister     No Known Problems Son        Past Surgical History:   Procedure Laterality Date    BLADDER SURGERY      Sling    GA HIP [address] BODY,PLASTY/RESECTN Right 2017    Procedure: RIGHT HIP ARTHROSCOPIC LABRAL DEBRIDEMENT;  Surgeon: Rudolph Lee MD;  [facility]: [facility];  Service: Orthopedics    SINUS SURGERY      3 surgeries     TUBAL LIGATION reports that she has been smoking  She has a 25 00 pack-year smoking history  She has never used smokeless tobacco  She reports that she drinks alcohol  She reports that she has current or past drug history  Drug: Marijuana  Current Outpatient Medications:     aspirin 325 mg tablet, Take 325 mg by mouth, Disp: , Rfl:     atorvastatin (LIPITOR) 20 mg tablet, Take 1 tablet (20 mg total) by mouth daily, Disp: 90 tablet, Rfl: 1    clonazePAM (KlonoPIN) 0 5 mg tablet, Take 1/2 tab by mouth twice daily and 1 full tab nightly, Disp: 60 tablet, Rfl: 1    glucose blood (ONE TOUCH ULTRA TEST) test strip, Check blood sugar once daily, Disp: 100 each, Rfl: 2    ibuprofen (MOTRIN) 600 mg tablet, Take 1 tablet (600 mg total) by mouth every 6 (six) hours as needed for mild pain (Patient taking differently: Take 200 mg by mouth every 6 (six) hours as needed for mild pain (only as needed) ), Disp: 30 tablet, Rfl: 0    Lancets (ONETOUCH ULTRASOFT) lancets, Check blood sugar twice/ week , Disp: 100 each, Rfl: 0    metFORMIN (GLUCOPHAGE) 1000 MG tablet, Take 1 tablet (1,000 mg total) by mouth 2 (two) times a day with meals, Disp: 180 tablet, Rfl: 1    omeprazole (PriLOSEC) 20 mg delayed release capsule, Take 1 capsule (20 mg total) by mouth daily, Disp: 90 capsule, Rfl: 1    PARoxetine (PAXIL) 20 mg tablet, Take 1 tablet (20 mg total) by mouth daily, Disp: 90 tablet, Rfl: 0    ARIPiprazole (ABILIFY) 2 mg tablet, Take 1 tablet (2 mg total) by mouth daily at bedtime, Disp: 90 tablet, Rfl: 0    lisinopril (ZESTRIL) 40 mg tablet, Take 1 tablet (40 mg total) by mouth daily, Disp: 90 tablet, Rfl: 1    The following portions of the patient's history were reviewed and updated as appropriate: allergies, current medications, past family history, past medical history, past social history, past surgical history and problem list     Review of Systems   Constitutional: Negative  Eyes: Negative  Negative for blurred vision  Respiratory: Negative  Negative for shortness of breath  Cardiovascular: Negative  Negative for chest pain, palpitations and orthopnea  Gastrointestinal: Negative  Endocrine: Negative  Genitourinary: Negative  Musculoskeletal: Negative  Negative for myalgias  Neurological: Negative  Negative for headaches  Psychiatric/Behavioral: Negative  Objective:    /70   Pulse 93   Ht 5' 3" (1 6 m)   Wt 79 4 kg (175 lb)   SpO2 97%   BMI 31 00 kg/m²      Physical Exam   Constitutional: She is oriented to person, place, and time  She appears well-developed and well-nourished  HENT:   Mouth/Throat: Oropharynx is clear and moist    Eyes: Pupils are equal, round, and reactive to light  Neck: Normal range of motion  Cardiovascular: Normal rate, regular rhythm and normal heart sounds  Pulmonary/Chest: Effort normal and breath sounds normal    Abdominal: Soft  Bowel sounds are normal    Musculoskeletal: Normal range of motion  Neurological: She is alert and oriented to person, place, and time  Psychiatric: Her behavior is normal  Judgment normal          Recent Results (from the past 1008 hour(s))   Comprehensive metabolic panel    Collection Time: 02/24/20  7:11 AM   Result Value Ref Range    Sodium 137 136 - 145 mmol/L    Potassium 4 2 3 5 - 5 3 mmol/L    Chloride 103 100 - 108 mmol/L    CO2 27 21 - 32 mmol/L    ANION GAP 7 4 - 13 mmol/L    BUN 10 5 - 25 mg/dL    Creatinine 0 89 0 60 - 1 30 mg/dL    Glucose, Fasting 159 (H) 65 - 99 mg/dL    Calcium 8 9 8 3 - 10 1 mg/dL    AST 13 5 - 45 U/L    ALT 19 12 - 78 U/L    Alkaline Phosphatase 150 (H) 46 - 116 U/L    Total Protein 7 1 6 4 - 8 2 g/dL    Albumin 3 1 (L) 3 5 - 5 0 g/dL    Total Bilirubin 0 36 0 20 - 1 00 mg/dL    eGFR 73 ml/min/1 73sq m   Hemoglobin A1C    Collection Time: 02/24/20  7:11 AM   Result Value Ref Range    Hemoglobin A1C 7 1 (H) Normal 3 8-5 6%; PreDiabetic 5 7-6 4%;  Diabetic >=6 5%; Glycemic control for adults with diabetes <7 0% %     mg/dl       Assessment/Plan:    No problem-specific Assessment & Plan notes found for this encounter  Problem List Items Addressed This Visit        Cardiovascular and Mediastinum    Hypertension - Primary     Blood pressure is improved increasing lisinopril 40 milligram   Continue same  Is due for metabolic labs and follow-up after 3 months           Relevant Medications    lisinopril (ZESTRIL) 40 mg tablet       Other    Smoking    Relevant Medications    varenicline (Chantix Starting Month Pak) 0 5 MG X 11 & 1 MG X 42 tablet

## 2020-05-27 ENCOUNTER — APPOINTMENT (OUTPATIENT)
Dept: LAB | Facility: CLINIC | Age: 55
End: 2020-05-27
Payer: MEDICARE

## 2020-05-27 DIAGNOSIS — E78.5 HYPERLIPIDEMIA, UNSPECIFIED HYPERLIPIDEMIA TYPE: ICD-10-CM

## 2020-05-27 DIAGNOSIS — E11.9 CONTROLLED TYPE 2 DIABETES MELLITUS WITHOUT COMPLICATION, WITHOUT LONG-TERM CURRENT USE OF INSULIN (HCC): ICD-10-CM

## 2020-05-27 LAB
ALBUMIN SERPL BCP-MCNC: 3.2 G/DL (ref 3.5–5)
ALP SERPL-CCNC: 135 U/L (ref 46–116)
ALT SERPL W P-5'-P-CCNC: 20 U/L (ref 12–78)
ANION GAP SERPL CALCULATED.3IONS-SCNC: 5 MMOL/L (ref 4–13)
AST SERPL W P-5'-P-CCNC: 19 U/L (ref 5–45)
BILIRUB SERPL-MCNC: 0.4 MG/DL (ref 0.2–1)
BUN SERPL-MCNC: 7 MG/DL (ref 5–25)
CALCIUM SERPL-MCNC: 8.6 MG/DL (ref 8.3–10.1)
CHLORIDE SERPL-SCNC: 102 MMOL/L (ref 100–108)
CHOLEST SERPL-MCNC: 187 MG/DL (ref 50–200)
CO2 SERPL-SCNC: 29 MMOL/L (ref 21–32)
CREAT SERPL-MCNC: 0.78 MG/DL (ref 0.6–1.3)
CREAT UR-MCNC: 49.7 MG/DL
EST. AVERAGE GLUCOSE BLD GHB EST-MCNC: 157 MG/DL
GFR SERPL CREATININE-BSD FRML MDRD: 85 ML/MIN/1.73SQ M
GLUCOSE P FAST SERPL-MCNC: 136 MG/DL (ref 65–99)
HBA1C MFR BLD: 7.1 %
HDLC SERPL-MCNC: 51 MG/DL
LDLC SERPL CALC-MCNC: 112 MG/DL (ref 0–100)
MICROALBUMIN UR-MCNC: <5 MG/L (ref 0–20)
MICROALBUMIN/CREAT 24H UR: <10 MG/G CREATININE (ref 0–30)
NONHDLC SERPL-MCNC: 136 MG/DL
POTASSIUM SERPL-SCNC: 4.1 MMOL/L (ref 3.5–5.3)
PROT SERPL-MCNC: 7.1 G/DL (ref 6.4–8.2)
SODIUM SERPL-SCNC: 136 MMOL/L (ref 136–145)
TRIGL SERPL-MCNC: 122 MG/DL

## 2020-05-27 PROCEDURE — 36415 COLL VENOUS BLD VENIPUNCTURE: CPT

## 2020-05-27 PROCEDURE — 82043 UR ALBUMIN QUANTITATIVE: CPT | Performed by: FAMILY MEDICINE

## 2020-05-27 PROCEDURE — 80053 COMPREHEN METABOLIC PANEL: CPT

## 2020-05-27 PROCEDURE — 83036 HEMOGLOBIN GLYCOSYLATED A1C: CPT

## 2020-05-27 PROCEDURE — 80061 LIPID PANEL: CPT

## 2020-05-27 PROCEDURE — 82570 ASSAY OF URINE CREATININE: CPT | Performed by: FAMILY MEDICINE

## 2020-05-28 ENCOUNTER — OFFICE VISIT (OUTPATIENT)
Dept: OBGYN CLINIC | Facility: CLINIC | Age: 55
End: 2020-05-28
Payer: MEDICARE

## 2020-05-28 ENCOUNTER — APPOINTMENT (OUTPATIENT)
Dept: RADIOLOGY | Facility: AMBULARY SURGERY CENTER | Age: 55
End: 2020-05-28
Attending: ORTHOPAEDIC SURGERY
Payer: MEDICARE

## 2020-05-28 VITALS
SYSTOLIC BLOOD PRESSURE: 163 MMHG | HEART RATE: 87 BPM | DIASTOLIC BLOOD PRESSURE: 107 MMHG | BODY MASS INDEX: 31 KG/M2 | HEIGHT: 63 IN

## 2020-05-28 DIAGNOSIS — M79.671 HEEL PAIN, CHRONIC, RIGHT: ICD-10-CM

## 2020-05-28 DIAGNOSIS — M79.671 HEEL PAIN, CHRONIC, RIGHT: Primary | ICD-10-CM

## 2020-05-28 DIAGNOSIS — G89.29 HEEL PAIN, CHRONIC, RIGHT: ICD-10-CM

## 2020-05-28 DIAGNOSIS — G89.29 HEEL PAIN, CHRONIC, RIGHT: Primary | ICD-10-CM

## 2020-05-28 DIAGNOSIS — M72.2 PLANTAR FASCIITIS OF RIGHT FOOT: ICD-10-CM

## 2020-05-28 PROCEDURE — 2022F DILAT RTA XM EVC RTNOPTHY: CPT | Performed by: ORTHOPAEDIC SURGERY

## 2020-05-28 PROCEDURE — 3066F NEPHROPATHY DOC TX: CPT | Performed by: ORTHOPAEDIC SURGERY

## 2020-05-28 PROCEDURE — 3051F HG A1C>EQUAL 7.0%<8.0%: CPT | Performed by: ORTHOPAEDIC SURGERY

## 2020-05-28 PROCEDURE — 99214 OFFICE O/P EST MOD 30 MIN: CPT | Performed by: ORTHOPAEDIC SURGERY

## 2020-05-28 PROCEDURE — 3077F SYST BP >= 140 MM HG: CPT | Performed by: ORTHOPAEDIC SURGERY

## 2020-05-28 PROCEDURE — 3080F DIAST BP >= 90 MM HG: CPT | Performed by: ORTHOPAEDIC SURGERY

## 2020-05-28 PROCEDURE — 73630 X-RAY EXAM OF FOOT: CPT

## 2020-06-01 ENCOUNTER — OFFICE VISIT (OUTPATIENT)
Dept: FAMILY MEDICINE CLINIC | Facility: CLINIC | Age: 55
End: 2020-06-01
Payer: MEDICARE

## 2020-06-01 ENCOUNTER — TELEPHONE (OUTPATIENT)
Dept: FAMILY MEDICINE CLINIC | Facility: CLINIC | Age: 55
End: 2020-06-01

## 2020-06-01 VITALS — TEMPERATURE: 98.6 F

## 2020-06-01 DIAGNOSIS — Z20.828 EXPOSURE TO SARS-ASSOCIATED CORONAVIRUS: ICD-10-CM

## 2020-06-01 DIAGNOSIS — R19.7 DIARRHEA, UNSPECIFIED TYPE: Primary | ICD-10-CM

## 2020-06-01 PROCEDURE — 3051F HG A1C>EQUAL 7.0%<8.0%: CPT | Performed by: FAMILY MEDICINE

## 2020-06-01 PROCEDURE — 2022F DILAT RTA XM EVC RTNOPTHY: CPT | Performed by: FAMILY MEDICINE

## 2020-06-01 PROCEDURE — U0003 INFECTIOUS AGENT DETECTION BY NUCLEIC ACID (DNA OR RNA); SEVERE ACUTE RESPIRATORY SYNDROME CORONAVIRUS 2 (SARS-COV-2) (CORONAVIRUS DISEASE [COVID-19]), AMPLIFIED PROBE TECHNIQUE, MAKING USE OF HIGH THROUGHPUT TECHNOLOGIES AS DESCRIBED BY CMS-2020-01-R: HCPCS

## 2020-06-01 PROCEDURE — 99214 OFFICE O/P EST MOD 30 MIN: CPT | Performed by: FAMILY MEDICINE

## 2020-06-01 PROCEDURE — 3077F SYST BP >= 140 MM HG: CPT | Performed by: FAMILY MEDICINE

## 2020-06-01 PROCEDURE — 3080F DIAST BP >= 90 MM HG: CPT | Performed by: FAMILY MEDICINE

## 2020-06-01 PROCEDURE — 3066F NEPHROPATHY DOC TX: CPT | Performed by: FAMILY MEDICINE

## 2020-06-03 ENCOUNTER — TELEPHONE (OUTPATIENT)
Dept: FAMILY MEDICINE CLINIC | Facility: CLINIC | Age: 55
End: 2020-06-03

## 2020-06-03 LAB — SARS-COV-2 RNA SPEC QL NAA+PROBE: NOT DETECTED

## 2020-06-04 ENCOUNTER — OFFICE VISIT (OUTPATIENT)
Dept: FAMILY MEDICINE CLINIC | Facility: CLINIC | Age: 55
End: 2020-06-04
Payer: MEDICARE

## 2020-06-04 VITALS
OXYGEN SATURATION: 95 % | DIASTOLIC BLOOD PRESSURE: 100 MMHG | HEART RATE: 82 BPM | BODY MASS INDEX: 30.48 KG/M2 | SYSTOLIC BLOOD PRESSURE: 164 MMHG | TEMPERATURE: 98.5 F | HEIGHT: 63 IN | WEIGHT: 172 LBS

## 2020-06-04 DIAGNOSIS — I10 ESSENTIAL HYPERTENSION: ICD-10-CM

## 2020-06-04 DIAGNOSIS — R80.9 MICROALBUMINURIA: ICD-10-CM

## 2020-06-04 DIAGNOSIS — E11.9 TYPE 2 DIABETES MELLITUS WITHOUT COMPLICATION, WITHOUT LONG-TERM CURRENT USE OF INSULIN (HCC): ICD-10-CM

## 2020-06-04 DIAGNOSIS — E78.5 HYPERLIPIDEMIA, UNSPECIFIED HYPERLIPIDEMIA TYPE: ICD-10-CM

## 2020-06-04 DIAGNOSIS — K21.9 CHRONIC GERD: ICD-10-CM

## 2020-06-04 DIAGNOSIS — E11.21 DIABETIC NEPHROPATHY ASSOCIATED WITH TYPE 2 DIABETES MELLITUS (HCC): ICD-10-CM

## 2020-06-04 DIAGNOSIS — E11.9 CONTROLLED TYPE 2 DIABETES MELLITUS WITHOUT COMPLICATION, WITHOUT LONG-TERM CURRENT USE OF INSULIN (HCC): Primary | ICD-10-CM

## 2020-06-04 PROCEDURE — 3051F HG A1C>EQUAL 7.0%<8.0%: CPT | Performed by: FAMILY MEDICINE

## 2020-06-04 PROCEDURE — 3008F BODY MASS INDEX DOCD: CPT | Performed by: FAMILY MEDICINE

## 2020-06-04 PROCEDURE — 3077F SYST BP >= 140 MM HG: CPT | Performed by: FAMILY MEDICINE

## 2020-06-04 PROCEDURE — 2022F DILAT RTA XM EVC RTNOPTHY: CPT | Performed by: FAMILY MEDICINE

## 2020-06-04 PROCEDURE — 3080F DIAST BP >= 90 MM HG: CPT | Performed by: FAMILY MEDICINE

## 2020-06-04 PROCEDURE — 4010F ACE/ARB THERAPY RXD/TAKEN: CPT | Performed by: FAMILY MEDICINE

## 2020-06-04 PROCEDURE — 3066F NEPHROPATHY DOC TX: CPT | Performed by: FAMILY MEDICINE

## 2020-06-04 PROCEDURE — 99214 OFFICE O/P EST MOD 30 MIN: CPT | Performed by: FAMILY MEDICINE

## 2020-06-04 RX ORDER — GLIPIZIDE 2.5 MG/1
TABLET, EXTENDED RELEASE ORAL
Qty: 90 TABLET | Refills: 0 | Status: SHIPPED | OUTPATIENT
Start: 2020-06-04 | End: 2020-10-06

## 2020-06-04 RX ORDER — ATORVASTATIN CALCIUM 20 MG/1
20 TABLET, FILM COATED ORAL DAILY
Qty: 90 TABLET | Refills: 1 | Status: SHIPPED | OUTPATIENT
Start: 2020-06-04 | End: 2020-11-16 | Stop reason: SDUPTHER

## 2020-06-04 RX ORDER — OMEPRAZOLE 20 MG/1
20 CAPSULE, DELAYED RELEASE ORAL DAILY
Qty: 90 CAPSULE | Refills: 1 | Status: SHIPPED | OUTPATIENT
Start: 2020-06-04 | End: 2020-11-16 | Stop reason: SDUPTHER

## 2020-06-04 RX ORDER — LISINOPRIL 40 MG/1
40 TABLET ORAL DAILY
Qty: 90 TABLET | Refills: 1 | Status: SHIPPED | OUTPATIENT
Start: 2020-06-04 | End: 2020-11-16 | Stop reason: SDUPTHER

## 2020-06-04 RX ORDER — HYDROCHLOROTHIAZIDE 12.5 MG/1
12.5 CAPSULE, GELATIN COATED ORAL DAILY
Qty: 30 CAPSULE | Refills: 0 | Status: SHIPPED | OUTPATIENT
Start: 2020-06-04 | End: 2020-06-30

## 2020-06-08 ENCOUNTER — DOCUMENTATION (OUTPATIENT)
Dept: PSYCHIATRY | Facility: CLINIC | Age: 55
End: 2020-06-08

## 2020-06-15 ENCOUNTER — APPOINTMENT (OUTPATIENT)
Dept: LAB | Facility: CLINIC | Age: 55
End: 2020-06-15
Payer: MEDICARE

## 2020-06-15 DIAGNOSIS — E11.9 CONTROLLED TYPE 2 DIABETES MELLITUS WITHOUT COMPLICATION, WITHOUT LONG-TERM CURRENT USE OF INSULIN (HCC): ICD-10-CM

## 2020-06-15 DIAGNOSIS — I10 ESSENTIAL HYPERTENSION: ICD-10-CM

## 2020-06-15 LAB
ALBUMIN SERPL BCP-MCNC: 3.5 G/DL (ref 3.5–5)
ALP SERPL-CCNC: 157 U/L (ref 46–116)
ALT SERPL W P-5'-P-CCNC: 24 U/L (ref 12–78)
ANION GAP SERPL CALCULATED.3IONS-SCNC: 8 MMOL/L (ref 4–13)
AST SERPL W P-5'-P-CCNC: 20 U/L (ref 5–45)
BILIRUB SERPL-MCNC: 0.38 MG/DL (ref 0.2–1)
BUN SERPL-MCNC: 10 MG/DL (ref 5–25)
CALCIUM SERPL-MCNC: 9.5 MG/DL (ref 8.3–10.1)
CHLORIDE SERPL-SCNC: 90 MMOL/L (ref 100–108)
CO2 SERPL-SCNC: 28 MMOL/L (ref 21–32)
CREAT SERPL-MCNC: 0.98 MG/DL (ref 0.6–1.3)
EST. AVERAGE GLUCOSE BLD GHB EST-MCNC: 157 MG/DL
GFR SERPL CREATININE-BSD FRML MDRD: 65 ML/MIN/1.73SQ M
GLUCOSE P FAST SERPL-MCNC: 101 MG/DL (ref 65–99)
HBA1C MFR BLD: 7.1 %
POTASSIUM SERPL-SCNC: 4.9 MMOL/L (ref 3.5–5.3)
PROT SERPL-MCNC: 7.8 G/DL (ref 6.4–8.2)
SODIUM SERPL-SCNC: 126 MMOL/L (ref 136–145)

## 2020-06-15 PROCEDURE — 80053 COMPREHEN METABOLIC PANEL: CPT

## 2020-06-15 PROCEDURE — 36415 COLL VENOUS BLD VENIPUNCTURE: CPT

## 2020-06-15 PROCEDURE — 83036 HEMOGLOBIN GLYCOSYLATED A1C: CPT

## 2020-06-16 ENCOUNTER — OFFICE VISIT (OUTPATIENT)
Dept: FAMILY MEDICINE CLINIC | Facility: CLINIC | Age: 55
End: 2020-06-16
Payer: MEDICARE

## 2020-06-16 ENCOUNTER — TELEPHONE (OUTPATIENT)
Dept: FAMILY MEDICINE CLINIC | Facility: CLINIC | Age: 55
End: 2020-06-16

## 2020-06-16 VITALS
OXYGEN SATURATION: 98 % | TEMPERATURE: 98 F | BODY MASS INDEX: 30.3 KG/M2 | HEART RATE: 88 BPM | HEIGHT: 63 IN | SYSTOLIC BLOOD PRESSURE: 122 MMHG | WEIGHT: 171 LBS | DIASTOLIC BLOOD PRESSURE: 72 MMHG

## 2020-06-16 DIAGNOSIS — I10 ESSENTIAL HYPERTENSION: ICD-10-CM

## 2020-06-16 DIAGNOSIS — E87.1 HYPONATREMIA: ICD-10-CM

## 2020-06-16 DIAGNOSIS — E11.9 CONTROLLED TYPE 2 DIABETES MELLITUS WITHOUT COMPLICATION, WITHOUT LONG-TERM CURRENT USE OF INSULIN (HCC): Primary | ICD-10-CM

## 2020-06-16 DIAGNOSIS — E11.21 DIABETIC NEPHROPATHY ASSOCIATED WITH TYPE 2 DIABETES MELLITUS (HCC): ICD-10-CM

## 2020-06-16 PROCEDURE — 3078F DIAST BP <80 MM HG: CPT | Performed by: FAMILY MEDICINE

## 2020-06-16 PROCEDURE — 3008F BODY MASS INDEX DOCD: CPT | Performed by: FAMILY MEDICINE

## 2020-06-16 PROCEDURE — 3066F NEPHROPATHY DOC TX: CPT | Performed by: FAMILY MEDICINE

## 2020-06-16 PROCEDURE — 3074F SYST BP LT 130 MM HG: CPT | Performed by: FAMILY MEDICINE

## 2020-06-16 PROCEDURE — 3051F HG A1C>EQUAL 7.0%<8.0%: CPT | Performed by: FAMILY MEDICINE

## 2020-06-16 PROCEDURE — 2022F DILAT RTA XM EVC RTNOPTHY: CPT | Performed by: FAMILY MEDICINE

## 2020-06-16 PROCEDURE — 99213 OFFICE O/P EST LOW 20 MIN: CPT | Performed by: FAMILY MEDICINE

## 2020-06-24 DIAGNOSIS — I10 ESSENTIAL HYPERTENSION: ICD-10-CM

## 2020-06-24 RX ORDER — HYDROCHLOROTHIAZIDE 12.5 MG/1
CAPSULE, GELATIN COATED ORAL
Qty: 30 CAPSULE | Refills: 0 | OUTPATIENT
Start: 2020-06-24

## 2020-06-30 ENCOUNTER — APPOINTMENT (OUTPATIENT)
Dept: LAB | Facility: CLINIC | Age: 55
End: 2020-06-30
Payer: MEDICARE

## 2020-06-30 ENCOUNTER — TRANSCRIBE ORDERS (OUTPATIENT)
Dept: LAB | Facility: CLINIC | Age: 55
End: 2020-06-30

## 2020-06-30 DIAGNOSIS — E87.1 HYPONATREMIA: Primary | ICD-10-CM

## 2020-06-30 DIAGNOSIS — E87.1 HYPONATREMIA: ICD-10-CM

## 2020-06-30 DIAGNOSIS — I10 ESSENTIAL HYPERTENSION: ICD-10-CM

## 2020-06-30 LAB
ALBUMIN SERPL BCP-MCNC: 3.4 G/DL (ref 3.5–5)
ALP SERPL-CCNC: 181 U/L (ref 46–116)
ALT SERPL W P-5'-P-CCNC: 24 U/L (ref 12–78)
ANION GAP SERPL CALCULATED.3IONS-SCNC: 7 MMOL/L (ref 4–13)
AST SERPL W P-5'-P-CCNC: 26 U/L (ref 5–45)
BILIRUB SERPL-MCNC: 0.46 MG/DL (ref 0.2–1)
BUN SERPL-MCNC: 11 MG/DL (ref 5–25)
CALCIUM SERPL-MCNC: 8.9 MG/DL (ref 8.3–10.1)
CHLORIDE SERPL-SCNC: 79 MMOL/L (ref 100–108)
CO2 SERPL-SCNC: 26 MMOL/L (ref 21–32)
CREAT SERPL-MCNC: 1.09 MG/DL (ref 0.6–1.3)
GFR SERPL CREATININE-BSD FRML MDRD: 57 ML/MIN/1.73SQ M
GLUCOSE P FAST SERPL-MCNC: 114 MG/DL (ref 65–99)
POTASSIUM SERPL-SCNC: 5.8 MMOL/L (ref 3.5–5.3)
PROT SERPL-MCNC: 7.5 G/DL (ref 6.4–8.2)
SODIUM SERPL-SCNC: 112 MMOL/L (ref 136–145)

## 2020-06-30 PROCEDURE — 80053 COMPREHEN METABOLIC PANEL: CPT

## 2020-06-30 PROCEDURE — 36415 COLL VENOUS BLD VENIPUNCTURE: CPT

## 2020-06-30 RX ORDER — AMLODIPINE BESYLATE 5 MG/1
5 TABLET ORAL DAILY
Qty: 30 TABLET | Refills: 0 | Status: SHIPPED | OUTPATIENT
Start: 2020-06-30 | End: 2020-07-15

## 2020-07-06 ENCOUNTER — TELEPHONE (OUTPATIENT)
Dept: FAMILY MEDICINE CLINIC | Facility: CLINIC | Age: 55
End: 2020-07-06

## 2020-07-06 NOTE — TELEPHONE ENCOUNTER
Adkins Sam has been taking her bp since last visit  States the the reading have been ranging around 104/63 and 116/63  She was asked to call Dr Tala Rodríguez and let her know

## 2020-07-08 ENCOUNTER — APPOINTMENT (OUTPATIENT)
Dept: LAB | Facility: CLINIC | Age: 55
End: 2020-07-08
Payer: MEDICARE

## 2020-07-08 DIAGNOSIS — E87.1 HYPONATREMIA: Primary | ICD-10-CM

## 2020-07-08 DIAGNOSIS — E87.1 HYPONATREMIA: ICD-10-CM

## 2020-07-08 LAB
ALBUMIN SERPL BCP-MCNC: 3.3 G/DL (ref 3.5–5)
ALP SERPL-CCNC: 166 U/L (ref 46–116)
ALT SERPL W P-5'-P-CCNC: 25 U/L (ref 12–78)
ANION GAP SERPL CALCULATED.3IONS-SCNC: 3 MMOL/L (ref 4–13)
AST SERPL W P-5'-P-CCNC: 19 U/L (ref 5–45)
BILIRUB SERPL-MCNC: 0.36 MG/DL (ref 0.2–1)
BUN SERPL-MCNC: 8 MG/DL (ref 5–25)
CALCIUM SERPL-MCNC: 9.3 MG/DL (ref 8.3–10.1)
CHLORIDE SERPL-SCNC: 99 MMOL/L (ref 100–108)
CO2 SERPL-SCNC: 29 MMOL/L (ref 21–32)
CREAT SERPL-MCNC: 0.94 MG/DL (ref 0.6–1.3)
GFR SERPL CREATININE-BSD FRML MDRD: 68 ML/MIN/1.73SQ M
GLUCOSE P FAST SERPL-MCNC: 98 MG/DL (ref 65–99)
POTASSIUM SERPL-SCNC: 5.7 MMOL/L (ref 3.5–5.3)
PROT SERPL-MCNC: 6.9 G/DL (ref 6.4–8.2)
SODIUM SERPL-SCNC: 131 MMOL/L (ref 136–145)

## 2020-07-08 PROCEDURE — 36415 COLL VENOUS BLD VENIPUNCTURE: CPT

## 2020-07-08 PROCEDURE — 80053 COMPREHEN METABOLIC PANEL: CPT

## 2020-07-09 DIAGNOSIS — E11.9 CONTROLLED TYPE 2 DIABETES MELLITUS WITHOUT COMPLICATION, WITHOUT LONG-TERM CURRENT USE OF INSULIN (HCC): ICD-10-CM

## 2020-07-15 DIAGNOSIS — I10 ESSENTIAL HYPERTENSION: ICD-10-CM

## 2020-07-15 RX ORDER — AMLODIPINE BESYLATE 5 MG/1
TABLET ORAL
Qty: 30 TABLET | Refills: 0 | Status: SHIPPED | OUTPATIENT
Start: 2020-07-15 | End: 2020-08-07

## 2020-07-17 DIAGNOSIS — F41.0 PANIC ATTACKS: ICD-10-CM

## 2020-07-17 DIAGNOSIS — F33.1 MODERATE RECURRENT MAJOR DEPRESSION (HCC): ICD-10-CM

## 2020-07-17 DIAGNOSIS — F41.1 GENERALIZED ANXIETY DISORDER: ICD-10-CM

## 2020-07-17 RX ORDER — PAROXETINE HYDROCHLORIDE 20 MG/1
20 TABLET, FILM COATED ORAL DAILY
Qty: 90 TABLET | Refills: 0 | Status: SHIPPED | OUTPATIENT
Start: 2020-07-17 | End: 2020-09-11

## 2020-07-17 RX ORDER — CLONAZEPAM 0.5 MG/1
TABLET ORAL
Qty: 60 TABLET | Refills: 1 | Status: SHIPPED | OUTPATIENT
Start: 2020-07-17 | End: 2020-09-11

## 2020-07-17 RX ORDER — ARIPIPRAZOLE 2 MG/1
2 TABLET ORAL
Qty: 90 TABLET | Refills: 0 | Status: SHIPPED | OUTPATIENT
Start: 2020-07-17 | End: 2020-09-11

## 2020-08-07 DIAGNOSIS — I10 ESSENTIAL HYPERTENSION: ICD-10-CM

## 2020-08-07 RX ORDER — AMLODIPINE BESYLATE 5 MG/1
TABLET ORAL
Qty: 30 TABLET | Refills: 0 | Status: SHIPPED | OUTPATIENT
Start: 2020-08-07 | End: 2020-09-04

## 2020-09-03 ENCOUNTER — OFFICE VISIT (OUTPATIENT)
Dept: OBGYN CLINIC | Facility: CLINIC | Age: 55
End: 2020-09-03
Payer: MEDICARE

## 2020-09-03 VITALS
DIASTOLIC BLOOD PRESSURE: 89 MMHG | HEART RATE: 81 BPM | WEIGHT: 175 LBS | HEIGHT: 63 IN | SYSTOLIC BLOOD PRESSURE: 154 MMHG | BODY MASS INDEX: 31.01 KG/M2

## 2020-09-03 DIAGNOSIS — M72.2 PLANTAR FASCIITIS OF RIGHT FOOT: Primary | ICD-10-CM

## 2020-09-03 DIAGNOSIS — M76.61 ACHILLES TENDINITIS, RIGHT LEG: ICD-10-CM

## 2020-09-03 PROCEDURE — 99213 OFFICE O/P EST LOW 20 MIN: CPT | Performed by: ORTHOPAEDIC SURGERY

## 2020-09-03 NOTE — PROGRESS NOTES
Assessment:       1  Plantar fasciitis of right foot    2  Achilles tendinitis, right leg          Plan:        I explained my current clinical findings to Natalie today  She has had partial relief of symptoms of her right heel pain with plantar fascia cortisone injection  Her remaining symptoms are likely secondary to Achilles insertional tendinitis  She is reluctant to do formal physical therapy at this time  Hence, I demonstrated her Achilles stretching and eccentric strengthening exercises to be done at home and also provided her with a printed handout of the same  I have also recommended her to wear nighttime plantar fascia splint to help with her residual discomfort  In future if she has a significant recurrence of symptoms she may be a candidate for repeat cortisone injection of the plantar fascia verses ultrasound-guided percutaneous tenotomy  Subjective:     Patient ID: Kacy Toribio is a 63 y o  female  Chief Complaint:  Follow-up right foot pain    HPI  Leyla Greenfield is a 54-year-old lady with a background history of right plantar heel pain regarding which she was last seen on 5/28/2020 and received a right plantar fascia cortisone injection  She was also suggested to wear a nighttime plantar fascia splint and heel cushion in her footwear  Today, she reports that her right plantar heel pain has improved following the cortisone injection  However, she still complains some some mild intermittent aching type discomfort on the posterior aspect of her right heel which is occasionally radiating to the posterior left leg  Denies any associated radiation to her lower back  The pain is usually sharp and it is not associated with any tingling or numbness  The patient does have a known history of diabetes  Denies any similar symptoms on the contralateral side  Denies any new injury       Social History     Occupational History    Occupation: Unemployed due to Rt hip injury     Comment: and lower back injury    Occupation: Used to be a     Occupation: Full Disability as of late 9/2019     Comment: based on medical issues   Tobacco Use    Smoking status: Current Every Day Smoker     Packs/day: 1 00     Years: 25 00     Pack years: 25 00    Smokeless tobacco: Never Used    Tobacco comment: Former smoker- Per Danita    Substance and Sexual Activity    Alcohol use: Yes     Comment: will have a couple of beers or a couple of rum and cokes once per week    Drug use: Yes     Types: Marijuana     Comment: Smoked THC between 19 and 28y/o    Sexual activity: Yes     Partners: Male     Comment: Boyfriend      Review of Systems   Constitutional: Negative  HENT: Negative  Eyes: Negative  Respiratory: Negative  Cardiovascular: Negative  Gastrointestinal: Negative  Endocrine: Negative  Genitourinary: Negative  Skin: Negative  Allergic/Immunologic: Negative  Neurological: Negative  Hematological: Negative  Psychiatric/Behavioral: Negative  Objective:     Ortho ExamPhysical Exam  Vitals signs and nursing note reviewed  Constitutional:       Appearance: She is well-developed  HENT:      Head: Normocephalic and atraumatic  Eyes:      Conjunctiva/sclera: Conjunctivae normal       Pupils: Pupils are equal, round, and reactive to light  Cardiovascular:      Rate and Rhythm: Normal rate and regular rhythm  Pulmonary:      Effort: Pulmonary effort is normal  No respiratory distress  Skin:     General: Skin is warm and dry  Findings: No erythema  Neurological:      Mental Status: She is alert and oriented to person, place, and time  Cranial Nerves: No cranial nerve deficit  Psychiatric:         Behavior: Behavior normal          Thought Content: Thought content normal          Judgment: Judgment normal          Right foot exam:  No obvious deformity noted except mild pes planus on weight-bearing  Negative calcaneal squeeze    This some tenderness to palpation over the Achilles insertion  Active range of motion is full in all directions without discomfort  Grade 5/5 strength of right ankle in all directions without discomfort  Passive ankle dorsiflexion is limited to 2°  No midsubstance Achilles tenderness or swelling noted  No tenderness in the retrocalcaneal bursa  Negative syndesmotic squeeze  No discomfort on passive midfoot motion or metatarsal squeeze  Clinically intact distal neurovascular status  I have personally reviewed pertinent films in PACS

## 2020-09-03 NOTE — PATIENT INSTRUCTIONS
Achilles Tendinitis   WHAT YOU NEED TO KNOW:   Achilles tendinitis is swelling of the tendon that connects your calf muscle to your heel bone  It may happen suddenly or become a chronic condition  Your risk for Achilles tendinitis increases as you age  DISCHARGE INSTRUCTIONS:   Contact your healthcare provider if:   · You have a fever  · Your swelling or pain gets worse  · You feel or hear a sudden pop near your ankle  · You cannot bend your ankle or put pressure on your leg  · You have questions about your condition or care  Medicines:   · NSAIDs , such as ibuprofen, help decrease swelling, pain, and fever  This medicine is available with or without a doctor's order  NSAIDs can cause stomach bleeding or kidney problems in certain people  If you take blood thinner medicine, always ask your healthcare provider if NSAIDs are safe for you  Always read the medicine label and follow directions  · Take your medicine as directed  Contact your healthcare provider if you think your medicine is not helping or if you have side effects  Tell him or her if you are allergic to any medicine  Keep a list of the medicines, vitamins, and herbs you take  Include the amounts, and when and why you take them  Bring the list or the pill bottles to follow-up visits  Carry your medicine list with you in case of an emergency  Manage your Achilles tendinitis:   · Rest  as directed  Rest decreases swelling and prevents your tendinitis from getting worse  Your healthcare provider may tell you to stop your usual training or exercise activities  Ask him when you can return to your normal activities or exercise plan  · Apply ice  on your Achilles tendon for 15 to 20 minutes every hour or as directed  Use an ice pack, or put crushed ice in a plastic bag  Cover it with a towel  Ice helps prevent tissue damage and decreases swelling and pain  · Wear a compression bandage or use tape  as directed   This will decrease swelling and pain  Ask your healthcare provider how to wrap a compression bandage or apply tape  If you use a support device ask if you should wear a compression bandage or use tape  · Elevate  your heel above the level of your heart as often as you can  This will help decrease swelling and pain  Prop your heel on pillows or blankets to keep it elevated comfortably  · Stretch  as directed when you return to your exercise program  Always warm up your muscles and stretch before you exercise  Do cool down exercises and stretches when you are finished  This will keep your muscles loose and decrease stress on your Achilles tendon  · Do bilateral heel drop exercises as directed  Bilateral heel drops strengthen your Achilles tendon  Do not do the following exercise unless your healthcare provider says it is safe:     ¨ Stand at the edge of a stair or raised step  Hold onto the railing for balance  ¨ Place the front part of your foot on the stair or step  Let the back of your foot hang off of the stair or step  ¨ Slowly lift your heels off the ground and then slowly lower your heels past the stair  Do not move your heels quickly  This could make your injury worse  Repeat this exercise 20 times or as directed  · Slowly increase the time and intensity when you return to your exercise program   Start with short and low intensity exercises  Ask your healthcare provider how and when to increase the time and intensity of your exercise  Wear support devices or supportive shoes as directed  Support devices may include a splint, orthotic, or brace  These devices will decrease pressure on your Achilles tendon and help relieve pain  Supportive shoes will cushion your heel and protect your Achilles tendon  Replace shoes or sneakers that are worn out     Go to physical therapy and practice exercises as directed:  A physical therapist teaches you exercises to help improve movement and strength, and decrease pain  Practice these exercises at home as directed  Follow up with your healthcare provider as directed:  Write down your questions so you remember to ask them during your visits  © 2017 [employer] Information is for End User's use only and may not be sold, redistributed or otherwise used for commercial purposes  All illustrations and images included in CareNotes® are the copyrighted property of [employer]  or [employer]  The above information is an  only  It is not intended as medical advice for individual conditions or treatments  Talk to your doctor, nurse or pharmacist before following any medical regimen to see if it is safe and effective for you

## 2020-09-04 DIAGNOSIS — I10 ESSENTIAL HYPERTENSION: ICD-10-CM

## 2020-09-04 RX ORDER — AMLODIPINE BESYLATE 5 MG/1
TABLET ORAL
Qty: 30 TABLET | Refills: 0 | Status: SHIPPED | OUTPATIENT
Start: 2020-09-04 | End: 2020-09-21 | Stop reason: SDUPTHER
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
## 2020-09-10 ENCOUNTER — DOCUMENTATION (OUTPATIENT)
Dept: PSYCHIATRY | Facility: CLINIC | Age: 55
End: 2020-09-10

## 2020-09-10 NOTE — PROGRESS NOTES
Treatment Plan not completed within required Limits due to : there was an Epic technology error and Treatment Plan was lost, will do at next OV

## 2020-09-11 ENCOUNTER — OFFICE VISIT (OUTPATIENT)
Dept: PSYCHIATRY | Facility: CLINIC | Age: 55
End: 2020-09-11
Payer: MEDICARE

## 2020-09-11 DIAGNOSIS — F41.0 PANIC ATTACKS: ICD-10-CM

## 2020-09-11 DIAGNOSIS — E87.1 HYPONATREMIA: ICD-10-CM

## 2020-09-11 DIAGNOSIS — F41.1 GENERALIZED ANXIETY DISORDER: Primary | ICD-10-CM

## 2020-09-11 DIAGNOSIS — F33.1 MODERATE RECURRENT MAJOR DEPRESSION (HCC): ICD-10-CM

## 2020-09-11 DIAGNOSIS — F17.210 CIGARETTE NICOTINE DEPENDENCE WITHOUT COMPLICATION: ICD-10-CM

## 2020-09-11 PROCEDURE — 99214 OFFICE O/P EST MOD 30 MIN: CPT | Performed by: PHYSICIAN ASSISTANT

## 2020-09-11 RX ORDER — PAROXETINE 30 MG/1
30 TABLET, FILM COATED ORAL DAILY
Qty: 90 TABLET | Refills: 0 | Status: SHIPPED | OUTPATIENT
Start: 2020-09-11 | End: 2020-12-06

## 2020-09-11 RX ORDER — CLONAZEPAM 0.5 MG/1
TABLET ORAL
Qty: 60 TABLET | Refills: 0 | Status: SHIPPED | OUTPATIENT
Start: 2020-09-11 | End: 2020-12-04 | Stop reason: SDUPTHER

## 2020-09-11 RX ORDER — ARIPIPRAZOLE 2 MG/1
2 TABLET ORAL
Qty: 90 TABLET | Refills: 0 | Status: SHIPPED | OUTPATIENT
Start: 2020-09-11 | End: 2021-01-12

## 2020-09-11 NOTE — BH TREATMENT PLAN
TREATMENT PLAN (Medication Management Only)        [facility]    Name and Date of Birth:  Kerney Opitz 63 y o  1965  Date of Treatment Plan: September 11, 2020  Diagnosis/Diagnoses:    1  Generalized anxiety disorder    2  Moderate recurrent major depression ([address] )    3  Panic attacks    4  Cigarette nicotine dependence without complication    5  Hyponatremia      Strengths/Personal Resources for Self-Care: "I'm strong willed"  Area/Areas of need (in own words): [place] is happy about it but she also has some stress with it "  1  Long Term Goal: Maintain mood stability, control of anxiety  Target Date: 2 months - 11/11/2020  Person/Persons responsible for completion of goal: Alison العلي  2  Short Term Objective (s) - How will we reach this goal?:   A  Provider new recommended medication/dosage changes and/or continue medication(s): Pt is having  Ongoing mild to moderate anxiety and mild depression but no panic  Pt accepts to increase Paroxetine and will continue her other medicines unchanged  She will start Chantix from PCP on 9/21/2020  Discussed the risks, benefits and SE of Chantix and Pt verbalized awareness, as she had tried this medicine in the past   I will watch her more closely to observe for any mood decompensation--and Pt is advised to stop Chantix and call me and PCP immediately if any worsening of mood  Pt is not interested in having a psychotherapist   Pt is in the process of selling her home and will continue f/u here for now  Treatment plan done and Pt accepts the plan     Increase Paroxetine to 30mg (1) tab po qhs # 90  Aripiprazole 2mg (1) tab po qhs # 90  Clonazepam 0 5mg (1/2) tab po bid and (1) qhs # 60   Pt to have another CMP and HgbA1C  (for recent low Na+, mildly elevated K+)    F/U PMD for electrolyte derangements    Return 10/8/2020 at 2:45PM, call sooner prn    Pt declines to have a copy of her Tx plan    Target Date: 3-6 months  Person/Persons Responsible for Completion of Goal: Laura Estrada  Progress Towards Goals: stable, continuing treatment  Treatment Modality: Medication mgt  Review due 90 to 120 days from date of this plan: 4-6 months  Expected length of service: ongoing treatment  My Physician/PA/NP and I have developed this plan together and I agree to work on the goals and objectives  I understand the treatment goals that were developed for my treatment

## 2020-09-16 ENCOUNTER — TRANSCRIBE ORDERS (OUTPATIENT)
Dept: LAB | Facility: CLINIC | Age: 55
End: 2020-09-16

## 2020-09-16 ENCOUNTER — APPOINTMENT (OUTPATIENT)
Dept: LAB | Facility: CLINIC | Age: 55
End: 2020-09-16
Payer: MEDICARE

## 2020-09-16 DIAGNOSIS — I10 ESSENTIAL HYPERTENSION: ICD-10-CM

## 2020-09-16 DIAGNOSIS — E11.9 CONTROLLED TYPE 2 DIABETES MELLITUS WITHOUT COMPLICATION, WITHOUT LONG-TERM CURRENT USE OF INSULIN (HCC): ICD-10-CM

## 2020-09-16 DIAGNOSIS — E87.1 HYPONATREMIA: ICD-10-CM

## 2020-09-16 DIAGNOSIS — E11.21 DIABETIC NEPHROPATHY ASSOCIATED WITH TYPE 2 DIABETES MELLITUS (HCC): ICD-10-CM

## 2020-09-16 LAB
ALBUMIN SERPL BCP-MCNC: 3 G/DL (ref 3.5–5)
ALP SERPL-CCNC: 161 U/L (ref 46–116)
ALT SERPL W P-5'-P-CCNC: 19 U/L (ref 12–78)
ANION GAP SERPL CALCULATED.3IONS-SCNC: 8 MMOL/L (ref 4–13)
AST SERPL W P-5'-P-CCNC: 21 U/L (ref 5–45)
BILIRUB SERPL-MCNC: 0.4 MG/DL (ref 0.2–1)
BUN SERPL-MCNC: 7 MG/DL (ref 5–25)
CALCIUM ALBUM COR SERPL-MCNC: 9.6 MG/DL (ref 8.3–10.1)
CALCIUM SERPL-MCNC: 8.8 MG/DL (ref 8.3–10.1)
CHLORIDE SERPL-SCNC: 100 MMOL/L (ref 100–108)
CO2 SERPL-SCNC: 30 MMOL/L (ref 21–32)
CREAT SERPL-MCNC: 0.78 MG/DL (ref 0.6–1.3)
GFR SERPL CREATININE-BSD FRML MDRD: 85 ML/MIN/1.73SQ M
GLUCOSE P FAST SERPL-MCNC: 129 MG/DL (ref 65–99)
POTASSIUM SERPL-SCNC: 4.1 MMOL/L (ref 3.5–5.3)
PROT SERPL-MCNC: 7.1 G/DL (ref 6.4–8.2)
SODIUM SERPL-SCNC: 138 MMOL/L (ref 136–145)

## 2020-09-16 PROCEDURE — 80053 COMPREHEN METABOLIC PANEL: CPT

## 2020-09-16 PROCEDURE — 36415 COLL VENOUS BLD VENIPUNCTURE: CPT

## 2020-09-16 PROCEDURE — 83036 HEMOGLOBIN GLYCOSYLATED A1C: CPT

## 2020-09-16 RX ORDER — LISINOPRIL 40 MG/1
TABLET ORAL
Qty: 90 TABLET | Refills: 1 | OUTPATIENT
Start: 2020-09-16

## 2020-09-17 LAB
EST. AVERAGE GLUCOSE BLD GHB EST-MCNC: 151 MG/DL
HBA1C MFR BLD: 6.9 %

## 2020-09-21 ENCOUNTER — TELEPHONE (OUTPATIENT)
Dept: FAMILY MEDICINE CLINIC | Facility: CLINIC | Age: 55
End: 2020-09-21

## 2020-09-21 ENCOUNTER — OFFICE VISIT (OUTPATIENT)
Dept: FAMILY MEDICINE CLINIC | Facility: CLINIC | Age: 55
End: 2020-09-21
Payer: MEDICARE

## 2020-09-21 VITALS
HEIGHT: 63 IN | DIASTOLIC BLOOD PRESSURE: 72 MMHG | OXYGEN SATURATION: 98 % | RESPIRATION RATE: 18 BRPM | BODY MASS INDEX: 31.5 KG/M2 | SYSTOLIC BLOOD PRESSURE: 130 MMHG | HEART RATE: 93 BPM | WEIGHT: 177.8 LBS | TEMPERATURE: 97.9 F

## 2020-09-21 DIAGNOSIS — E11.9 CONTROLLED TYPE 2 DIABETES MELLITUS WITHOUT COMPLICATION, WITHOUT LONG-TERM CURRENT USE OF INSULIN (HCC): Primary | ICD-10-CM

## 2020-09-21 DIAGNOSIS — I10 ESSENTIAL HYPERTENSION: ICD-10-CM

## 2020-09-21 DIAGNOSIS — J41.0 SIMPLE CHRONIC BRONCHITIS (HCC): ICD-10-CM

## 2020-09-21 DIAGNOSIS — E11.21 DIABETIC NEPHROPATHY ASSOCIATED WITH TYPE 2 DIABETES MELLITUS (HCC): ICD-10-CM

## 2020-09-21 DIAGNOSIS — F17.200 SMOKING: ICD-10-CM

## 2020-09-21 DIAGNOSIS — R80.9 MICROALBUMINURIA: ICD-10-CM

## 2020-09-21 DIAGNOSIS — E78.5 HYPERLIPIDEMIA, UNSPECIFIED HYPERLIPIDEMIA TYPE: ICD-10-CM

## 2020-09-21 DIAGNOSIS — F17.210 CIGARETTE NICOTINE DEPENDENCE WITHOUT COMPLICATION: ICD-10-CM

## 2020-09-21 PROCEDURE — 99214 OFFICE O/P EST MOD 30 MIN: CPT | Performed by: FAMILY MEDICINE

## 2020-09-21 RX ORDER — AZITHROMYCIN 250 MG/1
TABLET, FILM COATED ORAL
Qty: 6 TABLET | Refills: 0 | Status: SHIPPED | OUTPATIENT
Start: 2020-09-21 | End: 2020-09-25

## 2020-09-21 RX ORDER — VARENICLINE TARTRATE
KIT
Qty: 53 TABLET | Refills: 0 | Status: SHIPPED | OUTPATIENT
Start: 2020-09-21 | End: 2020-11-16

## 2020-09-21 RX ORDER — AMLODIPINE BESYLATE 5 MG/1
5 TABLET ORAL DAILY
Qty: 90 TABLET | Refills: 1 | Status: SHIPPED | OUTPATIENT
Start: 2020-09-21 | End: 2020-10-05

## 2020-09-21 RX ORDER — ALBUTEROL SULFATE 90 UG/1
2 AEROSOL, METERED RESPIRATORY (INHALATION) EVERY 6 HOURS PRN
Qty: 3 INHALER | Refills: 3 | Status: SHIPPED | OUTPATIENT
Start: 2020-09-21 | End: 2021-06-24 | Stop reason: SDUPTHER

## 2020-09-21 RX ORDER — BUDESONIDE AND FORMOTEROL FUMARATE DIHYDRATE 160; 4.5 UG/1; UG/1
2 AEROSOL RESPIRATORY (INHALATION) 2 TIMES DAILY
Qty: 1 INHALER | Refills: 5 | Status: SHIPPED | OUTPATIENT
Start: 2020-09-21 | End: 2020-09-22 | Stop reason: SDUPTHER

## 2020-09-21 NOTE — ASSESSMENT & PLAN NOTE
Lab Results   Component Value Date    HGBA1C 6 9 (H) 09/16/2020     A1c has improved with diet modifications  Continue metformin 500 milligram b i d , glipizide 2 5 milligram half a tablet daily

## 2020-09-21 NOTE — PROGRESS NOTES
Subjective:      Patient ID: Rosemarie Victoria is a 63 y o  female  Diabetes   She presents for her follow-up diabetic visit  She has type 2 diabetes mellitus  Her disease course has been stable (A1c 6 9)  There are no hypoglycemic associated symptoms  Pertinent negatives for hypoglycemia include no headaches  Pertinent negatives for diabetes include no chest pain, no polydipsia and no polyphagia  Symptoms are stable  There are no diabetic complications  Risk factors for coronary artery disease include diabetes mellitus, dyslipidemia, hypertension, post-menopausal and tobacco exposure  Current diabetic treatments: Metformin 500 milligram b i d , glipizide 2 5 milligram half tablet daily  She is compliant with treatment all of the time  Her weight is stable  She is following a generally healthy diet  Meal planning includes avoidance of concentrated sweets  She participates in exercise daily  Her home blood glucose trend is decreasing steadily  An ACE inhibitor/angiotensin II receptor blocker is being taken  She does not see a podiatrist Eye exam is current  Cough   This is a new problem  The current episode started 1 to 4 weeks ago  The problem has been unchanged  The cough is productive of purulent sputum  Associated symptoms include shortness of breath and wheezing  Pertinent negatives include no chest pain, fever, headaches or myalgias  The symptoms are aggravated by exercise  She has tried nothing for the symptoms  Her past medical history is significant for COPD  Patient has history of hypertension, blood pressure is at goal on amlodipine 5 milligram, lisinopril 40 milligram   Has hyperlipidemia, LDL is at goal   Continue atorvastatin  Patient continues to smoke 1 and half to 2 per day  Patient did not start Chantix however would like to start Chantix to quit smoking  Is reporting chronic cough with increased mucus production  Patient used to take inhalers in the past but has not taken any recently  Patient is requesting refills to be called into her pharmacy  Past Medical History:   Diagnosis Date    Anxiety     Diabetes mellitus ([address] )     Diverticulosis     GERD (gastroesophageal reflux disease)     Headache     Hyperlipidemia     Hypertension     Psychiatric disorder     bipolar    Right clavicle fracture     TIA (transient ischemic attack)     Vitamin D deficiency        Family History   Problem Relation Age of Onset    Arthritis Family     Cancer Family     Osteoporosis Family     Cervical cancer Mother     Ovarian cancer Mother 35    Uterine cancer Mother     Hypertension Father     Other Father         pre-diabetes    Diabetes Father     Diabetes type I Sister     Osteoporosis Sister     Depression Sister     Breast cancer Sister     Diabetes Sister     Heart attack Brother          of an MI at 50y/o    Diabetes Brother     No Known Problems Maternal Grandmother     No Known Problems Maternal Grandfather     Other Paternal Grandmother         Parkinson's Disease    Heart attack Paternal Grandfather     Other Paternal Grandfather         coronary arteriosclerosis    Alcohol abuse Paternal Uncle     Seizures Other     Seizures Other     No Known Problems Daughter     No Known Problems Sister     No Known Problems Sister     No Known Problems Sister     No Known Problems Son        Past Surgical History:   Procedure Laterality Date    BLADDER SURGERY      Sling    [place] [address] BODY,PLASTY/RESECTN Right 2017    Procedure: RIGHT HIP ARTHROSCOPIC LABRAL DEBRIDEMENT;  Surgeon: Gerry Tsai MD;  [facility];  Service: Orthopedics    SINUS SURGERY      3 surgeries     TUBAL LIGATION          reports that she has been smoking  She has a 25 00 pack-year smoking history  She has never used smokeless tobacco  She reports current alcohol use  She reports current drug use  Drug: Marijuana        Current Outpatient Medications:     amLODIPine (NORVASC) 5 mg tablet, take 1 tablet by mouth once daily, Disp: 30 tablet, Rfl: 0    ARIPiprazole (ABILIFY) 2 mg tablet, Take 1 tablet (2 mg total) by mouth daily at bedtime, Disp: 90 tablet, Rfl: 0    aspirin 325 mg tablet, Take 325 mg by mouth, Disp: , Rfl:     atorvastatin (LIPITOR) 20 mg tablet, Take 1 tablet (20 mg total) by mouth daily, Disp: 90 tablet, Rfl: 1    clonazePAM (KlonoPIN) 0 5 mg tablet, Take 1/2 tab by mouth twice daily and 1 full tab nightly, Disp: 60 tablet, Rfl: 0    glipiZIDE (GLUCOTROL XL) 2 5 mg 24 hr tablet, Take half tablet daily, Disp: 90 tablet, Rfl: 0    glucose blood (ONE TOUCH ULTRA TEST) test strip, Check blood sugar once daily, Disp: 100 each, Rfl: 2    Lancets (ONETOUCH ULTRASOFT) lancets, Check blood sugar twice/ week , Disp: 100 each, Rfl: 0    lisinopril (ZESTRIL) 40 mg tablet, Take 1 tablet (40 mg total) by mouth daily, Disp: 90 tablet, Rfl: 1    metFORMIN (GLUCOPHAGE) 500 mg tablet, take 1 tablet by mouth twice a day WITH MEALS, Disp: 180 tablet, Rfl: 0    omeprazole (PriLOSEC) 20 mg delayed release capsule, Take 1 capsule (20 mg total) by mouth daily, Disp: 90 capsule, Rfl: 1    PARoxetine (PAXIL) 30 mg tablet, Take 1 tablet (30 mg total) by mouth daily, Disp: 90 tablet, Rfl: 0    varenicline (Chantix Starting Month Chris) 0 5 MG X 11 & 1 MG X 42 tablet, Take one 0 5mg tab by mouth 1x daily for 3 days, then increase to one 0 5mg tab 2x daily for 3 days, then increase to one 1mg tab 2x daily (Patient not taking: Reported on 9/21/2020), Disp: 53 tablet, Rfl: 0    The following portions of the patient's history were reviewed and updated as appropriate: allergies, current medications, past family history, past medical history, past social history, past surgical history and problem list     Review of Systems   Constitutional: Negative  Negative for fever  HENT: Positive for congestion  Eyes: Negative      Respiratory: Positive for cough, chest tightness, shortness of breath and wheezing  Cardiovascular: Negative  Negative for chest pain and palpitations  Gastrointestinal: Negative  Endocrine: Negative  Negative for polydipsia and polyphagia  Genitourinary: Negative  Musculoskeletal: Negative  Negative for myalgias  Neurological: Negative  Negative for headaches  Psychiatric/Behavioral: Negative  Objective:    /80   Pulse 93   Temp 97 9 °F (36 6 °C)   Resp 18   Ht 5' 3" (1 6 m)   Wt 80 6 kg (177 lb 12 8 oz)   SpO2 98%   BMI 31 50 kg/m²     Repeat 130/72   Physical Exam  Constitutional:       Appearance: She is well-developed  Eyes:      Pupils: Pupils are equal, round, and reactive to light  Neck:      Musculoskeletal: Normal range of motion  Cardiovascular:      Rate and Rhythm: Normal rate and regular rhythm  Pulses: no weak pulses     Heart sounds: Normal heart sounds  Pulmonary:      Effort: Pulmonary effort is normal       Breath sounds: Normal breath sounds  Abdominal:      General: Bowel sounds are normal       Palpations: Abdomen is soft  Musculoskeletal: Normal range of motion  Feet:      Right foot:      Skin integrity: No ulcer, skin breakdown, erythema, warmth, callus or dry skin  Left foot:      Skin integrity: No ulcer, skin breakdown, erythema, warmth, callus or dry skin  Neurological:      Mental Status: She is alert and oriented to person, place, and time  Psychiatric:         Behavior: Behavior normal          Judgment: Judgment normal          Patient's shoes and socks removed  Right Foot/Ankle   Right Foot Inspection  Skin Exam: skin normal and skin intact no dry skin, no warmth, no callus, no erythema, no maceration, no abnormal color, no pre-ulcer, no ulcer and no callus                          Toe Exam: ROM and strength within normal limits  Sensory   Vibration: intact  Proprioception: intact   Monofilament testing: intact  Vascular  Capillary refills: < 3 seconds      Left Foot/Ankle  Left Foot Inspection  Skin Exam: skin normal and skin intactno dry skin, no warmth, no erythema, no maceration, normal color, no pre-ulcer, no ulcer and no callus                         Toe Exam: ROM and strength within normal limits                   Sensory   Vibration: intact  Proprioception: intact  Monofilament: intact  Vascular  Capillary refills: < 3 seconds    Assign Risk Category:  No deformity present; No loss of protective sensation; No weak pulses       Risk: 0     Recent Results (from the past 1008 hour(s))   Comprehensive metabolic panel    Collection Time: 09/16/20  7:21 AM   Result Value Ref Range    Sodium 138 136 - 145 mmol/L    Potassium 4 1 3 5 - 5 3 mmol/L    Chloride 100 100 - 108 mmol/L    CO2 30 21 - 32 mmol/L    ANION GAP 8 4 - 13 mmol/L    BUN 7 5 - 25 mg/dL    Creatinine 0 78 0 60 - 1 30 mg/dL    Glucose, Fasting 129 (H) 65 - 99 mg/dL    Calcium 8 8 8 3 - 10 1 mg/dL    Corrected Calcium 9 6 8 3 - 10 1 mg/dL    AST 21 5 - 45 U/L    ALT 19 12 - 78 U/L    Alkaline Phosphatase 161 (H) 46 - 116 U/L    Total Protein 7 1 6 4 - 8 2 g/dL    Albumin 3 0 (L) 3 5 - 5 0 g/dL    Total Bilirubin 0 40 0 20 - 1 00 mg/dL    eGFR 85 ml/min/1 73sq m   Hemoglobin A1C    Collection Time: 09/16/20  7:21 AM   Result Value Ref Range    Hemoglobin A1C 6 9 (H) Normal 3 8-5 6%; PreDiabetic 5 7-6 4%; Diabetic >=6 5%; Glycemic control for adults with diabetes <7 0% %     mg/dl       Assessment/Plan:             Problem List Items Addressed This Visit        Endocrine    Controlled type 2 diabetes mellitus without complication, without long-term current use of insulin ([facility] [address] ) - Primary       Lab Results   Component Value Date    HGBA1C 6 9 (H) 09/16/2020     A1c has improved with diet modifications  Continue metformin 500 milligram b i d , glipizide 2 5 milligram half a tablet daily             Relevant Medications    metFORMIN (GLUCOPHAGE) 500 mg tablet    Diabetic nephropathy associated with type 2 diabetes mellitus ([facility] )       Lab Results   Component Value Date    HGBA1C 6 9 (H) 09/16/2020     Stable  Blood pressure is at goal, Continue lisinopril  Relevant Medications    metFORMIN (GLUCOPHAGE) 500 mg tablet    Other Relevant Orders    Comprehensive metabolic panel    Hemoglobin A1C       Respiratory    Simple chronic bronchitis ([facility] )     Patient started on Symbicort, albuterol  Oxygen saturation is stable  Will call the office if symptoms persist or worsen         Relevant Medications    albuterol (Ventolin HFA) 90 mcg/act inhaler    budesonide-formoterol (SYMBICORT) 160-4 5 mcg/act inhaler    azithromycin (ZITHROMAX) 250 mg tablet       Cardiovascular and Mediastinum    Hypertension     Stable on lisinopril 40 milligram, amlodipine 5 milligram   Continue same  Relevant Medications    amLODIPine (NORVASC) 5 mg tablet       Other    Hyperlipemia     Stable on atorvastatin 20 milligram   Continue same  Relevant Orders    Lipid panel    Microalbuminuria     On lisinopril  Continue same  Blood pressure and A1c are at goal          Relevant Orders    Microalbumin / creatinine urine ratio    Nicotine addiction     Patient started on Chantix  Advised to discontinue smoking           Relevant Medications    varenicline (Chantix Starting Month Chris) 0 5 MG X 11 & 1 MG X 42 tablet    Other Relevant Orders    CT lung screening program      Other Visit Diagnoses     Smoking        Relevant Medications    varenicline (Chantix Starting Month Pak) 0 5 MG X 11 & 1 MG X 42 tablet

## 2020-09-21 NOTE — TELEPHONE ENCOUNTER
[employer] Prior auth dept called and said the inhaler you sent is denied because pt needs to take Symbicort for at least 3 months before this one can be approved

## 2020-09-21 NOTE — ASSESSMENT & PLAN NOTE
Lab Results   Component Value Date    HGBA1C 6 9 (H) 09/16/2020     Stable  Blood pressure is at goal, Continue lisinopril

## 2020-09-21 NOTE — ASSESSMENT & PLAN NOTE
Patient started on Symbicort, albuterol  Oxygen saturation is stable    Will call the office if symptoms persist or worsen

## 2020-09-22 ENCOUNTER — TELEPHONE (OUTPATIENT)
Dept: FAMILY MEDICINE CLINIC | Facility: CLINIC | Age: 55
End: 2020-09-22

## 2020-09-22 DIAGNOSIS — J41.0 SIMPLE CHRONIC BRONCHITIS (HCC): ICD-10-CM

## 2020-09-22 RX ORDER — BUDESONIDE AND FORMOTEROL FUMARATE DIHYDRATE 160; 4.5 UG/1; UG/1
2 AEROSOL RESPIRATORY (INHALATION) 2 TIMES DAILY
Qty: 3 INHALER | Refills: 1 | Status: SHIPPED | OUTPATIENT
Start: 2020-09-22 | End: 2020-09-22 | Stop reason: SDUPTHER

## 2020-09-22 RX ORDER — BUDESONIDE AND FORMOTEROL FUMARATE DIHYDRATE 160; 4.5 UG/1; UG/1
2 AEROSOL RESPIRATORY (INHALATION) 2 TIMES DAILY
Qty: 1 INHALER | Refills: 5 | Status: SHIPPED | OUTPATIENT
Start: 2020-09-22 | End: 2020-09-23

## 2020-09-22 NOTE — TELEPHONE ENCOUNTER
I called the insurance company and the Symbicort needs to be BRAND NAME make sure it is MISA    The gentlemen I talked to ran it through and it was covered

## 2020-09-23 DIAGNOSIS — J41.0 SIMPLE CHRONIC BRONCHITIS (HCC): Primary | ICD-10-CM

## 2020-09-23 RX ORDER — FLUTICASONE FUROATE AND VILANTEROL 200; 25 UG/1; UG/1
1 POWDER RESPIRATORY (INHALATION) DAILY
Qty: 1 INHALER | Refills: 0 | Status: SHIPPED | COMMUNITY
Start: 2020-09-23 | End: 2020-11-16 | Stop reason: SDUPTHER

## 2020-09-23 NOTE — TELEPHONE ENCOUNTER
Please provide her with samples of Breo  If it helps and let her call us back I will send the prescription over to her pharmacy

## 2020-10-01 ENCOUNTER — HOSPITAL ENCOUNTER (OUTPATIENT)
Dept: CT IMAGING | Facility: HOSPITAL | Age: 55
Discharge: HOME | End: 2020-10-01
Payer: COMMERCIAL

## 2020-10-01 DIAGNOSIS — F17.210 CIGARETTE NICOTINE DEPENDENCE WITHOUT COMPLICATION: ICD-10-CM

## 2020-10-01 DIAGNOSIS — I10 ESSENTIAL HYPERTENSION: ICD-10-CM

## 2020-10-01 PROCEDURE — G0297 LDCT FOR LUNG CA SCREEN: HCPCS

## 2020-10-01 PROCEDURE — G1004 CDSM NDSC: HCPCS

## 2020-10-02 DIAGNOSIS — I10 ESSENTIAL HYPERTENSION: ICD-10-CM

## 2020-10-02 RX ORDER — LISINOPRIL 40 MG/1
TABLET ORAL
Qty: 90 TABLET | Refills: 1 | OUTPATIENT
Start: 2020-10-02

## 2020-10-05 ENCOUNTER — TELEPHONE (OUTPATIENT)
Dept: FAMILY MEDICINE CLINIC | Facility: CLINIC | Age: 55
End: 2020-10-05

## 2020-10-05 RX ORDER — AMLODIPINE BESYLATE 5 MG/1
TABLET ORAL
Qty: 90 TABLET | Refills: 0 | Status: SHIPPED | OUTPATIENT
Start: 2020-10-05 | End: 2020-11-16 | Stop reason: SDUPTHER

## 2020-10-06 DIAGNOSIS — E11.9 CONTROLLED TYPE 2 DIABETES MELLITUS WITHOUT COMPLICATION, WITHOUT LONG-TERM CURRENT USE OF INSULIN (HCC): ICD-10-CM

## 2020-10-06 RX ORDER — GLIPIZIDE 2.5 MG/1
TABLET, EXTENDED RELEASE ORAL
Qty: 90 TABLET | Refills: 0 | Status: SHIPPED | OUTPATIENT
Start: 2020-10-06 | End: 2020-11-16 | Stop reason: SDUPTHER

## 2020-10-09 DIAGNOSIS — F41.1 GENERALIZED ANXIETY DISORDER: ICD-10-CM

## 2020-10-09 DIAGNOSIS — F41.0 PANIC ATTACKS: ICD-10-CM

## 2020-10-09 DIAGNOSIS — F33.1 MODERATE RECURRENT MAJOR DEPRESSION (HCC): ICD-10-CM

## 2020-10-09 RX ORDER — PAROXETINE HYDROCHLORIDE 20 MG/1
TABLET, FILM COATED ORAL
Qty: 90 TABLET | Refills: 0 | OUTPATIENT
Start: 2020-10-09

## 2020-11-03 ENCOUNTER — APPOINTMENT (OUTPATIENT)
Dept: LAB | Facility: CLINIC | Age: 55
End: 2020-11-03
Payer: MEDICARE

## 2020-11-03 DIAGNOSIS — E11.21 DIABETIC NEPHROPATHY ASSOCIATED WITH TYPE 2 DIABETES MELLITUS (HCC): ICD-10-CM

## 2020-11-03 DIAGNOSIS — E78.5 HYPERLIPIDEMIA, UNSPECIFIED HYPERLIPIDEMIA TYPE: ICD-10-CM

## 2020-11-03 DIAGNOSIS — R80.9 MICROALBUMINURIA: ICD-10-CM

## 2020-11-03 LAB
ALBUMIN SERPL BCP-MCNC: 3.3 G/DL (ref 3.5–5)
ALP SERPL-CCNC: 172 U/L (ref 46–116)
ALT SERPL W P-5'-P-CCNC: 18 U/L (ref 12–78)
ANION GAP SERPL CALCULATED.3IONS-SCNC: 6 MMOL/L (ref 4–13)
AST SERPL W P-5'-P-CCNC: 20 U/L (ref 5–45)
BILIRUB SERPL-MCNC: 0.52 MG/DL (ref 0.2–1)
BUN SERPL-MCNC: 5 MG/DL (ref 5–25)
CALCIUM ALBUM COR SERPL-MCNC: 10.2 MG/DL (ref 8.3–10.1)
CALCIUM SERPL-MCNC: 9.6 MG/DL (ref 8.3–10.1)
CHLORIDE SERPL-SCNC: 102 MMOL/L (ref 100–108)
CHOLEST SERPL-MCNC: 212 MG/DL (ref 50–200)
CO2 SERPL-SCNC: 31 MMOL/L (ref 21–32)
CREAT SERPL-MCNC: 0.81 MG/DL (ref 0.6–1.3)
CREAT UR-MCNC: 243 MG/DL
EST. AVERAGE GLUCOSE BLD GHB EST-MCNC: 154 MG/DL
GFR SERPL CREATININE-BSD FRML MDRD: 81 ML/MIN/1.73SQ M
GLUCOSE P FAST SERPL-MCNC: 158 MG/DL (ref 65–99)
HBA1C MFR BLD: 7 %
HDLC SERPL-MCNC: 46 MG/DL
LDLC SERPL CALC-MCNC: 134 MG/DL (ref 0–100)
MICROALBUMIN UR-MCNC: 65.7 MG/L (ref 0–20)
MICROALBUMIN/CREAT 24H UR: 27 MG/G CREATININE (ref 0–30)
NONHDLC SERPL-MCNC: 166 MG/DL
POTASSIUM SERPL-SCNC: 3.8 MMOL/L (ref 3.5–5.3)
PROT SERPL-MCNC: 7.4 G/DL (ref 6.4–8.2)
SODIUM SERPL-SCNC: 139 MMOL/L (ref 136–145)
TRIGL SERPL-MCNC: 160 MG/DL

## 2020-11-03 PROCEDURE — 80053 COMPREHEN METABOLIC PANEL: CPT

## 2020-11-03 PROCEDURE — 83036 HEMOGLOBIN GLYCOSYLATED A1C: CPT

## 2020-11-03 PROCEDURE — 82043 UR ALBUMIN QUANTITATIVE: CPT

## 2020-11-03 PROCEDURE — 36415 COLL VENOUS BLD VENIPUNCTURE: CPT

## 2020-11-03 PROCEDURE — 80061 LIPID PANEL: CPT

## 2020-11-03 PROCEDURE — 82570 ASSAY OF URINE CREATININE: CPT

## 2020-11-16 ENCOUNTER — OFFICE VISIT (OUTPATIENT)
Dept: FAMILY MEDICINE CLINIC | Facility: CLINIC | Age: 55
End: 2020-11-16
Payer: MEDICARE

## 2020-11-16 VITALS
WEIGHT: 174.2 LBS | HEIGHT: 63 IN | RESPIRATION RATE: 18 BRPM | SYSTOLIC BLOOD PRESSURE: 130 MMHG | DIASTOLIC BLOOD PRESSURE: 72 MMHG | HEART RATE: 93 BPM | OXYGEN SATURATION: 96 % | BODY MASS INDEX: 30.87 KG/M2 | TEMPERATURE: 98.7 F

## 2020-11-16 DIAGNOSIS — E78.5 HYPERLIPIDEMIA, UNSPECIFIED HYPERLIPIDEMIA TYPE: ICD-10-CM

## 2020-11-16 DIAGNOSIS — K21.9 CHRONIC GERD: ICD-10-CM

## 2020-11-16 DIAGNOSIS — J41.0 SIMPLE CHRONIC BRONCHITIS (HCC): ICD-10-CM

## 2020-11-16 DIAGNOSIS — R91.8 MULTIPLE SUBSOLID LUNG NODULES GREATER THAN 6 MM IN DIAMETER: ICD-10-CM

## 2020-11-16 DIAGNOSIS — F33.1 MODERATE RECURRENT MAJOR DEPRESSION (HCC): ICD-10-CM

## 2020-11-16 DIAGNOSIS — R80.9 MICROALBUMINURIA: ICD-10-CM

## 2020-11-16 DIAGNOSIS — E11.9 TYPE 2 DIABETES MELLITUS WITHOUT COMPLICATION, WITHOUT LONG-TERM CURRENT USE OF INSULIN (HCC): ICD-10-CM

## 2020-11-16 DIAGNOSIS — Z12.31 VISIT FOR SCREENING MAMMOGRAM: Primary | ICD-10-CM

## 2020-11-16 DIAGNOSIS — I10 ESSENTIAL HYPERTENSION: ICD-10-CM

## 2020-11-16 DIAGNOSIS — E11.9 CONTROLLED TYPE 2 DIABETES MELLITUS WITHOUT COMPLICATION, WITHOUT LONG-TERM CURRENT USE OF INSULIN (HCC): ICD-10-CM

## 2020-11-16 PROBLEM — M19.90 ARTHRITIS: Status: RESOLVED | Noted: 2017-06-06 | Resolved: 2020-11-16

## 2020-11-16 PROBLEM — E87.1 HYPONATREMIA: Status: RESOLVED | Noted: 2018-07-20 | Resolved: 2020-11-16

## 2020-11-16 PROBLEM — G25.2 COARSE TREMORS: Status: RESOLVED | Noted: 2019-01-08 | Resolved: 2020-11-16

## 2020-11-16 PROCEDURE — 99214 OFFICE O/P EST MOD 30 MIN: CPT | Performed by: FAMILY MEDICINE

## 2020-11-16 PROCEDURE — G0439 PPPS, SUBSEQ VISIT: HCPCS | Performed by: FAMILY MEDICINE

## 2020-11-16 RX ORDER — GLIPIZIDE 2.5 MG/1
TABLET, EXTENDED RELEASE ORAL
Qty: 90 TABLET | Refills: 1 | Status: SHIPPED | OUTPATIENT
Start: 2020-11-16 | End: 2021-03-19 | Stop reason: SDUPTHER

## 2020-11-16 RX ORDER — FLUTICASONE FUROATE AND VILANTEROL 200; 25 UG/1; UG/1
1 POWDER RESPIRATORY (INHALATION) DAILY
Qty: 1 INHALER | Refills: 5 | Status: SHIPPED | OUTPATIENT
Start: 2020-11-16 | End: 2021-03-19 | Stop reason: SDUPTHER

## 2020-11-16 RX ORDER — OMEPRAZOLE 20 MG/1
20 CAPSULE, DELAYED RELEASE ORAL DAILY
Qty: 90 CAPSULE | Refills: 1 | Status: SHIPPED | OUTPATIENT
Start: 2020-11-16 | End: 2021-03-19 | Stop reason: SDUPTHER

## 2020-11-16 RX ORDER — AMLODIPINE BESYLATE 5 MG/1
5 TABLET ORAL DAILY
Qty: 90 TABLET | Refills: 1 | Status: SHIPPED | OUTPATIENT
Start: 2020-11-16 | End: 2021-03-19 | Stop reason: SDUPTHER

## 2020-11-16 RX ORDER — ATORVASTATIN CALCIUM 20 MG/1
20 TABLET, FILM COATED ORAL DAILY
Qty: 90 TABLET | Refills: 1 | Status: SHIPPED | OUTPATIENT
Start: 2020-11-16 | End: 2021-03-19 | Stop reason: SDUPTHER

## 2020-11-16 RX ORDER — LISINOPRIL 40 MG/1
40 TABLET ORAL DAILY
Qty: 90 TABLET | Refills: 1 | Status: SHIPPED | OUTPATIENT
Start: 2020-11-16 | End: 2021-03-19 | Stop reason: SDUPTHER

## 2020-12-04 DIAGNOSIS — F41.0 PANIC ATTACKS: ICD-10-CM

## 2020-12-04 DIAGNOSIS — F41.1 GENERALIZED ANXIETY DISORDER: ICD-10-CM

## 2020-12-04 RX ORDER — CLONAZEPAM 0.5 MG/1
TABLET ORAL
Qty: 60 TABLET | Refills: 1 | Status: SHIPPED | OUTPATIENT
Start: 2020-12-04 | End: 2021-02-02 | Stop reason: SDUPTHER

## 2020-12-06 DIAGNOSIS — F41.1 GENERALIZED ANXIETY DISORDER: ICD-10-CM

## 2020-12-06 DIAGNOSIS — F33.1 MODERATE RECURRENT MAJOR DEPRESSION (HCC): ICD-10-CM

## 2020-12-06 DIAGNOSIS — F41.0 PANIC ATTACKS: ICD-10-CM

## 2020-12-06 RX ORDER — PAROXETINE 30 MG/1
TABLET, FILM COATED ORAL
Qty: 90 TABLET | Refills: 0 | Status: SHIPPED | OUTPATIENT
Start: 2020-12-06 | End: 2021-02-02 | Stop reason: SDUPTHER

## 2021-01-12 DIAGNOSIS — F33.1 MODERATE RECURRENT MAJOR DEPRESSION (HCC): ICD-10-CM

## 2021-01-12 RX ORDER — ARIPIPRAZOLE 2 MG/1
TABLET ORAL
Qty: 90 TABLET | Refills: 0 | Status: SHIPPED | OUTPATIENT
Start: 2021-01-12 | End: 2021-02-02 | Stop reason: SDUPTHER

## 2021-02-02 ENCOUNTER — TELEMEDICINE (OUTPATIENT)
Dept: PSYCHIATRY | Facility: CLINIC | Age: 56
End: 2021-02-02
Payer: MEDICARE

## 2021-02-02 DIAGNOSIS — F41.0 PANIC ATTACKS: ICD-10-CM

## 2021-02-02 DIAGNOSIS — F33.1 MODERATE RECURRENT MAJOR DEPRESSION (HCC): ICD-10-CM

## 2021-02-02 DIAGNOSIS — F41.1 GENERALIZED ANXIETY DISORDER: Primary | ICD-10-CM

## 2021-02-02 PROCEDURE — 99214 OFFICE O/P EST MOD 30 MIN: CPT | Performed by: PHYSICIAN ASSISTANT

## 2021-02-02 RX ORDER — CLONAZEPAM 0.5 MG/1
TABLET ORAL
Qty: 60 TABLET | Refills: 2 | Status: SHIPPED | OUTPATIENT
Start: 2021-02-02 | End: 2021-07-19 | Stop reason: SDUPTHER

## 2021-02-02 RX ORDER — ARIPIPRAZOLE 2 MG/1
2 TABLET ORAL
Qty: 90 TABLET | Refills: 0 | Status: SHIPPED | OUTPATIENT
Start: 2021-02-02 | End: 2021-07-08

## 2021-02-02 RX ORDER — PAROXETINE 30 MG/1
30 TABLET, FILM COATED ORAL DAILY
Qty: 90 TABLET | Refills: 0 | Status: SHIPPED | OUTPATIENT
Start: 2021-02-02 | End: 2021-07-19 | Stop reason: SDUPTHER

## 2021-02-02 NOTE — BH TREATMENT PLAN
TREATMENT PLAN (Medication Management Only)        [address] done but not signed at time of office visit due to:  Plan reviewed by phone or in person  and verbal consent given due to working remotely due to a snow storm  Name and Date of Birth:  Hermila Cui 63 y o  1965  Date of Treatment Plan: February 2, 2021  Diagnosis/Diagnoses:    1  Generalized anxiety disorder    2  Moderate recurrent major depression ([facility] [address] )    3  Panic attacks      Strengths/Personal Resources for Self-Care: "My will power is going up; I don't feel things bother me as much"  Area/Areas of need (in own words): "I feel pretty good and I don't have any, like, major anxiety attacks or nothing  I [place] "  1  Long Term Goal: Maintain mood stability and control of anxiety  Target Date:3-6 months  Person/Persons responsible for completion of goal: Flora Vences 2  Short Term Objective (s) - How will we reach this goal?:   A  Provider new recommended medication/dosage changes and/or continue medication(s):  Pt is having minimal anxiety without panic or mood Sxs and has successfully quit smoking, for which she was congratulated! She seems stable and I will continue the present regimen  Treatment plan done and Pt accepts the plan  Continue:  Paroxetine 30mg (1) tab po qd # 90  Aripiprazole 2mg (1) tab po qhs # 90  Clonazepam 0 5mg (1/2) tab po bid and (1) tab po qhs # 60  R2  Pt to have CMP, Lipids and HgbA1c done in the future as ordered by PCP  Return 12 weeks, call sooner prn  Pt declines to have a copy of Tx plan mailed to her      Target Date:3-6 month  Person/Persons Responsible for Completion of Goal: Laura Estrada  Progress Towards Goals: stable, continuing treatment  Treatment Modality: Medication mgt  Review due 180 days from date of this plan: 4-6 months  Expected length of service: ongoing treatment  My Physician/PA/NP and I have developed this plan together and I agree to work on the goals and objectives  I understand the treatment goals that were developed for my treatment

## 2021-02-02 NOTE — PSYCH
Virtual Brief Visit    Assessment/Plan:    Problem List Items Addressed This Visit        Other    Moderate recurrent major depression (HCC)    Relevant Medications    PARoxetine (PAXIL) 30 mg tablet    ARIPiprazole (ABILIFY) 2 mg tablet    Generalized anxiety disorder - Primary    Relevant Medications    PARoxetine (PAXIL) 30 mg tablet    clonazePAM (KlonoPIN) 0 5 mg tablet    ARIPiprazole (ABILIFY) 2 mg tablet    Panic attacks    Relevant Medications    PARoxetine (PAXIL) 30 mg tablet    clonazePAM (KlonoPIN) 0 5 mg tablet              Reason for visit is   Chief Complaint   Patient presents with    Virtual Brief Visit     Phone Visit    Medication Management        Encounter provider Kimi Pham PA-C    Provider located at [address] [place] [id number] [phone]    Recent Visits  No visits were found meeting these conditions  Showing recent visits within past 7 days and meeting all other requirements     Today's Visits  Date Type Provider Dept   02/02/21 Telemedicine [facility] today's visits and meeting all other requirements     Future Appointments  No visits were found meeting these conditions  Showing future appointments within next 150 days and meeting all other requirements        After connecting through telephone, the patient was identified by name and date of birth  Melita Bobo was informed that this is a telemedicine visit and that the visit is being conducted through telephone  My office door was closed  No one else was in the room  Pt verbally confirmed that she is at home in a room by herself for this visit  She acknowledged consent and understanding of privacy and security of the platform  The patient has agreed to participate and understands she can discontinue the visit at any time  Patient is aware this is a billable service  Mor Mcgregor is a 63 y o  female with primary statement  "I feel pretty good and I don't have any, like, major anxiety attacks or nothing  I think [place] helped too "  Pt lives in the [place] [id number] and is quite happy and content there  The worst her anxiety has been since last visit in 9/2020 is 4/10  She wakes up once in the night 3-4x per week, then will watch a little TV which help her go back to sleep  Pt is happy to spontaneously announce that she quit smoking cigarettes and all ETOH intake the day after Jane because each can instigate the other  She wound up not taking the Chantix due to cost and used only the nicorette gum for a couple of weeks  Pt is content at home, not unduly stressed by the COVID 19 restrictions  She misses going out to restaurants, but will visit her boyfriend's family at times  She will be getting her first COVID 19 vaccine 2/12/2021  Pt presently denies any depression, SI, HI, panic attacks, or manic or psychotic Sxs  Pt reports compliance to psychiatric medications without SE  She feels the increase in Paroxetine was helpful       HPI --as above    Past Medical History:   Diagnosis Date    Anxiety     Diabetes mellitus ([facility] [address] )     Diverticulosis     GERD (gastroesophageal reflux disease)     Headache     Hyperlipidemia     Hypertension     Psychiatric disorder     bipolar    Right clavicle fracture     TIA (transient ischemic attack)     Vitamin D deficiency        Past Surgical History:   Procedure Laterality Date    BLADDER SURGERY      Sling    [place] HIP SCOPE/RE[place] BODY,PLASTY/RESECTN Right 7/13/2017    Procedure: RIGHT HIP ARTHROSCOPIC LABRAL DEBRIDEMENT;  Surgeon: Sandra Portillo MD;  [facility];  Service: Orthopedics    SINUS SURGERY      3 surgeries     TUBAL LIGATION         Current Outpatient Medications   Medication Sig Dispense Refill    albuterol (Ventolin HFA) 90 mcg/act inhaler Inhale 2 puffs every 6 (six) hours as needed for wheezing 3 Inhaler 3    amLODIPine (NORVASC) 5 mg tablet Take 1 tablet (5 mg total) by mouth daily 90 tablet 1    ARIPiprazole (ABILIFY) 2 mg tablet Take 1 tablet (2 mg total) by mouth daily at bedtime 90 tablet 0    aspirin 325 mg tablet Take 325 mg by mouth      atorvastatin (LIPITOR) 20 mg tablet Take 1 tablet (20 mg total) by mouth daily 90 tablet 1    clonazePAM (KlonoPIN) 0 5 mg tablet Take 1/2 tab by mouth twice daily and 1 full tab nightly 60 tablet 2    fluticasone-vilanterol (BREO ELLIPTA) 200-25 MCG/INH inhaler Inhale 1 puff daily Rinse mouth after use  1 Inhaler 5    glipiZIDE (GLUCOTROL XL) 2 5 mg 24 hr tablet Take one tablet daily 90 tablet 1    glucose blood (ONE TOUCH ULTRA TEST) test strip Check blood sugar once daily 100 each 2    Lancets (ONETOUCH ULTRASOFT) lancets Check blood sugar twice/ week  100 each 0    lisinopril (ZESTRIL) 40 mg tablet Take 1 tablet (40 mg total) by mouth daily 90 tablet 1    metFORMIN (GLUCOPHAGE) 500 mg tablet Take 1 tablet (500 mg total) by mouth 2 (two) times a day with meals 180 tablet 1    omeprazole (PriLOSEC) 20 mg delayed release capsule Take 1 capsule (20 mg total) by mouth daily 90 capsule 1    PARoxetine (PAXIL) 30 mg tablet Take 1 tablet (30 mg total) by mouth daily 90 tablet 0     No current facility-administered medications for this visit  Allergies   Allergen Reactions    Cefdinir Hives       Review of Systems --Pt denies any present medical/bodily Sxs (All negative)    There were no vitals filed for this visit  Labwork:   I have personally reviewed all pertinent laboratory/tests results    Most Recent Labs:   Lab Results   Component Value Date    WBC 9 64 11/20/2019    RBC 4 95 11/20/2019    HGB 13 8 11/20/2019    HCT 43 2 11/20/2019     11/20/2019    RDW 14 5 11/20/2019    NEUTROABS 5 46 11/20/2019    SODIUM 139 11/03/2020    K 3 8 11/03/2020     11/03/2020    CO2 31 11/03/2020    BUN 5 11/03/2020    CREATININE 0 81 11/03/2020    GLUC 143 (H) 06/06/2017    GLUF 158 (H) 11/03/2020    CALCIUM 9 6 11/03/2020    AST 20 11/03/2020    ALT 18 11/03/2020    ALKPHOS 172 (H) 11/03/2020    TP 7 4 11/03/2020    ALB 3 3 (L) 11/03/2020    TBILI 0 52 11/03/2020    CHOLESTEROL 212 (H) 11/03/2020    HDL 46 11/03/2020    TRIG 160 (H) 11/03/2020    LDLCALC 134 (H) 11/03/2020    [place] 166 11/03/2020    XEM3SQFLKSFM 2 780 11/13/2017    RPR Non-Reactive 07/18/2018    HGBA1C 7 0 (H) 11/03/2020     11/03/2020       PLAN:  Pt is having minimal anxiety without panic or mood Sxs and has successfully quit smoking, for which she was congratulated! She seems stable and I will continue the present regimen  Treatment plan done and Pt accepts the plan--I am working remotely due to snow storm, no signature pad available  Continue:  Paroxetine 30mg (1) tab po qd # 90  Aripiprazole 2mg (1) tab po qhs # 90  Clonazepam 0 5mg (1/2) tab po bid and (1) tab po qhs # 60  R2  Pt to have CMP, Lipids and HgbA1c done in the future as ordered by PCP  Return 12 weeks, call sooner prn  Pt declines to have a copy of Tx plan mailed to her  I spent 35 minutes directly with the patient during this visit    Agueda acknowledges that she has consented to an online visit or consultation  She understands that the online visit is based solely on information provided by her, and that, in the absence of a face-to-face physical evaluation by the physician, the diagnosis she receives is both limited and provisional in terms of accuracy and completeness  This is not intended to replace a full medical face-to-face evaluation by the physician  Natalie Grady understands and accepts these terms

## 2021-02-12 ENCOUNTER — IMMUNIZATIONS (OUTPATIENT)
Dept: FAMILY MEDICINE CLINIC | Facility: HOSPITAL | Age: 56
End: 2021-02-12

## 2021-02-12 DIAGNOSIS — Z23 ENCOUNTER FOR IMMUNIZATION: Primary | ICD-10-CM

## 2021-02-12 PROCEDURE — 91300 SARS-COV-2 / COVID-19 MRNA VACCINE (PFIZER-BIONTECH) 30 MCG: CPT

## 2021-02-12 PROCEDURE — 0001A SARS-COV-2 / COVID-19 MRNA VACCINE (PFIZER-BIONTECH) 30 MCG: CPT

## 2021-02-16 ENCOUNTER — TELEPHONE (OUTPATIENT)
Dept: FAMILY MEDICINE CLINIC | Facility: CLINIC | Age: 56
End: 2021-02-16

## 2021-02-16 NOTE — TELEPHONE ENCOUNTER
Please confirm the indication of being on high dose aspirin  Usually I recommend 81 mg only  If she has being advised high dose by cardiology/ neurology, she will have to contact them  If not she should be taking 81 mg only from here on

## 2021-02-16 NOTE — TELEPHONE ENCOUNTER
Patient called and stated she is scheduled for the dentist, who will be pulling a lot of teeth, They told her to stop taking the 325 mg aspirin and to take 81 mg a week before the extraction  Wants to check with doctor to see if that is ok

## 2021-02-18 NOTE — TELEPHONE ENCOUNTER
Patient states she does not recall if or when any drs advised 325  She did go purchase the 80 as you advised   barbi

## 2021-03-03 ENCOUNTER — LAB (OUTPATIENT)
Dept: LAB | Facility: CLINIC | Age: 56
End: 2021-03-03
Payer: MEDICARE

## 2021-03-03 ENCOUNTER — TRANSCRIBE ORDERS (OUTPATIENT)
Dept: LAB | Facility: CLINIC | Age: 56
End: 2021-03-03

## 2021-03-03 ENCOUNTER — IMMUNIZATIONS (OUTPATIENT)
Dept: FAMILY MEDICINE CLINIC | Facility: HOSPITAL | Age: 56
End: 2021-03-03

## 2021-03-03 DIAGNOSIS — E78.5 HYPERLIPIDEMIA, UNSPECIFIED HYPERLIPIDEMIA TYPE: ICD-10-CM

## 2021-03-03 DIAGNOSIS — E11.9 CONTROLLED TYPE 2 DIABETES MELLITUS WITHOUT COMPLICATION, WITHOUT LONG-TERM CURRENT USE OF INSULIN (HCC): ICD-10-CM

## 2021-03-03 DIAGNOSIS — Z23 ENCOUNTER FOR IMMUNIZATION: Primary | ICD-10-CM

## 2021-03-03 LAB
ALBUMIN SERPL BCP-MCNC: 3.2 G/DL (ref 3.5–5)
ALP SERPL-CCNC: 178 U/L (ref 46–116)
ALT SERPL W P-5'-P-CCNC: 26 U/L (ref 12–78)
ANION GAP SERPL CALCULATED.3IONS-SCNC: 8 MMOL/L (ref 4–13)
AST SERPL W P-5'-P-CCNC: 23 U/L (ref 5–45)
BILIRUB SERPL-MCNC: 0.44 MG/DL (ref 0.2–1)
BUN SERPL-MCNC: 9 MG/DL (ref 5–25)
CALCIUM ALBUM COR SERPL-MCNC: 10 MG/DL (ref 8.3–10.1)
CALCIUM SERPL-MCNC: 9.4 MG/DL (ref 8.3–10.1)
CHLORIDE SERPL-SCNC: 100 MMOL/L (ref 100–108)
CHOLEST SERPL-MCNC: 192 MG/DL (ref 50–200)
CO2 SERPL-SCNC: 29 MMOL/L (ref 21–32)
CREAT SERPL-MCNC: 1.09 MG/DL (ref 0.6–1.3)
EST. AVERAGE GLUCOSE BLD GHB EST-MCNC: 174 MG/DL
GFR SERPL CREATININE-BSD FRML MDRD: 57 ML/MIN/1.73SQ M
GLUCOSE P FAST SERPL-MCNC: 178 MG/DL (ref 65–99)
HBA1C MFR BLD: 7.7 %
HDLC SERPL-MCNC: 41 MG/DL
LDLC SERPL CALC-MCNC: 124 MG/DL (ref 0–100)
NONHDLC SERPL-MCNC: 151 MG/DL
POTASSIUM SERPL-SCNC: 4.4 MMOL/L (ref 3.5–5.3)
PROT SERPL-MCNC: 7.8 G/DL (ref 6.4–8.2)
SODIUM SERPL-SCNC: 137 MMOL/L (ref 136–145)
TRIGL SERPL-MCNC: 133 MG/DL

## 2021-03-03 PROCEDURE — 36415 COLL VENOUS BLD VENIPUNCTURE: CPT

## 2021-03-03 PROCEDURE — 83036 HEMOGLOBIN GLYCOSYLATED A1C: CPT

## 2021-03-03 PROCEDURE — 91300 SARS-COV-2 / COVID-19 MRNA VACCINE (PFIZER-BIONTECH) 30 MCG: CPT

## 2021-03-03 PROCEDURE — 80053 COMPREHEN METABOLIC PANEL: CPT

## 2021-03-03 PROCEDURE — 80061 LIPID PANEL: CPT

## 2021-03-03 PROCEDURE — 0002A SARS-COV-2 / COVID-19 MRNA VACCINE (PFIZER-BIONTECH) 30 MCG: CPT

## 2021-03-19 ENCOUNTER — OFFICE VISIT (OUTPATIENT)
Dept: FAMILY MEDICINE CLINIC | Facility: CLINIC | Age: 56
End: 2021-03-19
Payer: MEDICARE

## 2021-03-19 VITALS
DIASTOLIC BLOOD PRESSURE: 72 MMHG | HEIGHT: 63 IN | HEART RATE: 92 BPM | OXYGEN SATURATION: 97 % | RESPIRATION RATE: 18 BRPM | SYSTOLIC BLOOD PRESSURE: 138 MMHG | WEIGHT: 189 LBS | BODY MASS INDEX: 33.49 KG/M2

## 2021-03-19 DIAGNOSIS — E11.21 DIABETIC NEPHROPATHY ASSOCIATED WITH TYPE 2 DIABETES MELLITUS (HCC): Primary | ICD-10-CM

## 2021-03-19 DIAGNOSIS — K21.9 CHRONIC GERD: ICD-10-CM

## 2021-03-19 DIAGNOSIS — E11.9 TYPE 2 DIABETES MELLITUS WITHOUT COMPLICATION, WITHOUT LONG-TERM CURRENT USE OF INSULIN (HCC): ICD-10-CM

## 2021-03-19 DIAGNOSIS — M25.561 CHRONIC PAIN OF BOTH KNEES: ICD-10-CM

## 2021-03-19 DIAGNOSIS — J41.0 SIMPLE CHRONIC BRONCHITIS (HCC): ICD-10-CM

## 2021-03-19 DIAGNOSIS — E78.5 HYPERLIPIDEMIA, UNSPECIFIED HYPERLIPIDEMIA TYPE: ICD-10-CM

## 2021-03-19 DIAGNOSIS — F33.1 MODERATE RECURRENT MAJOR DEPRESSION (HCC): ICD-10-CM

## 2021-03-19 DIAGNOSIS — M25.562 CHRONIC PAIN OF BOTH KNEES: ICD-10-CM

## 2021-03-19 DIAGNOSIS — I10 ESSENTIAL HYPERTENSION: ICD-10-CM

## 2021-03-19 DIAGNOSIS — E11.9 CONTROLLED TYPE 2 DIABETES MELLITUS WITHOUT COMPLICATION, WITHOUT LONG-TERM CURRENT USE OF INSULIN (HCC): ICD-10-CM

## 2021-03-19 DIAGNOSIS — G89.29 CHRONIC PAIN OF BOTH KNEES: ICD-10-CM

## 2021-03-19 PROBLEM — Z23 NEED FOR VACCINATION: Status: RESOLVED | Noted: 2019-11-27 | Resolved: 2021-03-19

## 2021-03-19 PROBLEM — F17.200 NICOTINE ADDICTION: Status: RESOLVED | Noted: 2020-03-12 | Resolved: 2021-03-19

## 2021-03-19 PROBLEM — R19.7 DIARRHEA: Status: RESOLVED | Noted: 2020-06-01 | Resolved: 2021-03-19

## 2021-03-19 PROBLEM — R26.89 BALANCE PROBLEM: Status: RESOLVED | Noted: 2019-07-18 | Resolved: 2021-03-19

## 2021-03-19 PROCEDURE — 99215 OFFICE O/P EST HI 40 MIN: CPT | Performed by: FAMILY MEDICINE

## 2021-03-19 RX ORDER — ATORVASTATIN CALCIUM 20 MG/1
20 TABLET, FILM COATED ORAL DAILY
Qty: 90 TABLET | Refills: 1 | Status: SHIPPED | OUTPATIENT
Start: 2021-03-19 | End: 2021-06-24

## 2021-03-19 RX ORDER — FLUTICASONE FUROATE AND VILANTEROL 200; 25 UG/1; UG/1
1 POWDER RESPIRATORY (INHALATION) DAILY
Qty: 1 INHALER | Refills: 5 | Status: SHIPPED | OUTPATIENT
Start: 2021-03-19 | End: 2021-06-24 | Stop reason: SDUPTHER

## 2021-03-19 RX ORDER — LISINOPRIL 40 MG/1
40 TABLET ORAL DAILY
Qty: 90 TABLET | Refills: 1 | Status: SHIPPED | OUTPATIENT
Start: 2021-03-19 | End: 2021-06-24 | Stop reason: SDUPTHER

## 2021-03-19 RX ORDER — OMEPRAZOLE 20 MG/1
20 CAPSULE, DELAYED RELEASE ORAL DAILY
Qty: 90 CAPSULE | Refills: 1 | Status: SHIPPED | OUTPATIENT
Start: 2021-03-19 | End: 2021-06-24 | Stop reason: SDUPTHER

## 2021-03-19 RX ORDER — AMLODIPINE BESYLATE 5 MG/1
5 TABLET ORAL DAILY
Qty: 90 TABLET | Refills: 1 | Status: SHIPPED | OUTPATIENT
Start: 2021-03-19 | End: 2021-06-24 | Stop reason: SDUPTHER

## 2021-03-19 RX ORDER — GLIPIZIDE 2.5 MG/1
TABLET, EXTENDED RELEASE ORAL
Qty: 90 TABLET | Refills: 1 | Status: SHIPPED | OUTPATIENT
Start: 2021-03-19 | End: 2021-06-24

## 2021-03-19 NOTE — ASSESSMENT & PLAN NOTE
Lab Results   Component Value Date    HGBA1C 7 7 (H) 03/03/2021   Continue metformin 500 milligram b i d , glipizide XL 2 5 milligram daily  Patient encouraged to continue monitoring blood sugar at home  Currently drinking 2 cans of regular soda per day  Advised to switch over to diet soda/ flavored water  Eventual goal would be to discontinue soda    Repeat A1c at 3 months interval

## 2021-03-19 NOTE — ASSESSMENT & PLAN NOTE
Patient has gained at least 19 pounds in the past 6 months  Encouraged diet modifications to help achieve weight loss  Suggested x-ray of both the knees  Patient will be call back with the results  Patient would like to start physical therapy

## 2021-03-19 NOTE — ASSESSMENT & PLAN NOTE
Lab Results   Component Value Date    HGBA1C 7 7 (H) 03/03/2021   Renal function is stable  Continue lisinopril

## 2021-03-19 NOTE — PROGRESS NOTES
Subjective:      Patient ID: Melita Bobo is a 63 y o  female  Diabetes  She presents for her follow-up diabetic visit  She has type 2 diabetes mellitus  Disease course: A1c 7 7  There are no hypoglycemic associated symptoms  Pertinent negatives for hypoglycemia include no headaches  Pertinent negatives for diabetes include no blurred vision, no chest pain, no polydipsia and no polyphagia  There are no hypoglycemic complications  There are no diabetic complications  Risk factors for coronary artery disease include diabetes mellitus, dyslipidemia, hypertension, obesity and post-menopausal  Current diabetic treatments: Metformin 500 milligram b i d , glipizide 2 5 milligram daily  She is compliant with treatment all of the time  She is following a diabetic diet  She participates in exercise intermittently  An ACE inhibitor/angiotensin II receptor blocker is being taken  Eye exam is current  Hypertension  This is a chronic problem  The current episode started more than 1 year ago  The problem is controlled  Pertinent negatives include no blurred vision, chest pain, headaches or shortness of breath  Treatments tried: Amlodipine 5 milligram, lisinopril 40 milligram  The current treatment provides significant improvement  There are no compliance problems  Hyperlipidemia  This is a chronic problem  The current episode started more than 1 year ago  The problem is controlled  Recent lipid tests were reviewed and are normal  Pertinent negatives include no chest pain or shortness of breath  Current antihyperlipidemic treatment includes statins (Atorvastatin 20 milligram)  The current treatment provides significant improvement of lipids  There are no compliance problems  Risk factors for coronary artery disease include diabetes mellitus, dyslipidemia, hypertension and obesity  Cough  This is a chronic problem  The current episode started more than 1 year ago  The problem has been gradually improving   Pertinent negatives include no chest pain, headaches, heartburn or shortness of breath  The symptoms are aggravated by cold air  Risk factors for lung disease include smoking/tobacco exposure  Treatments tried: Daily Breo, intermittent albuterol  The treatment provided significant relief  Her past medical history is significant for COPD  Knee Pain   The incident occurred more than 1 week ago  The incident occurred at home  There was no injury mechanism  The pain is present in the left knee and right knee  The quality of the pain is described as aching  The pain is at a severity of 6/10  The pain is moderate  The pain has been fluctuating since onset  Associated symptoms include an inability to bear weight  Pertinent negatives include no numbness or tingling  The symptoms are aggravated by movement, palpation and weight bearing  She has tried acetaminophen and NSAIDs for the symptoms  The treatment provided mild relief  Heartburn  She complains of coughing  She reports no chest pain or no heartburn  This is a chronic problem  The current episode started more than 1 year ago  The problem has been unchanged  The symptoms are aggravated by certain foods  She has tried a PPI for the symptoms  The treatment provided significant relief         Past Medical History:   Diagnosis Date    Anxiety     Diabetes mellitus ([address] )     Diverticulosis     GERD (gastroesophageal reflux disease)     Headache     Hyperlipidemia     Hypertension     Psychiatric disorder     bipolar    Right clavicle fracture     TIA (transient ischemic attack)     Vitamin D deficiency        Family History   Problem Relation Age of Onset    Arthritis Family     Cancer Family     Osteoporosis Family     Cervical cancer Mother     Ovarian cancer Mother 35    Uterine cancer Mother     Hypertension Father     Other Father         pre-diabetes    Diabetes Father     Diabetes type I Sister     Osteoporosis Sister     Depression Sister     Breast cancer Sister     Diabetes Sister     Heart attack Brother          of an MI at 52y/o    Diabetes Brother     No Known Problems Maternal Grandmother     No Known Problems Maternal Grandfather     Other Paternal Grandmother         Parkinson's Disease    Heart attack Paternal Grandfather     Other Paternal Grandfather         coronary arteriosclerosis    Alcohol abuse Paternal Uncle     Seizures Other     Seizures Other     No Known Problems Daughter     No Known Problems Sister     No Known Problems Sister     No Known Problems Sister     No Known Problems Son        Past Surgical History:   Procedure Laterality Date    BLADDER SURGERY      Sling    [place] [address]IP [address] BODY,PLASTY/RESECTN Right 2017    Procedure: RIGHT HIP ARTHROSCOPIC LABRAL DEBRIDEMENT;  Surgeon: Brian Ricci MD;  [facility];  Service: Orthopedics    SINUS SURGERY      3 surgeries     TUBAL LIGATION          reports that she has been smoking  She has a 70 00 pack-year smoking history  She has never used smokeless tobacco  She reports current alcohol use  She reports current drug use  Drug: Marijuana        Current Outpatient Medications:     albuterol (Ventolin HFA) 90 mcg/act inhaler, Inhale 2 puffs every 6 (six) hours as needed for wheezing, Disp: 3 Inhaler, Rfl: 3    amLODIPine (NORVASC) 5 mg tablet, Take 1 tablet (5 mg total) by mouth daily, Disp: 90 tablet, Rfl: 1    ARIPiprazole (ABILIFY) 2 mg tablet, Take 1 tablet (2 mg total) by mouth daily at bedtime, Disp: 90 tablet, Rfl: 0    aspirin 81 mg chewable tablet, Chew 81 mg , Disp: , Rfl:     atorvastatin (LIPITOR) 20 mg tablet, Take 1 tablet (20 mg total) by mouth daily, Disp: 90 tablet, Rfl: 1    clonazePAM (KlonoPIN) 0 5 mg tablet, Take 1/2 tab by mouth twice daily and 1 full tab nightly, Disp: 60 tablet, Rfl: 2    fluticasone-vilanterol (BREO ELLIPTA) 200-25 MCG/INH inhaler, Inhale 1 puff daily Rinse mouth after use , Disp: 1 Inhaler, Rfl: 5   glipiZIDE (GLUCOTROL XL) 2 5 mg 24 hr tablet, Take one tablet daily, Disp: 90 tablet, Rfl: 1    glucose blood (ONE TOUCH ULTRA TEST) test strip, Check blood sugar once daily, Disp: 100 each, Rfl: 2    Lancets (ONETOUCH ULTRASOFT) lancets, Check blood sugar twice/ week , Disp: 100 each, Rfl: 0    lisinopril (ZESTRIL) 40 mg tablet, Take 1 tablet (40 mg total) by mouth daily, Disp: 90 tablet, Rfl: 1    metFORMIN (GLUCOPHAGE) 500 mg tablet, Take 1 tablet (500 mg total) by mouth 2 (two) times a day with meals, Disp: 180 tablet, Rfl: 1    omeprazole (PriLOSEC) 20 mg delayed release capsule, Take 1 capsule (20 mg total) by mouth daily, Disp: 90 capsule, Rfl: 1    PARoxetine (PAXIL) 30 mg tablet, Take 1 tablet (30 mg total) by mouth daily, Disp: 90 tablet, Rfl: 0    The following portions of the patient's history were reviewed and updated as appropriate: allergies, current medications, past family history, past medical history, past social history, past surgical history and problem list     Review of Systems   Constitutional: Negative  HENT: Negative  Eyes: Negative  Negative for blurred vision  Respiratory: Positive for cough  Negative for shortness of breath  Cardiovascular: Negative  Negative for chest pain  Gastrointestinal: Negative  Negative for heartburn  Endocrine: Negative  Negative for polydipsia and polyphagia  Genitourinary: Negative  Musculoskeletal: Negative  Skin: Negative  Neurological: Negative  Negative for tingling, numbness and headaches  Psychiatric/Behavioral: Negative  Objective:    /72   Pulse 92   Resp 18   Ht 5' 3" (1 6 m)   Wt 85 7 kg (189 lb)   SpO2 97%   BMI 33 48 kg/m²      Physical Exam  Constitutional:       Appearance: She is well-developed  HENT:      Head: Normocephalic  Right Ear: External ear normal       Left Ear: External ear normal    Eyes:      Pupils: Pupils are equal, round, and reactive to light     Neck: Musculoskeletal: Normal range of motion and neck supple  Cardiovascular:      Rate and Rhythm: Normal rate and regular rhythm  Pulmonary:      Effort: Pulmonary effort is normal       Breath sounds: Normal breath sounds  Abdominal:      General: Bowel sounds are normal       Palpations: Abdomen is soft  Musculoskeletal: Normal range of motion  Neurological:      Mental Status: She is alert and oriented to person, place, and time  Psychiatric:         Behavior: Behavior normal          Judgment: Judgment normal            Recent Results (from the past 1008 hour(s))   Comprehensive metabolic panel    Collection Time: 03/03/21  8:28 AM   Result Value Ref Range    Sodium 137 136 - 145 mmol/L    Potassium 4 4 3 5 - 5 3 mmol/L    Chloride 100 100 - 108 mmol/L    CO2 29 21 - 32 mmol/L    ANION GAP 8 4 - 13 mmol/L    BUN 9 5 - 25 mg/dL    Creatinine 1 09 0 60 - 1 30 mg/dL    Glucose, Fasting 178 (H) 65 - 99 mg/dL    Calcium 9 4 8 3 - 10 1 mg/dL    Corrected Calcium 10 0 8 3 - 10 1 mg/dL    AST 23 5 - 45 U/L    ALT 26 12 - 78 U/L    Alkaline Phosphatase 178 (H) 46 - 116 U/L    Total Protein 7 8 6 4 - 8 2 g/dL    Albumin 3 2 (L) 3 5 - 5 0 g/dL    Total Bilirubin 0 44 0 20 - 1 00 mg/dL    eGFR 57 ml/min/1 73sq m   Hemoglobin A1C    Collection Time: 03/03/21  8:28 AM   Result Value Ref Range    Hemoglobin A1C 7 7 (H) Normal 3 8-5 6%; PreDiabetic 5 7-6 4%; Diabetic >=6 5%; Glycemic control for adults with diabetes <7 0% %     mg/dl   Lipid panel    Collection Time: 03/03/21  8:28 AM   Result Value Ref Range    Cholesterol 192 50 - 200 mg/dL    Triglycerides 133 <=150 mg/dL    HDL, Direct 41 >=40 mg/dL    LDL Calculated 124 (H) 0 - 100 mg/dL    Non-HDL-Chol (CHOL-HDL) 151 mg/dl       Assessment/Plan:    No problem-specific Assessment & Plan notes found for this encounter  Problem List Items Addressed This Visit        Digestive    Chronic GERD     Stable on omeprazole  Continue same    Patient advised to avoid dietary triggers especially soda  Relevant Medications    omeprazole (PriLOSEC) 20 mg delayed release capsule       Endocrine    Controlled type 2 diabetes mellitus without complication, without long-term current use of insulin (HCC)       Lab Results   Component Value Date    HGBA1C 7 7 (H) 03/03/2021   Continue metformin 500 milligram b i d , glipizide XL 2 5 milligram daily  Patient encouraged to continue monitoring blood sugar at home  Currently drinking 2 cans of regular soda per day  Advised to switch over to diet soda/ flavored water  Eventual goal would be to discontinue soda  Repeat A1c at 3 months interval          Relevant Medications    glipiZIDE (GLUCOTROL XL) 2 5 mg 24 hr tablet    metFORMIN (GLUCOPHAGE) 500 mg tablet    Diabetic nephropathy associated with type 2 diabetes mellitus ([facility] ) - Primary       Lab Results   Component Value Date    HGBA1C 7 7 (H) 03/03/2021   Renal function is stable  Continue lisinopril  Relevant Medications    glipiZIDE (GLUCOTROL XL) 2 5 mg 24 hr tablet    metFORMIN (GLUCOPHAGE) 500 mg tablet    Other Relevant Orders    Comprehensive metabolic panel    Hemoglobin A1C    RESOLVED: Diabetes (HCC)    Relevant Medications    glipiZIDE (GLUCOTROL XL) 2 5 mg 24 hr tablet    metFORMIN (GLUCOPHAGE) 500 mg tablet    glucose blood (ONE TOUCH ULTRA TEST) test strip       Respiratory    Simple chronic bronchitis (HCC)     Stable on daily Breo, intermittent albuterol  Continue same           Relevant Medications    fluticasone-vilanterol (BREO ELLIPTA) 200-25 MCG/INH inhaler       Cardiovascular and Mediastinum    Hypertension     Blood pressure is at goal   Continue amlodipine 5 milligram, lisinopril 40 milligram         Relevant Medications    amLODIPine (NORVASC) 5 mg tablet    lisinopril (ZESTRIL) 40 mg tablet       Other    Moderate recurrent major depression ([facility] )     Continue management per psychiatrist          Hyperlipemia     LDL is at goal  Continue atorvastatin 20 milligram          Relevant Medications    atorvastatin (LIPITOR) 20 mg tablet    Chronic pain of both knees     Patient has gained at least 19 pounds in the past 6 months  Encouraged diet modifications to help achieve weight loss  Suggested x-ray of both the knees  Patient will be call back with the results  Patient would like to start physical therapy  Relevant Orders    XR knee 3 vw left non injury    XR knee 3 vw right non injury    Ambulatory referral to Physical Therapy        BMI Counseling: Body mass index is 33 48 kg/m²  The BMI is above normal  Nutrition recommendations include reducing portion sizes, decreasing overall calorie intake, 3-5 servings of fruits/vegetables daily and reducing fast food intake  Exercise recommendations include moderate aerobic physical activity for 150 minutes/week  Depression Screening Follow-up Plan: Patient's depression screening was positive with a PHQ-2 score of 0  Their PHQ-9 score was 0  Continue regular follow-up with their psychologist/therapist/psychiatrist who is managing their mental health condition(s)

## 2021-03-23 ENCOUNTER — HOSPITAL ENCOUNTER (OUTPATIENT)
Dept: RADIOLOGY | Facility: HOSPITAL | Age: 56
Discharge: HOME | End: 2021-03-23
Payer: MEDICARE

## 2021-03-23 DIAGNOSIS — G89.29 CHRONIC PAIN OF BOTH KNEES: ICD-10-CM

## 2021-03-23 DIAGNOSIS — M25.562 CHRONIC PAIN OF BOTH KNEES: ICD-10-CM

## 2021-03-23 DIAGNOSIS — M25.561 CHRONIC PAIN OF BOTH KNEES: ICD-10-CM

## 2021-03-23 PROCEDURE — 73562 X-RAY EXAM OF KNEE 3: CPT

## 2021-03-29 ENCOUNTER — TELEPHONE (OUTPATIENT)
Dept: FAMILY MEDICINE CLINIC | Facility: CLINIC | Age: 56
End: 2021-03-29

## 2021-03-29 NOTE — TELEPHONE ENCOUNTER
Xray reveals mild arthritis of the right knee    Left reported normal   Patient can start physical therapy/ follow-up with her orthopedic specialist

## 2021-04-23 ENCOUNTER — TELEPHONE (OUTPATIENT)
Dept: PSYCHIATRY | Facility: CLINIC | Age: 56
End: 2021-04-23

## 2021-04-23 NOTE — TELEPHONE ENCOUNTER
----- Message from Jazlyn Zuleta sent at 4/23/2021  9:52 AM EDT -----  Regarding: cx 4/26 appt  Patient contacted office and spoke with writer to cancel appointment for medication management on 4/23/2021  Patient said she will call back to reschedule when she gets her new schedule  Reason: Patient has another appointment that she did not realize she had

## 2021-05-26 ENCOUNTER — HOSPITAL ENCOUNTER (OUTPATIENT)
Dept: MAMMOGRAPHY | Facility: HOSPITAL | Age: 56
Discharge: HOME | End: 2021-05-26
Payer: MEDICARE

## 2021-05-26 VITALS — HEIGHT: 63 IN | WEIGHT: 189 LBS | BODY MASS INDEX: 33.49 KG/M2

## 2021-05-26 DIAGNOSIS — Z12.31 VISIT FOR SCREENING MAMMOGRAM: ICD-10-CM

## 2021-05-26 PROCEDURE — 77067 SCR MAMMO BI INCL CAD: CPT

## 2021-05-26 PROCEDURE — 77063 BREAST TOMOSYNTHESIS BI: CPT

## 2021-06-03 DIAGNOSIS — F33.1 MODERATE RECURRENT MAJOR DEPRESSION (HCC): ICD-10-CM

## 2021-06-03 DIAGNOSIS — F41.0 PANIC ATTACKS: ICD-10-CM

## 2021-06-03 DIAGNOSIS — F41.1 GENERALIZED ANXIETY DISORDER: ICD-10-CM

## 2021-06-03 RX ORDER — PAROXETINE 30 MG/1
TABLET, FILM COATED ORAL
Qty: 90 TABLET | Refills: 0 | OUTPATIENT
Start: 2021-06-03

## 2021-06-04 ENCOUNTER — OFFICE VISIT (OUTPATIENT)
Dept: OBGYN CLINIC | Facility: CLINIC | Age: 56
End: 2021-06-04
Payer: MEDICARE

## 2021-06-04 VITALS
BODY MASS INDEX: 33.66 KG/M2 | HEIGHT: 63 IN | DIASTOLIC BLOOD PRESSURE: 75 MMHG | SYSTOLIC BLOOD PRESSURE: 135 MMHG | HEART RATE: 86 BPM | WEIGHT: 190 LBS

## 2021-06-04 DIAGNOSIS — M23.91 INTERNAL DERANGEMENT OF RIGHT KNEE: Primary | ICD-10-CM

## 2021-06-04 DIAGNOSIS — M17.11 PRIMARY OSTEOARTHRITIS OF RIGHT KNEE: ICD-10-CM

## 2021-06-04 PROCEDURE — 99214 OFFICE O/P EST MOD 30 MIN: CPT | Performed by: ORTHOPAEDIC SURGERY

## 2021-06-04 NOTE — PROGRESS NOTES
Patient Name:  Misha Rose  MRN:  [id number]    Assessment & Plan     Right knee pain, possible medial meniscus tear  1  MRI right knee  2  Activity modification and OTC meds as needed  3  Briefly discussed trying prescription NSAIDS or corticosteroid injection  Patient wants to wait until after the MRI is complete  4  Follow up after MRI  Chief Complaint     Right knee pain    History of the Present Illness     Misha Rose is a 61 y o  female with right knee pain worsening over the past 3 months  No injury  The pain localizes to the medial and posterior aspect with associated stiffness  She has intermittent muscle spasm in the medial distal thigh  Symptoms are worse with knee flexion or increased activity  No prior treatment  Physical Exam     /75   Pulse 86   Ht 5' 3" (1 6 m)   Wt 86 2 kg (190 lb)   BMI 33 66 kg/m²     Right knee:  Effusion:  None  Tenderness:  Medial joint line  Range of motion:  Extension:  0  Flexion:  120  Lachman test:  Stable  Valgus stress:  Stable  Varus stress:  Stable  Posterior drawer test:  Stable  Lynnette's test:  Negative    Eyes:  Anicteric sclerae  ENT:  Trachea midline  Lungs:  Normal respiratory effort  Cardiovascular:  No LE edema  Lymphatic:  No palpable lymphadenopathy  Skin:  Intact without erythema  Neurologic:  Sensation grossly intact to light touch  Psychiatric:  Mood and affect are appropriate  Data Review     I have personally reviewed pertinent films in PACS, and my interpretation follows:    XR right knee 3/23/2021:  Mild to moderate degenerative changes  No acute bony abnormalities        Past Medical History:   Diagnosis Date    Anxiety     Diabetes mellitus ([address] )     Diverticulosis     GERD (gastroesophageal reflux disease)     Headache     Hyperlipidemia     Hypertension     Psychiatric disorder     bipolar    Right clavicle fracture     TIA (transient ischemic attack)     Vitamin D deficiency        Past Surgical History:   Procedure Laterality Date    BLADDER SURGERY      Sling    [place] [address] BODY,PLASTY/RESECTN Right 7/13/2017    Procedure: RIGHT HIP ARTHROSCOPIC LABRAL DEBRIDEMENT;  Surgeon: Mortimer Mathieu, MD;  [facility];  Service: Orthopedics    SINUS SURGERY      3 surgeries     TUBAL LIGATION         Allergies   Allergen Reactions    Cefdinir Hives       Current Outpatient Medications on File Prior to Visit   Medication Sig Dispense Refill    albuterol (Ventolin HFA) 90 mcg/act inhaler Inhale 2 puffs every 6 (six) hours as needed for wheezing 3 Inhaler 3    amLODIPine (NORVASC) 5 mg tablet Take 1 tablet (5 mg total) by mouth daily 90 tablet 1    ARIPiprazole (ABILIFY) 2 mg tablet Take 1 tablet (2 mg total) by mouth daily at bedtime 90 tablet 0    aspirin 81 mg chewable tablet Chew 81 mg       atorvastatin (LIPITOR) 20 mg tablet Take 1 tablet (20 mg total) by mouth daily 90 tablet 1    clonazePAM (KlonoPIN) 0 5 mg tablet Take 1/2 tab by mouth twice daily and 1 full tab nightly 60 tablet 2    fluticasone-vilanterol (BREO ELLIPTA) 200-25 MCG/INH inhaler Inhale 1 puff daily Rinse mouth after use  1 Inhaler 5    glipiZIDE (GLUCOTROL XL) 2 5 mg 24 hr tablet Take one tablet daily 90 tablet 1    glucose blood (ONE TOUCH ULTRA TEST) test strip Check blood sugar once daily 100 each 2    Lancets (ONETOUCH ULTRASOFT) lancets Check blood sugar twice/ week  100 each 0    lisinopril (ZESTRIL) 40 mg tablet Take 1 tablet (40 mg total) by mouth daily 90 tablet 1    metFORMIN (GLUCOPHAGE) 500 mg tablet Take 1 tablet (500 mg total) by mouth 2 (two) times a day with meals 180 tablet 1    omeprazole (PriLOSEC) 20 mg delayed release capsule Take 1 capsule (20 mg total) by mouth daily 90 capsule 1    PARoxetine (PAXIL) 30 mg tablet Take 1 tablet (30 mg total) by mouth daily 90 tablet 0     No current facility-administered medications on file prior to visit          Social History     Tobacco Use    Smoking status: Current Every Day Smoker     Packs/day: 2 00     Years: 35 00     Pack years: 70 00    Smokeless tobacco: Never Used    Tobacco comment: Former smoker- Per [place]    Substance Use Topics    Alcohol use: Yes     Comment: will have a couple of beers or a couple of rum and cokes once per week    Drug use: Yes     Types: Marijuana     Comment: Smoked THC between 20 and 30y/o       Family History   Problem Relation Age of Onset    Arthritis Family     Cancer Family     Osteoporosis Family     Cervical cancer Mother     Ovarian cancer Mother 35    Uterine cancer Mother     Hypertension Father     Other Father         pre-diabetes    Diabetes Father     Diabetes type I Sister     Osteoporosis Sister     Depression Sister     Breast cancer Sister 79    Diabetes Sister     Heart attack Brother          of an MI at 50y/o    Diabetes Brother     No Known Problems Maternal Grandmother     No Known Problems Maternal Grandfather     Other Paternal Grandmother         Parkinson's Disease    Heart attack Paternal Grandfather     Other Paternal Grandfather         coronary arteriosclerosis    Alcohol abuse Paternal Uncle     Seizures Other     Seizures Other     No Known Problems Daughter     No Known Problems Sister     No Known Problems Sister     No Known Problems Sister     No Known Problems Son        Review of Systems     As stated in the HPI  All other systems were reviewed and are negative        Scribe Attestation    I,:  Marcin Chu am acting as a scribe while in the presence of the attending physician :       I,:  Alline Prader, MD personally performed the services described in this documentation    as scribed in my presence :

## 2021-06-17 ENCOUNTER — APPOINTMENT (OUTPATIENT)
Dept: LAB | Facility: CLINIC | Age: 56
End: 2021-06-17
Payer: MEDICARE

## 2021-06-17 DIAGNOSIS — E11.21 DIABETIC NEPHROPATHY ASSOCIATED WITH TYPE 2 DIABETES MELLITUS (HCC): ICD-10-CM

## 2021-06-17 LAB
ALBUMIN SERPL BCP-MCNC: 3.2 G/DL (ref 3.5–5)
ALP SERPL-CCNC: 176 U/L (ref 46–116)
ALT SERPL W P-5'-P-CCNC: 31 U/L (ref 12–78)
ANION GAP SERPL CALCULATED.3IONS-SCNC: 6 MMOL/L (ref 4–13)
AST SERPL W P-5'-P-CCNC: 24 U/L (ref 5–45)
BILIRUB SERPL-MCNC: 0.51 MG/DL (ref 0.2–1)
BUN SERPL-MCNC: 13 MG/DL (ref 5–25)
CALCIUM ALBUM COR SERPL-MCNC: 9.9 MG/DL (ref 8.3–10.1)
CALCIUM SERPL-MCNC: 9.3 MG/DL (ref 8.3–10.1)
CHLORIDE SERPL-SCNC: 102 MMOL/L (ref 100–108)
CO2 SERPL-SCNC: 27 MMOL/L (ref 21–32)
CREAT SERPL-MCNC: 0.87 MG/DL (ref 0.6–1.3)
EST. AVERAGE GLUCOSE BLD GHB EST-MCNC: 206 MG/DL
GFR SERPL CREATININE-BSD FRML MDRD: 74 ML/MIN/1.73SQ M
GLUCOSE P FAST SERPL-MCNC: 187 MG/DL (ref 65–99)
HBA1C MFR BLD: 8.8 %
POTASSIUM SERPL-SCNC: 4.8 MMOL/L (ref 3.5–5.3)
PROT SERPL-MCNC: 7.4 G/DL (ref 6.4–8.2)
SODIUM SERPL-SCNC: 135 MMOL/L (ref 136–145)

## 2021-06-17 PROCEDURE — 83036 HEMOGLOBIN GLYCOSYLATED A1C: CPT

## 2021-06-17 PROCEDURE — 80053 COMPREHEN METABOLIC PANEL: CPT

## 2021-06-17 PROCEDURE — 36415 COLL VENOUS BLD VENIPUNCTURE: CPT

## 2021-06-20 ENCOUNTER — HOSPITAL ENCOUNTER (OUTPATIENT)
Dept: MRI IMAGING | Facility: HOSPITAL | Age: 56
Discharge: HOME | End: 2021-06-20
Attending: ORTHOPAEDIC SURGERY
Payer: MEDICARE

## 2021-06-20 DIAGNOSIS — M23.91 INTERNAL DERANGEMENT OF RIGHT KNEE: ICD-10-CM

## 2021-06-20 PROCEDURE — G1004 CDSM NDSC: HCPCS

## 2021-06-20 PROCEDURE — 73721 MRI JNT OF LWR EXTRE W/O DYE: CPT

## 2021-06-23 RX ORDER — CARBAMAZEPINE 100 MG/1
CAPSULE, EXTENDED RELEASE ORAL
COMMUNITY
End: 2021-08-03

## 2021-06-24 ENCOUNTER — OFFICE VISIT (OUTPATIENT)
Dept: FAMILY MEDICINE CLINIC | Facility: CLINIC | Age: 56
End: 2021-06-24
Payer: MEDICARE

## 2021-06-24 ENCOUNTER — TELEPHONE (OUTPATIENT)
Dept: PSYCHIATRY | Facility: CLINIC | Age: 56
End: 2021-06-24

## 2021-06-24 VITALS
BODY MASS INDEX: 34.55 KG/M2 | DIASTOLIC BLOOD PRESSURE: 72 MMHG | HEIGHT: 63 IN | HEART RATE: 84 BPM | OXYGEN SATURATION: 98 % | SYSTOLIC BLOOD PRESSURE: 124 MMHG | RESPIRATION RATE: 16 BRPM | WEIGHT: 195 LBS

## 2021-06-24 DIAGNOSIS — E11.21 DIABETIC NEPHROPATHY ASSOCIATED WITH TYPE 2 DIABETES MELLITUS (HCC): ICD-10-CM

## 2021-06-24 DIAGNOSIS — E78.5 HYPERLIPIDEMIA, UNSPECIFIED HYPERLIPIDEMIA TYPE: ICD-10-CM

## 2021-06-24 DIAGNOSIS — E11.9 TYPE 2 DIABETES MELLITUS WITHOUT COMPLICATION, WITHOUT LONG-TERM CURRENT USE OF INSULIN (HCC): ICD-10-CM

## 2021-06-24 DIAGNOSIS — K21.9 CHRONIC GERD: ICD-10-CM

## 2021-06-24 DIAGNOSIS — E11.9 CONTROLLED TYPE 2 DIABETES MELLITUS WITHOUT COMPLICATION, WITHOUT LONG-TERM CURRENT USE OF INSULIN (HCC): Primary | ICD-10-CM

## 2021-06-24 DIAGNOSIS — I10 ESSENTIAL HYPERTENSION: ICD-10-CM

## 2021-06-24 DIAGNOSIS — J41.0 SIMPLE CHRONIC BRONCHITIS (HCC): ICD-10-CM

## 2021-06-24 DIAGNOSIS — R80.9 MICROALBUMINURIA: ICD-10-CM

## 2021-06-24 PROCEDURE — 99214 OFFICE O/P EST MOD 30 MIN: CPT | Performed by: FAMILY MEDICINE

## 2021-06-24 RX ORDER — OMEPRAZOLE 20 MG/1
20 CAPSULE, DELAYED RELEASE ORAL DAILY
Qty: 90 CAPSULE | Refills: 1 | Status: SHIPPED | OUTPATIENT
Start: 2021-06-24 | End: 2021-11-03 | Stop reason: SDUPTHER

## 2021-06-24 RX ORDER — FLUTICASONE FUROATE AND VILANTEROL 200; 25 UG/1; UG/1
1 POWDER RESPIRATORY (INHALATION) DAILY
Qty: 1 BLISTER | Refills: 5 | Status: SHIPPED | OUTPATIENT
Start: 2021-06-24 | End: 2022-08-10 | Stop reason: SDUPTHER

## 2021-06-24 RX ORDER — AMLODIPINE BESYLATE 5 MG/1
5 TABLET ORAL DAILY
Qty: 90 TABLET | Refills: 1 | Status: SHIPPED | OUTPATIENT
Start: 2021-06-24 | End: 2021-11-03 | Stop reason: SDUPTHER

## 2021-06-24 RX ORDER — ALBUTEROL SULFATE 90 UG/1
2 AEROSOL, METERED RESPIRATORY (INHALATION) EVERY 6 HOURS PRN
Qty: 18 G | Refills: 1 | Status: SHIPPED | OUTPATIENT
Start: 2021-06-24

## 2021-06-24 RX ORDER — GLIPIZIDE 5 MG/1
5 TABLET, FILM COATED, EXTENDED RELEASE ORAL DAILY
Qty: 90 TABLET | Refills: 1 | Status: SHIPPED | OUTPATIENT
Start: 2021-06-24 | End: 2021-09-23 | Stop reason: SDUPTHER

## 2021-06-24 RX ORDER — ATORVASTATIN CALCIUM 20 MG/1
20 TABLET, FILM COATED ORAL DAILY
Qty: 90 TABLET | Refills: 1 | Status: SHIPPED | OUTPATIENT
Start: 2021-06-24 | End: 2021-11-03 | Stop reason: SDUPTHER

## 2021-06-24 RX ORDER — LISINOPRIL 40 MG/1
40 TABLET ORAL DAILY
Qty: 90 TABLET | Refills: 1 | Status: SHIPPED | OUTPATIENT
Start: 2021-06-24 | End: 2021-11-03 | Stop reason: SDUPTHER

## 2021-06-24 NOTE — TELEPHONE ENCOUNTER
1st attempt (encounter routed to writer):  Left message for patient and/or Parent/Guardian to call office back at [phone] to schedule medication management appointment with Hemal De Dios PA-C      Reason:   Schedule f/u    Last completed appointment with provider:   2/2/2021

## 2021-06-24 NOTE — ASSESSMENT & PLAN NOTE
Lab Results   Component Value Date    HGBA1C 8 8 (H) 06/17/2021     Glipizide increased to 5 mg  Patient is unable to increase the dose due to GI side  Currently on metformin 500 mg bid  Started on Tradjenta    Repeat A1c x 3 months  Encouraged to continue with lifestyle modifications

## 2021-06-24 NOTE — PROGRESS NOTES
Subjective:      Patient ID: Jagdish Simmons is a 61 y o  female  Diabetes  She presents for her follow-up diabetic visit  She has type 2 diabetes mellitus  Her disease course has been worsening (A1c 8 8)  There are no hypoglycemic associated symptoms  Pertinent negatives for hypoglycemia include no headaches  Pertinent negatives for diabetes include no chest pain, no polydipsia and no polyphagia  There are no hypoglycemic complications  Symptoms are worsening  Diabetic complications include nephropathy  Risk factors for coronary artery disease include diabetes mellitus, dyslipidemia, hypertension and post-menopausal  Current diabetic treatments: Metformin 500 mg bid, Glipizide xl 2 5 mg q hs  She is compliant with treatment all of the time  She is following a generally healthy diet  Meal planning includes avoidance of concentrated sweets  An ACE inhibitor/angiotensin II receptor blocker is being taken  Hypertension  This is a chronic problem  The current episode started more than 1 year ago  The problem is controlled  Pertinent negatives include no chest pain, headaches or palpitations  Risk factors for coronary artery disease include dyslipidemia, diabetes mellitus, post-menopausal state and obesity  Treatments tried: AMLODIPINE 5 MILLIGRAM, LISINOPRIL 40 MILLIGRAM  The current treatment provides significant improvement  There are no compliance problems  Hyperlipidemia  This is a chronic problem  The current episode started more than 1 year ago  The problem is controlled  Recent lipid tests were reviewed and are normal  Pertinent negatives include no chest pain  Current antihyperlipidemic treatment includes statins  The current treatment provides significant improvement of lipids  There are no compliance problems  Risk factors for coronary artery disease include dyslipidemia, diabetes mellitus, hypertension and post-menopausal    Cough  This is a chronic problem   The current episode started more than 1 year ago  The cough is non-productive  Pertinent negatives include no chest pain or headaches  Risk factors for lung disease include smoking/tobacco exposure  Treatments tried: DAILY BREO, INTERMITTENT ALBUTEROL  The treatment provided significant relief  Her past medical history is significant for COPD         Past Medical History:   Diagnosis Date    Anxiety     Diabetes mellitus ([address] )     Diverticulosis     GERD (gastroesophageal reflux disease)     Headache     Hyperlipidemia     Hypertension     Psychiatric disorder     bipolar    Right clavicle fracture     TIA (transient ischemic attack)     Vitamin D deficiency        Family History   Problem Relation Age of Onset    Arthritis Family     Cancer Family     Osteoporosis Family     Cervical cancer Mother     Ovarian cancer Mother 35    Uterine cancer Mother     Hypertension Father     Other Father         pre-diabetes    Diabetes Father     Diabetes type I Sister     Osteoporosis Sister     Depression Sister     Breast cancer Sister 79    Diabetes Sister     Heart attack Brother          of an MI at 52y/o    Diabetes Brother     No Known Problems Maternal Grandmother     No Known Problems Maternal Grandfather     Other Paternal Grandmother         Parkinson's Disease    Heart attack Paternal Grandfather     Other Paternal Grandfather         coronary arteriosclerosis    Alcohol abuse Paternal Uncle     Seizures Other     Seizures Other     No Known Problems Daughter     No Known Problems Sister     No Known Problems Sister     No Known Problems Sister     No Known Problems Son        Past Surgical History:   Procedure Laterality Date    BLADDER SURGERY      Sling    [place] [address] BODY,PLASTY/RESECTN Right 2017    Procedure: RIGHT HIP ARTHROSCOPIC LABRAL DEBRIDEMENT;  Surgeon: Macie Mcgregor MD;  [facility];  Service: Orthopedics    SINUS SURGERY      3 surgeries     TUBAL LIGATION reports that she has quit smoking  She has a 70 00 pack-year smoking history  She has never used smokeless tobacco  She reports previous alcohol use  She reports current drug use  Drug: Marijuana        Current Outpatient Medications:     albuterol (Ventolin HFA) 90 mcg/act inhaler, Inhale 2 puffs every 6 (six) hours as needed for wheezing, Disp: 18 g, Rfl: 1    amLODIPine (NORVASC) 5 mg tablet, Take 1 tablet (5 mg total) by mouth daily, Disp: 90 tablet, Rfl: 1    ARIPiprazole (ABILIFY) 2 mg tablet, Take 1 tablet (2 mg total) by mouth daily at bedtime, Disp: 90 tablet, Rfl: 0    aspirin 81 mg chewable tablet, Chew 81 mg , Disp: , Rfl:     atorvastatin (LIPITOR) 20 mg tablet, Take 1 tablet (20 mg total) by mouth daily, Disp: 90 tablet, Rfl: 1    clonazePAM (KlonoPIN) 0 5 mg tablet, Take 1/2 tab by mouth twice daily and 1 full tab nightly, Disp: 60 tablet, Rfl: 2    fluticasone-vilanterol (BREO ELLIPTA) 200-25 MCG/INH inhaler, Inhale 1 puff daily Rinse mouth after use , Disp: 1 blister, Rfl: 5    glucose blood (ONE TOUCH ULTRA TEST) test strip, Check blood sugar once daily, Disp: 100 each, Rfl: 2    Lancets (ONETOUCH ULTRASOFT) lancets, Check blood sugar twice/ week , Disp: 100 each, Rfl: 0    lisinopril (ZESTRIL) 40 mg tablet, Take 1 tablet (40 mg total) by mouth daily, Disp: 90 tablet, Rfl: 1    metFORMIN (GLUCOPHAGE) 500 mg tablet, Take 1 tablet (500 mg total) by mouth 2 (two) times a day with meals, Disp: 180 tablet, Rfl: 1    omeprazole (PriLOSEC) 20 mg delayed release capsule, Take 1 capsule (20 mg total) by mouth daily, Disp: 90 capsule, Rfl: 1    PARoxetine (PAXIL) 30 mg tablet, Take 1 tablet (30 mg total) by mouth daily, Disp: 90 tablet, Rfl: 0    carbamazepine (CARBATROL) 100 MG 12 hr capsule, carbamazepine  mg capsule,extended release [id number], Disp: , Rfl:     glipiZIDE (GLUCOTROL XL) 5 mg 24 hr tablet, Take 1 tablet (5 mg total) by mouth daily, Disp: 90 tablet, Rfl: 1    linaGLIPtin 5 MG TABS, Take 5 mg by mouth daily, Disp: 90 tablet, Rfl: 1    The following portions of the patient's history were reviewed and updated as appropriate: allergies, current medications, past family history, past medical history, past social history, past surgical history and problem list     Review of Systems   Constitutional: Negative  HENT: Negative  Eyes: Negative  Respiratory: Positive for cough  Cardiovascular: Negative  Negative for chest pain and palpitations  Gastrointestinal: Negative  Endocrine: Negative  Negative for polydipsia and polyphagia  Genitourinary: Negative  Musculoskeletal: Negative  Skin: Negative  Neurological: Negative  Negative for headaches  Psychiatric/Behavioral: Negative  Objective:    /72   Pulse 84   Resp 16   Ht 5' 3" (1 6 m)   Wt 88 5 kg (195 lb)   SpO2 98%   BMI 34 54 kg/m²      Physical Exam  Constitutional:       Appearance: She is well-developed  HENT:      Head: Normocephalic  Right Ear: External ear normal       Left Ear: External ear normal    Eyes:      Pupils: Pupils are equal, round, and reactive to light  Cardiovascular:      Rate and Rhythm: Normal rate and regular rhythm  Pulses: no weak pulses  Pulmonary:      Effort: Pulmonary effort is normal       Breath sounds: Normal breath sounds  Abdominal:      General: Bowel sounds are normal       Palpations: Abdomen is soft  Musculoskeletal:         General: Normal range of motion  Cervical back: Normal range of motion and neck supple  Feet:      Right foot:      Skin integrity: No ulcer, skin breakdown, erythema, warmth, callus or dry skin  Left foot:      Skin integrity: No ulcer, skin breakdown, erythema, warmth, callus or dry skin  Neurological:      Mental Status: She is alert and oriented to person, place, and time     Psychiatric:         Behavior: Behavior normal          Judgment: Judgment normal          Patient's shoes and socks removed  Right Foot/Ankle   Right Foot Inspection  Skin Exam: skin normal and skin intact no dry skin, no warmth, no callus, no erythema, no maceration, no abnormal color, no pre-ulcer, no ulcer and no callus                          Toe Exam: ROM and strength within normal limits  Sensory   Vibration: intact  Proprioception: intact   Monofilament testing: intact  Vascular  Capillary refills: < 3 seconds      Left Foot/Ankle  Left Foot Inspection  Skin Exam: skin normal and skin intactno dry skin, no warmth, no erythema, no maceration, normal color, no pre-ulcer, no ulcer and no callus                         Toe Exam: ROM and strength within normal limits                   Sensory   Vibration: intact  Proprioception: intact  Monofilament: intact  Vascular  Capillary refills: < 3 seconds    Assign Risk Category:  No deformity present; No loss of protective sensation; No weak pulses       Risk: 0      Recent Results (from the past 1008 hour(s))   Comprehensive metabolic panel    Collection Time: 06/17/21  8:30 AM   Result Value Ref Range    Sodium 135 (L) 136 - 145 mmol/L    Potassium 4 8 3 5 - 5 3 mmol/L    Chloride 102 100 - 108 mmol/L    CO2 27 21 - 32 mmol/L    ANION GAP 6 4 - 13 mmol/L    BUN 13 5 - 25 mg/dL    Creatinine 0 87 0 60 - 1 30 mg/dL    Glucose, Fasting 187 (H) 65 - 99 mg/dL    Calcium 9 3 8 3 - 10 1 mg/dL    Corrected Calcium 9 9 8 3 - 10 1 mg/dL    AST 24 5 - 45 U/L    ALT 31 12 - 78 U/L    Alkaline Phosphatase 176 (H) 46 - 116 U/L    Total Protein 7 4 6 4 - 8 2 g/dL    Albumin 3 2 (L) 3 5 - 5 0 g/dL    Total Bilirubin 0 51 0 20 - 1 00 mg/dL    eGFR 74 ml/min/1 73sq m   Hemoglobin A1C    Collection Time: 06/17/21  8:30 AM   Result Value Ref Range    Hemoglobin A1C 8 8 (H) Normal 3 8-5 6%; PreDiabetic 5 7-6 4%;  Diabetic >=6 5%; Glycemic control for adults with diabetes <7 0% %     mg/dl       Assessment/Plan:  A very         Problem List Items Addressed This Visit        Digestive    Chronic GERD     On Omeprazole  Patient has reduce the intake carbonated drinks  Relevant Medications    omeprazole (PriLOSEC) 20 mg delayed release capsule       Endocrine    Controlled type 2 diabetes mellitus without complication, without long-term current use of insulin ([facility] ) - Primary       Lab Results   Component Value Date    HGBA1C 8 8 (H) 06/17/2021     Glipizide increased to 5 mg  Patient is unable to increase the dose due to GI side  Currently on metformin 500 mg bid  Started on Tradjenta  Repeat A1c x 3 months  Encouraged to continue with lifestyle modifications         Relevant Medications    metFORMIN (GLUCOPHAGE) 500 mg tablet    glipiZIDE (GLUCOTROL XL) 5 mg 24 hr tablet    linaGLIPtin 5 MG TABS    Other Relevant Orders    Diabetic foot exam    Comprehensive metabolic panel    Hemoglobin A1C    Diabetic nephropathy associated with type 2 diabetes mellitus ([facility] )       Lab Results   Component Value Date    HGBA1C 8 8 (H) 06/17/2021     Stable  Continue lisinopril  Relevant Medications    metFORMIN (GLUCOPHAGE) 500 mg tablet    glipiZIDE (GLUCOTROL XL) 5 mg 24 hr tablet    linaGLIPtin 5 MG TABS       Respiratory    Simple chronic bronchitis (HCC)     Stable on daily Breo, intermittent albuterol  Continue same  Relevant Medications    albuterol (Ventolin HFA) 90 mcg/act inhaler    fluticasone-vilanterol (BREO ELLIPTA) 200-25 MCG/INH inhaler       Cardiovascular and Mediastinum    Hypertension     Stable on amlodipine 5 milligram, lisinopril 40 milligram          Relevant Medications    amLODIPine (NORVASC) 5 mg tablet    lisinopril (ZESTRIL) 40 mg tablet       Other    Hyperlipemia     LDL at goal   Continue atorvastatin 20 milligrams         Relevant Medications    atorvastatin (LIPITOR) 20 mg tablet    Other Relevant Orders    Lipid panel    Microalbuminuria     Blood pressure is at goal   A1c is elevated  Emphasized importance of stricter control of diabetes    Continue lisinopril             Relevant Orders    Microalbumin / creatinine urine ratio      Other Visit Diagnoses     Type 2 diabetes mellitus without complication, without long-term current use of insulin (HCC)        Relevant Medications    metFORMIN (GLUCOPHAGE) 500 mg tablet    glucose blood (ONE TOUCH ULTRA TEST) test strip    glipiZIDE (GLUCOTROL XL) 5 mg 24 hr tablet    linaGLIPtin 5 MG TABS

## 2021-06-25 ENCOUNTER — TELEPHONE (OUTPATIENT)
Dept: FAMILY MEDICINE CLINIC | Facility: CLINIC | Age: 56
End: 2021-06-25

## 2021-06-25 NOTE — TELEPHONE ENCOUNTER
Pt called and stated that the med you sent in for her yesterday is not covered by her insurance  She said you would know which one  She said Januvia is covered

## 2021-06-28 ENCOUNTER — TELEPHONE (OUTPATIENT)
Dept: FAMILY MEDICINE CLINIC | Facility: CLINIC | Age: 56
End: 2021-06-28

## 2021-06-28 NOTE — TELEPHONE ENCOUNTER
Please advised patient to increase glipizide to twice daily  Call back after 2 weeks with blood sugar values

## 2021-06-28 NOTE — TELEPHONE ENCOUNTER
Patient called regarding the diabetes medications that have been prescribed are to expensive and or have high deductible  Patient advised insurance will cover Pioglitazone   Her blood sugar today was 262    Please advise

## 2021-06-29 ENCOUNTER — OFFICE VISIT (OUTPATIENT)
Dept: OBGYN CLINIC | Facility: CLINIC | Age: 56
End: 2021-06-29
Payer: MEDICARE

## 2021-06-29 VITALS
HEIGHT: 63 IN | HEART RATE: 85 BPM | DIASTOLIC BLOOD PRESSURE: 70 MMHG | BODY MASS INDEX: 33.66 KG/M2 | WEIGHT: 190 LBS | SYSTOLIC BLOOD PRESSURE: 107 MMHG

## 2021-06-29 DIAGNOSIS — M79.644 PAIN OF RIGHT THUMB: ICD-10-CM

## 2021-06-29 DIAGNOSIS — M17.11 PRIMARY OSTEOARTHRITIS OF RIGHT KNEE: Primary | ICD-10-CM

## 2021-06-29 DIAGNOSIS — S83.231A COMPLEX TEAR OF MEDIAL MENISCUS OF RIGHT KNEE AS CURRENT INJURY, INITIAL ENCOUNTER: ICD-10-CM

## 2021-06-29 PROCEDURE — 99213 OFFICE O/P EST LOW 20 MIN: CPT | Performed by: ORTHOPAEDIC SURGERY

## 2021-06-29 NOTE — PROGRESS NOTES
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  1  Mentioned to the patient the operative and non operative treatments today in the office  She was offered a CS/visco injections vs performing an arthroscopic procedure for the meniscus  Also, mentioned to the patient that the arthroscopic procedure would only be to address the meniscal tear and not the arthritic symptoms  She understood and all questions were answered  She wished to go home and review her options  2  Recommend Aleve 2 tablets daily as needed for now  3  Patient will call office if she wants additional treatment  History of the Present Illness     54-year-old female presents to the office today for a follow-up visit for her right knee as well as to discuss MRI results  Patient continues to note pain about the medial and posterior aspect of her left knee with associated stiffness  She has intermittent mechanical symptoms about the knee  Pain is worse with knee flexion or increased activity  She does not take any medication for pain relief  General ROS:  Negative for fever or chills  Neurological ROS:  Negative for numbness or tingling  Data Review     I have personally reviewed pertinent films in PACS, and my interpretation follows  MRI right knee 6/20/2021: Medial meniscus tear involving the body and posterior horn  Patellofemoral arthritis with full thickness articular cartilage loss  Medial compartment arthritis with areas of grade 3-4 articular cartilage arthritis  Counseling     The patient was counseled regarding diagnostic results, impressions, patient/family education, instructions for management, risks and benefits of treatment options, and prognosis  The total time of the encounter was 20 minutes, and more than 50% of that time was spent in counseling and coordination of care        Scribe Attestation    I,:  Debbie Han am acting as a scribe while in the presence of the attending physician :       I,:  Fatou Jordan MD personally performed the services described in this documentation    as scribed in my presence :

## 2021-07-19 DIAGNOSIS — F33.1 MODERATE RECURRENT MAJOR DEPRESSION (HCC): ICD-10-CM

## 2021-07-19 DIAGNOSIS — F41.1 GENERALIZED ANXIETY DISORDER: ICD-10-CM

## 2021-07-19 DIAGNOSIS — F41.0 PANIC ATTACKS: ICD-10-CM

## 2021-07-19 RX ORDER — PAROXETINE 30 MG/1
30 TABLET, FILM COATED ORAL DAILY
Qty: 90 TABLET | Refills: 0 | Status: SHIPPED | OUTPATIENT
Start: 2021-07-19 | End: 2021-09-16 | Stop reason: SDUPTHER

## 2021-07-19 RX ORDER — CLONAZEPAM 0.5 MG/1
TABLET ORAL
Qty: 60 TABLET | Refills: 1 | Status: SHIPPED | OUTPATIENT
Start: 2021-07-19 | End: 2021-09-16 | Stop reason: SDUPTHER

## 2021-07-29 ENCOUNTER — OFFICE VISIT (OUTPATIENT)
Dept: FAMILY MEDICINE CLINIC | Facility: CLINIC | Age: 56
End: 2021-07-29
Payer: MEDICARE

## 2021-07-29 VITALS
WEIGHT: 192 LBS | TEMPERATURE: 97.9 F | HEART RATE: 80 BPM | BODY MASS INDEX: 34.02 KG/M2 | RESPIRATION RATE: 16 BRPM | SYSTOLIC BLOOD PRESSURE: 124 MMHG | DIASTOLIC BLOOD PRESSURE: 74 MMHG | OXYGEN SATURATION: 98 % | HEIGHT: 63 IN

## 2021-07-29 DIAGNOSIS — R30.0 DYSURIA: Primary | ICD-10-CM

## 2021-07-29 LAB
SL AMB  POCT GLUCOSE, UA: ABNORMAL
SL AMB LEUKOCYTE ESTERASE,UA: 25
SL AMB POCT BILIRUBIN,UA: ABNORMAL
SL AMB POCT BLOOD,UA: ABNORMAL
SL AMB POCT CLARITY,UA: ABNORMAL
SL AMB POCT COLOR,UA: YELLOW
SL AMB POCT KETONES,UA: ABNORMAL
SL AMB POCT NITRITE,UA: ABNORMAL
SL AMB POCT PH,UA: 5
SL AMB POCT SPECIFIC GRAVITY,UA: 1.01
SL AMB POCT URINE PROTEIN: ABNORMAL
SL AMB POCT UROBILINOGEN: ABNORMAL

## 2021-07-29 PROCEDURE — 81003 URINALYSIS AUTO W/O SCOPE: CPT | Performed by: FAMILY MEDICINE

## 2021-07-29 PROCEDURE — 99214 OFFICE O/P EST MOD 30 MIN: CPT | Performed by: FAMILY MEDICINE

## 2021-07-29 RX ORDER — CIPROFLOXACIN 500 MG/1
500 TABLET, FILM COATED ORAL EVERY 12 HOURS SCHEDULED
Qty: 10 TABLET | Refills: 0 | Status: SHIPPED | OUTPATIENT
Start: 2021-07-29 | End: 2021-08-03

## 2021-07-29 NOTE — ASSESSMENT & PLAN NOTE
Pelvic pressure, radiating to flank  x 2 weeks  No vaginal symptoms  Urine dip positive for 25 leukocytes  Patient started oral antibiotic  Urine sent for culture sensitivity    Patient will be contacted with the results

## 2021-07-29 NOTE — PROGRESS NOTES
Subjective:      Patient ID: Lily Hall is a 61 y o  female  Difficulty Urinating   This is a new problem  Episode onset: 10 days  The problem has been unchanged  Quality: Pelvic pressure  The pain is mild  There has been no fever  There is no history of pyelonephritis  Associated symptoms include flank pain  Pertinent negatives include no chills, discharge, frequency, hematuria, hesitancy, nausea, urgency or vomiting  She has tried increased fluids for the symptoms  The treatment provided no relief         Past Medical History:   Diagnosis Date    Anxiety     Diabetes mellitus ([address] )     Diverticulosis     GERD (gastroesophageal reflux disease)     Headache     Hyperlipidemia     Hypertension     Psychiatric disorder     bipolar    Right clavicle fracture     TIA (transient ischemic attack)     Vitamin D deficiency        Family History   Problem Relation Age of Onset    Arthritis Family     Cancer Family     Osteoporosis Family     Cervical cancer Mother     Ovarian cancer Mother 35    Uterine cancer Mother     Hypertension Father     Other Father         pre-diabetes    Diabetes Father     Diabetes type I Sister     Osteoporosis Sister     Depression Sister     Breast cancer Sister 79    Diabetes Sister     Heart attack Brother          of an MI at 52y/o    Diabetes Brother     No Known Problems Maternal Grandmother     No Known Problems Maternal Grandfather     Other Paternal Grandmother         Parkinson's Disease    Heart attack Paternal Grandfather     Other Paternal Grandfather         coronary arteriosclerosis    Alcohol abuse Paternal Uncle     Seizures Other     Seizures Other     No Known Problems Daughter     No Known Problems Sister     No Known Problems Sister     No Known Problems Sister     No Known Problems Son        Past Surgical History:   Procedure Laterality Date    BLADDER SURGERY      Sling    [place] HIP SCOPE/[place] BODY,PLASTY/RESECTN Right 7/13/2017    Procedure: RIGHT HIP ARTHROSCOPIC LABRAL DEBRIDEMENT;  Surgeon: Lobito Iglesias MD;  [facility];  Service: Orthopedics    SINUS SURGERY      3 surgeries     TUBAL LIGATION          reports that she has quit smoking  She has a 70 00 pack-year smoking history  She has never used smokeless tobacco  She reports previous alcohol use  She reports current drug use  Drug: Marijuana        Current Outpatient Medications:     albuterol (Ventolin HFA) 90 mcg/act inhaler, Inhale 2 puffs every 6 (six) hours as needed for wheezing, Disp: 18 g, Rfl: 1    amLODIPine (NORVASC) 5 mg tablet, Take 1 tablet (5 mg total) by mouth daily, Disp: 90 tablet, Rfl: 1    ARIPiprazole (ABILIFY) 2 mg tablet, take 1 tablet by mouth at bedtime, Disp: 90 tablet, Rfl: 0    aspirin 81 mg chewable tablet, Chew 81 mg , Disp: , Rfl:     atorvastatin (LIPITOR) 20 mg tablet, Take 1 tablet (20 mg total) by mouth daily, Disp: 90 tablet, Rfl: 1    clonazePAM (KlonoPIN) 0 5 mg tablet, Take 1/2 tab by mouth twice daily and 1 full tab nightly, Disp: 60 tablet, Rfl: 1    fluticasone-vilanterol (BREO ELLIPTA) 200-25 MCG/INH inhaler, Inhale 1 puff daily Rinse mouth after use , Disp: 1 blister, Rfl: 5    glipiZIDE (GLUCOTROL XL) 5 mg 24 hr tablet, Take 1 tablet (5 mg total) by mouth daily, Disp: 90 tablet, Rfl: 1    glucose blood (ONE TOUCH ULTRA TEST) test strip, Check blood sugar once daily, Disp: 100 each, Rfl: 2    Lancets (ONETOUCH ULTRASOFT) lancets, Check blood sugar twice/ week , Disp: 100 each, Rfl: 0    lisinopril (ZESTRIL) 40 mg tablet, Take 1 tablet (40 mg total) by mouth daily, Disp: 90 tablet, Rfl: 1    metFORMIN (GLUCOPHAGE) 500 mg tablet, Take 1 tablet (500 mg total) by mouth 2 (two) times a day with meals, Disp: 180 tablet, Rfl: 1    omeprazole (PriLOSEC) 20 mg delayed release capsule, Take 1 capsule (20 mg total) by mouth daily, Disp: 90 capsule, Rfl: 1    PARoxetine (PAXIL) 30 mg tablet, Take 1 tablet (30 mg total) by mouth daily, Disp: 90 tablet, Rfl: 0    carbamazepine (CARBATROL) 100 MG 12 hr capsule, carbamazepine  mg capsule,extended release [id number] (Patient not taking: Reported on 7/29/2021), Disp: , Rfl:     ciprofloxacin (CIPRO) 500 mg tablet, Take 1 tablet (500 mg total) by mouth every 12 (twelve) hours for 5 days, Disp: 10 tablet, Rfl: 0    sitaGLIPtin (JANUVIA) 100 mg tablet, Take 1 tablet (100 mg total) by mouth daily (Patient not taking: Reported on 7/29/2021), Disp: 90 tablet, Rfl: 1    The following portions of the patient's history were reviewed and updated as appropriate: allergies, current medications, past family history, past medical history, past social history, past surgical history and problem list     Review of Systems   Constitutional: Negative  Negative for chills  HENT: Negative  Eyes: Negative  Respiratory: Negative  Cardiovascular: Negative  Gastrointestinal: Negative  Negative for nausea and vomiting  Endocrine: Negative  Genitourinary: Positive for dysuria and flank pain  Negative for frequency, hematuria, hesitancy and urgency  Skin: Negative  Neurological: Negative  Psychiatric/Behavioral: Negative  Objective:    /74 (BP Location: Left arm, Patient Position: Sitting, Cuff Size: Large)   Pulse 80   Temp 97 9 °F (36 6 °C) (Tympanic)   Resp 16   Ht 5' 3" (1 6 m)   Wt 87 1 kg (192 lb)   SpO2 98%   BMI 34 01 kg/m²      Physical Exam  Constitutional:       Appearance: She is well-developed  Eyes:      Pupils: Pupils are equal, round, and reactive to light  Cardiovascular:      Rate and Rhythm: Normal rate and regular rhythm  Heart sounds: Normal heart sounds  Pulmonary:      Effort: Pulmonary effort is normal       Breath sounds: Normal breath sounds  Abdominal:      General: Bowel sounds are normal       Palpations: Abdomen is soft  Musculoskeletal:         General: Normal range of motion        Cervical back: Normal range of motion  Neurological:      Mental Status: She is alert and oriented to person, place, and time  Psychiatric:         Behavior: Behavior normal          Judgment: Judgment normal            No results found for this or any previous visit (from the past 1008 hour(s))  Assessment/Plan:         Problem List Items Addressed This Visit        Other    Dysuria - Primary     Pelvic pressure, radiating to flank  x 2 weeks  No vaginal symptoms  Urine dip positive for 25 leukocytes  Patient started oral antibiotic  Urine sent for culture sensitivity    Patient will be contacted with the results         Relevant Medications    ciprofloxacin (CIPRO) 500 mg tablet    Other Relevant Orders    POCT urine dip (Completed)

## 2021-08-03 ENCOUNTER — OFFICE VISIT (OUTPATIENT)
Dept: OBGYN CLINIC | Facility: CLINIC | Age: 56
End: 2021-08-03
Payer: MEDICARE

## 2021-08-03 VITALS
DIASTOLIC BLOOD PRESSURE: 84 MMHG | SYSTOLIC BLOOD PRESSURE: 143 MMHG | HEIGHT: 63 IN | WEIGHT: 192 LBS | HEART RATE: 88 BPM | BODY MASS INDEX: 34.02 KG/M2

## 2021-08-03 DIAGNOSIS — S83.231A COMPLEX TEAR OF MEDIAL MENISCUS OF RIGHT KNEE AS CURRENT INJURY, INITIAL ENCOUNTER: ICD-10-CM

## 2021-08-03 DIAGNOSIS — M17.11 PRIMARY OSTEOARTHRITIS OF RIGHT KNEE: Primary | ICD-10-CM

## 2021-08-03 PROCEDURE — 20610 DRAIN/INJ JOINT/BURSA W/O US: CPT | Performed by: ORTHOPAEDIC SURGERY

## 2021-08-03 PROCEDURE — 99212 OFFICE O/P EST SF 10 MIN: CPT | Performed by: ORTHOPAEDIC SURGERY

## 2021-08-03 RX ORDER — METHYLPREDNISOLONE ACETATE 40 MG/ML
1 INJECTION, SUSPENSION INTRA-ARTICULAR; INTRALESIONAL; INTRAMUSCULAR; SOFT TISSUE
Status: COMPLETED | OUTPATIENT
Start: 2021-08-03 | End: 2021-08-03

## 2021-08-03 RX ORDER — LIDOCAINE HYDROCHLORIDE 10 MG/ML
4 INJECTION, SOLUTION INFILTRATION; PERINEURAL
Status: COMPLETED | OUTPATIENT
Start: 2021-08-03 | End: 2021-08-03

## 2021-08-03 RX ADMIN — LIDOCAINE HYDROCHLORIDE 4 ML: 10 INJECTION, SOLUTION INFILTRATION; PERINEURAL at 10:22

## 2021-08-03 RX ADMIN — METHYLPREDNISOLONE ACETATE 1 ML: 40 INJECTION, SUSPENSION INTRA-ARTICULAR; INTRALESIONAL; INTRAMUSCULAR; SOFT TISSUE at 10:22

## 2021-08-03 NOTE — PROGRESS NOTES
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  1  Steroid injection administered today  2  Briefly discussed arthroscopy and uncertain prognosis due to presence of arthritis  Will manage conservatively for now  3  Follow up as needed if pain persists  History of the Present Illness     Patient returns with ongoing pain in the right knee localizing to the anteromedial aspect  She wants to try a steroid injection  Physical Exam     /84   Pulse 88   Ht 5' 3" (1 6 m)   Wt 87 1 kg (192 lb)   BMI 34 01 kg/m²     Right knee: Tenderness over the anteromedial aspect  ROM 0-120  No extensor lag  No LE edema  Skin intact without erythema         Large joint arthrocentesis: R knee  Procedure Details  Location: knee - R knee  Needle size: 22 G  Approach: anterolateral  Medications administered: 4 mL lidocaine 1 %; 1 mL methylPREDNISolone acetate 40 mg/mL    Patient tolerance: patient tolerated the procedure well with no immediate complications  Dressing:  Sterile dressing applied          Scribe Attestation    I,:  Elizabeth Acosta am acting as a scribe while in the presence of the attending physician :       I,:  Tavia Chavez MD personally performed the services described in this documentation    as scribed in my presence :

## 2021-09-12 NOTE — PSYCH
MEDICATION MANAGEMENT NOTE        [address]      Name and Date of Birth:  Marcos Rivera 61 y o  1965    Date of Visit: September 16, 2021    HPI:    Marcos Rivera is here for medication review with primary c/o / Area of need:  "Sad for my step mom and my father" --she lost her step mother 8/2021, had to place father in a SNF for his dementia, and Pt is helping one baby sister has Down Syndrome but is having early onset of dementia  She gets anxious sometimes whiles driving, but remains in control and she can drive  Anxiety generally otherwise   Pt presently denies SI, HI, panic attacks, or manic or psychotic Sxs  She denies ETOH or illicit drug use  Pt reports compliance to psychiatric medications without SE and would like them continued unchanged  She quit smoking and gained Wt as a result --she denies overeating/snacking more  She is having luis miguel knee pains dur to arthritis      Appetite Changes and Sleep: disrupted sleep, at times due to arthritis pain, normal appetite, normal energy level    Review Of Systems:      Constitutional negative   ENT negative   Cardiovascular negative   Respiratory negative   Gastrointestinal negative   Genitourinary negative   Musculoskeletal negative   Integumentary negative   Neurological negative   Endocrine negative   Other Symptoms none, all other systems are negative       Past Psychiatric History:   As copied from my 2/2/2021 note with updates as needed:        Past Medical History:    Past Medical History:   Diagnosis Date    Anxiety     Diabetes mellitus ([address] )     Diverticulosis     GERD (gastroesophageal reflux disease)     Headache     Hyperlipidemia     Hypertension     Psychiatric disorder     bipolar    Right clavicle fracture     TIA (transient ischemic attack)     Vitamin D deficiency        Substance Abuse History:    Social History     Substance and Sexual Activity   Alcohol Use Not Currently     Social History     Substance and Sexual Activity   Drug Use Yes    Types: Marijuana    Comment: Smoked THC between 19 and 30y/o       Social History:    Social History     Socioeconomic History    Marital status:      Spouse name: Not on file    Number of children: 2    Years of education: Not on file    Highest education level: Not on file   Occupational History    Occupation: Unemployed due to Rt hip injury     Comment: and lower back injury    Occupation: Used to be a     Occupation: Full Disability as of late 2019     Comment: based on medical issues   Tobacco Use    Smoking status: Former Smoker     Packs/day: 2 00     Years: 35 00     Pack years: 70 00    Smokeless tobacco: Never Used    Tobacco comment: quit 2020   Vaping Use    Vaping Use: Never used   Substance and Sexual Activity    Alcohol use: Not Currently    Drug use: Yes     Types: Marijuana     Comment: Smoked THC between 19 and 30y/o    Sexual activity: Yes     Partners: Male     Comment: Boyfriend   Other Topics Concern    Not on file   Social History Narrative    Caffeine use        Home: Lives with boyfriend        Children from first  and most recent boyfriend respectively: Son born  Daughter         Education:    Pt denies any h/o learning disabilities and reached childhood milestones on time as far as she knows    Graduated      Did a 9 month Business Ops course in the         Most recent tobacco use screenin2018      Social Determinants of Health     Financial Resource Strain: High Risk    Difficulty of Paying Living Expenses: Hard   Food Insecurity: No Food Insecurity    Worried About Running Out of Food in the Last Year: Never true    [address] in the Last Year: Never true   Transportation Needs: No Transportation Needs    Lack of Transportation (Medical): No    Lack of Transportation (Non-Medical):  No   Physical Activity: Unknown  Days of Exercise per Week: 0 days    Minutes of Exercise per Session: Not on file   Stress:     Feeling of Stress :    Social Connections:     Frequency of Communication with Friends and Family:     Frequency of Social Gatherings with Friends and Family:     Attends Denominational Services:     Active Member of Clubs or Organizations:     Attends Club or Organization Meetings:     Marital Status:    Intimate Partner Violence:     Fear of Current or Ex-Partner:     Emotionally Abused:     Physically Abused:     Sexually Abused:        Family Psychiatric History:     Family History   Problem Relation Age of Onset    Arthritis Family     Cancer Family     Osteoporosis Family     Cervical cancer Mother     Ovarian cancer Mother 35    Uterine cancer Mother     Hypertension Father     Other Father         pre-diabetes    Diabetes Father     Diabetes type I Sister     Osteoporosis Sister     Depression Sister     Breast cancer Sister 79    Diabetes Sister     Heart attack Brother          of an MI at 52y/o    Diabetes Brother     No Known Problems Maternal Grandmother     No Known Problems Maternal Grandfather     Other Paternal Grandmother         Parkinson's Disease    Heart attack Paternal Grandfather     Other Paternal Grandfather         coronary arteriosclerosis    Alcohol abuse Paternal Uncle     Seizures Other     Seizures Other     No Known Problems Daughter     No Known Problems Sister     No Known Problems Sister     No Known Problems Sister     No Known Problems Son        History Review:  The following portions of the patient's history were reviewed and updated as appropriate: allergies, current medications, past family history, past medical history, past social history, past surgical history and problem list          OBJECTIVE:     Mental Status Evaluation:    Appearance Casually dressed, good eye contact and hygiene   Behavior Calm, cooperative, pleasant   Speech Clear, normal rate and volume   Mood Depressed, anxious   Affect Appears reactive   Thought Processes Organized, goal directed   Associations intact associations   Thought Content No delusions   Perceptual Disturbances: Pt denies any form of hallucinations and does not appear to be responding to internal stimuli   Abnormal Thoughts  Risk Potential Suicidal ideation - None  Homicidal ideation - None  Potential for aggression - No   Orientation oriented to person, place, situation, day of week, date, month of year and year   Memory short term memory grossly intact   Cosciousness alert and awake   Attention Span attention span and concentration are age appropriate   Intellect appears to be of average intelligence   Insight fair   Judgement good   Muscle Strength and  Gait normal gait and normal balance   Language no difficulty naming common objects, no difficulty repeating a phrase   Fund of Knowledge adequate knowledge of current events  adequate fund of knowledge regarding past history  adequate fund of knowledge regarding vocabulary    Pain moderate   Pain Scale 5/5 in knees        Laboratory Results:   I have personally reviewed all pertinent laboratory/tests results    Most Recent Labs:   Lab Results   Component Value Date    WBC 9 64 11/20/2019    RBC 4 95 11/20/2019    HGB 13 8 11/20/2019    HCT 43 2 11/20/2019     11/20/2019    RDW 14 5 11/20/2019    NEUTROABS 5 46 11/20/2019    SODIUM 135 (L) 06/17/2021    K 4 8 06/17/2021     06/17/2021    CO2 27 06/17/2021    BUN 13 06/17/2021    CREATININE 0 87 06/17/2021    GLUC 143 (H) 06/06/2017    GLUF 187 (H) 06/17/2021    CALCIUM 9 3 06/17/2021    AST 24 06/17/2021    ALT 31 06/17/2021    ALKPHOS 176 (H) 06/17/2021    TP 7 4 06/17/2021    ALB 3 2 (L) 06/17/2021    TBILI 0 51 06/17/2021    CHOLESTEROL 192 03/03/2021    HDL 41 03/03/2021    TRIG 133 03/03/2021    LDLCALC 124 (H) 03/03/2021    NONHDLC 151 03/03/2021    BBZ4GLFCZQFE 2 780 11/13/2017    RPR Non-Reactive 07/18/2018    HGBA1C 8 8 (H) 06/17/2021     06/17/2021       Assessment/plan:       Diagnoses and all orders for this visit:    Moderate recurrent major depression (HCC)  -     ARIPiprazole (ABILIFY) 2 mg tablet; Take 1 tablet (2 mg total) by mouth daily at bedtime  -     PARoxetine (PAXIL) 30 mg tablet; Take 1 tablet (30 mg total) by mouth daily    Bereavement    Generalized anxiety disorder  -     PARoxetine (PAXIL) 30 mg tablet; Take 1 tablet (30 mg total) by mouth daily  -     clonazePAM (KlonoPIN) 0 5 mg tablet; Take 1/2 tab by mouth twice daily and 1 full tab nightly    Panic attacks  -     PARoxetine (PAXIL) 30 mg tablet; Take 1 tablet (30 mg total) by mouth daily  -     clonazePAM (KlonoPIN) 0 5 mg tablet; Take 1/2 tab by mouth twice daily and 1 full tab nightly    Chronic pain of both knees        PLAN:  Pt is having mild to moderate depression, bereavement and anxiety without panic  Pt appears stable on medication and I will keep the present regimen unchanged (Pt is in agreement)  Pt does not feel the need for psychotherapy  Treatment plan done and Pt accepts the plan  Unable to electronically sign off on Tx plan but Pt gave verbal consent  Continue:  Paroxetine 30mg (1) tab po qd # 90  Aripiprazole 2mg (1) tab po qhs # 90  Clonazepam 0 5mg (1/2) tab po bid and (1) tab po qhs # 60 R2  F/U PCP and Orthopedics for arthritis mgt  Return 12 weeks, call sooner prn  Pt declines to have a paper copy of Tx plan--she has a Mychart acct    Risks/Benefits      Risks, Benefits And Possible Side Effects Of Medications:    Risks, benefits, and possible side effects of medications explained to Natalie and she verbalizes understanding and agreement for treatment

## 2021-09-15 ENCOUNTER — OFFICE VISIT (OUTPATIENT)
Dept: FAMILY MEDICINE CLINIC | Facility: CLINIC | Age: 56
End: 2021-09-15
Payer: MEDICARE

## 2021-09-15 VITALS
BODY MASS INDEX: 35.33 KG/M2 | OXYGEN SATURATION: 98 % | HEART RATE: 96 BPM | WEIGHT: 199.4 LBS | HEIGHT: 63 IN | RESPIRATION RATE: 16 BRPM | DIASTOLIC BLOOD PRESSURE: 82 MMHG | SYSTOLIC BLOOD PRESSURE: 140 MMHG

## 2021-09-15 DIAGNOSIS — R30.0 DYSURIA: Primary | ICD-10-CM

## 2021-09-15 LAB
SL AMB  POCT GLUCOSE, UA: NORMAL
SL AMB LEUKOCYTE ESTERASE,UA: NORMAL
SL AMB POCT BILIRUBIN,UA: NORMAL
SL AMB POCT BLOOD,UA: NORMAL
SL AMB POCT CLARITY,UA: CLEAR
SL AMB POCT COLOR,UA: YELLOW
SL AMB POCT KETONES,UA: NORMAL
SL AMB POCT NITRITE,UA: NORMAL
SL AMB POCT PH,UA: 5
SL AMB POCT SPECIFIC GRAVITY,UA: 1
SL AMB POCT URINE PROTEIN: NORMAL
SL AMB POCT UROBILINOGEN: NORMAL

## 2021-09-15 PROCEDURE — 81003 URINALYSIS AUTO W/O SCOPE: CPT | Performed by: FAMILY MEDICINE

## 2021-09-15 PROCEDURE — 99214 OFFICE O/P EST MOD 30 MIN: CPT | Performed by: FAMILY MEDICINE

## 2021-09-15 RX ORDER — CIPROFLOXACIN 500 MG/1
500 TABLET, FILM COATED ORAL EVERY 12 HOURS SCHEDULED
Qty: 10 TABLET | Refills: 0 | Status: SHIPPED | OUTPATIENT
Start: 2021-09-15 | End: 2021-09-20

## 2021-09-15 NOTE — ASSESSMENT & PLAN NOTE
Previous episode 3 months ago  Responded well to ciprofloxacin  Patient reports similar symptoms  Urine dip in the office was normal   Started on ciprofloxacin  Follow-up if symptoms persist or worsen

## 2021-09-15 NOTE — PROGRESS NOTES
Subjective:      Patient ID: Everton Barajas is a 61 y o  female  Difficulty Urinating   This is a recurrent problem  The current episode started yesterday  The problem occurs every urination  The problem has been unchanged  The quality of the pain is described as aching (Pelvic pressure)  The pain is at a severity of 5/10  The pain is moderate  There has been no fever  Associated symptoms include flank pain (Right side) and frequency  Pertinent negatives include no nausea  She has tried nothing for the symptoms  Patient had similar episode in July, treated with ciprofloxacin which reported significant improvement in her symptoms  At that time a urine culture was positive for mixed contaminants  Patient states that she is experiencing mostly similar symptoms      Past Medical History:   Diagnosis Date    Anxiety     Diabetes mellitus ([address] )     Diverticulosis     GERD (gastroesophageal reflux disease)     Headache     Hyperlipidemia     Hypertension     Psychiatric disorder     bipolar    Right clavicle fracture     TIA (transient ischemic attack)     Vitamin D deficiency        Family History   Problem Relation Age of Onset    Arthritis Family     Cancer Family     Osteoporosis Family     Cervical cancer Mother     Ovarian cancer Mother 35    Uterine cancer Mother     Hypertension Father     Other Father         pre-diabetes    Diabetes Father     Diabetes type I Sister     Osteoporosis Sister     Depression Sister     Breast cancer Sister 79    Diabetes Sister     Heart attack Brother          of an MI at 50y/o    Diabetes Brother     No Known Problems Maternal Grandmother     No Known Problems Maternal Grandfather     Other Paternal Grandmother         Parkinson's Disease    Heart attack Paternal Grandfather     Other Paternal Grandfather         coronary arteriosclerosis    Alcohol abuse Paternal Uncle     Seizures Other     Seizures Other     No Known Problems Daughter     No Known Problems Sister     No Known Problems Sister     No Known Problems Sister     No Known Problems Son        Past Surgical History:   Procedure Laterality Date    BLADDER SURGERY      Sling    [address] BODY,PLASTY/RESECTN Right 7/13/2017    Procedure: RIGHT HIP ARTHROSCOPIC LABRAL DEBRIDEMENT;  Surgeon: Haven Sutton MD;  Location: [facility];  Service: Orthopedics    SINUS SURGERY      3 surgeries     TUBAL LIGATION          reports that she has quit smoking  She has a 70 00 pack-year smoking history  She has never used smokeless tobacco  She reports previous alcohol use  She reports current drug use  Drug: Marijuana        Current Outpatient Medications:     albuterol (Ventolin HFA) 90 mcg/act inhaler, Inhale 2 puffs every 6 (six) hours as needed for wheezing, Disp: 18 g, Rfl: 1    amLODIPine (NORVASC) 5 mg tablet, Take 1 tablet (5 mg total) by mouth daily, Disp: 90 tablet, Rfl: 1    ARIPiprazole (ABILIFY) 2 mg tablet, take 1 tablet by mouth at bedtime, Disp: 90 tablet, Rfl: 0    aspirin 81 mg chewable tablet, Chew 81 mg , Disp: , Rfl:     atorvastatin (LIPITOR) 20 mg tablet, Take 1 tablet (20 mg total) by mouth daily, Disp: 90 tablet, Rfl: 1    clonazePAM (KlonoPIN) 0 5 mg tablet, Take 1/2 tab by mouth twice daily and 1 full tab nightly, Disp: 60 tablet, Rfl: 1    fluticasone-vilanterol (BREO ELLIPTA) 200-25 MCG/INH inhaler, Inhale 1 puff daily Rinse mouth after use , Disp: 1 blister, Rfl: 5    glipiZIDE (GLUCOTROL XL) 5 mg 24 hr tablet, Take 1 tablet (5 mg total) by mouth daily, Disp: 90 tablet, Rfl: 1    glucose blood (ONE TOUCH ULTRA TEST) test strip, Check blood sugar once daily, Disp: 100 each, Rfl: 2    Lancets (ONETOUCH ULTRASOFT) lancets, Check blood sugar twice/ week , Disp: 100 each, Rfl: 0    lisinopril (ZESTRIL) 40 mg tablet, Take 1 tablet (40 mg total) by mouth daily, Disp: 90 tablet, Rfl: 1    metFORMIN (GLUCOPHAGE) 500 mg tablet, Take 1 tablet (500 mg total) by mouth 2 (two) times a day with meals, Disp: 180 tablet, Rfl: 1    omeprazole (PriLOSEC) 20 mg delayed release capsule, Take 1 capsule (20 mg total) by mouth daily, Disp: 90 capsule, Rfl: 1    PARoxetine (PAXIL) 30 mg tablet, Take 1 tablet (30 mg total) by mouth daily, Disp: 90 tablet, Rfl: 0    ciprofloxacin (CIPRO) 500 mg tablet, Take 1 tablet (500 mg total) by mouth every 12 (twelve) hours for 5 days, Disp: 10 tablet, Rfl: 0    sitaGLIPtin (JANUVIA) 100 mg tablet, Take 1 tablet (100 mg total) by mouth daily (Patient not taking: Reported on 7/29/2021), Disp: 90 tablet, Rfl: 1    The following portions of the patient's history were reviewed and updated as appropriate: allergies, current medications, past family history, past medical history, past social history, past surgical history and problem list     Review of Systems   Constitutional: Negative  HENT: Negative  Eyes: Negative  Respiratory: Negative  Cardiovascular: Negative  Gastrointestinal: Negative  Negative for nausea  Endocrine: Negative  Genitourinary: Positive for dysuria, flank pain (Right side) and frequency  Skin: Negative  Neurological: Negative  Psychiatric/Behavioral: Negative  Objective:    /82   Pulse 96   Resp 16   Ht 5' 3" (1 6 m)   Wt 90 4 kg (199 lb 6 4 oz)   SpO2 98%   BMI 35 32 kg/m²      Physical Exam  Constitutional:       Appearance: She is well-developed  Eyes:      Pupils: Pupils are equal, round, and reactive to light  Cardiovascular:      Rate and Rhythm: Normal rate and regular rhythm  Heart sounds: Normal heart sounds  Pulmonary:      Effort: Pulmonary effort is normal       Breath sounds: Normal breath sounds  Abdominal:      General: Bowel sounds are normal       Palpations: Abdomen is soft  Musculoskeletal:         General: Normal range of motion  Cervical back: Normal range of motion     Neurological:      Mental Status: She is alert and oriented to person, place, and time  Psychiatric:         Behavior: Behavior normal          Judgment: Judgment normal            Recent Results (from the past 1008 hour(s))   POCT urine dip    Collection Time: 09/15/21  4:34 PM   Result Value Ref Range    LEUKOCYTE ESTERASE,UA neg     NITRITE,UA neg     SL AMB POCT UROBILINOGEN norm     POCT URINE PROTEIN neg      PH,UA 5     BLOOD,UA neg     SPECIFIC GRAVITY,UA 1 005     KETONES,UA neg     BILIRUBIN,UA neg     GLUCOSE, UA norm      COLOR,UA yellow     CLARITY,UA clear        Assessment/Plan:    No problem-specific Assessment & Plan notes found for this encounter  Problem List Items Addressed This Visit        Other    Dysuria - Primary     Previous episode 3 months ago  Responded well to ciprofloxacin  Patient reports similar symptoms  Urine dip in the office was normal   Started on ciprofloxacin  Follow-up if symptoms persist or worsen           Relevant Medications    ciprofloxacin (CIPRO) 500 mg tablet    Other Relevant Orders    POCT urine dip (Completed)

## 2021-09-16 ENCOUNTER — OFFICE VISIT (OUTPATIENT)
Dept: PSYCHIATRY | Facility: CLINIC | Age: 56
End: 2021-09-16
Payer: MEDICARE

## 2021-09-16 VITALS
DIASTOLIC BLOOD PRESSURE: 82 MMHG | HEART RATE: 73 BPM | HEIGHT: 63 IN | SYSTOLIC BLOOD PRESSURE: 125 MMHG | BODY MASS INDEX: 35.32 KG/M2

## 2021-09-16 DIAGNOSIS — F41.1 GENERALIZED ANXIETY DISORDER: ICD-10-CM

## 2021-09-16 DIAGNOSIS — M25.561 CHRONIC PAIN OF BOTH KNEES: ICD-10-CM

## 2021-09-16 DIAGNOSIS — G89.29 CHRONIC PAIN OF BOTH KNEES: ICD-10-CM

## 2021-09-16 DIAGNOSIS — F33.1 MODERATE RECURRENT MAJOR DEPRESSION (HCC): Primary | ICD-10-CM

## 2021-09-16 DIAGNOSIS — Z63.4 BEREAVEMENT: ICD-10-CM

## 2021-09-16 DIAGNOSIS — F41.0 PANIC ATTACKS: ICD-10-CM

## 2021-09-16 DIAGNOSIS — M25.562 CHRONIC PAIN OF BOTH KNEES: ICD-10-CM

## 2021-09-16 PROCEDURE — 99213 OFFICE O/P EST LOW 20 MIN: CPT | Performed by: PHYSICIAN ASSISTANT

## 2021-09-16 RX ORDER — PAROXETINE 30 MG/1
30 TABLET, FILM COATED ORAL DAILY
Qty: 90 TABLET | Refills: 0 | Status: SHIPPED | OUTPATIENT
Start: 2021-09-16 | End: 2022-01-05 | Stop reason: SDUPTHER

## 2021-09-16 RX ORDER — ARIPIPRAZOLE 2 MG/1
2 TABLET ORAL
Qty: 90 TABLET | Refills: 0 | Status: SHIPPED | OUTPATIENT
Start: 2021-09-16 | End: 2022-01-05 | Stop reason: SDUPTHER

## 2021-09-16 RX ORDER — CLONAZEPAM 0.5 MG/1
TABLET ORAL
Qty: 60 TABLET | Refills: 2 | Status: SHIPPED | OUTPATIENT
Start: 2021-09-16 | End: 2022-01-05 | Stop reason: SDUPTHER

## 2021-09-16 SDOH — SOCIAL STABILITY - SOCIAL INSECURITY: DISSAPEARANCE AND DEATH OF FAMILY MEMBER: Z63.4

## 2021-09-16 NOTE — BH TREATMENT PLAN
TREATMENT PLAN (Medication Management Only)        [facility]    Name and Date of Birth:  Ortiz Wong 61 y o  1965  Date of Treatment Plan: September 16, 2021  Diagnosis/Diagnoses:    1  Moderate recurrent major depression ([address] )    2  Bereavement    3  Generalized anxiety disorder    4  Panic attacks    5  Chronic pain of both knees      Strengths/Personal Resources for Self-Care: "My mind's pretty clear; I just try to laugh--you got to let things go sometimes"  Area/Areas of need (in own words): "Sad for my step mom and my father"  1  Long Term Goal: Maintain mood stability and anxiety control  Target Date:3-6 months  Person/Persons responsible for completion of goal: Carmen Yap  2  Short Term Objective (s) - How will we reach this goal?:   A  Provider new recommended medication/dosage changes and/or continue medication(s):  Pt is having mild to moderate depression, bereavement and anxiety without panic  Pt appears stable on medication and I will keep the present regimen unchanged (Pt is in agreement)  Pt does not feel the need for psychotherapy  Treatment plan done and Pt accepts the plan  Unable to electronically sign off on Tx plan but Pt gave verbal consent  Continue:  Paroxetine 30mg (1) tab po qd # 90  Aripiprazole 2mg (1) tab po qhs # 90  Clonazepam 0 5mg (1/2) tab po bid and (1) tab po qhs # 60 R2  F/U PCP and Orthopedics for arthritis mgt  Return 12 weeks, call sooner prn  Pt declines to have a paper copy of Tx plan--she has a Mychart acct    Target Date:3-6 months  Person/Persons Responsible for Completion of Goal: Laura Estrada  Progress Towards Goals: stable, continuing treatment  Treatment Modality: Medication mgt  Review due 180 days from date of this plan: 4-6 months  Expected length of service: ongoing treatment  My Physician/PA/NP and I have developed this plan together and I agree to work on the goals and objectives   I understand the treatment goals that were developed for my treatment

## 2021-09-23 DIAGNOSIS — E11.9 CONTROLLED TYPE 2 DIABETES MELLITUS WITHOUT COMPLICATION, WITHOUT LONG-TERM CURRENT USE OF INSULIN (HCC): ICD-10-CM

## 2021-09-23 RX ORDER — GLIPIZIDE 5 MG/1
5 TABLET, FILM COATED, EXTENDED RELEASE ORAL 2 TIMES DAILY
Qty: 180 TABLET | Refills: 1 | Status: SHIPPED | OUTPATIENT
Start: 2021-09-23 | End: 2022-03-14

## 2021-09-24 ENCOUNTER — OFFICE VISIT (OUTPATIENT)
Dept: OBGYN CLINIC | Facility: CLINIC | Age: 56
End: 2021-09-24
Payer: MEDICARE

## 2021-09-24 VITALS
BODY MASS INDEX: 34.91 KG/M2 | HEART RATE: 85 BPM | HEIGHT: 63 IN | SYSTOLIC BLOOD PRESSURE: 143 MMHG | WEIGHT: 197 LBS | DIASTOLIC BLOOD PRESSURE: 84 MMHG

## 2021-09-24 DIAGNOSIS — M17.11 PRIMARY OSTEOARTHRITIS OF RIGHT KNEE: Primary | ICD-10-CM

## 2021-09-24 PROCEDURE — 99213 OFFICE O/P EST LOW 20 MIN: CPT | Performed by: ORTHOPAEDIC SURGERY

## 2021-09-24 NOTE — PROGRESS NOTES
Patient Name:  Teofilo Schaefer  MRN:  [id number]    Assessment & Plan     Bilateral knee osteoarthritis, Right greater than left  1  She would like to proceed with viscosupplementation to the right knee  2  Continue activity modification and nsaids/tylenol otc  3  Will follow up for injections on the right knee    History of the Present Illness      58-year-old female presents to clinic for follow-up on bilateral knees  Right knee is worse than the left  She did have an MRI of the right knee which showed arthritis and meniscal tearing  She did not have any MRIs on left knee  She recently had steroid injection about 6 weeks ago  On the right knee  She got about a week or so of relief the pain returned  She has pain across the front of her knees along the medial knee with walking standing  She has pain with sitting  She has pain with sleeping  General ROS:  Negative for fever or chills  Neurological ROS:  Negative for numbness or tingling  Physical Exam     /84   Pulse 85   Ht 5' 3" (1 6 m)   Wt 89 4 kg (197 lb)   BMI 34 90 kg/m²     Bilateral knee:  Effusion: None  Tenderness: Medial joint line  Range of motion: 0-110  Lachman test: Stable  Varus stress: Stable  Valgus stress: Stable  Posterior drawer test: Stable  Lynnette's test: painful/no popping  Patellar compression test: positive      Data Review     I have personally reviewed pertinent films in PACS, and my interpretation follows  Bilateral knee x-ray show degenerative joint disease    MRI of the right knee shows medial meniscal tearing,  Patellofemoral arthritis and medial compartment arthritis        Scribe Attestation    I,:  Zeny Morris PA-C am acting as a scribe while in the presence of the attending physician :       I,:  Cammie Remy MD personally performed the services described in this documentation    as scribed in my presence :

## 2021-10-12 ENCOUNTER — PROCEDURE VISIT (OUTPATIENT)
Dept: OBGYN CLINIC | Facility: CLINIC | Age: 56
End: 2021-10-12
Payer: MEDICARE

## 2021-10-12 VITALS
BODY MASS INDEX: 34.91 KG/M2 | DIASTOLIC BLOOD PRESSURE: 86 MMHG | HEART RATE: 89 BPM | HEIGHT: 63 IN | WEIGHT: 197 LBS | SYSTOLIC BLOOD PRESSURE: 147 MMHG

## 2021-10-12 DIAGNOSIS — M17.11 PRIMARY OSTEOARTHRITIS OF RIGHT KNEE: Primary | ICD-10-CM

## 2021-10-12 PROCEDURE — 20610 DRAIN/INJ JOINT/BURSA W/O US: CPT | Performed by: PHYSICIAN ASSISTANT

## 2021-10-16 ENCOUNTER — IMMUNIZATIONS (OUTPATIENT)
Dept: FAMILY MEDICINE CLINIC | Facility: HOSPITAL | Age: 56
End: 2021-10-16

## 2021-10-16 DIAGNOSIS — Z23 ENCOUNTER FOR IMMUNIZATION: Primary | ICD-10-CM

## 2021-10-16 PROCEDURE — 0001A SARS-COV-2 / COVID-19 MRNA VACCINE (PFIZER-BIONTECH) 30 MCG: CPT

## 2021-10-16 PROCEDURE — 91300 SARS-COV-2 / COVID-19 MRNA VACCINE (PFIZER-BIONTECH) 30 MCG: CPT

## 2021-10-26 ENCOUNTER — APPOINTMENT (OUTPATIENT)
Dept: LAB | Facility: CLINIC | Age: 56
End: 2021-10-26
Payer: MEDICARE

## 2021-10-26 ENCOUNTER — HOSPITAL ENCOUNTER (OUTPATIENT)
Dept: CT IMAGING | Facility: HOSPITAL | Age: 56
Discharge: HOME | End: 2021-10-26
Payer: MEDICARE

## 2021-10-26 DIAGNOSIS — R80.9 MICROALBUMINURIA: ICD-10-CM

## 2021-10-26 DIAGNOSIS — R91.8 MULTIPLE SUBSOLID LUNG NODULES GREATER THAN 6 MM IN DIAMETER: ICD-10-CM

## 2021-10-26 DIAGNOSIS — E11.9 CONTROLLED TYPE 2 DIABETES MELLITUS WITHOUT COMPLICATION, WITHOUT LONG-TERM CURRENT USE OF INSULIN (HCC): ICD-10-CM

## 2021-10-26 DIAGNOSIS — E78.5 HYPERLIPIDEMIA, UNSPECIFIED HYPERLIPIDEMIA TYPE: ICD-10-CM

## 2021-10-26 LAB
ALBUMIN SERPL BCP-MCNC: 3.3 G/DL (ref 3.5–5)
ALP SERPL-CCNC: 162 U/L (ref 46–116)
ALT SERPL W P-5'-P-CCNC: 32 U/L (ref 12–78)
ANION GAP SERPL CALCULATED.3IONS-SCNC: 6 MMOL/L (ref 4–13)
AST SERPL W P-5'-P-CCNC: 23 U/L (ref 5–45)
BILIRUB SERPL-MCNC: 0.49 MG/DL (ref 0.2–1)
BUN SERPL-MCNC: 9 MG/DL (ref 5–25)
CALCIUM ALBUM COR SERPL-MCNC: 10.4 MG/DL (ref 8.3–10.1)
CALCIUM SERPL-MCNC: 9.8 MG/DL (ref 8.3–10.1)
CHLORIDE SERPL-SCNC: 102 MMOL/L (ref 100–108)
CHOLEST SERPL-MCNC: 210 MG/DL (ref 50–200)
CO2 SERPL-SCNC: 27 MMOL/L (ref 21–32)
CREAT SERPL-MCNC: 1.01 MG/DL (ref 0.6–1.3)
CREAT UR-MCNC: 157 MG/DL
EST. AVERAGE GLUCOSE BLD GHB EST-MCNC: 166 MG/DL
GFR SERPL CREATININE-BSD FRML MDRD: 62 ML/MIN/1.73SQ M
GLUCOSE P FAST SERPL-MCNC: 167 MG/DL (ref 65–99)
HBA1C MFR BLD: 7.4 %
HDLC SERPL-MCNC: 54 MG/DL
LDLC SERPL CALC-MCNC: 131 MG/DL (ref 0–100)
MICROALBUMIN UR-MCNC: 7.5 MG/L (ref 0–20)
MICROALBUMIN/CREAT 24H UR: 5 MG/G CREATININE (ref 0–30)
NONHDLC SERPL-MCNC: 156 MG/DL
POTASSIUM SERPL-SCNC: 4.6 MMOL/L (ref 3.5–5.3)
PROT SERPL-MCNC: 8.1 G/DL (ref 6.4–8.2)
SODIUM SERPL-SCNC: 135 MMOL/L (ref 136–145)
TRIGL SERPL-MCNC: 124 MG/DL

## 2021-10-26 PROCEDURE — 80061 LIPID PANEL: CPT

## 2021-10-26 PROCEDURE — 82570 ASSAY OF URINE CREATININE: CPT

## 2021-10-26 PROCEDURE — 36415 COLL VENOUS BLD VENIPUNCTURE: CPT

## 2021-10-26 PROCEDURE — 71250 CT THORAX DX C-: CPT

## 2021-10-26 PROCEDURE — 80053 COMPREHEN METABOLIC PANEL: CPT

## 2021-10-26 PROCEDURE — 83036 HEMOGLOBIN GLYCOSYLATED A1C: CPT

## 2021-10-26 PROCEDURE — G1004 CDSM NDSC: HCPCS

## 2021-10-26 PROCEDURE — 82043 UR ALBUMIN QUANTITATIVE: CPT

## 2021-11-03 ENCOUNTER — OFFICE VISIT (OUTPATIENT)
Dept: FAMILY MEDICINE CLINIC | Facility: CLINIC | Age: 56
End: 2021-11-03
Payer: MEDICARE

## 2021-11-03 VITALS
OXYGEN SATURATION: 98 % | HEIGHT: 63 IN | BODY MASS INDEX: 35.86 KG/M2 | WEIGHT: 202.4 LBS | DIASTOLIC BLOOD PRESSURE: 88 MMHG | SYSTOLIC BLOOD PRESSURE: 134 MMHG | HEART RATE: 80 BPM | RESPIRATION RATE: 16 BRPM

## 2021-11-03 DIAGNOSIS — E11.21 DIABETIC NEPHROPATHY ASSOCIATED WITH TYPE 2 DIABETES MELLITUS (HCC): ICD-10-CM

## 2021-11-03 DIAGNOSIS — K21.9 CHRONIC GERD: ICD-10-CM

## 2021-11-03 DIAGNOSIS — I10 ESSENTIAL HYPERTENSION: ICD-10-CM

## 2021-11-03 DIAGNOSIS — J41.0 SIMPLE CHRONIC BRONCHITIS (HCC): ICD-10-CM

## 2021-11-03 DIAGNOSIS — E11.9 TYPE 2 DIABETES MELLITUS WITHOUT COMPLICATION, WITHOUT LONG-TERM CURRENT USE OF INSULIN (HCC): ICD-10-CM

## 2021-11-03 DIAGNOSIS — S61.210A LACERATION OF RIGHT INDEX FINGER WITHOUT FOREIGN BODY WITHOUT DAMAGE TO NAIL, INITIAL ENCOUNTER: ICD-10-CM

## 2021-11-03 DIAGNOSIS — E78.5 HYPERLIPIDEMIA, UNSPECIFIED HYPERLIPIDEMIA TYPE: ICD-10-CM

## 2021-11-03 DIAGNOSIS — I10 PRIMARY HYPERTENSION: ICD-10-CM

## 2021-11-03 DIAGNOSIS — Z00.00 MEDICARE ANNUAL WELLNESS VISIT, SUBSEQUENT: ICD-10-CM

## 2021-11-03 DIAGNOSIS — Z23 NEED FOR VACCINATION: ICD-10-CM

## 2021-11-03 DIAGNOSIS — E11.9 CONTROLLED TYPE 2 DIABETES MELLITUS WITHOUT COMPLICATION, WITHOUT LONG-TERM CURRENT USE OF INSULIN (HCC): Primary | ICD-10-CM

## 2021-11-03 DIAGNOSIS — E66.01 OBESITY, MORBID (HCC): ICD-10-CM

## 2021-11-03 PROCEDURE — 99214 OFFICE O/P EST MOD 30 MIN: CPT | Performed by: FAMILY MEDICINE

## 2021-11-03 PROCEDURE — 90471 IMMUNIZATION ADMIN: CPT | Performed by: FAMILY MEDICINE

## 2021-11-03 PROCEDURE — G0439 PPPS, SUBSEQ VISIT: HCPCS | Performed by: FAMILY MEDICINE

## 2021-11-03 PROCEDURE — 90715 TDAP VACCINE 7 YRS/> IM: CPT | Performed by: FAMILY MEDICINE

## 2021-11-03 RX ORDER — ATORVASTATIN CALCIUM 20 MG/1
20 TABLET, FILM COATED ORAL DAILY
Qty: 90 TABLET | Refills: 1 | Status: SHIPPED | OUTPATIENT
Start: 2021-11-03 | End: 2022-04-18

## 2021-11-03 RX ORDER — LISINOPRIL 40 MG/1
40 TABLET ORAL DAILY
Qty: 90 TABLET | Refills: 1 | Status: SHIPPED | OUTPATIENT
Start: 2021-11-03 | End: 2022-04-18 | Stop reason: SDUPTHER

## 2021-11-03 RX ORDER — OMEPRAZOLE 20 MG/1
20 CAPSULE, DELAYED RELEASE ORAL DAILY
Qty: 90 CAPSULE | Refills: 1 | Status: SHIPPED | OUTPATIENT
Start: 2021-11-03 | End: 2022-04-18 | Stop reason: SDUPTHER

## 2021-11-03 RX ORDER — AMLODIPINE BESYLATE 5 MG/1
5 TABLET ORAL DAILY
Qty: 90 TABLET | Refills: 1 | Status: SHIPPED | OUTPATIENT
Start: 2021-11-03 | End: 2022-04-18 | Stop reason: SDUPTHER

## 2021-12-01 ENCOUNTER — OFFICE VISIT (OUTPATIENT)
Dept: FAMILY MEDICINE CLINIC | Facility: CLINIC | Age: 56
End: 2021-12-01
Payer: MEDICARE

## 2021-12-01 VITALS
DIASTOLIC BLOOD PRESSURE: 62 MMHG | HEART RATE: 91 BPM | HEIGHT: 63 IN | RESPIRATION RATE: 18 BRPM | TEMPERATURE: 98.1 F | OXYGEN SATURATION: 100 % | WEIGHT: 201 LBS | BODY MASS INDEX: 35.61 KG/M2 | SYSTOLIC BLOOD PRESSURE: 122 MMHG

## 2021-12-01 DIAGNOSIS — H01.003 BLEPHARITIS OF BOTH EYES, UNSPECIFIED EYELID, UNSPECIFIED TYPE: Primary | ICD-10-CM

## 2021-12-01 DIAGNOSIS — H01.006 BLEPHARITIS OF BOTH EYES, UNSPECIFIED EYELID, UNSPECIFIED TYPE: Primary | ICD-10-CM

## 2021-12-01 PROCEDURE — 99213 OFFICE O/P EST LOW 20 MIN: CPT | Performed by: NURSE PRACTITIONER

## 2021-12-01 RX ORDER — ERYTHROMYCIN 5 MG/G
0.5 OINTMENT OPHTHALMIC
Qty: 7 G | Refills: 0 | Status: SHIPPED | OUTPATIENT
Start: 2021-12-01

## 2021-12-06 ENCOUNTER — TELEPHONE (OUTPATIENT)
Dept: PSYCHIATRY | Facility: CLINIC | Age: 56
End: 2021-12-06
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
## 2022-01-05 ENCOUNTER — TELEMEDICINE (OUTPATIENT)
Dept: PSYCHIATRY | Facility: CLINIC | Age: 57
End: 2022-01-05
Payer: MEDICARE

## 2022-01-05 DIAGNOSIS — F41.0 PANIC ATTACKS: ICD-10-CM

## 2022-01-05 DIAGNOSIS — F33.1 MODERATE RECURRENT MAJOR DEPRESSION (HCC): Primary | ICD-10-CM

## 2022-01-05 DIAGNOSIS — F41.1 GENERALIZED ANXIETY DISORDER: ICD-10-CM

## 2022-01-05 PROBLEM — Z63.4 BEREAVEMENT: Status: RESOLVED | Noted: 2021-09-16 | Resolved: 2022-01-05

## 2022-01-05 PROCEDURE — 99213 OFFICE O/P EST LOW 20 MIN: CPT | Performed by: PHYSICIAN ASSISTANT

## 2022-01-05 RX ORDER — PAROXETINE 30 MG/1
30 TABLET, FILM COATED ORAL DAILY
Qty: 90 TABLET | Refills: 0 | Status: SHIPPED | OUTPATIENT
Start: 2022-01-05 | End: 2022-04-25 | Stop reason: SDUPTHER

## 2022-01-05 RX ORDER — CLONAZEPAM 0.5 MG/1
TABLET ORAL
Qty: 60 TABLET | Refills: 2 | Status: SHIPPED | OUTPATIENT
Start: 2022-01-05 | End: 2022-04-25 | Stop reason: SDUPTHER

## 2022-01-05 RX ORDER — ARIPIPRAZOLE 2 MG/1
2 TABLET ORAL
Qty: 90 TABLET | Refills: 0 | Status: SHIPPED | OUTPATIENT
Start: 2022-01-05 | End: 2022-04-25 | Stop reason: SDUPTHER

## 2022-01-05 NOTE — BH TREATMENT PLAN
TREATMENT PLAN (Medication Management Only)        [employer]    Name and Date of Birth:  Judge Rica Boyer y o  1965  Date of Treatment Plan: January 5, 2022  Diagnosis/Diagnoses:    1  Moderate recurrent major depression ([address] )    2  Generalized anxiety disorder    3  Panic attacks      Strengths/Personal Resources for Self-Care: "I have to be strong for my sister and my father of course"  Area/Areas of need (in own words): "Pretty good, just dealing with my father and my sister--that's all "  1  Long Term Goal: Maintain mood stability and control of anxiety  Target Date:3-6 months  Person/Persons responsible for completion of goal: Rod Colvin  2  Short Term Objective (s) - How will we reach this goal?:   A  Provider new recommended medication/dosage changes and/or continue medication(s): continue current medications as prescribed  Target Date:3-6 months  Person/Persons Responsible for Completion of Goal: Laura Estrada  Progress Towards Goals: stable, continuing treatment  Treatment Modality: Medication mgt  Review due 180 days from date of this plan: 6 months - 7/5/2022  Expected length of service: ongoing treatment  My Physician/PA/NP and I have developed this plan together and I agree to work on the goals and objectives  I understand the treatment goals that were developed for my treatment

## 2022-01-12 ENCOUNTER — APPOINTMENT (OUTPATIENT)
Dept: LAB | Facility: CLINIC | Age: 57
End: 2022-01-12
Payer: MEDICARE

## 2022-01-12 DIAGNOSIS — F33.1 MODERATE RECURRENT MAJOR DEPRESSION (HCC): ICD-10-CM

## 2022-01-12 DIAGNOSIS — F41.1 GENERALIZED ANXIETY DISORDER: ICD-10-CM

## 2022-01-12 LAB
BASOPHILS # BLD AUTO: 0.08 THOUSANDS/ΜL (ref 0–0.1)
BASOPHILS NFR BLD AUTO: 1 % (ref 0–1)
EOSINOPHIL # BLD AUTO: 0.34 THOUSAND/ΜL (ref 0–0.61)
EOSINOPHIL NFR BLD AUTO: 3 % (ref 0–6)
ERYTHROCYTE [DISTWIDTH] IN BLOOD BY AUTOMATED COUNT: 15.6 % (ref 11.6–15.1)
HCT VFR BLD AUTO: 37.2 % (ref 34.8–46.1)
HGB BLD-MCNC: 11.3 G/DL (ref 11.5–15.4)
IMM GRANULOCYTES # BLD AUTO: 0.07 THOUSAND/UL (ref 0–0.2)
IMM GRANULOCYTES NFR BLD AUTO: 1 % (ref 0–2)
LYMPHOCYTES # BLD AUTO: 3.72 THOUSANDS/ΜL (ref 0.6–4.47)
LYMPHOCYTES NFR BLD AUTO: 36 % (ref 14–44)
MCH RBC QN AUTO: 24.1 PG (ref 26.8–34.3)
MCHC RBC AUTO-ENTMCNC: 30.4 G/DL (ref 31.4–37.4)
MCV RBC AUTO: 80 FL (ref 82–98)
MONOCYTES # BLD AUTO: 0.69 THOUSAND/ΜL (ref 0.17–1.22)
MONOCYTES NFR BLD AUTO: 7 % (ref 4–12)
NEUTROPHILS # BLD AUTO: 5.44 THOUSANDS/ΜL (ref 1.85–7.62)
NEUTS SEG NFR BLD AUTO: 52 % (ref 43–75)
NRBC BLD AUTO-RTO: 0 /100 WBCS
PLATELET # BLD AUTO: 302 THOUSANDS/UL (ref 149–390)
PMV BLD AUTO: 11.5 FL (ref 8.9–12.7)
RBC # BLD AUTO: 4.68 MILLION/UL (ref 3.81–5.12)
WBC # BLD AUTO: 10.34 THOUSAND/UL (ref 4.31–10.16)

## 2022-01-12 PROCEDURE — 85025 COMPLETE CBC W/AUTO DIFF WBC: CPT

## 2022-01-12 PROCEDURE — 36415 COLL VENOUS BLD VENIPUNCTURE: CPT

## 2022-03-13 DIAGNOSIS — E11.9 CONTROLLED TYPE 2 DIABETES MELLITUS WITHOUT COMPLICATION, WITHOUT LONG-TERM CURRENT USE OF INSULIN (HCC): ICD-10-CM

## 2022-03-14 RX ORDER — GLIPIZIDE 5 MG/1
TABLET, FILM COATED, EXTENDED RELEASE ORAL
Qty: 180 TABLET | Refills: 1 | Status: SHIPPED | OUTPATIENT
Start: 2022-03-14 | End: 2022-04-18

## 2022-04-03 ENCOUNTER — RA CDI HCC (OUTPATIENT)
Dept: OTHER | Facility: HOSPITAL | Age: 57
End: 2022-04-03

## 2022-04-03 NOTE — PROGRESS NOTES
[address]    [facility]  coding opportunities          Chart Reviewed number of suggestions sent to Provider: 1     Patients Insurance     Medicare Insurance: [employer]

## 2022-04-04 ENCOUNTER — APPOINTMENT (OUTPATIENT)
Dept: LAB | Facility: CLINIC | Age: 57
End: 2022-04-04
Payer: MEDICARE

## 2022-04-04 DIAGNOSIS — E78.5 HYPERLIPIDEMIA, UNSPECIFIED HYPERLIPIDEMIA TYPE: ICD-10-CM

## 2022-04-04 DIAGNOSIS — E11.9 CONTROLLED TYPE 2 DIABETES MELLITUS WITHOUT COMPLICATION, WITHOUT LONG-TERM CURRENT USE OF INSULIN (HCC): ICD-10-CM

## 2022-04-04 LAB
ALBUMIN SERPL BCP-MCNC: 3.1 G/DL (ref 3.5–5)
ALP SERPL-CCNC: 151 U/L (ref 46–116)
ALT SERPL W P-5'-P-CCNC: 31 U/L (ref 12–78)
ANION GAP SERPL CALCULATED.3IONS-SCNC: 7 MMOL/L (ref 4–13)
AST SERPL W P-5'-P-CCNC: 26 U/L (ref 5–45)
BILIRUB SERPL-MCNC: 0.38 MG/DL (ref 0.2–1)
BUN SERPL-MCNC: 10 MG/DL (ref 5–25)
CALCIUM ALBUM COR SERPL-MCNC: 9.7 MG/DL (ref 8.3–10.1)
CALCIUM SERPL-MCNC: 9 MG/DL (ref 8.3–10.1)
CHLORIDE SERPL-SCNC: 100 MMOL/L (ref 100–108)
CHOLEST SERPL-MCNC: 182 MG/DL
CO2 SERPL-SCNC: 25 MMOL/L (ref 21–32)
CREAT SERPL-MCNC: 1.02 MG/DL (ref 0.6–1.3)
EST. AVERAGE GLUCOSE BLD GHB EST-MCNC: 217 MG/DL
GFR SERPL CREATININE-BSD FRML MDRD: 61 ML/MIN/1.73SQ M
GLUCOSE P FAST SERPL-MCNC: 225 MG/DL (ref 65–99)
HBA1C MFR BLD: 9.2 %
HDLC SERPL-MCNC: 47 MG/DL
LDLC SERPL CALC-MCNC: 101 MG/DL (ref 0–100)
NONHDLC SERPL-MCNC: 135 MG/DL
POTASSIUM SERPL-SCNC: 4.3 MMOL/L (ref 3.5–5.3)
PROT SERPL-MCNC: 7.3 G/DL (ref 6.4–8.2)
SODIUM SERPL-SCNC: 132 MMOL/L (ref 136–145)
TRIGL SERPL-MCNC: 171 MG/DL

## 2022-04-04 PROCEDURE — 36415 COLL VENOUS BLD VENIPUNCTURE: CPT

## 2022-04-04 PROCEDURE — 80053 COMPREHEN METABOLIC PANEL: CPT

## 2022-04-04 PROCEDURE — 80061 LIPID PANEL: CPT

## 2022-04-04 PROCEDURE — 83036 HEMOGLOBIN GLYCOSYLATED A1C: CPT

## 2022-04-18 ENCOUNTER — OFFICE VISIT (OUTPATIENT)
Dept: FAMILY MEDICINE CLINIC | Facility: CLINIC | Age: 57
End: 2022-04-18
Payer: MEDICARE

## 2022-04-18 VITALS
SYSTOLIC BLOOD PRESSURE: 124 MMHG | HEART RATE: 96 BPM | DIASTOLIC BLOOD PRESSURE: 76 MMHG | OXYGEN SATURATION: 97 % | RESPIRATION RATE: 16 BRPM | WEIGHT: 207 LBS | HEIGHT: 63 IN | BODY MASS INDEX: 36.68 KG/M2

## 2022-04-18 DIAGNOSIS — E11.9 TYPE 2 DIABETES MELLITUS WITHOUT COMPLICATION, WITHOUT LONG-TERM CURRENT USE OF INSULIN (HCC): Primary | ICD-10-CM

## 2022-04-18 DIAGNOSIS — E11.21 DIABETIC NEPHROPATHY ASSOCIATED WITH TYPE 2 DIABETES MELLITUS (HCC): ICD-10-CM

## 2022-04-18 DIAGNOSIS — I10 PRIMARY HYPERTENSION: ICD-10-CM

## 2022-04-18 DIAGNOSIS — K21.9 CHRONIC GERD: ICD-10-CM

## 2022-04-18 DIAGNOSIS — I10 ESSENTIAL HYPERTENSION: ICD-10-CM

## 2022-04-18 DIAGNOSIS — E11.9 CONTROLLED TYPE 2 DIABETES MELLITUS WITHOUT COMPLICATION, WITHOUT LONG-TERM CURRENT USE OF INSULIN (HCC): ICD-10-CM

## 2022-04-18 DIAGNOSIS — R80.9 MICROALBUMINURIA: ICD-10-CM

## 2022-04-18 DIAGNOSIS — E66.01 OBESITY, MORBID (HCC): ICD-10-CM

## 2022-04-18 DIAGNOSIS — F33.1 MODERATE RECURRENT MAJOR DEPRESSION (HCC): ICD-10-CM

## 2022-04-18 DIAGNOSIS — R91.8 MULTIPLE SUBSOLID LUNG NODULES GREATER THAN 6 MM IN DIAMETER: ICD-10-CM

## 2022-04-18 DIAGNOSIS — E78.5 HYPERLIPIDEMIA, UNSPECIFIED HYPERLIPIDEMIA TYPE: ICD-10-CM

## 2022-04-18 DIAGNOSIS — J41.0 SIMPLE CHRONIC BRONCHITIS (HCC): ICD-10-CM

## 2022-04-18 DIAGNOSIS — Z12.31 VISIT FOR SCREENING MAMMOGRAM: ICD-10-CM

## 2022-04-18 PROBLEM — S42.034K: Status: RESOLVED | Noted: 2018-07-23 | Resolved: 2022-04-18

## 2022-04-18 PROBLEM — M62.838 MUSCLE SPASM: Status: RESOLVED | Noted: 2019-04-10 | Resolved: 2022-04-18

## 2022-04-18 PROBLEM — K31.9 STOMACH PROBLEMS: Status: RESOLVED | Noted: 2017-06-06 | Resolved: 2022-04-18

## 2022-04-18 PROBLEM — H01.006 BLEPHARITIS OF BOTH EYES: Status: RESOLVED | Noted: 2021-12-01 | Resolved: 2022-04-18

## 2022-04-18 PROBLEM — H01.003 BLEPHARITIS OF BOTH EYES: Status: RESOLVED | Noted: 2021-12-01 | Resolved: 2022-04-18

## 2022-04-18 PROBLEM — F41.1 GENERALIZED ANXIETY DISORDER: Status: RESOLVED | Noted: 2018-05-29 | Resolved: 2022-04-18

## 2022-04-18 PROBLEM — F41.0 PANIC ATTACKS: Status: RESOLVED | Noted: 2018-05-29 | Resolved: 2022-04-18

## 2022-04-18 PROBLEM — M54.50 ACUTE RIGHT-SIDED LOW BACK PAIN WITHOUT SCIATICA: Status: RESOLVED | Noted: 2019-04-10 | Resolved: 2022-04-18

## 2022-04-18 PROCEDURE — 99215 OFFICE O/P EST HI 40 MIN: CPT | Performed by: FAMILY MEDICINE

## 2022-04-18 RX ORDER — OMEPRAZOLE 20 MG/1
20 CAPSULE, DELAYED RELEASE ORAL DAILY
Qty: 90 CAPSULE | Refills: 1 | Status: SHIPPED | OUTPATIENT
Start: 2022-04-18 | End: 2022-08-10 | Stop reason: SDUPTHER

## 2022-04-18 RX ORDER — AMLODIPINE BESYLATE 5 MG/1
5 TABLET ORAL DAILY
Qty: 90 TABLET | Refills: 1 | Status: SHIPPED | OUTPATIENT
Start: 2022-04-18 | End: 2022-08-10 | Stop reason: SDUPTHER

## 2022-04-18 RX ORDER — ATORVASTATIN CALCIUM 20 MG/1
20 TABLET, FILM COATED ORAL EVERY EVENING
Qty: 90 TABLET | Refills: 1 | Status: SHIPPED | OUTPATIENT
Start: 2022-04-18 | End: 2022-08-10 | Stop reason: SDUPTHER

## 2022-04-18 RX ORDER — LISINOPRIL 40 MG/1
40 TABLET ORAL DAILY
Qty: 90 TABLET | Refills: 1 | Status: SHIPPED | OUTPATIENT
Start: 2022-04-18 | End: 2022-08-10 | Stop reason: SDUPTHER

## 2022-04-18 NOTE — PROGRESS NOTES
Subjective:      Patient ID: Ana Hunter is a 61 y o  female  Diabetes  She presents for her follow-up diabetic visit  She has type 2 diabetes mellitus  Her disease course has been worsening (A1c 9 2)  There are no hypoglycemic associated symptoms  Pertinent negatives for diabetes include no chest pain, no polydipsia and no polyuria  There are no hypoglycemic complications  There are no diabetic complications  Risk factors for coronary artery disease include diabetes mellitus, dyslipidemia, hypertension and post-menopausal  Current diabetic treatment includes oral agent (dual therapy)  She is compliant with treatment all of the time  She is following a generally healthy diet  Meal planning includes avoidance of concentrated sweets  An ACE inhibitor/angiotensin II receptor blocker is being taken  Eye exam is current  Hypertension  This is a chronic problem  The current episode started more than 1 year ago  The problem is controlled  Pertinent negatives include no chest pain, palpitations or shortness of breath  Past treatments include ACE inhibitors and calcium channel blockers  The current treatment provides significant improvement  Hyperlipidemia  This is a chronic problem  The current episode started more than 1 year ago  The problem is controlled  Recent lipid tests were reviewed and are normal  Pertinent negatives include no chest pain, myalgias or shortness of breath  Current antihyperlipidemic treatment includes statins  The current treatment provides significant improvement of lipids  There are no compliance problems  Heartburn  She complains of coughing  She reports no chest pain or no heartburn  This is a chronic problem  She has tried a PPI for the symptoms  The treatment provided significant relief  Cough  This is a chronic problem  The current episode started more than 1 year ago  The cough is non-productive   Pertinent negatives include no chest pain, heartburn, myalgias or shortness of breath  Risk factors for lung disease include smoking/tobacco exposure  Treatments tried: Breo, albuterol  The treatment provided significant relief  Her past medical history is significant for COPD  Past Medical History:   Diagnosis Date    Anxiety     Diabetes mellitus ([address] )     Diverticulosis     GERD (gastroesophageal reflux disease)     Headache     Hyperlipidemia     Hypertension     Psychiatric disorder     bipolar    Right clavicle fracture     TIA (transient ischemic attack)     Vitamin D deficiency        Family History   Problem Relation Age of Onset    Arthritis Family     Cancer Family     Osteoporosis Family     Cervical cancer Mother     Ovarian cancer Mother 35    Uterine cancer Mother     Hypertension Father     Other Father         pre-diabetes    Diabetes Father     Diabetes type I Sister     Osteoporosis Sister     Depression Sister     Breast cancer Sister 79    Diabetes Sister     Heart attack Brother          of an MI at 52y/o    Diabetes Brother     No Known Problems Maternal Grandmother     No Known Problems Maternal Grandfather     Other Paternal Grandmother         Parkinson's Disease    Heart attack Paternal Grandfather     Other Paternal Grandfather         coronary arteriosclerosis    Alcohol abuse Paternal Uncle     Seizures Other     Seizures Other     No Known Problems Daughter     No Known Problems Sister     No Known Problems Sister     No Known Problems Sister     No Known Problems Son        Past Surgical History:   Procedure Laterality Date    BLADDER SURGERY      Sling    [place] HIP [address] BODY,PLASTY/RESECTN Right 2017    Procedure: RIGHT HIP ARTHROSCOPIC LABRAL DEBRIDEMENT;  Surgeon: Lori Gooden MD;  Location: [facility];  Service: Orthopedics    SINUS SURGERY      3 surgeries     TUBAL LIGATION          reports that she has quit smoking  She has a 70 00 pack-year smoking history   She has never used smokeless tobacco  She reports previous alcohol use  She reports current drug use  Drug: Marijuana        Current Outpatient Medications:     albuterol (Ventolin HFA) 90 mcg/act inhaler, Inhale 2 puffs every 6 (six) hours as needed for wheezing, Disp: 18 g, Rfl: 1    amLODIPine (NORVASC) 5 mg tablet, Take 1 tablet (5 mg total) by mouth daily, Disp: 90 tablet, Rfl: 1    ARIPiprazole (ABILIFY) 2 mg tablet, Take 1 tablet (2 mg total) by mouth daily at bedtime, Disp: 90 tablet, Rfl: 0    aspirin 81 mg chewable tablet, Chew 81 mg , Disp: , Rfl:     clonazePAM (KlonoPIN) 0 5 mg tablet, Take 1/2 tab by mouth twice daily and 1 full tab nightly, Disp: 60 tablet, Rfl: 2    fluticasone-vilanterol (BREO ELLIPTA) 200-25 MCG/INH inhaler, Inhale 1 puff daily Rinse mouth after use , Disp: 1 blister, Rfl: 5    glucose blood (ONE TOUCH ULTRA TEST) test strip, Check blood sugar once daily, Disp: 100 each, Rfl: 5    Lancets (ONETOUCH ULTRASOFT) lancets, Check blood sugar twice/ week , Disp: 100 each, Rfl: 0    lisinopril (ZESTRIL) 40 mg tablet, Take 1 tablet (40 mg total) by mouth daily, Disp: 90 tablet, Rfl: 1    metFORMIN (GLUCOPHAGE) 500 mg tablet, Take 2 tablets (1,000 mg total) by mouth 2 (two) times a day with meals, Disp: 360 tablet, Rfl: 1    omeprazole (PriLOSEC) 20 mg delayed release capsule, Take 1 capsule (20 mg total) by mouth daily, Disp: 90 capsule, Rfl: 1    PARoxetine (PAXIL) 30 mg tablet, Take 1 tablet (30 mg total) by mouth daily, Disp: 90 tablet, Rfl: 0    atorvastatin (LIPITOR) 20 mg tablet, Take 1 tablet (20 mg total) by mouth every evening, Disp: 90 tablet, Rfl: 1    erythromycin (ILOTYCIN) ophthalmic ointment, Administer 0 5 inches to both eyes daily at bedtime (Patient not taking: Reported on 4/18/2022 ), Disp: 7 g, Rfl: 0    The following portions of the patient's history were reviewed and updated as appropriate: allergies, current medications, past family history, past medical history, past social history, past surgical history and problem list     Review of Systems   Constitutional: Negative  Respiratory: Positive for cough  Negative for shortness of breath  Cardiovascular: Negative  Negative for chest pain and palpitations  Gastrointestinal: Negative for heartburn  Endocrine: Negative for polydipsia and polyuria  Musculoskeletal: Negative for myalgias  Neurological: Negative  Psychiatric/Behavioral: Negative  Objective:    /76 (BP Location: Left arm, Patient Position: Sitting, Cuff Size: Standard)   Pulse 96   Resp 16   Ht 5' 3" (1 6 m)   Wt 93 9 kg (207 lb)   SpO2 97%   BMI 36 67 kg/m²      Physical Exam  Constitutional:       Appearance: She is well-developed  Cardiovascular:      Rate and Rhythm: Normal rate and regular rhythm  Pulses: no weak pulses     Heart sounds: Normal heart sounds  Pulmonary:      Effort: Pulmonary effort is normal       Breath sounds: Normal breath sounds  Abdominal:      General: Bowel sounds are normal       Palpations: Abdomen is soft  Feet:      Right foot:      Skin integrity: No ulcer, skin breakdown, erythema, warmth, callus or dry skin  Left foot:      Skin integrity: No ulcer, skin breakdown, erythema, warmth, callus or dry skin  Neurological:      Mental Status: She is alert and oriented to person, place, and time  Psychiatric:         Behavior: Behavior normal          Judgment: Judgment normal          Patient's shoes and socks removed  Right Foot/Ankle   Right Foot Inspection  Skin Exam: skin normal and skin intact  No dry skin, no warmth, no callus, no erythema, no maceration, no abnormal color, no pre-ulcer, no ulcer and no callus  Toe Exam: ROM and strength within normal limits  Sensory   Vibration: intact  Proprioception: intact  Monofilament testing: intact    Vascular  Capillary refills: < 3 seconds          Left Foot/Ankle  Left Foot Inspection  Skin Exam: skin normal and skin intact   No dry skin, no warmth, no erythema, no maceration, normal color, no pre-ulcer, no ulcer and no callus  Toe Exam: ROM and strength within normal limits  Sensory   Vibration: intact  Proprioception: intact  Monofilament testing: intact    Vascular  Capillary refills: < 3 seconds        Assign Risk Category  No deformity present  No loss of protective sensation  No weak pulses  Risk: 0      Recent Results (from the past 1008 hour(s))   Comprehensive metabolic panel    Collection Time: 04/04/22  8:25 AM   Result Value Ref Range    Sodium 132 (L) 136 - 145 mmol/L    Potassium 4 3 3 5 - 5 3 mmol/L    Chloride 100 100 - 108 mmol/L    CO2 25 21 - 32 mmol/L    ANION GAP 7 4 - 13 mmol/L    BUN 10 5 - 25 mg/dL    Creatinine 1 02 0 60 - 1 30 mg/dL    Glucose, Fasting 225 (H) 65 - 99 mg/dL    Calcium 9 0 8 3 - 10 1 mg/dL    Corrected Calcium 9 7 8 3 - 10 1 mg/dL    AST 26 5 - 45 U/L    ALT 31 12 - 78 U/L    Alkaline Phosphatase 151 (H) 46 - 116 U/L    Total Protein 7 3 6 4 - 8 2 g/dL    Albumin 3 1 (L) 3 5 - 5 0 g/dL    Total Bilirubin 0 38 0 20 - 1 00 mg/dL    eGFR 61 ml/min/1 73sq m   Hemoglobin A1C    Collection Time: 04/04/22  8:25 AM   Result Value Ref Range    Hemoglobin A1C 9 2 (H) Normal 3 8-5 6%; PreDiabetic 5 7-6 4%; Diabetic >=6 5%; Glycemic control for adults with diabetes <7 0% %     mg/dl   Lipid panel    Collection Time: 04/04/22  8:25 AM   Result Value Ref Range    Cholesterol 182 See Comment mg/dL    Triglycerides 171 (H) See Comment mg/dL    HDL, Direct 47 (L) >=50 mg/dL    LDL Calculated 101 (H) 0 - 100 mg/dL    Non-HDL-Chol (CHOL-HDL) 135 mg/dl       Assessment/Plan:             Problem List Items Addressed This Visit        Digestive    Chronic GERD     Stable on omeprazole 20 milligram   Continue same           Relevant Medications    omeprazole (PriLOSEC) 20 mg delayed release capsule       Endocrine    Controlled type 2 diabetes mellitus without complication, without long-term current use of insulin (HCC)       Lab Results   Component Value Date    HGBA1C 9 2 (H) 04/04/2022     Increase metformin to 1000 mg bid, glipizide to 10 mg bid  Hypoglycemia discussed with patient  Repeat A1c x 3 months  Continue daily blood sugar monitoring  Relevant Medications    metFORMIN (GLUCOPHAGE) 500 mg tablet    Diabetic nephropathy associated with type 2 diabetes mellitus ([facility] )       Lab Results   Component Value Date    HGBA1C 9 2 (H) 04/04/2022   Patient's blood pressure is at goal   Continue losartan  Importance of improved glycemic control discussed with patient  Medications adjusted  Relevant Medications    metFORMIN (GLUCOPHAGE) 500 mg tablet       Respiratory    Simple chronic bronchitis (HCC)     Symptoms well controlled intermittent Breo, albuterol  Patient encouraged continue same  Follow up if need for albuterol increases above baseline            Cardiovascular and Mediastinum    Hypertension     Patient's blood pressure is at goal   Continue amlodipine 5 milligram, lisinopril 40 milligram         Relevant Medications    amLODIPine (NORVASC) 5 mg tablet    lisinopril (ZESTRIL) 40 mg tablet       Other    Moderate recurrent major depression (HCC)     Continue monitoring/management per Psychiatry         Hyperlipemia     LDL with lifestyle modifications  Continue same  Continue statin         Relevant Medications    atorvastatin (LIPITOR) 20 mg tablet    Other Relevant Orders    Lipid Panel with Direct LDL reflex    Microalbuminuria     Patient's blood pressure is at goal   Continue lisinopril  Relevant Orders    Microalbumin / creatinine urine ratio    Multiple subsolid lung nodules greater than 6 mm in diameter     Stable her CT scan in October 2021  Patient due for annual screening in October             Obesity, morbid ([place] [address] )      Other Visit Diagnoses     Type 2 diabetes mellitus without complication, without long-term current use of insulin ([place] [address] )    -  Primary    Relevant Medications    metFORMIN (GLUCOPHAGE) 500 mg tablet    glucose blood (ONE TOUCH ULTRA TEST) test strip    Other Relevant Orders    Ambulatory referral to Diabetic Education    Hemoglobin A1C    Comprehensive metabolic panel    Hemoglobin A1c (w/out EAG) ([facility] ONLY)    Essential hypertension        Relevant Medications    amLODIPine (NORVASC) 5 mg tablet    lisinopril (ZESTRIL) 40 mg tablet    Visit for screening mammogram        Relevant Orders    Mammo screening bilateral w 3d & cad

## 2022-04-18 NOTE — ASSESSMENT & PLAN NOTE
Lab Results   Component Value Date    HGBA1C 9 2 (H) 04/04/2022   Patient's blood pressure is at goal   Continue losartan  Importance of improved glycemic control discussed with patient  Medications adjusted

## 2022-04-18 NOTE — PATIENT INSTRUCTIONS
10% - bad control"> 10% - bad control,Hemoglobin A1c (HbA1c) greater than 10% indicating poor diabetic control,Haemoglobin A1c greater than 10% indicating poor diabetic control">   Diabetes Mellitus Type 2 in Adults, Ambulatory Care   GENERAL INFORMATION:   Diabetes mellitus type 2  is a disease that affects how your body uses glucose (sugar)  Insulin helps move sugar out of the blood so it can be used for energy  Normally, when the blood sugar level increases, the pancreas makes more insulin  Type 2 diabetes develops because either the body cannot make enough insulin, or it cannot use the insulin correctly  After many years, your pancreas may stop making insulin  Common symptoms include the following:   · More hunger or thirst than usual     · Frequent urination     · Weight loss without trying     · Blurred vision  Seek immediate care for the following symptoms:   · Severe abdominal pain, or pain that spreads to your back  You may also be vomiting  · Trouble staying awake or focusing    · Shaking or sweating    · Blurred or double vision    · Breath has a fruity, sweet smell    · Breathing is deep and labored, or rapid and shallow    · Heartbeat is fast and weak  Treatment for diabetes mellitus type 2  includes keeping your blood sugar at a normal level  You must eat the right foods, and exercise regularly  You may also need medicine if you cannot control your blood sugar level with nutrition and exercise  Manage diabetes mellitus type 2:   · Check your blood sugar level  You will be taught how to check a small drop of blood in a glucose monitor  Ask your healthcare provider when and how often to check during the day  Ask your healthcare provider what your blood sugar levels should be when you check them  · Keep track of carbohydrates (sugar and starchy foods)  Your blood sugar level can get too high if you eat too many carbohydrates   Your dietitian will help you plan meals and snacks that have the right amount of carbohydrates  · Eat low-fat foods  Some examples are skinless chicken and low-fat milk  · Eat less sodium (salt)  Some examples of high-sodium foods to limit are soy sauce, potato chips, and soup  Do not add salt to food you cook  Limit your use of table salt  · Eat high-fiber foods  Foods that are a good source of fiber include vegetables, whole grain bread, and beans  · Limit alcohol  Alcohol affects your blood sugar level and can make it harder to manage your diabetes  Women should limit alcohol to 1 drink a day  Men should limit alcohol to 2 drinks a day  A drink of alcohol is 12 ounces of beer, 5 ounces of wine, or 1½ ounces of liquor  · Get regular exercise  Exercise can help keep your blood sugar level steady, decrease your risk of heart disease, and help you lose weight  Exercise for at least 30 minutes, 5 days a week  Include muscle strengthening activities 2 days each week  Work with your healthcare provider to create an exercise plan  · Check your feet each day  for injuries or open sores  Ask your healthcare provider for activities you can do if you have an open sore  · Quit smoking  If you smoke, it is never too late to quit  Smoking can worsen the problems that may occur with diabetes  Ask your healthcare provider for information about how to stop smoking if you are having trouble quitting  · Ask about your weight:  Ask healthcare providers if you need to lose weight, and how much to lose  Ask them to help you with a weight loss program  Even a 10 to 15 pound weight loss can help you manage your blood sugar level  · Carry medical alert identification  Wear medical alert jewelry or carry a card that says you have diabetes  Ask your healthcare provider where to get these items  · Ask about vaccines  Diabetes puts you at risk of serious illness if you get the flu, pneumonia, or hepatitis   Ask your healthcare provider if you should get a flu, pneumonia, or hepatitis B vaccine, and when to get the vaccine  Follow up with your healthcare provider as directed:  Write down your questions so you remember to ask them during your visits  CARE AGREEMENT:   You have the right to help plan your care  Learn about your health condition and how it may be treated  Discuss treatment options with your caregivers to decide what care you want to receive  You always have the right to refuse treatment  The above information is an  only  It is not intended as medical advice for individual conditions or treatments  Talk to your doctor, nurse or pharmacist before following any medical regimen to see if it is safe and effective for you  © 2014 [address] is for End User's use only and may not be sold, redistributed or otherwise used for commercial purposes  All illustrations and images included in CareNotes® are the copyrighted property of [employer]  or Michael Fowler

## 2022-04-18 NOTE — ASSESSMENT & PLAN NOTE
Symptoms well controlled intermittent Breo, albuterol  Patient encouraged continue same    Follow up if need for albuterol increases above baseline

## 2022-04-18 NOTE — ASSESSMENT & PLAN NOTE
Lab Results   Component Value Date    HGBA1C 9 2 (H) 04/04/2022     Increase metformin to 1000 mg bid, glipizide to 10 mg bid  Hypoglycemia discussed with patient  Repeat A1c x 3 months  Continue daily blood sugar monitoring

## 2022-04-25 DIAGNOSIS — F33.1 MODERATE RECURRENT MAJOR DEPRESSION (HCC): ICD-10-CM

## 2022-04-25 DIAGNOSIS — F41.0 PANIC ATTACKS: ICD-10-CM

## 2022-04-25 DIAGNOSIS — F41.1 GENERALIZED ANXIETY DISORDER: ICD-10-CM

## 2022-04-25 RX ORDER — PAROXETINE 30 MG/1
30 TABLET, FILM COATED ORAL DAILY
Qty: 90 TABLET | Refills: 0 | Status: SHIPPED | OUTPATIENT
Start: 2022-04-25 | End: 2022-05-09 | Stop reason: SDUPTHER

## 2022-04-25 RX ORDER — CLONAZEPAM 0.5 MG/1
TABLET ORAL
Qty: 60 TABLET | Refills: 2 | Status: SHIPPED | OUTPATIENT
Start: 2022-04-25

## 2022-04-25 RX ORDER — ARIPIPRAZOLE 2 MG/1
2 TABLET ORAL
Qty: 90 TABLET | Refills: 0 | Status: SHIPPED | OUTPATIENT
Start: 2022-04-25 | End: 2022-05-09 | Stop reason: SDUPTHER
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
## 2022-05-09 ENCOUNTER — OFFICE VISIT (OUTPATIENT)
Dept: PSYCHIATRY | Facility: CLINIC | Age: 57
End: 2022-05-09
Payer: MEDICARE

## 2022-05-09 VITALS — HEIGHT: 63 IN | BODY MASS INDEX: 36 KG/M2 | WEIGHT: 203.2 LBS

## 2022-05-09 DIAGNOSIS — F41.1 GENERALIZED ANXIETY DISORDER: ICD-10-CM

## 2022-05-09 DIAGNOSIS — F41.0 PANIC ATTACKS: ICD-10-CM

## 2022-05-09 DIAGNOSIS — F33.1 MODERATE RECURRENT MAJOR DEPRESSION (HCC): Primary | ICD-10-CM

## 2022-05-09 PROCEDURE — 99214 OFFICE O/P EST MOD 30 MIN: CPT | Performed by: PHYSICIAN ASSISTANT

## 2022-05-09 RX ORDER — ARIPIPRAZOLE 2 MG/1
2 TABLET ORAL
Qty: 90 TABLET | Refills: 0 | Status: SHIPPED | OUTPATIENT
Start: 2022-05-09

## 2022-05-09 RX ORDER — PAROXETINE 30 MG/1
30 TABLET, FILM COATED ORAL DAILY
Qty: 90 TABLET | Refills: 0 | Status: SHIPPED | OUTPATIENT
Start: 2022-05-09

## 2022-05-09 NOTE — BH TREATMENT PLAN
TREATMENT PLAN (Medication Management Only)        [facility]    Name/Date of Birth/MRN:  Coco Sanchez 61 y o  1965 MRN: [id number]  Date of Treatment Plan: May 9, 2022  Diagnosis/Diagnoses:   1  Moderate recurrent major depression ([address] )    2  Generalized anxiety disorder    3  Panic attacks      Strengths/Personal Resources for Self-Care: "The ability to handle things a little bit better; My gardening"  Area/Areas of need (in own words): "Busy  "  1  Long Term Goal:   Maintain mood stability and control of anxiety  Target Date: 3-6 months  Person/Persons responsible for completion of goal: Anil  2  Short Term Objective (s) - How will we reach this goal?:   A  Provider new recommended medication/dosage changes and/or continue medication(s): Pt is having mild to moderate depression and mild anxiety without panic  Pt appears stable and feels her medicines are working well enough given the circumstances  I will continue the present regimen unchanged  Pt is not interested in psychotherapy  Treatment plan done and Pt accepts the plan  Continue:  Paroxetine 30mg (1) tab po qd # 90  Aripiprazole 2mg (1) tab po qhs # 90  Clonazepam 0 5mg (1/2) tab po bid and (1) tab po qhs--Pt last filled this 4/24/2022 --and this was from a refill on the 1/5/2022 Rx  Pt has not yet used any fills from my 4/25/2022  Get labs done per PCP--due 7/2022: CMP, Lipids, HgbA1C, Urine Microalb/Cr ratio  Return 12 weeks, call sooner prn    Target Date: 3-6 months  Person/Persons Responsible for Completion of Goal: Anil   Progress Towards Goals: stable, continuing treatment  Treatment Modality: Medication mgt  Review due 180 days from date of this plan: 6 months - 11/9/2022  Expected length of service: ongoing treatment unless revised  My Physician/PA/NP and I have developed this plan together and I agree to work on the goals and objectives   I understand the treatment goals that were developed for my treatment    Electronic Signatures: on file (unless signed below)    Jim Farr PA-C 05/09/22

## 2022-05-16 DIAGNOSIS — E11.65 UNCONTROLLED TYPE 2 DIABETES MELLITUS WITH HYPERGLYCEMIA (HCC): Primary | ICD-10-CM

## 2022-05-16 RX ORDER — GLIPIZIDE 10 MG/1
10 TABLET ORAL
Qty: 180 TABLET | Refills: 1 | Status: SHIPPED | OUTPATIENT
Start: 2022-05-16 | End: 2022-08-10 | Stop reason: SDUPTHER

## 2022-05-31 LAB
LEFT EYE DIABETIC RETINOPATHY: NORMAL
RIGHT EYE DIABETIC RETINOPATHY: NORMAL

## 2022-08-05 ENCOUNTER — APPOINTMENT (OUTPATIENT)
Dept: LAB | Facility: CLINIC | Age: 57
End: 2022-08-05
Payer: MEDICARE

## 2022-08-05 DIAGNOSIS — R80.9 MICROALBUMINURIA: ICD-10-CM

## 2022-08-05 DIAGNOSIS — E11.9 TYPE 2 DIABETES MELLITUS WITHOUT COMPLICATION, WITHOUT LONG-TERM CURRENT USE OF INSULIN (HCC): ICD-10-CM

## 2022-08-05 DIAGNOSIS — E78.5 HYPERLIPIDEMIA, UNSPECIFIED HYPERLIPIDEMIA TYPE: ICD-10-CM

## 2022-08-05 LAB
ALBUMIN SERPL BCP-MCNC: 3 G/DL (ref 3.5–5)
ALP SERPL-CCNC: 154 U/L (ref 46–116)
ALT SERPL W P-5'-P-CCNC: 32 U/L (ref 12–78)
ANION GAP SERPL CALCULATED.3IONS-SCNC: 6 MMOL/L (ref 4–13)
AST SERPL W P-5'-P-CCNC: 30 U/L (ref 5–45)
BILIRUB SERPL-MCNC: 0.77 MG/DL (ref 0.2–1)
BUN SERPL-MCNC: 11 MG/DL (ref 5–25)
CALCIUM ALBUM COR SERPL-MCNC: 9.7 MG/DL (ref 8.3–10.1)
CALCIUM SERPL-MCNC: 8.9 MG/DL (ref 8.3–10.1)
CHLORIDE SERPL-SCNC: 101 MMOL/L (ref 96–108)
CHOLEST SERPL-MCNC: 176 MG/DL
CO2 SERPL-SCNC: 27 MMOL/L (ref 21–32)
CREAT SERPL-MCNC: 1.03 MG/DL (ref 0.6–1.3)
CREAT UR-MCNC: 342 MG/DL
EST. AVERAGE GLUCOSE BLD GHB EST-MCNC: 223 MG/DL
GFR SERPL CREATININE-BSD FRML MDRD: 60 ML/MIN/1.73SQ M
GLUCOSE P FAST SERPL-MCNC: 233 MG/DL (ref 65–99)
HBA1C MFR BLD: 9.4 %
HDLC SERPL-MCNC: 39 MG/DL
LDLC SERPL CALC-MCNC: 104 MG/DL (ref 0–100)
MICROALBUMIN UR-MCNC: 70.7 MG/L (ref 0–20)
MICROALBUMIN/CREAT 24H UR: 21 MG/G CREATININE (ref 0–30)
POTASSIUM SERPL-SCNC: 4.5 MMOL/L (ref 3.5–5.3)
PROT SERPL-MCNC: 7.6 G/DL (ref 6.4–8.4)
SODIUM SERPL-SCNC: 134 MMOL/L (ref 135–147)
TRIGL SERPL-MCNC: 165 MG/DL

## 2022-08-05 PROCEDURE — 80053 COMPREHEN METABOLIC PANEL: CPT

## 2022-08-05 PROCEDURE — 83036 HEMOGLOBIN GLYCOSYLATED A1C: CPT

## 2022-08-05 PROCEDURE — 80061 LIPID PANEL: CPT

## 2022-08-05 PROCEDURE — 82043 UR ALBUMIN QUANTITATIVE: CPT

## 2022-08-05 PROCEDURE — 82570 ASSAY OF URINE CREATININE: CPT

## 2022-08-05 PROCEDURE — 36415 COLL VENOUS BLD VENIPUNCTURE: CPT

## 2022-08-10 ENCOUNTER — OFFICE VISIT (OUTPATIENT)
Dept: FAMILY MEDICINE CLINIC | Facility: CLINIC | Age: 57
End: 2022-08-10
Payer: MEDICARE

## 2022-08-10 VITALS
HEIGHT: 63 IN | OXYGEN SATURATION: 96 % | DIASTOLIC BLOOD PRESSURE: 78 MMHG | BODY MASS INDEX: 34.38 KG/M2 | HEART RATE: 94 BPM | SYSTOLIC BLOOD PRESSURE: 128 MMHG | WEIGHT: 194 LBS

## 2022-08-10 DIAGNOSIS — E11.65 UNCONTROLLED TYPE 2 DIABETES MELLITUS WITH HYPERGLYCEMIA (HCC): ICD-10-CM

## 2022-08-10 DIAGNOSIS — J41.0 SIMPLE CHRONIC BRONCHITIS (HCC): ICD-10-CM

## 2022-08-10 DIAGNOSIS — K21.9 CHRONIC GERD: ICD-10-CM

## 2022-08-10 DIAGNOSIS — E11.9 CONTROLLED TYPE 2 DIABETES MELLITUS WITHOUT COMPLICATION, WITHOUT LONG-TERM CURRENT USE OF INSULIN (HCC): ICD-10-CM

## 2022-08-10 DIAGNOSIS — E11.21 DIABETIC NEPHROPATHY ASSOCIATED WITH TYPE 2 DIABETES MELLITUS (HCC): Primary | ICD-10-CM

## 2022-08-10 DIAGNOSIS — E11.9 TYPE 2 DIABETES MELLITUS WITHOUT COMPLICATION, WITHOUT LONG-TERM CURRENT USE OF INSULIN (HCC): ICD-10-CM

## 2022-08-10 DIAGNOSIS — E78.5 HYPERLIPIDEMIA, UNSPECIFIED HYPERLIPIDEMIA TYPE: ICD-10-CM

## 2022-08-10 DIAGNOSIS — I10 PRIMARY HYPERTENSION: ICD-10-CM

## 2022-08-10 DIAGNOSIS — I10 ESSENTIAL HYPERTENSION: ICD-10-CM

## 2022-08-10 PROCEDURE — 99215 OFFICE O/P EST HI 40 MIN: CPT | Performed by: FAMILY MEDICINE

## 2022-08-10 RX ORDER — ATORVASTATIN CALCIUM 20 MG/1
20 TABLET, FILM COATED ORAL EVERY EVENING
Qty: 90 TABLET | Refills: 1 | Status: SHIPPED | OUTPATIENT
Start: 2022-08-10 | End: 2023-02-06

## 2022-08-10 RX ORDER — OMEPRAZOLE 20 MG/1
20 CAPSULE, DELAYED RELEASE ORAL DAILY
Qty: 90 CAPSULE | Refills: 1 | Status: SHIPPED | OUTPATIENT
Start: 2022-08-10

## 2022-08-10 RX ORDER — GLIPIZIDE 10 MG/1
10 TABLET ORAL
Qty: 180 TABLET | Refills: 1 | Status: SHIPPED | OUTPATIENT
Start: 2022-08-10

## 2022-08-10 RX ORDER — LISINOPRIL 40 MG/1
40 TABLET ORAL DAILY
Qty: 90 TABLET | Refills: 1 | Status: SHIPPED | OUTPATIENT
Start: 2022-08-10

## 2022-08-10 RX ORDER — LANCETS
EACH MISCELLANEOUS
Qty: 100 EACH | Refills: 5 | Status: SHIPPED | OUTPATIENT
Start: 2022-08-10

## 2022-08-10 RX ORDER — AMLODIPINE BESYLATE 5 MG/1
5 TABLET ORAL DAILY
Qty: 90 TABLET | Refills: 1 | Status: SHIPPED | OUTPATIENT
Start: 2022-08-10

## 2022-08-10 NOTE — PROGRESS NOTES
Subjective:      Patient ID: Kerney Opitz is a 61 y o  female  Diabetes  She presents for her follow-up diabetic visit  She has type 2 diabetes mellitus  Her disease course has been stable  There are no hypoglycemic associated symptoms  Pertinent negatives for hypoglycemia include no headaches  Pertinent negatives for diabetes include no chest pain, no fatigue and no foot paresthesias  There are no hypoglycemic complications  Symptoms are worsening (A1c 9 4)  There are no diabetic complications  Risk factors for coronary artery disease include diabetes mellitus, dyslipidemia, hypertension, post-menopausal and tobacco exposure  Current diabetic treatment includes oral agent (dual therapy)  She is compliant with treatment all of the time  An ACE inhibitor/angiotensin II receptor blocker is being taken  Eye exam is current  Hypertension  This is a chronic problem  The current episode started more than 1 year ago  The problem is controlled  Pertinent negatives include no chest pain, headaches, palpitations or shortness of breath  Risk factors for coronary artery disease include dyslipidemia, diabetes mellitus, post-menopausal state, smoking/tobacco exposure and stress  Past treatments include calcium channel blockers and ACE inhibitors  The current treatment provides significant improvement  There are no compliance problems  Hyperlipidemia  This is a chronic problem  The current episode started more than 1 year ago  The problem is controlled  Recent lipid tests were reviewed and are normal  Pertinent negatives include no chest pain, myalgias or shortness of breath  Current antihyperlipidemic treatment includes statins  The current treatment provides significant improvement of lipids  There are no compliance problems  Patient has chronic acid reflux, stable on PPI  Advised to avoid dietary triggers  Patient has chronic bronchitis, stable Breo that she uses as a maintenance inhaler      Past Medical History:   Diagnosis Date    Anxiety     Diabetes mellitus ([address] )     Diverticulosis     GERD (gastroesophageal reflux disease)     Headache     Hyperlipidemia     Hypertension     Psychiatric disorder     bipolar    Right clavicle fracture     TIA (transient ischemic attack)     Vitamin D deficiency        Family History   Problem Relation Age of Onset    Arthritis Family     Cancer Family     Osteoporosis Family     Cervical cancer Mother     Ovarian cancer Mother 35    Uterine cancer Mother     Hypertension Father     Other Father         pre-diabetes    Diabetes Father     Diabetes type I Sister     Osteoporosis Sister     Depression Sister     Breast cancer Sister 79    Diabetes Sister     Heart attack Brother          of an MI at 52y/o    Diabetes Brother     No Known Problems Maternal Grandmother     No Known Problems Maternal Grandfather     Other Paternal Grandmother         Parkinson's Disease    Heart attack Paternal Grandfather     Other Paternal Grandfather         coronary arteriosclerosis    Alcohol abuse Paternal Uncle     Seizures Other     Seizures Other     No Known Problems Daughter     No Known Problems Sister     No Known Problems Sister     No Known Problems Sister     No Known Problems Son        Past Surgical History:   Procedure Laterality Date    BLADDER SURGERY      Sling    [place] HIP [address] BODY,PLASTY/RESECTN Right 2017    Procedure: RIGHT HIP ARTHROSCOPIC LABRAL DEBRIDEMENT;  Surgeon: Rojas Clayton MD;  [facility];  Service: Orthopedics    SINUS SURGERY      3 surgeries     TUBAL LIGATION          reports that she has quit smoking  She has a 70 00 pack-year smoking history  She has never used smokeless tobacco  She reports previous alcohol use  She reports current drug use  Drug: Marijuana        Current Outpatient Medications:     albuterol (Ventolin HFA) 90 mcg/act inhaler, Inhale 2 puffs every 6 (six) hours as needed for wheezing, Disp: 18 g, Rfl: 1    amLODIPine (NORVASC) 5 mg tablet, Take 1 tablet (5 mg total) by mouth daily, Disp: 90 tablet, Rfl: 1    ARIPiprazole (ABILIFY) 2 mg tablet, Take 1 tablet (2 mg total) by mouth daily at bedtime, Disp: 90 tablet, Rfl: 0    aspirin 81 mg chewable tablet, Chew 81 mg , Disp: , Rfl:     atorvastatin (LIPITOR) 20 mg tablet, Take 1 tablet (20 mg total) by mouth every evening, Disp: 90 tablet, Rfl: 1    clonazePAM (KlonoPIN) 0 5 mg tablet, Take 1/2 tab by mouth twice daily and 1 full tab nightly, Disp: 60 tablet, Rfl: 2    Empagliflozin (Jardiance) 25 MG TABS, Take 1 tablet (25 mg total) by mouth every morning, Disp: 90 tablet, Rfl: 1    fluticasone-vilanterol (BREO ELLIPTA) 200-25 MCG/INH inhaler, Inhale 1 puff daily Rinse mouth after use , Disp: 1 blister, Rfl: 5    glipiZIDE (GLUCOTROL) 10 mg tablet, Take 1 tablet (10 mg total) by mouth 2 (two) times a day before meals, Disp: 180 tablet, Rfl: 1    glucose blood (ONE TOUCH ULTRA TEST) test strip, Check blood sugar once daily, Disp: 100 each, Rfl: 5    Lancets (onetouch ultrasoft) lancets, Check blood sugar twice/ week , Disp: 100 each, Rfl: 5    lisinopril (ZESTRIL) 40 mg tablet, Take 1 tablet (40 mg total) by mouth daily, Disp: 90 tablet, Rfl: 1    metFORMIN (GLUCOPHAGE) 500 mg tablet, Take 1 tablet (500 mg total) by mouth 2 (two) times a day with meals, Disp: 180 tablet, Rfl: 1    omeprazole (PriLOSEC) 20 mg delayed release capsule, Take 1 capsule (20 mg total) by mouth daily, Disp: 90 capsule, Rfl: 1    PARoxetine (PAXIL) 30 mg tablet, Take 1 tablet (30 mg total) by mouth daily, Disp: 90 tablet, Rfl: 0    erythromycin (ILOTYCIN) ophthalmic ointment, Administer 0 5 inches to both eyes daily at bedtime (Patient not taking: No sig reported), Disp: 7 g, Rfl: 0    The following portions of the patient's history were reviewed and updated as appropriate: allergies, current medications, past family history, past medical history, past social history, past surgical history and problem list     Review of Systems   Constitutional: Negative  Negative for fatigue  Eyes: Negative  Respiratory: Negative  Negative for shortness of breath  Cardiovascular: Negative  Negative for chest pain and palpitations  Gastrointestinal: Negative  Endocrine: Negative  Genitourinary: Negative  Musculoskeletal: Negative  Negative for myalgias  Neurological: Negative  Negative for headaches  Psychiatric/Behavioral: Negative  Objective:    /78   Pulse 94   Ht 5' 3" (1 6 m)   Wt 88 kg (194 lb)   SpO2 96%   BMI 34 37 kg/m²      Physical Exam  Constitutional:       Appearance: She is well-developed  Cardiovascular:      Rate and Rhythm: Normal rate and regular rhythm  Heart sounds: Normal heart sounds  Pulmonary:      Effort: Pulmonary effort is normal       Breath sounds: Normal breath sounds  Abdominal:      General: Bowel sounds are normal       Palpations: Abdomen is soft  Neurological:      Mental Status: She is alert and oriented to person, place, and time  Psychiatric:         Behavior: Behavior normal          Judgment: Judgment normal            Recent Results (from the past 1008 hour(s))   Hemoglobin A1C    Collection Time: 08/05/22  9:10 AM   Result Value Ref Range    Hemoglobin A1C 9 4 (H) Normal 3 8-5 6%; PreDiabetic 5 7-6 4%;  Diabetic >=6 5%; Glycemic control for adults with diabetes <7 0% %     mg/dl   Comprehensive metabolic panel    Collection Time: 08/05/22  9:10 AM   Result Value Ref Range    Sodium 134 (L) 135 - 147 mmol/L    Potassium 4 5 3 5 - 5 3 mmol/L    Chloride 101 96 - 108 mmol/L    CO2 27 21 - 32 mmol/L    ANION GAP 6 4 - 13 mmol/L    BUN 11 5 - 25 mg/dL    Creatinine 1 03 0 60 - 1 30 mg/dL    Glucose, Fasting 233 (H) 65 - 99 mg/dL    Calcium 8 9 8 3 - 10 1 mg/dL    Corrected Calcium 9 7 8 3 - 10 1 mg/dL    AST 30 5 - 45 U/L    ALT 32 12 - 78 U/L    Alkaline Phosphatase 154 (H) 46 - 116 U/L    Total Protein 7 6 6 4 - 8 4 g/dL    Albumin 3 0 (L) 3 5 - 5 0 g/dL    Total Bilirubin 0 77 0 20 - 1 00 mg/dL    eGFR 60 ml/min/1 73sq m   Microalbumin / creatinine urine ratio    Collection Time: 08/05/22  9:10 AM   Result Value Ref Range    Creatinine, Ur 342 0 mg/dL    Microalbum  ,U,Random 70 7 (H) 0 0 - 20 0 mg/L    Microalb Creat Ratio 21 0 - 30 mg/g creatinine   Lipid Panel with Direct LDL reflex    Collection Time: 08/05/22  9:10 AM   Result Value Ref Range    Cholesterol 176 See Comment mg/dL    Triglycerides 165 (H) See Comment mg/dL    HDL, Direct 39 (L) >=50 mg/dL    LDL Calculated 104 (H) 0 - 100 mg/dL       Assessment/Plan:    Controlled diabetes     Problem List Items Addressed This Visit        Digestive    Chronic GERD     Stable on omeprazole 20 milligram   Continue same  Relevant Medications    omeprazole (PriLOSEC) 20 mg delayed release capsule       Endocrine    Controlled type 2 diabetes mellitus without complication, without long-term current use of insulin ([facility])       Lab Results   Component Value Date    HGBA1C 9 4 (H) 08/05/2022     A1c elevated  Patient reports diarrhea with metformin 1000 mg bid, reports no diarrhea on lower dose  Switched to metformin 500 mg bid, continue glipizide 10 milligram b i d  Started on Jardiance  Previously insurance denied Januvia  Patient declines all injectables  Suggested to repeat CMP at weeks interval   Patient also advised to monitor blood sugar at home  Relevant Medications    Empagliflozin (Jardiance) 25 MG TABS    metFORMIN (GLUCOPHAGE) 500 mg tablet    glipiZIDE (GLUCOTROL) 10 mg tablet    Diabetic nephropathy associated with type 2 diabetes mellitus ([facility] ) - Primary       Lab Results   Component Value Date    HGBA1C 9 4 (H) 08/05/2022   Patient's blood pressure is at goal   Continue losartan  Importance of improved glycemic control discussed with patient  Medications adjusted           Relevant Medications Empagliflozin (Jardiance) 25 MG TABS    metFORMIN (GLUCOPHAGE) 500 mg tablet    glipiZIDE (GLUCOTROL) 10 mg tablet    Other Relevant Orders    Ambulatory referral to Diabetic Education    Comprehensive metabolic panel    Hemoglobin A1C    Comprehensive metabolic panel       Respiratory    Simple chronic bronchitis (HCC)     Symptoms well controlled intermittent Breo, albuterol  Patient encouraged continue same  Follow up if need for albuterol increases above baseline            Cardiovascular and Mediastinum    Essential hypertension     Patient's blood pressure is at goal   Continue amlodipine 5 milligram, lisinopril 40 milligram         Relevant Medications    amLODIPine (NORVASC) 5 mg tablet    lisinopril (ZESTRIL) 40 mg tablet       Other    Hyperlipemia     LDL improved with lifestyle modifications  Continue same    Continue statin         Relevant Medications    atorvastatin (LIPITOR) 20 mg tablet      Other Visit Diagnoses     Uncontrolled type 2 diabetes mellitus with hyperglycemia (HCC)        Relevant Medications    Empagliflozin (Jardiance) 25 MG TABS    metFORMIN (GLUCOPHAGE) 500 mg tablet    glipiZIDE (GLUCOTROL) 10 mg tablet    Type 2 diabetes mellitus without complication, without long-term current use of insulin (HCC)        Relevant Medications    Empagliflozin (Jardiance) 25 MG TABS    metFORMIN (GLUCOPHAGE) 500 mg tablet    glipiZIDE (GLUCOTROL) 10 mg tablet    glucose blood (ONE TOUCH ULTRA TEST) test strip    Lancets (onetouch ultrasoft) lancets    Other Relevant Orders    Comprehensive metabolic panel

## 2022-08-10 NOTE — ASSESSMENT & PLAN NOTE
Lab Results   Component Value Date    HGBA1C 9 4 (H) 08/05/2022     A1c elevated  Patient reports diarrhea with metformin 1000 mg bid, reports no diarrhea on lower dose  Switched to metformin 500 mg bid, continue glipizide 10 milligram b i d  Started on Sureshdiance  Previously insurance denied Januvia  Patient declines all injectables  Suggested to repeat CMP at weeks interval   Patient also advised to monitor blood sugar at home

## 2022-08-10 NOTE — ASSESSMENT & PLAN NOTE
Lab Results   Component Value Date    HGBA1C 9 4 (H) 08/05/2022   Patient's blood pressure is at goal   Continue losartan  Importance of improved glycemic control discussed with patient  Medications adjusted

## 2022-08-15 ENCOUNTER — RA CDI HCC (OUTPATIENT)
Dept: OTHER | Facility: HOSPITAL | Age: 57
End: 2022-08-15

## 2022-08-15 NOTE — PROGRESS NOTES
[address]  coding opportunities       Chart reviewed, no opportunity found: CHART REVIEWED, NO OPPORTUNITY FOUND        Patients Insurance     Medicare Insurance: Medicare

## 2022-08-24 ENCOUNTER — TELEPHONE (OUTPATIENT)
Dept: FAMILY MEDICINE CLINIC | Facility: CLINIC | Age: 57
End: 2022-08-24

## 2022-08-24 DIAGNOSIS — U07.1 COVID-19 VIRUS DETECTED: Primary | ICD-10-CM

## 2022-08-24 RX ORDER — NIRMATRELVIR AND RITONAVIR 300-100 MG
3 KIT ORAL 2 TIMES DAILY
Qty: 30 TABLET | Refills: 0 | Status: SHIPPED | OUTPATIENT
Start: 2022-08-24 | End: 2022-08-29

## 2022-08-24 NOTE — TELEPHONE ENCOUNTER
Patient tested positive for Covid yesterday  Has a persistent cough, diarrhea, runny nose and a fever  I offered a virtual but she isn't able to  Would like to know how to manage symptoms

## 2022-08-31 ENCOUNTER — TELEPHONE (OUTPATIENT)
Dept: OTHER | Facility: OTHER | Age: 57
End: 2022-08-31

## 2022-08-31 ENCOUNTER — TELEMEDICINE (OUTPATIENT)
Dept: FAMILY MEDICINE CLINIC | Facility: CLINIC | Age: 57
End: 2022-08-31
Payer: MEDICARE

## 2022-08-31 VITALS — BODY MASS INDEX: 34.38 KG/M2 | WEIGHT: 194 LBS | HEIGHT: 63 IN | TEMPERATURE: 99 F

## 2022-08-31 DIAGNOSIS — J01.10 ACUTE NON-RECURRENT FRONTAL SINUSITIS: Primary | ICD-10-CM

## 2022-08-31 PROCEDURE — 99214 OFFICE O/P EST MOD 30 MIN: CPT | Performed by: FAMILY MEDICINE

## 2022-08-31 RX ORDER — AZITHROMYCIN 250 MG/1
TABLET, FILM COATED ORAL
Qty: 6 TABLET | Refills: 0 | Status: SHIPPED | OUTPATIENT
Start: 2022-08-31 | End: 2022-09-04

## 2022-08-31 RX ORDER — FLUTICASONE PROPIONATE 50 MCG
1 SPRAY, SUSPENSION (ML) NASAL DAILY
Qty: 18 G | Refills: 0 | Status: SHIPPED | OUTPATIENT
Start: 2022-08-31

## 2022-08-31 NOTE — PROGRESS NOTES
Virtual Regular Visit    Verification of patient location:    Patient is located in the following state in which I hold an active license [place]      Assessment/Plan:    Problem List Items Addressed This Visit        Respiratory    Acute non-recurrent frontal sinusitis - Primary    Relevant Medications    azithromycin (ZITHROMAX) 250 mg tablet    fluticasone (FLONASE) 50 mcg/act nasal spray        Patient diagnosed with COVID on 08/24, status post packed slow with  Experiencing yellow green nasal mucus  Start on trial of oral antibiotic  Has type 2 diabetes, started on Jardiance for improved glycemic control       Reason for visit is   Chief Complaint   Patient presents with    Virtual Regular Visit        Encounter provider Chandana Pascal MD    Provider located at [address] [address]      Recent Visits  Date Type Provider Dept   08/24/22 Telephone Chandana Pascal MD [facility]   Showing recent visits within past 7 days and meeting all other requirements  Today's Visits  Date Type Provider Dept   08/31/22 Telemedicine Chandana Pascal MD [facility]   Showing today's visits and meeting all other requirements  Future Appointments  No visits were found meeting these conditions  Showing future appointments within next 150 days and meeting all other requirements       The patient was identified by name and date of birth  Alise Gonzalez was informed that this is a telemedicine visit and that the visit is being conducted through [address] [place] [address] Now and patient was informed that this is a secure, HIPAA-compliant platform  She agrees to proceed     My office door was closed  No one else was in the room  She acknowledged consent and understanding of privacy and security of the video platform  The patient has agreed to participate and understands they can discontinue the visit at any time  Patient is aware this is a billable service  Subjective  Tyler Herrmann is a 61 y o  female    Sinusitis  This is a new problem  The current episode started in the past 7 days  The problem is unchanged  The maximum temperature recorded prior to her arrival was 100 4 - 100 9 F  Her pain is at a severity of 5/10  The pain is moderate  Associated symptoms include congestion, coughing and sinus pressure  Pertinent negatives include no shortness of breath  Past treatments include spray decongestants  The treatment provided mild relief          Past Medical History:   Diagnosis Date    Anxiety     Diabetes mellitus ([address] )     Diverticulosis     GERD (gastroesophageal reflux disease)     Headache     Hyperlipidemia     Hypertension     Psychiatric disorder     bipolar    Right clavicle fracture     TIA (transient ischemic attack)     Vitamin D deficiency        Past Surgical History:   Procedure Laterality Date    BLADDER SURGERY      Sling    [place] HIP SCOPE/REMV BODY,PLASTY/RESECTN Right 7/13/2017    Procedure: RIGHT HIP ARTHROSCOPIC LABRAL DEBRIDEMENT;  Surgeon: Kevin Stovall MD;  [facility];  Service: Orthopedics    SINUS SURGERY      3 surgeries     TUBAL LIGATION         Current Outpatient Medications   Medication Sig Dispense Refill    azithromycin (ZITHROMAX) 250 mg tablet Take 2 tablets today then 1 tablet daily x 4 days 6 tablet 0    fluticasone (FLONASE) 50 mcg/act nasal spray 1 spray into each nostril daily 18 g 0    albuterol (Ventolin HFA) 90 mcg/act inhaler Inhale 2 puffs every 6 (six) hours as needed for wheezing 18 g 1    amLODIPine (NORVASC) 5 mg tablet Take 1 tablet (5 mg total) by mouth daily 90 tablet 1    ARIPiprazole (ABILIFY) 2 mg tablet Take 1 tablet (2 mg total) by mouth daily at bedtime 90 tablet 0    aspirin 81 mg chewable tablet Chew 81 mg       atorvastatin (LIPITOR) 20 mg tablet Take 1 tablet (20 mg total) by mouth every evening 90 tablet 1    clonazePAM (KlonoPIN) 0 5 mg tablet Take 1/2 tab by mouth twice daily and 1 full tab nightly 60 tablet 2    Empagliflozin (Jardiance) 25 MG TABS Take 1 tablet (25 mg total) by mouth every morning 90 tablet 1    erythromycin (ILOTYCIN) ophthalmic ointment Administer 0 5 inches to both eyes daily at bedtime (Patient not taking: No sig reported) 7 g 0    fluticasone-vilanterol (Breo Ellipta) 100-25 mcg/inh inhaler Inhale 1 puff daily Rinse mouth after use  60 blister 5    glipiZIDE (GLUCOTROL) 10 mg tablet Take 1 tablet (10 mg total) by mouth 2 (two) times a day before meals 180 tablet 1    glucose blood (ONE TOUCH ULTRA TEST) test strip Check blood sugar once daily 100 each 5    Lancets (onetouch ultrasoft) lancets Check blood sugar twice/ week  100 each 5    lisinopril (ZESTRIL) 40 mg tablet Take 1 tablet (40 mg total) by mouth daily 90 tablet 1    metFORMIN (GLUCOPHAGE) 500 mg tablet Take 1 tablet (500 mg total) by mouth 2 (two) times a day with meals 180 tablet 1    omeprazole (PriLOSEC) 20 mg delayed release capsule Take 1 capsule (20 mg total) by mouth daily 90 capsule 1    PARoxetine (PAXIL) 30 mg tablet Take 1 tablet (30 mg total) by mouth daily 90 tablet 0     No current facility-administered medications for this visit  Allergies   Allergen Reactions    Cefdinir Hives       Review of Systems   Constitutional: Negative  HENT: Positive for congestion and sinus pressure  Respiratory: Positive for cough  Negative for shortness of breath  Cardiovascular: Negative  Negative for chest pain and palpitations  Musculoskeletal: Negative for myalgias  Neurological: Negative  Psychiatric/Behavioral: Negative  Video Exam    Vitals:    08/31/22 1516   Temp: 99 °F (37 2 °C)   Weight: 88 kg (194 lb)   Height: 5' 3" (1 6 m)       Physical Exam  Constitutional:       Appearance: She is well-developed  Cardiovascular:      Rate and Rhythm: Regular rhythm     Pulmonary:      Effort: Pulmonary effort is normal    Abdominal:      General: Bowel sounds are normal    Neurological:      Mental Status: She is alert and oriented to person, place, and time     Psychiatric:         Behavior: Behavior normal          Judgment: Judgment normal           I spent 15 minutes directly with the patient during this visit

## 2022-08-31 NOTE — TELEPHONE ENCOUNTER
Patient would like to see if Dr Emmy Mcbride could order antibiotics   Patient states "I am COVID + and I have a sinus infection "

## 2022-09-12 ENCOUNTER — APPOINTMENT (OUTPATIENT)
Dept: LAB | Facility: CLINIC | Age: 57
End: 2022-09-12
Payer: MEDICARE

## 2022-09-12 DIAGNOSIS — E11.9 TYPE 2 DIABETES MELLITUS WITHOUT COMPLICATION, WITHOUT LONG-TERM CURRENT USE OF INSULIN (HCC): ICD-10-CM

## 2022-09-12 LAB
ALBUMIN SERPL BCP-MCNC: 2.9 G/DL (ref 3.5–5)
ALP SERPL-CCNC: 149 U/L (ref 46–116)
ALT SERPL W P-5'-P-CCNC: 23 U/L (ref 12–78)
ANION GAP SERPL CALCULATED.3IONS-SCNC: 8 MMOL/L (ref 4–13)
AST SERPL W P-5'-P-CCNC: 22 U/L (ref 5–45)
BILIRUB SERPL-MCNC: 0.51 MG/DL (ref 0.2–1)
BUN SERPL-MCNC: 10 MG/DL (ref 5–25)
CALCIUM ALBUM COR SERPL-MCNC: 9.9 MG/DL (ref 8.3–10.1)
CALCIUM SERPL-MCNC: 9 MG/DL (ref 8.3–10.1)
CHLORIDE SERPL-SCNC: 105 MMOL/L (ref 96–108)
CO2 SERPL-SCNC: 25 MMOL/L (ref 21–32)
CREAT SERPL-MCNC: 0.83 MG/DL (ref 0.6–1.3)
GFR SERPL CREATININE-BSD FRML MDRD: 77 ML/MIN/1.73SQ M
GLUCOSE P FAST SERPL-MCNC: 133 MG/DL (ref 65–99)
POTASSIUM SERPL-SCNC: 4.2 MMOL/L (ref 3.5–5.3)
PROT SERPL-MCNC: 7.6 G/DL (ref 6.4–8.4)
SODIUM SERPL-SCNC: 138 MMOL/L (ref 135–147)

## 2022-09-12 PROCEDURE — 80053 COMPREHEN METABOLIC PANEL: CPT

## 2022-09-12 PROCEDURE — 36415 COLL VENOUS BLD VENIPUNCTURE: CPT

## 2022-09-14 ENCOUNTER — OFFICE VISIT (OUTPATIENT)
Dept: FAMILY MEDICINE CLINIC | Facility: CLINIC | Age: 57
End: 2022-09-14
Payer: MEDICARE

## 2022-09-14 VITALS
BODY MASS INDEX: 33.31 KG/M2 | HEIGHT: 63 IN | HEART RATE: 69 BPM | SYSTOLIC BLOOD PRESSURE: 122 MMHG | OXYGEN SATURATION: 97 % | WEIGHT: 188 LBS | DIASTOLIC BLOOD PRESSURE: 74 MMHG

## 2022-09-14 DIAGNOSIS — E11.9 CONTROLLED TYPE 2 DIABETES MELLITUS WITHOUT COMPLICATION, WITHOUT LONG-TERM CURRENT USE OF INSULIN (HCC): Primary | ICD-10-CM

## 2022-09-14 DIAGNOSIS — B37.31 VAGINAL CANDIDIASIS: ICD-10-CM

## 2022-09-14 PROBLEM — B37.3 VAGINAL CANDIDIASIS: Status: ACTIVE | Noted: 2022-09-14

## 2022-09-14 PROCEDURE — 99214 OFFICE O/P EST MOD 30 MIN: CPT | Performed by: FAMILY MEDICINE

## 2022-09-14 RX ORDER — FLUCONAZOLE 150 MG/1
150 TABLET ORAL ONCE
Qty: 1 TABLET | Refills: 0 | Status: SHIPPED | OUTPATIENT
Start: 2022-09-14 | End: 2022-09-14

## 2022-09-14 NOTE — ASSESSMENT & PLAN NOTE
Patient vaginal itching with started yesterday  Is on Jardiance  Treated with Diflucan  Advised increase intake of water  Fup if symptoms  do not improve

## 2022-09-14 NOTE — PROGRESS NOTES
Subjective:      Patient ID: Evans Gan is a 61 y o  female  HPI    Patient is here for 4 week follow-up on her diabetes  Noted to have hyperglycemia with poorly controlled diabetes  A1c 9 4  Patient was started on Jardiance 25 milligram in addition to glipizide and metformin  Patient provided medications samples  Her fasting blood sugar has improved significantly  Patient is also following a low-calorie diet now  Has stopped eating cup cakes and ice creams which she was eating almost on a daily basis  Patient mentions a Lennox Lenny is over 500 dollar per month prescription and she is unable to get the medication at the pharmacy  Requesting a change in medication  Can try and send Invokana  Patient is reporting symptoms of vaginal itching with started 2 days ago      Past Medical History:   Diagnosis Date    Anxiety     Diabetes mellitus ([address] )     Diverticulosis     GERD (gastroesophageal reflux disease)     Headache     Hyperlipidemia     Hypertension     Psychiatric disorder     bipolar    Right clavicle fracture     TIA (transient ischemic attack)     Vitamin D deficiency        Family History   Problem Relation Age of Onset    Arthritis Family     Cancer Family     Osteoporosis Family     Cervical cancer Mother     Ovarian cancer Mother 35    Uterine cancer Mother     Hypertension Father     Other Father         pre-diabetes    Diabetes Father     Diabetes type I Sister     Osteoporosis Sister     Depression Sister     Breast cancer Sister 79    Diabetes Sister     Heart attack Brother          of an MI at 52y/o    Diabetes Brother     No Known Problems Maternal Grandmother     No Known Problems Maternal Grandfather     Other Paternal Grandmother         Parkinson's Disease    Heart attack Paternal Grandfather     Other Paternal Grandfather         coronary arteriosclerosis    Alcohol abuse Paternal Uncle     Seizures Other     Seizures Other     No Known Problems Daughter     No Known Problems Sister     No Known Problems Sister     No Known Problems Sister     No Known Problems Son        Past Surgical History:   Procedure Laterality Date    BLADDER SURGERY      Sling    [place] [facility] [address] BODY,PLASTY/RESECTN Right 7/13/2017    Procedure: RIGHT HIP ARTHROSCOPIC LABRAL DEBRIDEMENT;  Surgeon: Maria Guadalupe Hitchcock MD;  [facility];  Service: Orthopedics    SINUS SURGERY      3 surgeries     TUBAL LIGATION          reports that she has quit smoking  She has a 70 00 pack-year smoking history  She has never used smokeless tobacco  She reports previous alcohol use  She reports current drug use  Drug: Marijuana        Current Outpatient Medications:     albuterol (Ventolin HFA) 90 mcg/act inhaler, Inhale 2 puffs every 6 (six) hours as needed for wheezing, Disp: 18 g, Rfl: 1    amLODIPine (NORVASC) 5 mg tablet, Take 1 tablet (5 mg total) by mouth daily, Disp: 90 tablet, Rfl: 1    ARIPiprazole (ABILIFY) 2 mg tablet, Take 1 tablet (2 mg total) by mouth daily at bedtime, Disp: 90 tablet, Rfl: 0    aspirin 81 mg chewable tablet, Chew 81 mg , Disp: , Rfl:     atorvastatin (LIPITOR) 20 mg tablet, Take 1 tablet (20 mg total) by mouth every evening, Disp: 90 tablet, Rfl: 1    canagliflozin (INVOKANA) 100 mg, Take 1 tablet (100 mg total) by mouth daily before breakfast, Disp: 90 tablet, Rfl: 1    clonazePAM (KlonoPIN) 0 5 mg tablet, Take 1/2 tab by mouth twice daily and 1 full tab nightly, Disp: 60 tablet, Rfl: 2    fluconazole (DIFLUCAN) 150 mg tablet, Take 1 tablet (150 mg total) by mouth once for 1 dose, Disp: 1 tablet, Rfl: 0    fluticasone (FLONASE) 50 mcg/act nasal spray, 1 spray into each nostril daily, Disp: 18 g, Rfl: 0    fluticasone-vilanterol (Breo Ellipta) 100-25 mcg/inh inhaler, Inhale 1 puff daily Rinse mouth after use , Disp: 60 blister, Rfl: 5    glipiZIDE (GLUCOTROL) 10 mg tablet, Take 1 tablet (10 mg total) by mouth 2 (two) times a day before meals, Disp: 180 tablet, Rfl: 1    glucose blood (ONE TOUCH ULTRA TEST) test strip, Check blood sugar once daily, Disp: 100 each, Rfl: 5    Lancets (onetouch ultrasoft) lancets, Check blood sugar twice/ week , Disp: 100 each, Rfl: 5    lisinopril (ZESTRIL) 40 mg tablet, Take 1 tablet (40 mg total) by mouth daily, Disp: 90 tablet, Rfl: 1    metFORMIN (GLUCOPHAGE) 500 mg tablet, Take 1 tablet (500 mg total) by mouth 2 (two) times a day with meals, Disp: 180 tablet, Rfl: 1    omeprazole (PriLOSEC) 20 mg delayed release capsule, Take 1 capsule (20 mg total) by mouth daily, Disp: 90 capsule, Rfl: 1    PARoxetine (PAXIL) 30 mg tablet, Take 1 tablet (30 mg total) by mouth daily, Disp: 90 tablet, Rfl: 0    The following portions of the patient's history were reviewed and updated as appropriate: allergies, current medications, past family history, past medical history, past social history, past surgical history and problem list     Review of Systems   Constitutional: Negative  Respiratory: Negative  Negative for shortness of breath  Cardiovascular: Negative  Negative for chest pain and palpitations  Musculoskeletal: Negative for myalgias  Neurological: Negative  Psychiatric/Behavioral: Negative  Objective:    /74   Pulse 69   Ht 5' 3" (1 6 m)   Wt 85 3 kg (188 lb)   SpO2 97%   BMI 33 30 kg/m²      Physical Exam  Constitutional:       Appearance: She is well-developed  Cardiovascular:      Rate and Rhythm: Normal rate and regular rhythm  Heart sounds: Normal heart sounds  Pulmonary:      Effort: Pulmonary effort is normal       Breath sounds: Normal breath sounds  Abdominal:      General: Bowel sounds are normal       Palpations: Abdomen is soft  Neurological:      Mental Status: She is alert and oriented to person, place, and time     Psychiatric:         Behavior: Behavior normal          Judgment: Judgment normal            Recent Results (from the past 1008 hour(s))   Hemoglobin A1C    Collection Time: 08/05/22  9:10 AM   Result Value Ref Range    Hemoglobin A1C 9 4 (H) Normal 3 8-5 6%; PreDiabetic 5 7-6 4%; Diabetic >=6 5%; Glycemic control for adults with diabetes <7 0% %     mg/dl   Comprehensive metabolic panel    Collection Time: 08/05/22  9:10 AM   Result Value Ref Range    Sodium 134 (L) 135 - 147 mmol/L    Potassium 4 5 3 5 - 5 3 mmol/L    Chloride 101 96 - 108 mmol/L    CO2 27 21 - 32 mmol/L    ANION GAP 6 4 - 13 mmol/L    BUN 11 5 - 25 mg/dL    Creatinine 1 03 0 60 - 1 30 mg/dL    Glucose, Fasting 233 (H) 65 - 99 mg/dL    Calcium 8 9 8 3 - 10 1 mg/dL    Corrected Calcium 9 7 8 3 - 10 1 mg/dL    AST 30 5 - 45 U/L    ALT 32 12 - 78 U/L    Alkaline Phosphatase 154 (H) 46 - 116 U/L    Total Protein 7 6 6 4 - 8 4 g/dL    Albumin 3 0 (L) 3 5 - 5 0 g/dL    Total Bilirubin 0 77 0 20 - 1 00 mg/dL    eGFR 60 ml/min/1 73sq m   Microalbumin / creatinine urine ratio    Collection Time: 08/05/22  9:10 AM   Result Value Ref Range    Creatinine, Ur 342 0 mg/dL    Microalbum  ,U,Random 70 7 (H) 0 0 - 20 0 mg/L    Microalb Creat Ratio 21 0 - 30 mg/g creatinine   Lipid Panel with Direct LDL reflex    Collection Time: 08/05/22  9:10 AM   Result Value Ref Range    Cholesterol 176 See Comment mg/dL    Triglycerides 165 (H) See Comment mg/dL    HDL, Direct 39 (L) >=50 mg/dL    LDL Calculated 104 (H) 0 - 100 mg/dL   Comprehensive metabolic panel    Collection Time: 09/12/22  9:11 AM   Result Value Ref Range    Sodium 138 135 - 147 mmol/L    Potassium 4 2 3 5 - 5 3 mmol/L    Chloride 105 96 - 108 mmol/L    CO2 25 21 - 32 mmol/L    ANION GAP 8 4 - 13 mmol/L    BUN 10 5 - 25 mg/dL    Creatinine 0 83 0 60 - 1 30 mg/dL    Glucose, Fasting 133 (H) 65 - 99 mg/dL    Calcium 9 0 8 3 - 10 1 mg/dL    Corrected Calcium 9 9 8 3 - 10 1 mg/dL    AST 22 5 - 45 U/L    ALT 23 12 - 78 U/L    Alkaline Phosphatase 149 (H) 46 - 116 U/L    Total Protein 7 6 6 4 - 8 4 g/dL    Albumin 2 9 (L) 3 5 - 5 0 g/dL    Total Bilirubin 0 51 0 20 - 1 00 mg/dL    eGFR 77 ml/min/1 73sq m       Assessment/Plan:    Controlled type 2 diabetes mellitus without complication, without long-term current use of insulin ([facility])    Lab Results   Component Value Date    HGBA1C 9 4 (H) 08/05/2022     Patient's fasting blood sugar improved after she was started on Jardiance  The medication however is not covered under her plan  Will try Invokana  Patient to call back if there is a problem at the pharmacy  Recommended to continue with low carb diet  Patient has reduced intake of  her cakes and ice creams  A1c x 3 months  Vaginal candidiasis  Patient vaginal itching with started yesterday  Is on Jardiance  Treated with Diflucan  Advised increase intake of water  Fup if symptoms  do not improve  Problem List Items Addressed This Visit        Endocrine    Controlled type 2 diabetes mellitus without complication, without long-term current use of insulin ([facility] ) - Primary       Lab Results   Component Value Date    HGBA1C 9 4 (H) 08/05/2022     Patient's fasting blood sugar improved after she was started on Jardiance  The medication however is not covered under her plan  Will try Invokana  Patient to call back if there is a problem at the pharmacy  Recommended to continue with low carb diet  Patient has reduced intake of  her cakes and ice creams  A1c x 3 months  Relevant Medications    canagliflozin (INVOKANA) 100 mg       Genitourinary    Vaginal candidiasis     Patient vaginal itching with started yesterday  Is on Jardiance  Treated with Diflucan  Advised increase intake of water  Fup if symptoms  do not improve              Relevant Medications    fluconazole (DIFLUCAN) 150 mg tablet

## 2022-09-14 NOTE — ASSESSMENT & PLAN NOTE
Lab Results   Component Value Date    HGBA1C 9 4 (H) 08/05/2022     Patient's fasting blood sugar improved after she was started on Jardiance  The medication however is not covered under her plan  Will try Invokana  Patient to call back if there is a problem at the pharmacy  Recommended to continue with low carb diet  Patient has reduced intake of  her cakes and ice creams  A1c x 3 months

## 2022-10-03 ENCOUNTER — TELEPHONE (OUTPATIENT)
Dept: FAMILY MEDICINE CLINIC | Facility: CLINIC | Age: 57
End: 2022-10-03

## 2022-10-03 DIAGNOSIS — E11.9 CONTROLLED TYPE 2 DIABETES MELLITUS WITHOUT COMPLICATION, WITHOUT LONG-TERM CURRENT USE OF INSULIN (HCC): Primary | ICD-10-CM

## 2022-10-03 NOTE — TELEPHONE ENCOUNTER
Patient called to let the doctor know that the invokana will cost her over $500 through her insurance  She states that she only has 7 of the jardiance left  She states that open enrollment will be coming up but not soon enough for her medication    Please advise

## 2022-10-04 ENCOUNTER — PATIENT OUTREACH (OUTPATIENT)
Dept: FAMILY MEDICINE CLINIC | Facility: CLINIC | Age: 57
End: 2022-10-04

## 2022-10-04 NOTE — PROGRESS NOTES
OPCM SW received referral to assist patient with cost of Jardiance and [facility]  PAP applications printed  Phone call to [facility] for Jardiance, income guideline is 250% of FPL or lower, or $33,975 for a HH of 1       [employer] income guideline is yearly income of $40,770 or less for a HH of 1  Phone call placed to patient  Patient's income includes $1138/mo disability, $125/mo pension  Yearly income based on this information is $15,156  Patient will qualify for both programs  Applications started  Patient will need to provide copy of 1040 and out of pocket expense report from her prescription plan or from her pharmacy for the year 2022  Applications will be dropped off at the PCP office for patient to sign and then provider to complete  When competed, application is to be faxed with the fax covers provided and scanned to patient's chart

## 2022-10-04 NOTE — PROGRESS NOTES
OPCM JUANITA placed phone call to patient to advise that applications will be dropped off at PCP office this afternoon and patient can stop by beginning tomorrow, 47/6, to sign applications and provide 1040 and out of pocket expense report  SW advised patient to contact her pharmacy to provide a detailed report of prescriptions paid for during the year 2022  Patient expressed understanding

## 2022-10-05 ENCOUNTER — TELEPHONE (OUTPATIENT)
Dept: FAMILY MEDICINE CLINIC | Facility: CLINIC | Age: 57
End: 2022-10-05

## 2022-10-05 ENCOUNTER — PATIENT OUTREACH (OUTPATIENT)
Dept: FAMILY MEDICINE CLINIC | Facility: CLINIC | Age: 57
End: 2022-10-05

## 2022-10-05 NOTE — TELEPHONE ENCOUNTER
Elly Brown is calling about the out of expense amount she is suppose to obtain for help with her medication costs  Natalie states that the only way she can obtain this report is by logging into [employer] site and printing it out  Natalie states she does not have a computer and does not know who to use a computer  I did ask her if she had someone that could help her, but states she does not

## 2022-10-05 NOTE — PROGRESS NOTES
OPCM JUANITA received inbasket message that patient was advised that she would need to log into her online insurance account to get the yearly out of pocket expense report from her insurance  Patient reports that she does not have a computer and does not know how to work one  OPCM SW placed phone call to [facility] to obtain OOP report from them  No answer at [employer]  JUANITA call [employer] at [phone] and spoke with Turner Zacarias who advised that JUANITA would have to contact Presley Medina at [phone]  JUANITA placed phone call to Presley Medina and was put through to claims  SW requested OOP yearly report and was put through to the appropriate department at phone # [phone]  JUANITA on hold for 30 min and had to hang up  Will attempt phone call at a later time  JUANITA placed phone call to patient who reports insurance said they cannot mail or fax the requested statement  Patient did report that insurance provided that her OOP expense is over $7,000 so far this year    JUANITA advised will try to outreach insurance again for statement request

## 2022-10-06 ENCOUNTER — TELEPHONE (OUTPATIENT)
Dept: FAMILY MEDICINE CLINIC | Facility: CLINIC | Age: 57
End: 2022-10-06

## 2022-10-07 ENCOUNTER — PATIENT OUTREACH (OUTPATIENT)
Dept: FAMILY MEDICINE CLINIC | Facility: CLINIC | Age: 57
End: 2022-10-07

## 2022-10-07 NOTE — PROGRESS NOTES
OPCM SW placed phone call to [facility] to determine if pharmacy is able to provide print out of patient payments for the year  No answer  Message from office, patient requesting call back from    Phone call placed and voicemail left with contact information  Phone call placed to [facility] to assist with getting statement of out of pocket expenses for patient  SW on hold for 10, spoke with representative, transferred to claims department [phone], Artem Griffith at claims reports that is not the appropirate department, transferring to the appropriate department with phone number  [phone] option 2 which is prior authorization  No other option available  20min phone call with no result  Phone call back to [facility] and received [place] relief office  Call back to [phone] as a member  Amos Higuera unable to mail  Was told to call [employer] at [phone]  Amos Higuera advised that [employer] directed  to call [facility]  Amos Higuera spoke with supervisor who is going to open a case to have full expense report mailed to patient  Case #[id number]  This may take up to 5 business days  Phone call to patient who reports that she has gone to office and signed applications and dropped off the [id number] form  Patient also advised if she is able to get to pharmacy, pharmacy can print a report of her prescription costs for the year  Patient updated on progress through insurance and will watch for statement in the mail

## 2022-10-12 PROBLEM — S61.210A LACERATION OF RIGHT INDEX FINGER WITHOUT FOREIGN BODY WITHOUT DAMAGE TO NAIL: Status: RESOLVED | Noted: 2021-11-03 | Resolved: 2022-10-12

## 2022-10-12 PROBLEM — Z00.00 MEDICARE ANNUAL WELLNESS VISIT, SUBSEQUENT: Status: RESOLVED | Noted: 2018-06-05 | Resolved: 2022-10-12

## 2022-10-15 ENCOUNTER — HOSPITAL ENCOUNTER (OUTPATIENT)
Dept: MAMMOGRAPHY | Facility: HOSPITAL | Age: 57
Discharge: HOME | End: 2022-10-15
Payer: MEDICARE

## 2022-10-15 DIAGNOSIS — Z12.31 VISIT FOR SCREENING MAMMOGRAM: ICD-10-CM

## 2022-10-15 DIAGNOSIS — Z12.31 ENCOUNTER FOR SCREENING MAMMOGRAM FOR MALIGNANT NEOPLASM OF BREAST: ICD-10-CM

## 2022-10-15 PROCEDURE — 77063 BREAST TOMOSYNTHESIS BI: CPT

## 2022-10-15 PROCEDURE — 77067 SCR MAMMO BI INCL CAD: CPT

## 2022-10-25 ENCOUNTER — OFFICE VISIT (OUTPATIENT)
Dept: FAMILY MEDICINE CLINIC | Facility: CLINIC | Age: 57
End: 2022-10-25
Payer: MEDICARE

## 2022-10-25 VITALS
OXYGEN SATURATION: 97 % | RESPIRATION RATE: 16 BRPM | WEIGHT: 187 LBS | HEIGHT: 63 IN | TEMPERATURE: 97 F | HEART RATE: 90 BPM | BODY MASS INDEX: 33.13 KG/M2 | DIASTOLIC BLOOD PRESSURE: 80 MMHG | SYSTOLIC BLOOD PRESSURE: 122 MMHG

## 2022-10-25 DIAGNOSIS — J40 BRONCHITIS: Primary | ICD-10-CM

## 2022-10-25 DIAGNOSIS — E11.65 UNCONTROLLED TYPE 2 DIABETES MELLITUS WITH HYPERGLYCEMIA (HCC): ICD-10-CM

## 2022-10-25 PROBLEM — J01.10 ACUTE NON-RECURRENT FRONTAL SINUSITIS: Status: RESOLVED | Noted: 2022-08-31 | Resolved: 2022-10-25

## 2022-10-25 PROCEDURE — 99214 OFFICE O/P EST MOD 30 MIN: CPT | Performed by: FAMILY MEDICINE

## 2022-10-25 RX ORDER — AZITHROMYCIN 250 MG/1
TABLET, FILM COATED ORAL
Qty: 6 TABLET | Refills: 0 | Status: SHIPPED | OUTPATIENT
Start: 2022-10-25 | End: 2022-10-29

## 2022-10-25 RX ORDER — FLUTICASONE PROPIONATE AND SALMETEROL 250; 50 UG/1; UG/1
1 POWDER RESPIRATORY (INHALATION) 2 TIMES DAILY
Qty: 60 BLISTER | Refills: 5 | Status: SHIPPED | OUTPATIENT
Start: 2022-10-25

## 2022-10-25 NOTE — PROGRESS NOTES
Subjective:      Patient ID: Cristofer Bautista is a 61 y o  female  Cough  This is a new problem  The current episode started in the past 7 days  The problem has been unchanged  The problem occurs constantly  The cough is productive of purulent sputum  Associated symptoms include nasal congestion, shortness of breath and wheezing  Pertinent negatives include no chest pain or myalgias  The symptoms are aggravated by lying down  Risk factors for lung disease include smoking/tobacco exposure  She has tried nothing for the symptoms  Her past medical history is significant for COPD         Past Medical History:   Diagnosis Date   • Anxiety    • Diabetes mellitus ([facility] [address] )    • Diverticulosis    • GERD (gastroesophageal reflux disease)    • Headache    • Hyperlipidemia    • Hypertension    • Psychiatric disorder     bipolar   • Right clavicle fracture    • TIA (transient ischemic attack)    • Vitamin D deficiency        Family History   Problem Relation Age of Onset   • Cervical cancer Mother    • Ovarian cancer Mother 35   • Uterine cancer Mother    • Hypertension Father    • Other Father         pre-diabetes   • Diabetes Father    • Diabetes type I Sister    • Osteoporosis Sister    • Depression Sister    • Breast cancer Sister 79   • Diabetes Sister    • No Known Problems Sister    • No Known Problems Sister    • No Known Problems Sister    • No Known Problems Daughter    • No Known Problems Maternal Grandmother    • No Known Problems Maternal Grandfather    • Other Paternal Grandmother         Parkinson's Disease   • Heart attack Paternal Grandfather    • Other Paternal Grandfather         coronary arteriosclerosis   • Heart attack Brother          of an MI at 50y/o   • Diabetes Brother    • No Known Problems Son    • Alcohol abuse Paternal Uncle    • Seizures Other    • Seizures Other    • Arthritis Family    • Cancer Family    • Osteoporosis Family        Past Surgical History:   Procedure Laterality Date   • BLADDER SURGERY      Sling   • [facility] [address] BODY,PLASTY/RESECTN Right 7/13/2017    Procedure: RIGHT HIP ARTHROSCOPIC LABRAL DEBRIDEMENT;  Surgeon: Martine Draper MD;  [facility];  Service: Orthopedics   • SINUS SURGERY      3 surgeries    • TUBAL LIGATION          reports that she has quit smoking  She has a 70 00 pack-year smoking history  She has never used smokeless tobacco  She reports previous alcohol use  She reports current drug use  Drug: Marijuana        Current Outpatient Medications:   •  albuterol (Ventolin HFA) 90 mcg/act inhaler, Inhale 2 puffs every 6 (six) hours as needed for wheezing, Disp: 18 g, Rfl: 1  •  amLODIPine (NORVASC) 5 mg tablet, Take 1 tablet (5 mg total) by mouth daily, Disp: 90 tablet, Rfl: 1  •  ARIPiprazole (ABILIFY) 2 mg tablet, Take 1 tablet (2 mg total) by mouth daily at bedtime, Disp: 90 tablet, Rfl: 0  •  aspirin 81 mg chewable tablet, Chew 81 mg , Disp: , Rfl:   •  atorvastatin (LIPITOR) 20 mg tablet, Take 1 tablet (20 mg total) by mouth every evening, Disp: 90 tablet, Rfl: 1  •  azithromycin (ZITHROMAX) 250 mg tablet, Take 2 tablets today then 1 tablet daily x 4 days, Disp: 6 tablet, Rfl: 0  •  canagliflozin (INVOKANA) 100 mg, Take 1 tablet (100 mg total) by mouth daily before breakfast, Disp: 90 tablet, Rfl: 1  •  clonazePAM (KlonoPIN) 0 5 mg tablet, Take 1/2 tab by mouth twice daily and 1 full tab nightly, Disp: 60 tablet, Rfl: 2  •  fluticasone (FLONASE) 50 mcg/act nasal spray, 1 spray into each nostril daily, Disp: 18 g, Rfl: 0  •  Fluticasone-Salmeterol (Wixela Inhub) 250-50 mcg/dose inhaler, Inhale 1 puff 2 (two) times a day Rinse mouth after use , Disp: 60 blister, Rfl: 5  •  glipiZIDE (GLUCOTROL) 10 mg tablet, Take 1 tablet (10 mg total) by mouth 2 (two) times a day before meals, Disp: 180 tablet, Rfl: 1  •  glucose blood (ONE TOUCH ULTRA TEST) test strip, Check blood sugar once daily, Disp: 100 each, Rfl: 5  •  Lancets (onetouch ultrasoft) lancets, Check blood sugar twice/ week , Disp: 100 each, Rfl: 5  •  lisinopril (ZESTRIL) 40 mg tablet, Take 1 tablet (40 mg total) by mouth daily, Disp: 90 tablet, Rfl: 1  •  metFORMIN (GLUCOPHAGE) 500 mg tablet, Take 1 tablet (500 mg total) by mouth 2 (two) times a day with meals, Disp: 180 tablet, Rfl: 1  •  omeprazole (PriLOSEC) 20 mg delayed release capsule, Take 1 capsule (20 mg total) by mouth daily, Disp: 90 capsule, Rfl: 1  •  PARoxetine (PAXIL) 30 mg tablet, Take 1 tablet (30 mg total) by mouth daily, Disp: 90 tablet, Rfl: 0    The following portions of the patient's history were reviewed and updated as appropriate: allergies, current medications, past family history, past medical history, past social history, past surgical history and problem list     Review of Systems   Constitutional: Negative  Respiratory: Positive for cough, shortness of breath and wheezing  Cardiovascular: Negative  Negative for chest pain and palpitations  Musculoskeletal: Negative for myalgias  Neurological: Negative  Psychiatric/Behavioral: Negative  Objective:    /80   Pulse 90   Temp (!) 97 °F (36 1 °C)   Resp 16   Ht 5' 3" (1 6 m)   Wt 84 8 kg (187 lb)   SpO2 97%   BMI 33 13 kg/m²      Physical Exam  Constitutional:       Appearance: She is well-developed  Cardiovascular:      Rate and Rhythm: Normal rate and regular rhythm  Heart sounds: Normal heart sounds  Pulmonary:      Effort: Pulmonary effort is normal       Breath sounds: Wheezing (B/l Basal  ) present  Abdominal:      General: Bowel sounds are normal       Palpations: Abdomen is soft  Neurological:      Mental Status: She is alert and oriented to person, place, and time  Psychiatric:         Behavior: Behavior normal          Judgment: Judgment normal            No results found for this or any previous visit (from the past 1008 hour(s))      Assessment/Plan:    No problem-specific Assessment & Plan notes found for this encounter  Problem List Items Addressed This Visit        Respiratory    Bronchitis - Primary     Acute exacerbation of symptoms  Patient started on oral antibiotic  Advised to restart Breo, provided samples  Pelon He called in  Can use albuterol q4h prn for relief of symptoms    Advised to quit smoking         Relevant Medications    Fluticasone-Salmeterol (Wixela Inhub) 250-50 mcg/dose inhaler    azithromycin (ZITHROMAX) 250 mg tablet      Other Visit Diagnoses     Uncontrolled type 2 diabetes mellitus with hyperglycemia (HCC)        Relevant Medications    Empagliflozin (Jardiance) 25 MG TABS

## 2022-10-25 NOTE — ASSESSMENT & PLAN NOTE
Acute exacerbation of symptoms  Patient started on oral antibiotic  Advised to restart Breo, provided samples  Vj Escalera called in  Can use albuterol q4h prn for relief of symptoms    Advised to quit smoking

## 2022-10-26 ENCOUNTER — TELEPHONE (OUTPATIENT)
Dept: FAMILY MEDICINE CLINIC | Facility: CLINIC | Age: 57
End: 2022-10-26

## 2022-10-26 NOTE — TELEPHONE ENCOUNTER
Received a fax from [address] regarding [place]  Letter was scan into chart  It is dated 10/26/2022

## 2022-10-28 ENCOUNTER — PATIENT OUTREACH (OUTPATIENT)
Dept: FAMILY MEDICINE CLINIC | Facility: CLINIC | Age: 57
End: 2022-10-28

## 2022-10-28 ENCOUNTER — HOSPITAL ENCOUNTER (OUTPATIENT)
Dept: RADIOLOGY | Facility: HOSPITAL | Age: 57
Discharge: HOME | End: 2022-10-28
Payer: MEDICARE

## 2022-10-28 ENCOUNTER — TELEPHONE (OUTPATIENT)
Dept: FAMILY MEDICINE CLINIC | Facility: CLINIC | Age: 57
End: 2022-10-28

## 2022-10-28 DIAGNOSIS — Z59.9 FINANCIAL DIFFICULTY: Primary | ICD-10-CM

## 2022-10-28 DIAGNOSIS — J40 BRONCHITIS: Primary | ICD-10-CM

## 2022-10-28 DIAGNOSIS — J40 BRONCHITIS: ICD-10-CM

## 2022-10-28 PROCEDURE — 71046 X-RAY EXAM CHEST 2 VIEWS: CPT

## 2022-10-28 RX ORDER — METHYLPREDNISOLONE 4 MG/1
TABLET ORAL
Qty: 21 EACH | Refills: 0 | Status: SHIPPED | OUTPATIENT
Start: 2022-10-28 | End: 2023-02-02

## 2022-10-28 SDOH — ECONOMIC STABILITY - INCOME SECURITY: PROBLEM RELATED TO HOUSING AND ECONOMIC CIRCUMSTANCES, UNSPECIFIED: Z59.9

## 2022-10-28 NOTE — PROGRESS NOTES
OPCM SW following up on PAP applications  Phone call placed to [employer] for status of Invokana PAP  Pooja at [employer] application reports that as part of the application process patient must apply for medicaid  Temporary approval has been issued for 120 days  [employer] will need to receive the [place] approval or denial within that time frame to move forward with approval of application  Patient and office should have Received a letter for the temporary approval including the pharmacy card which was sent on 9/24  This document is not seen scanned into the chart, Kari Dunbar is sending it again  Phone call placed to [facility] for Jardiance PAP  This application required OOP expense report  Lisa at [facility] reports that patient must submit a SS low income subsidy denial letter  Patient must reach out to JACKELYN GARCIA [facility] to apply for low income subsidy  When same is received, application can move forward  [facility] referral placed to assist patient with applying for medical assistance and SS low income subsidy   Patient does not have internet or computer

## 2022-10-28 NOTE — TELEPHONE ENCOUNTER
Patient called to say that she is not any better  She says her chest is tight, she's coughing all the time with nothing coming up  She says its hard for her to breath  Spoke w/doctor she will order a stat Chest x-ray

## 2022-10-28 NOTE — TELEPHONE ENCOUNTER
----- Message from Homa Singh MD sent at 10/28/2022 11:59 AM EDT -----  Please call patient with normal results  If she is not feeling well, will call in oral steroid for her  She should go to ER if symptoms do not improve over weekend

## 2022-10-31 ENCOUNTER — PATIENT OUTREACH (OUTPATIENT)
Dept: FAMILY MEDICINE CLINIC | Facility: CLINIC | Age: 57
End: 2022-10-31

## 2022-10-31 NOTE — PROGRESS NOTES
[facility] received patient referral from Estephanie Hardy, patient needs assistance with SSA extra help and MA benefits  [facility] did complete a chart review prior to calling patient  [facility] called patient, introduced myself and my role  CHW assessment was completed, and patient agreed to services  [employer] application for MA and SNAP benefits completed  [facility] submitted ID and Insurance cards  Patient is going working on preparing the other verification documents that are needed  Application for SS low income assistance was completed  Patient will receive information pertaining to [facility] application via USPS  [facility] will follow up in one weeks time

## 2022-11-01 DIAGNOSIS — F41.0 PANIC ATTACKS: ICD-10-CM

## 2022-11-01 DIAGNOSIS — F33.1 MODERATE RECURRENT MAJOR DEPRESSION (HCC): ICD-10-CM

## 2022-11-01 DIAGNOSIS — F41.1 GENERALIZED ANXIETY DISORDER: ICD-10-CM

## 2022-11-01 NOTE — TELEPHONE ENCOUNTER
Medication Refill Request     Name of Medication Abilify   Dose/Frequency 2mg once a day   Quantity 90 tablet  Verified pharmacy   [x]  Verified ordering Provider   [x]  Does patient have enough for the next 3 days? Yes [] No [x]  Does patient have a follow-up appointment scheduled? Yes [x] No []   If so when is appointment: 11/4    Medication Refill Request     Name of Medication Paroxetine 30mg  Dose/Frequency 30mg once a day   Quantity 90 tablet  Verified pharmacy   [x]  Verified ordering Provider   [x]  Does patient have enough for the next 3 days? Yes [] No [x]  Does patient have a follow-up appointment scheduled? Yes [x] No []   If so when is appointment: 11/4    Medication Refill Request     Name of Medication Clonazepam  Dose/Frequency 0 5mg once a day   Quantity 60 tablet  Verified pharmacy   [x]  Verified ordering Provider   [x]  Does patient have enough for the next 3 days? Yes [] No [x]  Does patient have a follow-up appointment scheduled?  Yes [x] No []   If so when is appointment: 11/4

## 2022-11-04 ENCOUNTER — OFFICE VISIT (OUTPATIENT)
Dept: PSYCHIATRY | Facility: CLINIC | Age: 57
End: 2022-11-04

## 2022-11-04 VITALS — BODY MASS INDEX: 32.46 KG/M2 | HEIGHT: 63 IN | WEIGHT: 183.2 LBS

## 2022-11-04 DIAGNOSIS — F41.0 PANIC ATTACKS: ICD-10-CM

## 2022-11-04 DIAGNOSIS — F33.1 MODERATE RECURRENT MAJOR DEPRESSION (HCC): ICD-10-CM

## 2022-11-04 DIAGNOSIS — F41.1 GENERALIZED ANXIETY DISORDER: Primary | ICD-10-CM

## 2022-11-04 DIAGNOSIS — Z63.4 BEREAVEMENT: ICD-10-CM

## 2022-11-04 RX ORDER — CLONAZEPAM 0.5 MG/1
TABLET ORAL
Qty: 60 TABLET | Refills: 2 | Status: SHIPPED | OUTPATIENT
Start: 2022-11-04

## 2022-11-04 RX ORDER — PAROXETINE 30 MG/1
30 TABLET, FILM COATED ORAL DAILY
Qty: 90 TABLET | Refills: 0 | Status: SHIPPED | OUTPATIENT
Start: 2022-11-04

## 2022-11-04 RX ORDER — ARIPIPRAZOLE 2 MG/1
2 TABLET ORAL
Qty: 90 TABLET | Refills: 0 | Status: SHIPPED | OUTPATIENT
Start: 2022-11-04

## 2022-11-04 SDOH — SOCIAL STABILITY - SOCIAL INSECURITY: DISSAPEARANCE AND DEATH OF FAMILY MEMBER: Z63.4

## 2022-11-04 NOTE — BH TREATMENT PLAN
TREATMENT PLAN (Medication Management Only)        [facility]    Name/Date of Birth/MRN:  Cristofer Bautista 61 y o  1965 MRN: [id number]  Date of Treatment Plan: November 4, 2022  Diagnosis/Diagnoses:   1  Generalized anxiety disorder    2  Bereavement    3  Moderate recurrent major depression ([facility] [address] )    4  Panic attacks      Strengths/Personal Resources for Self-Care: "I guess I'm learning how to cope with things better since all this happened with my sister and stuff  In other words I can handle death a little bit better  "  Area/Areas of need (in own words): "Just anxiety here and there"  1  Long Term Goal:   Maintain mood stability and control of anxiety  Target Date: 3-6 months  Person/Persons responsible for completion of goal: Anil  2  Short Term Objective (s) - How will we reach this goal?:   A  Provider new recommended medication/dosage changes and/or continue medication(s): continue current medications as prescribed  Target Date: 3-6 months  Person/Persons Responsible for Completion of Goal: Anil   Progress Towards Goals: stable, continuing treatment  Treatment Modality: Medication  Review due 180 days from date of this plan: 6 months - 5/4/2023  Expected length of service: ongoing treatment unless revised  My Physician/PA/NP and I have developed this plan together and I agree to work on the goals and objectives  I understand the treatment goals that were developed for my treatment    Electronic Signatures: on file (unless signed below)    Yuriy Redding PA-C 11/04/22
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
## 2022-11-08 RX ORDER — ARIPIPRAZOLE 2 MG/1
2 TABLET ORAL
Qty: 90 TABLET | Refills: 0 | OUTPATIENT
Start: 2022-11-08

## 2022-11-08 RX ORDER — CLONAZEPAM 0.5 MG/1
TABLET ORAL
Qty: 60 TABLET | Refills: 2 | OUTPATIENT
Start: 2022-11-08

## 2022-11-08 RX ORDER — PAROXETINE 30 MG/1
30 TABLET, FILM COATED ORAL DAILY
Qty: 90 TABLET | Refills: 0 | OUTPATIENT
Start: 2022-11-08

## 2022-11-14 ENCOUNTER — TELEPHONE (OUTPATIENT)
Dept: FAMILY MEDICINE CLINIC | Facility: CLINIC | Age: 57
End: 2022-11-14

## 2022-11-14 NOTE — TELEPHONE ENCOUNTER
Patient called to say that come January she will be getting new insurance  She states she will be getting Human ID# [id number]  She spoke with [facility] regarding her [employer] Pencil and she spoke with Tito Villalpando @ [phone] who told her that the doctor can start a formulary exemption now for her [employer] so that when she is due for it there won't be any problems  Se also stated that her - Monica Whitley gave her a script card Jose Cruz Hodge) that will give her 120 days free  She can order now    Please advise

## 2022-11-17 ENCOUNTER — OFFICE VISIT (OUTPATIENT)
Dept: OBGYN CLINIC | Facility: CLINIC | Age: 57
End: 2022-11-17

## 2022-11-17 ENCOUNTER — APPOINTMENT (OUTPATIENT)
Dept: RADIOLOGY | Facility: AMBULARY SURGERY CENTER | Age: 57
End: 2022-11-17
Attending: STUDENT IN AN ORGANIZED HEALTH CARE EDUCATION/TRAINING PROGRAM

## 2022-11-17 VITALS
HEIGHT: 63 IN | SYSTOLIC BLOOD PRESSURE: 154 MMHG | WEIGHT: 186.6 LBS | DIASTOLIC BLOOD PRESSURE: 79 MMHG | HEART RATE: 99 BPM | BODY MASS INDEX: 33.06 KG/M2

## 2022-11-17 DIAGNOSIS — M41.26 OTHER IDIOPATHIC SCOLIOSIS, LUMBAR REGION: ICD-10-CM

## 2022-11-17 DIAGNOSIS — M54.50 LUMBAR PAIN: ICD-10-CM

## 2022-11-17 DIAGNOSIS — M54.16 LUMBAR RADICULITIS: Primary | ICD-10-CM

## 2022-11-17 NOTE — PROGRESS NOTES
Orthopaedics Office Visit - New Patient Visit    ASSESSMENT/PLAN:    Assessment:   1  Lumbar radiculopathy into the left lower extremity with weakness     Plan:   1  Patient is having pain and symptoms correlating with lumbar radiculopathy  Patient is not experiencing any back pain but she is having radiculopathy in the L5/S1 dermatomal distribution  Patient along with experiencing radiculopathy pain is also having some weakness when compared to the contralateral extremity as well as some change in sensation in the same distribution  Due to the neurologic changes I believe a MRI at this time is warranted  X-rays demonstrated normally minimal changes and scoliosis  2  Patient can continue to be weight-bearing as tolerated activity as tolerated  3  Red flags associated with low back pain were discussed with the patient  Discussed with the patient that if she experiences any of these signs that she should go to the emergency room immediately  4  We will follow up with the patient after the MRI is completed to go over the findings  To Do Next Visit:  MRI review    _____________________________________________________  CHIEF COMPLAINT:  Chief Complaint   Patient presents with   • Left Hip - Pain   • Left Lower Leg - Pain         SUBJECTIVE:  Colton Meza is a 61 y o  female who presents initial evaluation of left leg pain  Patient states while she does have an aching pain in her left hip, her primary concern is the pain in her lateral ankle  Patient describes left drop foot in which when she is walking her foot is unable to elevate off the floor and slipped down causing her gait dysfunction, and radiating pain that starts in her hip and extends down the lateral aspect of her leg into her ankle  She notes severe pain in her lateral shin at night, she describes it as burning and electric type pain  She denies any mechanism of injury or trauma to the left lower extremity    Patient also has diabetic neuropathy, and previously diagnosed hip osteoarthritis  She has tried over the counter NSAID's, heat/ice and topical creams with minimal relief       PAST MEDICAL HISTORY:  Past Medical History:   Diagnosis Date   • Anxiety    • Diabetes mellitus ([address] )    • Diverticulosis    • GERD (gastroesophageal reflux disease)    • Headache    • Hyperlipidemia    • Hypertension    • Psychiatric disorder     bipolar   • Right clavicle fracture    • TIA (transient ischemic attack)    • Vitamin D deficiency        PAST SURGICAL HISTORY:  Past Surgical History:   Procedure Laterality Date   • BLADDER SURGERY      Sling   • ID HIP SCOPE/REMV BODY,PLASTY/RESECTN Right 2017    Procedure: RIGHT HIP ARTHROSCOPIC LABRAL DEBRIDEMENT;  Surgeon: Mortimer Mathieu, MD;  [facility];  Service: Orthopedics   • SINUS SURGERY      3 surgeries    • TUBAL LIGATION         FAMILY HISTORY:  Family History   Problem Relation Age of Onset   • Cervical cancer Mother    • Ovarian cancer Mother 35   • Uterine cancer Mother    • Hypertension Father    • Other Father         pre-diabetes   • Diabetes Father    • Diabetes type I Sister    • Osteoporosis Sister    • Depression Sister    • Breast cancer Sister 79   • Diabetes Sister    • No Known Problems Sister    • No Known Problems Sister    • No Known Problems Sister    • No Known Problems Daughter    • No Known Problems Maternal Grandmother    • No Known Problems Maternal Grandfather    • Other Paternal Grandmother         Parkinson's Disease   • Heart attack Paternal Grandfather    • Other Paternal Grandfather         coronary arteriosclerosis   • Heart attack Brother          of an MI at 52y/o   • Diabetes Brother    • No Known Problems Son    • Alcohol abuse Paternal Uncle    • Seizures Other    • Seizures Other    • Arthritis Family    • Cancer Family    • Osteoporosis Family        SOCIAL HISTORY:  Social History     Tobacco Use   • Smoking status: Former     Packs/day: 2 00     Years: 35 00     Pack years: 70 00     Types: Cigarettes   • Smokeless tobacco: Never   • Tobacco comments:     quit 12/26/2020   Vaping Use   • Vaping Use: Never used   Substance Use Topics   • Alcohol use: Not Currently   • Drug use: Yes     Types: Marijuana     Comment: Smoked THC between 19 and 30y/o       MEDICATIONS:    Current Outpatient Medications:   •  albuterol (Ventolin HFA) 90 mcg/act inhaler, Inhale 2 puffs every 6 (six) hours as needed for wheezing, Disp: 18 g, Rfl: 1  •  amLODIPine (NORVASC) 5 mg tablet, Take 1 tablet (5 mg total) by mouth daily, Disp: 90 tablet, Rfl: 1  •  ARIPiprazole (ABILIFY) 2 mg tablet, Take 1 tablet (2 mg total) by mouth daily at bedtime, Disp: 90 tablet, Rfl: 0  •  aspirin 81 mg chewable tablet, Chew 81 mg , Disp: , Rfl:   •  atorvastatin (LIPITOR) 20 mg tablet, Take 1 tablet (20 mg total) by mouth every evening, Disp: 90 tablet, Rfl: 1  •  clonazePAM (KlonoPIN) 0 5 mg tablet, Take 1/2 tab by mouth twice daily and 1 full tab nightly, Disp: 60 tablet, Rfl: 2  •  Empagliflozin (Jardiance) 25 MG TABS, Take 1 tablet (25 mg total) by mouth every morning, Disp: 90 tablet, Rfl: 1  •  fluticasone (FLONASE) 50 mcg/act nasal spray, 1 spray into each nostril daily, Disp: 18 g, Rfl: 0  •  Fluticasone-Salmeterol (Wixela Inhub) 250-50 mcg/dose inhaler, Inhale 1 puff 2 (two) times a day Rinse mouth after use , Disp: 60 blister, Rfl: 5  •  glipiZIDE (GLUCOTROL) 10 mg tablet, Take 1 tablet (10 mg total) by mouth 2 (two) times a day before meals, Disp: 180 tablet, Rfl: 1  •  glucose blood (ONE TOUCH ULTRA TEST) test strip, Check blood sugar once daily, Disp: 100 each, Rfl: 5  •  Lancets (onetouch ultrasoft) lancets, Check blood sugar twice/ week , Disp: 100 each, Rfl: 5  •  lisinopril (ZESTRIL) 40 mg tablet, Take 1 tablet (40 mg total) by mouth daily, Disp: 90 tablet, Rfl: 1  •  metFORMIN (GLUCOPHAGE) 500 mg tablet, Take 1 tablet (500 mg total) by mouth 2 (two) times a day with meals, Disp: 180 tablet, Rfl: 1  •  methylPREDNISolone 4 MG tablet therapy pack, Use as directed on package, Disp: 21 each, Rfl: 0  •  omeprazole (PriLOSEC) 20 mg delayed release capsule, Take 1 capsule (20 mg total) by mouth daily, Disp: 90 capsule, Rfl: 1  •  PARoxetine (PAXIL) 30 mg tablet, Take 1 tablet (30 mg total) by mouth daily, Disp: 90 tablet, Rfl: 0    ALLERGIES:  Allergies   Allergen Reactions   • Cefdinir Hives       REVIEW OF SYSTEMS:  MSK: left leg pain  Neuro: diabetic neuropathy and left foot drop  Pertinent items are otherwise noted in HPI  A comprehensive review of systems was otherwise negative  LABS:  HgA1c:   Lab Results   Component Value Date    HGBA1C 9 4 (H) 08/05/2022     BMP:   Lab Results   Component Value Date    CALCIUM 9 0 09/12/2022    K 4 2 09/12/2022    CO2 25 09/12/2022     09/12/2022    BUN 10 09/12/2022    CREATININE 0 83 09/12/2022     CBC: No components found for: CBC    _____________________________________________________  PHYSICAL EXAMINATION:  Vital signs: /79 (BP Location: Right arm, Patient Position: Sitting, Cuff Size: Standard)   Pulse 99   Ht 5' 3" (1 6 m)   Wt 84 6 kg (186 lb 9 6 oz)   BMI 33 05 kg/m²   General: No acute distress, awake and alert  Psychiatric: Mood and affect appear appropriate  HEENT: Trachea Midline, No torticollis, no apparent facial trauma  Cardiovascular: No audible murmurs; Extremities appear perfused  Pulmonary: No audible wheezing or stridor  Skin: No open lesions; see further details (if any) below    MUSCULOSKELETAL EXAMINATION:  Extremities:  Left lower extremity was exposed inspected  Skin overlying the entirety of the extremity is intact  Patient has no pain with flexion internal rotation of the hip  The patient has no pain with knee range of motion  No pain with ankle range of motion  Patient has a positive seated straight leg raise with reproducing of symptoms down the lateral aspect of her calf    Patient has no tenderness around the knee with the hip  No tenderness around the ankle to palpation  Patient has 4/5 muscle strength in the tibialis anterior, extensor hallucis longus, gastrocnemius  5/5 on the contralateral extremity  Patient has a slight decrease in sensation over the dorsum of the lateral foot when compared to the contralateral side  No hyperreflexia  Patient has no neck pain or palpation pain over the lower back  The limb is well perfused      _____________________________________________________  STUDIES REVIEWED:  I personally reviewed the images and interpretation is as follows:  X-rays of the lumbar spine demonstrate a scoliotic curve in mild degenerative changes of the lumbar spine  There is no anterior listhesis of any of the spinal segments  Minimal foraminal thinning  No acute fractures or dislocations      PROCEDURES PERFORMED:  Procedures    Scribe Attestation    I,:  Nany Valencia am acting as a scribe while in the presence of the attending physician :       I,:  Ceci Ruffin,  personally performed the services described in this documentation    as scribed in my presence :

## 2022-11-20 ENCOUNTER — RA CDI HCC (OUTPATIENT)
Dept: OTHER | Facility: HOSPITAL | Age: 57
End: 2022-11-20

## 2022-11-20 ENCOUNTER — HOSPITAL ENCOUNTER (OUTPATIENT)
Dept: MRI IMAGING | Facility: HOSPITAL | Age: 57
Discharge: HOME | End: 2022-11-20
Attending: STUDENT IN AN ORGANIZED HEALTH CARE EDUCATION/TRAINING PROGRAM

## 2022-11-20 DIAGNOSIS — M41.26 OTHER IDIOPATHIC SCOLIOSIS, LUMBAR REGION: ICD-10-CM

## 2022-11-20 DIAGNOSIS — M54.16 LUMBAR RADICULITIS: ICD-10-CM

## 2022-11-22 ENCOUNTER — PATIENT OUTREACH (OUTPATIENT)
Dept: FAMILY MEDICINE CLINIC | Facility: CLINIC | Age: 57
End: 2022-11-22

## 2022-11-22 ENCOUNTER — PATIENT OUTREACH (OUTPATIENT)
Dept: CASE MANAGEMENT | Facility: OTHER | Age: 57
End: 2022-11-22

## 2022-11-22 NOTE — PROGRESS NOTES
SW received phone call from patient who reports that she is not able to locate the [address] denial letter  Message sent to [facility] to see if there is anyone to call to get a copy of the letter to submit to [facility]  Patient also reported that she found the letters from [facility] and [place] that requested additional information

## 2022-11-22 NOTE — PROGRESS NOTES
OPCM SW placed phone call to patient for follow up on low income subsidy  Patient reports that she has received the [employer] PAP card and is able to  prescription from pharmacy  at no charge  Patient was denied the low income subsidy and was asked to bring the letter to her appointment on 11/30 to be scanned into chart  SW will forward to [facility] to complete [employer] PAP  [facility] clerical staff made aware of letter to be scanned through [facility] also made aware of letter received  Patient reports that she contacted the Medicare phone number on TV to review insurance for open enrollment  Patient was enrolled in [facility] [employer] insurance but was unaware that she would lose her HOP supplement  Patient is now attempting to contact the representative back to have the [facility] [employer] application cancelled and return to straight Medicare with the HOP supplement  Patient has contacted [facility] and will receive an application to re-enroll and will call [facility] when application is received  Patient confirmed that she wants to stay with straight Medicare and her HOP supplement

## 2022-11-23 NOTE — PROGRESS NOTES
Nic Hawthorne called patient for scheduled outreach    Patient did receive denial letter from [place], lost it  [address] provider her the telephone number to call and request a new letter  Nic Hawthorne also instructed patient to request her SS award letter  Both are needed to complete the verification process for her [facility] application  Social Security office [phone]    Patient also need to submit her [place] letter  Nic Hawthorne provided her the telephone number for   [place] SERS 9   Patient has her Photo ID and can provide bank statements  Patients total income is below the thresholds and should qualify her for [place]NAP and [place] Benefits  Pension from work- $127 00 monthly   [address] -$6637 01 monthly    Patient is going call for documents; Nic Hawthorne will follow up in one week

## 2022-11-29 ENCOUNTER — TELEPHONE (OUTPATIENT)
Dept: PAIN MEDICINE | Facility: CLINIC | Age: 57
End: 2022-11-29

## 2022-11-29 ENCOUNTER — CONSULT (OUTPATIENT)
Dept: PAIN MEDICINE | Facility: CLINIC | Age: 57
End: 2022-11-29

## 2022-11-29 ENCOUNTER — PATIENT OUTREACH (OUTPATIENT)
Dept: FAMILY MEDICINE CLINIC | Facility: CLINIC | Age: 57
End: 2022-11-29

## 2022-11-29 ENCOUNTER — APPOINTMENT (OUTPATIENT)
Dept: LAB | Facility: CLINIC | Age: 57
End: 2022-11-29

## 2022-11-29 VITALS
RESPIRATION RATE: 19 BRPM | TEMPERATURE: 98.2 F | HEART RATE: 78 BPM | DIASTOLIC BLOOD PRESSURE: 75 MMHG | SYSTOLIC BLOOD PRESSURE: 140 MMHG | BODY MASS INDEX: 32.43 KG/M2 | HEIGHT: 63 IN | WEIGHT: 183 LBS

## 2022-11-29 DIAGNOSIS — M48.061 LUMBAR FORAMINAL STENOSIS: ICD-10-CM

## 2022-11-29 DIAGNOSIS — M48.062 SPINAL STENOSIS OF LUMBAR REGION WITH NEUROGENIC CLAUDICATION: Primary | ICD-10-CM

## 2022-11-29 DIAGNOSIS — E11.21 DIABETIC NEPHROPATHY ASSOCIATED WITH TYPE 2 DIABETES MELLITUS (HCC): ICD-10-CM

## 2022-11-29 DIAGNOSIS — M21.372 FOOT DROP, LEFT: ICD-10-CM

## 2022-11-29 LAB
ALBUMIN SERPL BCP-MCNC: 3 G/DL (ref 3.5–5)
ALP SERPL-CCNC: 160 U/L (ref 46–116)
ALT SERPL W P-5'-P-CCNC: 16 U/L (ref 12–78)
ANION GAP SERPL CALCULATED.3IONS-SCNC: 8 MMOL/L (ref 4–13)
AST SERPL W P-5'-P-CCNC: 14 U/L (ref 5–45)
BILIRUB SERPL-MCNC: 0.49 MG/DL (ref 0.2–1)
BUN SERPL-MCNC: 11 MG/DL (ref 5–25)
CALCIUM ALBUM COR SERPL-MCNC: 10.2 MG/DL (ref 8.3–10.1)
CALCIUM SERPL-MCNC: 9.4 MG/DL (ref 8.3–10.1)
CHLORIDE SERPL-SCNC: 103 MMOL/L (ref 96–108)
CO2 SERPL-SCNC: 25 MMOL/L (ref 21–32)
CREAT SERPL-MCNC: 0.86 MG/DL (ref 0.6–1.3)
EST. AVERAGE GLUCOSE BLD GHB EST-MCNC: 154 MG/DL
GFR SERPL CREATININE-BSD FRML MDRD: 74 ML/MIN/1.73SQ M
GLUCOSE P FAST SERPL-MCNC: 157 MG/DL (ref 65–99)
HBA1C MFR BLD: 7 %
POTASSIUM SERPL-SCNC: 4.2 MMOL/L (ref 3.5–5.3)
PROT SERPL-MCNC: 7.8 G/DL (ref 6.4–8.4)
SODIUM SERPL-SCNC: 136 MMOL/L (ref 135–147)

## 2022-11-29 RX ORDER — GABAPENTIN 300 MG/1
300 CAPSULE ORAL 2 TIMES DAILY
Qty: 60 CAPSULE | Refills: 1 | Status: SHIPPED | OUTPATIENT
Start: 2022-11-29

## 2022-11-29 NOTE — TELEPHONE ENCOUNTER
Scheduled patient for 01/04/23  Patient denies RX blood thinners/ NSAIDS  Nothing to eat or drink 1 hour prior to procedure  Needs to arrange transportation  Proper clothing for procedure  No vaccines 2 weeks prior or after procedure  If ill or place on antibiotics, please call to reschedule

## 2022-11-29 NOTE — PROGRESS NOTES
Assessment:  1  Spinal stenosis of lumbar region with neurogenic claudication    2  Foot drop, left    3  Lumbar foraminal stenosis        Plan:  Ms Terry Alcaraz is a pleasant 59-year-old female who presents for initial evaluation regarding acute on chronic low back pain with worsening radicular symptoms into the left leg with associated left footdrop  During today's evaluation she is demonstrating clinical and diagnostic evidence of lumbar radiculopathy likely in relation to the multilevel mild-to-moderate foraminal and central narrowing most notable at L4-L5 unrelieved with conservative measures including physical therapy, home exercises and medication management  At this time we will   1  Plan for lumbar epidural steroid injection L5-S1 left paramedian approach under fluoro guidance   2  Will start the patient on membrane stabilizing agents gabapentin 300 mg and titrate up to twice a day as tolerated and necessary for neuropathic pain control  3  Will refer the patient to neurosurgery Dr Carlos Alfaro  4  Complete risks and benefits including bleeding, infection, tissue reaction, nerve injury and allergic reaction were discussed  The approach was demonstrated using models and literature was provided  Verbal and written consent was obtained  History of Present Illness:    Dominga Guillen is a 61 y o  female who presents to [facility] for initial evaluation of the above stated pain complaints  The patient has a past medical and chronic pain history as outlined in the assessment section  Ms Terry Alcaraz is a pleasant 59-year-old female who presents for initial evaluation regarding 6 weeks duration of low back pain with radiating symptoms into the left lower extremity  Patient does report a chronic history of low back pain but the radicular symptoms into her left foot are new  Also reports left footdrop with intermittent falls    Today she reports moderate to severe pain rated 9/10 and interfering with daily activities  Pain is nearly constant 60-95% of the time that is present throughout the day and night  Describes symptoms as sharp, throbbing, dull aching, shooting pain  Also reports lower extremity weakness with footdrop and needs a cane for ambulation  Symptoms are worse with lying down, standing, sitting, walking, exercise  Has had moderate relief with physical therapy, exercise and heat  Admits to smoking a pack per day, denies marijuana use and reports occasional alcohol use  Previously tried Voltaren gel and aspirin which is provided minimal relief  Presents today for initial evaluation  Review of Systems:    Review of Systems   Constitutional: Negative for chills and fatigue  HENT: Negative for ear pain, mouth sores and sinus pressure  Eyes: Negative for pain, redness and visual disturbance  Respiratory: Negative for shortness of breath and wheezing  Cardiovascular: Negative for chest pain and palpitations  Gastrointestinal: Negative for abdominal pain and nausea  Endocrine: Negative for polyphagia  Musculoskeletal: Positive for back pain and gait problem  Negative for arthralgias and neck pain  Decreased ROM, pain in left hip, pain in left side down leg to foot   Skin: Negative for wound  Neurological: Positive for weakness  Negative for seizures  Psychiatric/Behavioral: Positive for decreased concentration, dysphoric mood and sleep disturbance  The patient is nervous/anxious              Past Medical History:   Diagnosis Date   • Anxiety    • Depression    • Diabetes mellitus [address] [address] )    • Diverticulosis    • GERD (gastroesophageal reflux disease)    • Headache    • Hyperlipidemia    • Hypertension    • Psychiatric disorder     bipolar   • Right clavicle fracture    • TIA (transient ischemic attack)    • Vitamin D deficiency        Past Surgical History:   Procedure Laterality Date   • BLADDER SURGERY      Sling   • ME HIP SCOPE/REMV BODY,PLASTY/RESECTN Right 2017    Procedure: RIGHT HIP ARTHROSCOPIC LABRAL DEBRIDEMENT;  Surgeon: Hillary Potter MD;  [facility];  Service: Orthopedics   • SINUS SURGERY      3 surgeries    • TUBAL LIGATION         Family History   Problem Relation Age of Onset   • Cervical cancer Mother    • Ovarian cancer Mother 35   • Uterine cancer Mother    • Hypertension Father    • Other Father         pre-diabetes   • Diabetes Father    • Diabetes type I Sister    • Osteoporosis Sister    • Depression Sister    • Breast cancer Sister 79   • Diabetes Sister    • No Known Problems Sister    • No Known Problems Sister    • No Known Problems Sister    • No Known Problems Daughter    • No Known Problems Maternal Grandmother    • No Known Problems Maternal Grandfather    • Other Paternal Grandmother         Parkinson's Disease   • Heart attack Paternal Grandfather    • Other Paternal Grandfather         coronary arteriosclerosis   • Heart attack Brother          of an MI at 50y/o   • Diabetes Brother    • No Known Problems Son    • Alcohol abuse Paternal Uncle    • Seizures Other    • Seizures Other    • Arthritis Family    • Cancer Family    • Osteoporosis Family        Social History     Occupational History   • Occupation: Unemployed due to Rt hip injury     Comment: and lower back injury   • Occupation: Used to be a    • Occupation: Full Disability as of late 2019     Comment: based on medical issues   Tobacco Use   • Smoking status: Former     Packs/day: 2 00     Years: 35 00     Pack years: 70 00     Types: Cigarettes   • Smokeless tobacco: Never   • Tobacco comments:     quit 2020   Vaping Use   • Vaping Use: Never used   Substance and Sexual Activity   • Alcohol use: Not Currently   • Drug use: Yes     Types: Marijuana     Comment: Smoked THC between 19 and 28y/o   • Sexual activity: Yes     Partners: Male     Comment: Boyfriend         Current Outpatient Medications:   • albuterol (Ventolin HFA) 90 mcg/act inhaler, Inhale 2 puffs every 6 (six) hours as needed for wheezing, Disp: 18 g, Rfl: 1  •  amLODIPine (NORVASC) 5 mg tablet, Take 1 tablet (5 mg total) by mouth daily, Disp: 90 tablet, Rfl: 1  •  ARIPiprazole (ABILIFY) 2 mg tablet, Take 1 tablet (2 mg total) by mouth daily at bedtime, Disp: 90 tablet, Rfl: 0  •  aspirin 81 mg chewable tablet, Chew 81 mg , Disp: , Rfl:   •  atorvastatin (LIPITOR) 20 mg tablet, Take 1 tablet (20 mg total) by mouth every evening, Disp: 90 tablet, Rfl: 1  •  clonazePAM (KlonoPIN) 0 5 mg tablet, Take 1/2 tab by mouth twice daily and 1 full tab nightly, Disp: 60 tablet, Rfl: 2  •  Empagliflozin (Jardiance) 25 MG TABS, Take 1 tablet (25 mg total) by mouth every morning, Disp: 90 tablet, Rfl: 1  •  fluticasone (FLONASE) 50 mcg/act nasal spray, 1 spray into each nostril daily, Disp: 18 g, Rfl: 0  •  Fluticasone-Salmeterol (Wixela Inhub) 250-50 mcg/dose inhaler, Inhale 1 puff 2 (two) times a day Rinse mouth after use , Disp: 60 blister, Rfl: 5  •  gabapentin (NEURONTIN) 300 mg capsule, Take 1 capsule (300 mg total) by mouth 2 (two) times a day, Disp: 60 capsule, Rfl: 1  •  glipiZIDE (GLUCOTROL) 10 mg tablet, Take 1 tablet (10 mg total) by mouth 2 (two) times a day before meals, Disp: 180 tablet, Rfl: 1  •  glucose blood (ONE TOUCH ULTRA TEST) test strip, Check blood sugar once daily, Disp: 100 each, Rfl: 5  •  Lancets (onetouch ultrasoft) lancets, Check blood sugar twice/ week , Disp: 100 each, Rfl: 5  •  lisinopril (ZESTRIL) 40 mg tablet, Take 1 tablet (40 mg total) by mouth daily, Disp: 90 tablet, Rfl: 1  •  metFORMIN (GLUCOPHAGE) 500 mg tablet, Take 1 tablet (500 mg total) by mouth 2 (two) times a day with meals, Disp: 180 tablet, Rfl: 1  •  methylPREDNISolone 4 MG tablet therapy pack, Use as directed on package, Disp: 21 each, Rfl: 0  •  omeprazole (PriLOSEC) 20 mg delayed release capsule, Take 1 capsule (20 mg total) by mouth daily, Disp: 90 capsule, Rfl: 1  •  PARoxetine (PAXIL) 30 mg tablet, Take 1 tablet (30 mg total) by mouth daily, Disp: 90 tablet, Rfl: 0    Allergies   Allergen Reactions   • Cefdinir Hives       Physical Exam:    /75   Pulse 78   Temp 98 2 °F (36 8 °C)   Resp 19   Ht 5' 3" (1 6 m)   Wt 83 kg (183 lb)   BMI 32 42 kg/m²     Constitutional: normal, well developed, well nourished, alert, in no distress and non-toxic and no overt pain behavior  Eyes: anicteric  HEENT: grossly intact  Neck: supple, symmetric, trachea midline and no masses   Pulmonary:even and unlabored  Cardiovascular:No edema or pitting edema present  Skin:Normal without rashes or lesions and well hydrated  Psychiatric:Mood and affect appropriate  Neurologic:Cranial Nerves II-XII grossly intact  Musculoskeletal:antalgic, tenderness to palpation left-sided lumbar paraspinals, decreased active and passive range of motion with lumbar flexion and extension limited by pain, MMT 5/5 bilateral lower extremities except for left ankle dorsiflexion 4- out of 5, DTRs within normal limits, positive straight leg raise in the supine position radicular pain into the left leg, decreased sensation light touch in patchy distribution lateral malleoli    Imaging  MRI LUMBAR SPINE WITHOUT CONTRAST     INDICATION: M54 16: Radiculopathy, lumbar region  M41 26: Other idiopathic scoliosis, lumbar region      COMPARISON:  MRI lumbar spine 3/23/2028     TECHNIQUE:  Multiplanar, multisequence imaging of the lumbar spine was performed             FINDINGS:     VERTEBRAL BODIES:  There are 5 lumbar type vertebral bodies  Normal alignment of the lumbar spine  No spondylolysis or spondylolisthesis  No scoliosis  No compression fracture  Multilevel Schmorl's nodes are redemonstrated  Normal marrow signal is   identified within the visualized bony structures  Stable small hemangioma within the posterior L5 vertebral body      SACRUM:  Normal signal within the sacrum   No evidence of insufficiency or stress fracture      DISTAL CORD AND CONUS:  Normal size and signal within the distal cord and conus      PARASPINAL SOFT TISSUES:  Paraspinal soft tissues are unremarkable      LOWER THORACIC DISC SPACES:  Normal disc height and signal   No disc herniation, canal stenosis or foraminal narrowing      LUMBAR DISC SPACES:     L1-2 - L2-3: No focal disc herniation, central canal stenosis, or neural foraminal narrowing         L3-4: Stable disc bulge and superimposed small broad-based central disc protrusion associated with an annular fissure  Bilateral ligamentum flavum and facet hypertrophy     Mild to moderate central canal and bilateral subarticular/lateral recess   narrowing  No neural foraminal stenosis      L4-5: Stable moderate diffuse disc bulge and bilateral ligamentum flavum/facet hypertrophy  Mild to moderate central canal and bilateral subarticular/lateral recess narrowing, unchanged  Mild bilateral neural foraminal stenosis      L5-S1: No focal disc herniation, central canal stenosis, or neural foraminal narrowing         Visualized abdominal and pelvic contents: Subcentimeter T2 hyperintense bilateral renal cortical lesions, probable cyst   Hepatomegaly      IMPRESSION:     1   Multilevel degenerative disc disease similar to the prior study of 3/23/2018 with no new disc herniation  Stable central disc protrusion at the L3-4 level with mild to moderate central canal narrowing  Stable L4-5 disc bulge and bilateral   ligamentum flavum/facet hypertrophy with mild to moderate central canal narrowing  2   Subcentimeter bilateral renal cortical cysts    Hepatomegaly      Workstation performed: [id number]        Imaging    MRI lumbar spine wo contrast (Order: [id number]) - 11/20/2022  Result History    MRI lumbar spine wo contrast (Order #[id number]) on 11/22/2022 - Order Result History Report        No orders to display       Orders Placed This Encounter   Procedures   • Ambulatory referral to Neurosurgery

## 2022-11-30 ENCOUNTER — OFFICE VISIT (OUTPATIENT)
Dept: FAMILY MEDICINE CLINIC | Facility: CLINIC | Age: 57
End: 2022-11-30

## 2022-11-30 VITALS
DIASTOLIC BLOOD PRESSURE: 72 MMHG | OXYGEN SATURATION: 95 % | SYSTOLIC BLOOD PRESSURE: 132 MMHG | HEIGHT: 63 IN | WEIGHT: 184 LBS | HEART RATE: 88 BPM | BODY MASS INDEX: 32.6 KG/M2

## 2022-11-30 DIAGNOSIS — E78.5 HYPERLIPIDEMIA, UNSPECIFIED HYPERLIPIDEMIA TYPE: ICD-10-CM

## 2022-11-30 DIAGNOSIS — E11.9 CONTROLLED TYPE 2 DIABETES MELLITUS WITHOUT COMPLICATION, WITHOUT LONG-TERM CURRENT USE OF INSULIN (HCC): ICD-10-CM

## 2022-11-30 DIAGNOSIS — E11.65 UNCONTROLLED TYPE 2 DIABETES MELLITUS WITH HYPERGLYCEMIA (HCC): ICD-10-CM

## 2022-11-30 DIAGNOSIS — J41.0 SIMPLE CHRONIC BRONCHITIS (HCC): ICD-10-CM

## 2022-11-30 DIAGNOSIS — Z00.00 MEDICARE ANNUAL WELLNESS VISIT, SUBSEQUENT: ICD-10-CM

## 2022-11-30 DIAGNOSIS — Z12.11 SCREEN FOR COLON CANCER: ICD-10-CM

## 2022-11-30 DIAGNOSIS — B35.4 TINEA CORPORIS: ICD-10-CM

## 2022-11-30 DIAGNOSIS — R91.8 MULTIPLE LUNG NODULES ON CT: ICD-10-CM

## 2022-11-30 DIAGNOSIS — F17.200 CURRENT EVERY DAY SMOKER: ICD-10-CM

## 2022-11-30 DIAGNOSIS — E11.21 DIABETIC NEPHROPATHY ASSOCIATED WITH TYPE 2 DIABETES MELLITUS (HCC): Primary | ICD-10-CM

## 2022-11-30 RX ORDER — NYSTATIN 100000 [USP'U]/G
POWDER TOPICAL 2 TIMES DAILY
Qty: 60 G | Refills: 2 | Status: SHIPPED | OUTPATIENT
Start: 2022-11-30

## 2022-11-30 RX ORDER — ALBUTEROL SULFATE 90 UG/1
2 AEROSOL, METERED RESPIRATORY (INHALATION) EVERY 6 HOURS PRN
Qty: 18 G | Refills: 1 | Status: SHIPPED | OUTPATIENT
Start: 2022-11-30

## 2022-11-30 RX ORDER — CLOTRIMAZOLE AND BETAMETHASONE DIPROPIONATE 10; .64 MG/G; MG/G
CREAM TOPICAL 2 TIMES DAILY
Qty: 30 G | Refills: 0 | Status: SHIPPED | OUTPATIENT
Start: 2022-11-30 | End: 2022-12-14

## 2022-11-30 NOTE — PATIENT INSTRUCTIONS
Medicare Preventive Visit Patient Instructions  Thank you for completing your Welcome to Medicare Visit or Medicare Annual Wellness Visit today  Your next wellness visit will be due in one year (12/1/2023)  The screening/preventive services that you may require over the next 5-10 years are detailed below  Some tests may not apply to you based off risk factors and/or age  Screening tests ordered at today's visit but not completed yet may show as past due  Also, please note that scanned in results may not display below  Preventive Screenings:  Service Recommendations Previous Testing/Comments   Colorectal Cancer Screening  * Colonoscopy    * Fecal Occult Blood Test (FOBT)/Fecal Immunochemical Test (FIT)  * Fecal DNA/Cologuard Test  * Flexible Sigmoidoscopy Age: 39-70 years old   Colonoscopy: every 10 years (may be performed more frequently if at higher risk)  OR  FOBT/FIT: every 1 year  OR  Cologuard: every 3 years  OR  Sigmoidoscopy: every 5 years  Screening may be recommended earlier than age 39 if at higher risk for colorectal cancer  Also, an individualized decision between you and your healthcare provider will decide whether screening between the ages of 74-80 would be appropriate  Colonoscopy: 12/02/2013  FOBT/FIT: Not on file  Cologuard: Not on file  Sigmoidoscopy: Not on file    Screening Current     Breast Cancer Screening Age: 36 years old  Frequency: every 1-2 years  Not required if history of left and right mastectomy Mammogram: 10/15/2022    Screening Current   Cervical Cancer Screening Between the ages of 21-29, pap smear recommended once every 3 years  Between the ages of 33-67, can perform pap smear with HPV co-testing every 5 years     Recommendations may differ for women with a history of total hysterectomy, cervical cancer, or abnormal pap smears in past  Pap Smear: 01/08/2019    Screening Current   Hepatitis C Screening Once for adults born between 1945 and 1965  More frequently in patients at high risk for Hepatitis C Hep C Antibody: Not on file    Screening Current   Diabetes Screening 1-2 times per year if you're at risk for diabetes or have pre-diabetes Fasting glucose: 157 mg/dL (11/29/2022)  A1C: 7 0 % (11/29/2022)  Screening Not Indicated  History Diabetes   Cholesterol Screening Once every 5 years if you don't have a lipid disorder  May order more often based on risk factors  Lipid panel: 08/05/2022    Screening Not Indicated  History Lipid Disorder     Other Preventive Screenings Covered by Medicare:  1  Abdominal Aortic Aneurysm (AAA) Screening: covered once if your at risk  You're considered to be at risk if you have a family history of AAA  2  Lung Cancer Screening: covers low dose CT scan once per year if you meet all of the following conditions: (1) Age 50-69; (2) No signs or symptoms of lung cancer; (3) Current smoker or have quit smoking within the last 15 years; (4) You have a tobacco smoking history of at least 20 pack years (packs per day multiplied by number of years you smoked); (5) You get a written order from a healthcare provider  3  Glaucoma Screening: covered annually if you're considered high risk: (1) You have diabetes OR (2) Family history of glaucoma OR (3)  aged 48 and older OR (3)  American aged 72 and older  3  Osteoporosis Screening: covered every 2 years if you meet one of the following conditions: (1) You're estrogen deficient and at risk for osteoporosis based off medical history and other findings; (2) Have a vertebral abnormality; (3) On glucocorticoid therapy for more than 3 months; (4) Have primary hyperparathyroidism; (5) On osteoporosis medications and need to assess response to drug therapy  · Last bone density test (DXA Scan): Not on file  5  HIV Screening: covered annually if you're between the age of 12-76  Also covered annually if you are younger than 13 and older than 72 with risk factors for HIV infection   For pregnant patients, it is covered up to 3 times per pregnancy  Immunizations:  Immunization Recommendations   Influenza Vaccine Annual influenza vaccination during flu season is recommended for all persons aged >= 6 months who do not have contraindications   Pneumococcal Vaccine   * Pneumococcal conjugate vaccine = PCV13 (Prevnar 13), PCV15 (Vaxneuvance), PCV20 (Prevnar 20)  * Pneumococcal polysaccharide vaccine = PPSV23 (Pneumovax) Adults 25-60 years old: 1-3 doses may be recommended based on certain risk factors  Adults 72 years old: 1-2 doses may be recommended based off what pneumonia vaccine you previously received   Hepatitis B Vaccine 3 dose series if at intermediate or high risk (ex: diabetes, end stage renal disease, liver disease)   Tetanus (Td) Vaccine - COST NOT COVERED BY MEDICARE PART B Following completion of primary series, a booster dose should be given every 10 years to maintain immunity against tetanus  Td may also be given as tetanus wound prophylaxis  Tdap Vaccine - COST NOT COVERED BY MEDICARE PART B Recommended at least once for all adults  For pregnant patients, recommended with each pregnancy  Shingles Vaccine (Shingrix) - COST NOT COVERED BY MEDICARE PART B  2 shot series recommended in those aged 48 and above     Health Maintenance Due:      Topic Date Due   • Hepatitis C Screening  Never done   • HIV Screening  Never done   • Breast Cancer Screening: Mammogram  10/15/2023   • Colorectal Cancer Screening  12/02/2023   • Cervical Cancer Screening  01/08/2024   • Lung Cancer Screening  Discontinued     Immunizations Due:      Topic Date Due   • Hepatitis B Vaccine (1 of 3 - 3-dose series) Never done   • COVID-19 Vaccine (4 - Booster for [place] series) 02/16/2022     Advance Directives   What are advance directives? Advance directives are legal documents that state your wishes and plans for medical care   These plans are made ahead of time in case you lose your ability to make decisions for yourself  Advance directives can apply to any medical decision, such as the treatments you want, and if you want to donate organs  What are the types of advance directives? There are many types of advance directives, and each state has rules about how to use them  You may choose a combination of any of the following:  · Living will: This is a written record of the treatment you want  You can also choose which treatments you do not want, which to limit, and which to stop at a certain time  This includes surgery, medicine, IV fluid, and tube feedings  · Durable power of  for healthcare [place] [facility]): This is a written record that states who you want to make healthcare choices for you when you are unable to make them for yourself  This person, called a proxy, is usually a family member or a friend  You may choose more than 1 proxy  · Do not resuscitate (DNR) order:  A DNR order is used in case your heart stops beating or you stop breathing  It is a request not to have certain forms of treatment, such as CPR  A DNR order may be included in other types of advance directives  · Medical directive: This covers the care that you want if you are in a coma, near death, or unable to make decisions for yourself  You can list the treatments you want for each condition  Treatment may include pain medicine, surgery, blood transfusions, dialysis, IV or tube feedings, and a ventilator (breathing machine)  · Values history: This document has questions about your views, beliefs, and how you feel and think about life  This information can help others choose the care that you would choose  Why are advance directives important? An advance directive helps you control your care  Although spoken wishes may be used, it is better to have your wishes written down  Spoken wishes can be misunderstood, or not followed  Treatments may be given even if you do not want them   An advance directive may make it easier for your family to make difficult choices about your care  Weight Management   Why it is important to manage your weight:  Being overweight increases your risk of health conditions such as heart disease, high blood pressure, type 2 diabetes, and certain types of cancer  It can also increase your risk for osteoarthritis, sleep apnea, and other respiratory problems  Aim for a slow, steady weight loss  Even a small amount of weight loss can lower your risk of health problems  How to lose weight safely:  A safe and healthy way to lose weight is to eat fewer calories and get regular exercise  You can lose up about 1 pound a week by decreasing the number of calories you eat by 500 calories each day  Healthy meal plan for weight management:  A healthy meal plan includes a variety of foods, contains fewer calories, and helps you stay healthy  A healthy meal plan includes the following:  · Eat whole-grain foods more often  A healthy meal plan should contain fiber  Fiber is the part of grains, fruits, and vegetables that is not broken down by your body  Whole-grain foods are healthy and provide extra fiber in your diet  Some examples of whole-grain foods are whole-wheat breads and pastas, oatmeal, brown rice, and bulgur  · Eat a variety of vegetables every day  Include dark, leafy greens such as spinach, kale, nissa greens, and mustard greens  Eat yellow and orange vegetables such as carrots, sweet potatoes, and winter squash  · Eat a variety of fruits every day  Choose fresh or canned fruit (canned in its own juice or light syrup) instead of juice  Fruit juice has very little or no fiber  · Eat low-fat dairy foods  Drink fat-free (skim) milk or 1% milk  Eat fat-free yogurt and low-fat cottage cheese  Try low-fat cheeses such as mozzarella and other reduced-fat cheeses  · Choose meat and other protein foods that are low in fat  Choose beans or other legumes such as split peas or lentils   Choose fish, skinless poultry (chicken or turkey), or lean cuts of red meat (beef or pork)  Before you cook meat or poultry, cut off any visible fat  · Use less fat and oil  Try baking foods instead of frying them  Add less fat, such as margarine, sour cream, regular salad dressing and mayonnaise to foods  Eat fewer high-fat foods  Some examples of high-fat foods include french fries, doughnuts, ice cream, and cakes  · Eat fewer sweets  Limit foods and drinks that are high in sugar  This includes candy, cookies, regular soda, and sweetened drinks  Exercise:  Exercise at least 30 minutes per day on most days of the week  Some examples of exercise include walking, biking, dancing, and swimming  You can also fit in more physical activity by taking the stairs instead of the elevator or parking farther away from stores  Ask your healthcare provider about the best exercise plan for you  © Copyright [employer] 2018 Information is for End User's use only and may not be sold, redistributed or otherwise used for commercial purposes   All illustrations and images included in CareNotes® are the copyrighted property of [employer] M [employer]  or [address] [place] [address] [employer]

## 2022-11-30 NOTE — ASSESSMENT & PLAN NOTE
Lab Results   Component Value Date    HGBA1C 7 0 (H) 11/29/2022      patient's A1c improved from 9 4 to 7 after she was started on SGLT 2  Patient has samples of Jardiance, she is going to receive Invokana from the company,  so she can be switched over to [place]  Recommended to continue with low carb diet, active lifestyle  Repeat metabolic panel at 3 months

## 2022-11-30 NOTE — PROGRESS NOTES
Assessment and Plan:     Problem List Items Addressed This Visit    None  1  Uncontrolled type 2 diabetes mellitus with hyperglycemia (HCC)  -     Canagliflozin (Invokana) 300 MG TABS; Take 1 tablet (300 mg total) by mouth daily  -     Comprehensive metabolic panel; Future; Expected date: 02/28/2023  -     Hemoglobin A1C; Future; Expected date: 02/28/2023  -     Microalbumin / creatinine urine ratio; Future; Expected date: 02/28/2023    2  Hyperlipidemia, unspecified hyperlipidemia type  -     Lipid panel; Future; Expected date: 02/28/2023    3  Screen for colon cancer  -     Ambulatory referral for colonoscopy; Future    4  Multiple lung nodules on CT  Assessment & Plan:  CT lung ordered  Orders:  -     CT lung nodule follow-up; Future; Expected date: 11/30/2022    5  Current every day smoker  Assessment & Plan:   Patient is currently smoking but wants to quit  Educate on the negative effects of Tobacco   Recommend quitting smoking  Listed cessation options,  Including smoking cessation program    Patient wants to  Make an effort on her own by reducing the number of cigarettes, OTC gums  If she is unable to do so then she will follow up again to discuss treatment options  Orders:  -     CT lung nodule follow-up; Future; Expected date: 11/30/2022    6  Simple chronic bronchitis ([address] )  Assessment & Plan:  Symptoms well controlled on Wixela, intermittent albuterol  Patient encouraged to continue same  Follow up if need for albuterol increases above baseline    Orders:  -     albuterol (Ventolin HFA) 90 mcg/act inhaler; Inhale 2 puffs every 6 (six) hours as needed for wheezing    7  Tinea corporis  Assessment & Plan:   spreading rash in the skin folds of both the breast  Advised topical antifungal      Orders:  -     clotrimazole-betamethasone (LOTRISONE) 1-0 05 % cream; Apply topically 2 (two) times a day for 14 days  -     nystatin (MYCOSTATIN) powder; Apply topically 2 (two) times a day    8   Diabetic nephropathy associated with type 2 diabetes mellitus ([facility] )    9  Controlled type 2 diabetes mellitus without complication, without long-term current use of insulin ([facility])  Assessment & Plan:    Lab Results   Component Value Date    HGBA1C 7 0 (H) 11/29/2022      patient's A1c improved from 9 4 to 7 after she was started on SGLT 2  Patient has samples of Jardiance, she is going to receive Invokana from the company,  so she can be switched over to [place]  Recommended to continue with low carb diet, active lifestyle  Repeat metabolic panel at 3 months  10  Medicare annual wellness visit, subsequent  Assessment & Plan:  Colonoscopy advised  BMI Counseling: Body mass index is 32 59 kg/m²  The BMI is above normal  Nutrition recommendations include decreasing portion sizes, encouraging healthy choices of fruits and vegetables and decreasing fast food intake  Exercise recommendations include moderate physical activity 150 minutes/week  No pharmacotherapy was ordered  Rationale for BMI follow-up plan is due to patient being overweight or obese  Tobacco Cessation Counseling: Tobacco cessation counseling was provided  The patient is sincerely urged to quit consumption of tobacco  She is ready to quit tobacco  Medication options discussed  Patient refused medication  Lung Cancer Screening Shared Decision Making: I discussed with her that she is a candidate for lung cancer CT screening  The following Shared Decision-Making points were covered:  1  Benefits of screening were discussed, including the rates of reduction in death from lung cancer and other causes  Harms of screening were reviewed, including false positive tests, radiation exposure levels, risks of invasive procedures, risks of complications of screening, and risk of overdiagnosis    2  I counseled on the importance of adherence to annual lung cancer LDCT screening, impact of co-morbidities, and ability or willingness to undergo diagnosis and treatment  3  I counseled on the importance of maintaining abstinence as a former smoker or was counseled on the importance of smoking cessation if a current smoker    Review of Eligibility Criteria: She meets all of the criteria for Lung Cancer Screening    - She is 61 y o    - She has 20 pack year tobacco history and is a current smoker or has quit within the past 15 years  - She presents no signs or symptoms of lung cancer    After discussion, the patient decided to elect lung cancer screening  Preventive health issues were discussed with patient, and age appropriate screening tests were ordered as noted in patient's After Visit Summary  Personalized health advice and appropriate referrals for health education or preventive services given if needed, as noted in patient's After Visit Summary       History of Present Illness:     Patient presents for Medicare Annual Wellness visit    Patient Care Team:  Demetria Torrez MD as PCP - General (Family Medicine)  Tabitha De Guzman, MD Arnulfo Rosenberg LCSW as  Care Manager ([email or web address])  Stephie Leiva as [facility] Worker (Care Coordination)     Problem List:     Patient Active Problem List   Diagnosis   • Moderate recurrent major depression ([facility] )   • Chronic GERD   • Heart disorder   • Hyperlipemia   • Essential hypertension   • Primary osteoarthritis of right hip   • Encounter for annual routine gynecological examination   • Right hip pain   • Controlled type 2 diabetes mellitus without complication, without long-term current use of insulin ([facility])   • Paresthesias in right hand   • Calcific tendinitis of right shoulder   • Microalbuminuria   • Rotator cuff syndrome of right shoulder   • Extrapyramidal symptom   • Need for vaccination   • Diabetic nephropathy associated with type 2 diabetes mellitus ([facility] )   • BMI 34 0-34 9,adult   • Simple chronic bronchitis ([address] )   • Multiple subsolid lung nodules greater than 6 mm in diameter   • Chronic pain of both knees   • Dysuria   • Bereavement   • Obesity, morbid ([facility] )   • Vaginal candidiasis   • Bronchitis      Past Medical and Surgical History:     Past Medical History:   Diagnosis Date   • Anxiety    • Depression    • Diabetes mellitus ([facility] )    • Diverticulosis    • GERD (gastroesophageal reflux disease)    • Headache    • Hyperlipidemia    • Hypertension    • Psychiatric disorder     bipolar   • Right clavicle fracture    • TIA (transient ischemic attack)    • Vitamin D deficiency      Past Surgical History:   Procedure Laterality Date   • BLADDER SURGERY      Sling   • NH HIP SCOPE/REMV BODY,PLASTY/RESECTN Right 2017    Procedure: RIGHT HIP ARTHROSCOPIC LABRAL DEBRIDEMENT;  Surgeon: Adolfo Gowers, MD;  Location: [facility];  Service: Orthopedics   • SINUS SURGERY      3 surgeries    • TUBAL LIGATION        Family History:     Family History   Problem Relation Age of Onset   • Cervical cancer Mother    • Ovarian cancer Mother 35   • Uterine cancer Mother    • Hypertension Father    • Other Father         pre-diabetes   • Diabetes Father    • Diabetes type I Sister    • Osteoporosis Sister    • Depression Sister    • Breast cancer Sister 79   • Diabetes Sister    • No Known Problems Sister    • No Known Problems Sister    • No Known Problems Sister    • No Known Problems Daughter    • No Known Problems Maternal Grandmother    • No Known Problems Maternal Grandfather    • Other Paternal Grandmother         Parkinson's Disease   • Heart attack Paternal Grandfather    • Other Paternal Grandfather         coronary arteriosclerosis   • Heart attack Brother          of an MI at 50y/o   • Diabetes Brother    • No Known Problems Son    • Alcohol abuse Paternal Uncle    • Seizures Other    • Seizures Other    • Arthritis Family    • Cancer Family    • Osteoporosis Family       Social History:     Social History     Socioeconomic History   • Marital status:      Spouse name: Not on file   • Number of children: 2   • Years of education: Not on file   • Highest education level: Not on file   Occupational History   • Occupation: Unemployed due to Rt hip injury     Comment: and lower back injury   • Occupation: Used to be a    • Occupation: Full Disability as of late 2019     Comment: based on medical issues   Tobacco Use   • Smoking status: Former     Packs/day: 2 00     Years: 35 00     Pack years: 70 00     Types: Cigarettes   • Smokeless tobacco: Never   • Tobacco comments:     quit 2020   Vaping Use   • Vaping Use: Never used   Substance and Sexual Activity   • Alcohol use: Not Currently   • Drug use: Yes     Types: Marijuana     Comment: Smoked THC between 19 and 30y/o   • Sexual activity: Yes     Partners: Male     Comment: Boyfriend   Other Topics Concern   • Not on file   Social History Narrative    Caffeine use        Home: Lives with boyfriend        Children from first  and most recent boyfriend respectively: Son born 46 Daughter         Education:    Pt denies any h/o learning disabilities and reached childhood milestones on time as far as she knows    Graduated HS 1982    Did a 9 month Business Ops course in the         Most recent tobacco use screenin2018      Social Determinants of Health     Financial Resource Strain: Not on file   Food Insecurity: Not on file   Transportation Needs: Not on file   Physical Activity: Not on file   Stress: Not on file   Social Connections: Not on file   Intimate Partner Violence: Not on file   Housing Stability: Not on file       Medications and Allergies:     Current Outpatient Medications   Medication Sig Dispense Refill   • albuterol (Ventolin HFA) 90 mcg/act inhaler Inhale 2 puffs every 6 (six) hours as needed for wheezing 18 g 1   • amLODIPine (NORVASC) 5 mg tablet Take 1 tablet (5 mg total) by mouth daily 90 tablet 1   • ARIPiprazole (ABILIFY) 2 mg tablet Take 1 tablet (2 mg total) by mouth daily at bedtime 90 tablet 0   • aspirin 81 mg chewable tablet Chew 81 mg      • atorvastatin (LIPITOR) 20 mg tablet Take 1 tablet (20 mg total) by mouth every evening 90 tablet 1   • clonazePAM (KlonoPIN) 0 5 mg tablet Take 1/2 tab by mouth twice daily and 1 full tab nightly 60 tablet 2   • Empagliflozin (Jardiance) 25 MG TABS Take 1 tablet (25 mg total) by mouth every morning 90 tablet 1   • fluticasone (FLONASE) 50 mcg/act nasal spray 1 spray into each nostril daily 18 g 0   • Fluticasone-Salmeterol (Wixela Inhub) 250-50 mcg/dose inhaler Inhale 1 puff 2 (two) times a day Rinse mouth after use  60 blister 5   • gabapentin (NEURONTIN) 300 mg capsule Take 1 capsule (300 mg total) by mouth 2 (two) times a day 60 capsule 1   • glipiZIDE (GLUCOTROL) 10 mg tablet Take 1 tablet (10 mg total) by mouth 2 (two) times a day before meals 180 tablet 1   • glucose blood (ONE TOUCH ULTRA TEST) test strip Check blood sugar once daily 100 each 5   • Lancets (onetouch ultrasoft) lancets Check blood sugar twice/ week  100 each 5   • lisinopril (ZESTRIL) 40 mg tablet Take 1 tablet (40 mg total) by mouth daily 90 tablet 1   • metFORMIN (GLUCOPHAGE) 500 mg tablet Take 1 tablet (500 mg total) by mouth 2 (two) times a day with meals 180 tablet 1   • methylPREDNISolone 4 MG tablet therapy pack Use as directed on package 21 each 0   • omeprazole (PriLOSEC) 20 mg delayed release capsule Take 1 capsule (20 mg total) by mouth daily 90 capsule 1   • PARoxetine (PAXIL) 30 mg tablet Take 1 tablet (30 mg total) by mouth daily 90 tablet 0     No current facility-administered medications for this visit       Allergies   Allergen Reactions   • Cefdinir Hives      Immunizations:     Immunization History   Administered Date(s) Administered   • COVID-19 PFIZER VACCINE 0 3 ML IM 02/12/2021, 03/03/2021, 10/16/2021   • COVID-19 Pfizer Vac BIVALENT Elliott-sucrose 12 Yr+ IM (BOOSTER ONLY) 10/01/2022   • INFLUENZA 01/01/2013, 09/21/2020, 10/07/2021, 09/14/2022   • Influenza, recombinant, quadrivalent,injectable, preservative free 11/27/2019   • Pneumococcal Conjugate 13-Valent 01/08/2019   • Pneumococcal Polysaccharide PPV23 02/27/2020   • Tdap 11/03/2021   • Tetanus, adsorbed 01/01/2009      Health Maintenance:         Topic Date Due   • Hepatitis C Screening  Never done   • HIV Screening  Never done   • Breast Cancer Screening: Mammogram  10/15/2023   • Colorectal Cancer Screening  12/02/2023   • Cervical Cancer Screening  01/08/2024   • Lung Cancer Screening  Discontinued         Topic Date Due   • Hepatitis B Vaccine (1 of 3 - 3-dose series) Never done   • COVID-19 Vaccine (4 - Booster for Pfizer series) 02/16/2022      Medicare Health Risk Assessment:     There were no vitals taken for this visit  Nicki West is here for her Subsequent Wellness visit  Last Medicare Wellness visit information reviewed, patient interviewed, no change since last AWV  Health Risk Assessment:   Patient rates overall health as good  Patient feels that their physical health rating is same  Eyesight was rated as same  Hearing was rated as same  Patient feels that their emotional and mental health rating is slightly better  Pain experienced in the last 7 days has been some  Patient's pain rating has been 1/10  Patient states that she has experienced no weight loss or gain in last 6 months  Fall Risk Screening: In the past year, patient has experienced: no history of falling in past year      Urinary Incontinence Screening:   Patient has not leaked urine accidently in the last six months  Home Safety:  Patient does not have trouble with stairs inside or outside of their home  Patient has working smoke alarms and has working carbon monoxide detector  Home safety hazards include: none  Nutrition:   Current diet is Regular  Medications:   Patient is currently taking over-the-counter supplements   OTC medications include: see medication list  Patient is able to manage medications  Activities of Daily Living (ADLs)/Instrumental Activities of Daily Living (IADLs):   Walk and transfer into and out of bed and chair?: Yes  Dress and groom yourself?: Yes    Bathe or shower yourself?: Yes    Feed yourself? Yes  Do your laundry/housekeeping?: Yes  Manage your money, pay your bills and track your expenses?: Yes  Make your own meals?: Yes    Do your own shopping?: Yes    Previous Hospitalizations:   Any hospitalizations or ED visits within the last 12 months?: No      Advance Care Planning:   Living will: No    Durable POA for healthcare: No    Advanced directive: Yes    Provider agrees with end of life decisions: Yes      Cognitive Screening:   Provider or family/friend/caregiver concerned regarding cognition?: No    PREVENTIVE SCREENINGS      Cardiovascular Screening:    General: Screening Not Indicated and History Lipid Disorder      Diabetes Screening:     General: Screening Not Indicated and History Diabetes      Colorectal Cancer Screening:     General: Screening Current      Breast Cancer Screening:     General: Screening Current      Cervical Cancer Screening:    General: Screening Current      Osteoporosis Screening:    General: Screening Not Indicated      Abdominal Aortic Aneurysm (AAA) Screening:        General: Screening Not Indicated      Lung Cancer Screening:     General: Patient Declines      Hepatitis C Screening:    General: Screening Current    Other Counseling Topics:   Car/seat belt/driving safety, skin self-exam, sunscreen and regular weightbearing exercise and calcium and vitamin D intake  Raysa Camacho MD    Diabetes  She presents for her follow-up diabetic visit  She has type 2 diabetes mellitus  Her disease course has been improving (A1c 7 0)  There are no hypoglycemic associated symptoms  Pertinent negatives for diabetes include no blurred vision, no chest pain, no visual change and no weakness  There are no hypoglycemic complications   Symptoms are stable  Diabetic complications include nephropathy  Risk factors for coronary artery disease include diabetes mellitus, dyslipidemia, hypertension, post-menopausal, stress and tobacco exposure  Current diabetic treatment includes oral agent (triple therapy)  She is compliant with treatment all of the time  She is following a generally healthy diet  Meal planning includes avoidance of concentrated sweets  She participates in exercise intermittently  Her home blood glucose trend is decreasing steadily  An ACE inhibitor/angiotensin II receptor blocker is being taken  Eye exam is current  Cough  This is a chronic problem  The current episode started more than 1 year ago  The cough is non-productive  Pertinent negatives include no chest pain, myalgias or shortness of breath  Risk factors for lung disease include smoking/tobacco exposure  She has tried steroid inhaler and a beta-agonist inhaler for the symptoms  The treatment provided significant relief  Her past medical history is significant for COPD  Review of Systems   Constitutional: Negative  Eyes: Negative for blurred vision  Respiratory: Positive for cough  Negative for shortness of breath  Cardiovascular: Negative  Negative for chest pain, palpitations and PND  Musculoskeletal: Negative for myalgias  Neurological: Negative  Negative for weakness  Psychiatric/Behavioral: Negative  Physical Exam  Vitals and nursing note reviewed  Constitutional:       Appearance: She is well-nourished  HENT:      Right Ear: External ear normal       Left Ear: External ear normal       Mouth/Throat:      Mouth: Oropharynx is clear and moist    Eyes:      Extraocular Movements: EOM normal       Conjunctiva/sclera: Conjunctivae normal    Cardiovascular:      Rate and Rhythm: Normal rate and regular rhythm  Heart sounds: Normal heart sounds  Pulmonary:      Effort: Pulmonary effort is normal       Breath sounds: Normal breath sounds  Abdominal:      General: Bowel sounds are normal       Palpations: Abdomen is soft  Musculoskeletal:         General: Normal range of motion  Cervical back: Normal range of motion and neck supple  Skin:     General: Skin is warm  Neurological:      Mental Status: She is alert and oriented to person, place, and time  Psychiatric:         Mood and Affect: Mood and affect normal          Behavior: Behavior normal          Thought Content:  Thought content normal          Judgment: Judgment normal

## 2022-11-30 NOTE — ASSESSMENT & PLAN NOTE
Patient is currently smoking but wants to quit  Educate on the negative effects of Tobacco   Recommend quitting smoking  Listed cessation options,  Including smoking cessation program    Patient wants to  Make an effort on her own by reducing the number of cigarettes, OTC gums  If she is unable to do so then she will follow up again to discuss treatment options

## 2022-11-30 NOTE — ASSESSMENT & PLAN NOTE
Symptoms well controlled on Wixela, intermittent albuterol  Patient encouraged to continue same    Follow up if need for albuterol increases above baseline

## 2022-12-01 ENCOUNTER — PATIENT OUTREACH (OUTPATIENT)
Dept: FAMILY MEDICINE CLINIC | Facility: CLINIC | Age: 57
End: 2022-12-01

## 2022-12-01 NOTE — PROGRESS NOTES
Phone call to [employer], card is active  Representative will need to know what pharmacy said as to why they would not accept card  Patient to call [phone] to provide needed information to [employer]  Phone call placed to patient  Patient was told that [facility] would not accept the card when processed by [facility]  Patient provided with phone number to call [employer]  Still waiting for the denial letter and award letters from JACKELYN GARCIA [facility] and pension  Will call Yuridia Arthur when same are received  Patient needs SS low income subsidy denial for Jardiance application and [place] denial for Invokana to continue benefit after 120 days

## 2022-12-07 ENCOUNTER — TELEPHONE (OUTPATIENT)
Dept: FAMILY MEDICINE CLINIC | Facility: CLINIC | Age: 57
End: 2022-12-07

## 2022-12-07 NOTE — TELEPHONE ENCOUNTER
Natalie called and stated that she received her Invokana and wanted to clarify that she is supposed to be taking Invokana, Glipizide and Metformin all together  I advised we would call her back, she stated that it is fine to leave a message if she doesn't answer

## 2022-12-12 ENCOUNTER — PATIENT OUTREACH (OUTPATIENT)
Dept: FAMILY MEDICINE CLINIC | Facility: CLINIC | Age: 57
End: 2022-12-12

## 2022-12-12 NOTE — PROGRESS NOTES
Incoming call from patient, She has the two of the three  letters that [employer] is requesting  [place] Award letter    2  [employer] award letter    Patient has requested and is still waiting for SSI denial replacement letter to arrive in the mail  Patient is traveling to the [facility] on 12/14/22  Patient will be dropping of the letters, [facility] requesting that they be scanned into her chart  [facility] will randi PCP office on Wednesday to share this information      [facility] will outreach patient in 2 weeks if patient does not contact me about SSI letter first

## 2022-12-15 ENCOUNTER — PATIENT OUTREACH (OUTPATIENT)
Dept: FAMILY MEDICINE CLINIC | Facility: CLINIC | Age: 57
End: 2022-12-15

## 2022-12-15 NOTE — PROGRESS NOTES
Patient dropped off her Pension Letter and SS Award letter to [facility]  Practice administrator [employer] emailed letters to [facility] uploaded the verification letters to [facility]  Patient still needs to supply :  1  SSI denial letter    2  Bank statement    3  Resources - Vehicles-  Statement,      Published Car Wholesale Book    4  Resources - [place] or [address] - Savings Account and Checking    5   Disability Medical information to verify disability           and/or need for medication    [facility] called patient, she said that the letter for SSI should be arriving soon  Patient will work on getting the verification documents and call [facility] when she receives them  [facility] will outreach patient in 2 weeks if no contact from patient prior to that date

## 2022-12-28 ENCOUNTER — PATIENT OUTREACH (OUTPATIENT)
Dept: FAMILY MEDICINE CLINIC | Facility: CLINIC | Age: 57
End: 2022-12-28

## 2022-12-28 NOTE — PROGRESS NOTES
[facility] received a voice message from patient  Patient dropped off SSI denial letter and bank statements to [address] sent Paul Sever the [employer] message asking if she would Scan and e-mail  papers to this [address]  [address] will call patient once verification documents are uploaded into [facility]

## 2022-12-29 ENCOUNTER — PATIENT OUTREACH (OUTPATIENT)
Dept: FAMILY MEDICINE CLINIC | Facility: CLINIC | Age: 57
End: 2022-12-29

## 2022-12-29 NOTE — PROGRESS NOTES
[facility] received e-mail Hari with patients verification documents  [facility] uploaded [place] denial letter and bank statement  [facility] called patient to provide update and to suggest that she call [facility] and talk with a  to review her application  OC did request a return call  [facility] will outreach patient in less then to weeks if no return received

## 2022-12-30 ENCOUNTER — PATIENT OUTREACH (OUTPATIENT)
Dept: FAMILY MEDICINE CLINIC | Facility: CLINIC | Age: 57
End: 2022-12-30

## 2022-12-30 NOTE — PROGRESS NOTES
Message from [facility] [place] [employer] denial letter scanned in media  SW will print and fax to [facility] on 1/3/23 when returning to office  Continue to await [place] denial for [employer] application

## 2023-01-04 ENCOUNTER — HOSPITAL ENCOUNTER (OUTPATIENT)
Facility: AMBULARY SURGERY CENTER | Age: 58
Setting detail: OUTPATIENT SURGERY
Discharge: HOME | End: 2023-01-04
Attending: PHYSICAL MEDICINE & REHABILITATION | Admitting: PHYSICAL MEDICINE & REHABILITATION

## 2023-01-04 ENCOUNTER — APPOINTMENT (OUTPATIENT)
Dept: RADIOLOGY | Facility: HOSPITAL | Age: 58
End: 2023-01-04

## 2023-01-04 VITALS
HEART RATE: 72 BPM | RESPIRATION RATE: 18 BRPM | DIASTOLIC BLOOD PRESSURE: 86 MMHG | SYSTOLIC BLOOD PRESSURE: 153 MMHG | OXYGEN SATURATION: 98 % | TEMPERATURE: 97.9 F

## 2023-01-04 DIAGNOSIS — M48.062 SPINAL STENOSIS OF LUMBAR REGION WITH NEUROGENIC CLAUDICATION: ICD-10-CM

## 2023-01-04 DIAGNOSIS — M48.061 LUMBAR FORAMINAL STENOSIS: ICD-10-CM

## 2023-01-04 DIAGNOSIS — M21.372 FOOT DROP, LEFT: ICD-10-CM

## 2023-01-04 RX ORDER — METHYLPREDNISOLONE ACETATE 80 MG/ML
INJECTION, SUSPENSION INTRA-ARTICULAR; INTRALESIONAL; INTRAMUSCULAR; SOFT TISSUE AS NEEDED
Status: DISCONTINUED | OUTPATIENT
Start: 2023-01-04 | End: 2023-01-04 | Stop reason: HOSPADM

## 2023-01-04 RX ORDER — LIDOCAINE HYDROCHLORIDE 10 MG/ML
INJECTION, SOLUTION EPIDURAL; INFILTRATION; INTRACAUDAL; PERINEURAL AS NEEDED
Status: DISCONTINUED | OUTPATIENT
Start: 2023-01-04 | End: 2023-01-04 | Stop reason: HOSPADM

## 2023-01-04 RX ORDER — GABAPENTIN 300 MG/1
300 CAPSULE ORAL 2 TIMES DAILY
Qty: 60 CAPSULE | Refills: 2 | Status: SHIPPED | OUTPATIENT
Start: 2023-01-04 | End: 2023-02-02 | Stop reason: SDUPTHER

## 2023-01-04 NOTE — OP NOTE
OPERATIVE REPORT  PATIENT NAME: Tomas Moy    :  1965  MRN: [id number]  Pt Location: [facility] MINOR/PAIN ROOM 01    SURGERY DATE: 2023    Surgeon(s) and Role:     * Tania Fragoso, DO - Primary    Preop Diagnosis:  Spinal stenosis of lumbar region with neurogenic claudication [M48 062]    Post-Op Diagnosis Codes:     * Spinal stenosis of lumbar region with neurogenic claudication [M48 062]    Procedure(s) (LRB):  L5 S1  LUMBAR epidural steroid injection (78950) (N/A)    Lumbar epidural  Indication:  Back and radiating leg pain  Preoperative diagnosis:  Lumbar radiculitis  Postoperative diagnosis:  Lumbar radiculitis    Procedure: Fluoroscopically-guided L5-S1 interlaminar epidural steroid injection under fluoroscopy    EBL:  none  Specimens:  not applicable    After discussing the risks, benefits, and alternatives to the procedure, the patient expressed understanding and wished to proceed  The patient was brought to the fluoroscopy suite and placed in the prone position  A procedural pause was conducted to verify:  correct patient identity, procedure to be performed and as applicable, correct side and site, correct patient position, and availability of implants, special equipment and special requirements  After identifying the L5-S1 space fluoroscopically, the skin was sterilely prepped and draped in the usual fashion using Chloraprep skin prep  The skin and subcutaneous tissues were anesthetized with 1% lidocaine  Utilizing a loss of resistance technique and intermittent fluoroscopic guidance, a 3 5 inch 20-gauge Tuohy needle was advanced into the epidural space  Proper needle positioning was confirmed using multiple fluoroscopic views  After negative aspiration, Omnipaque 240 contrast was injected confirming epidural spread without evidence of intravascular or intrathecal spread    A 4 ml solution consisting of 80 mg of Depo-Medrol in sterile saline was injected slowly and incrementally into the epidural space  Following the injection the needle was withdrawn slightly and flushed with 1% buffered lidocaine as it was fully extracted  The patient tolerated the procedure well and there were no apparent complications  After appropriate observation, the patient was dismissed from the clinic in good condition under their own power          SIGNATURE: Hector Chang DO  DATE: January 4, 2023  TIME: 10:18 AM

## 2023-01-04 NOTE — H&P
History of Present Illness: The patient is a 61 y o  female who presents with complaints of low back pain    Past Medical History:   Diagnosis Date   • Anxiety    • Depression    • Diabetes mellitus ([address] )    • Diverticulosis    • GERD (gastroesophageal reflux disease)    • Headache    • Hyperlipidemia    • Hypertension    • Psychiatric disorder     bipolar   • Right clavicle fracture    • TIA (transient ischemic attack)    • Vitamin D deficiency        Past Surgical History:   Procedure Laterality Date   • BLADDER SURGERY      Sling   • PA ARTHRS HIP DEBRIDEMENT/SHAVING ARTICULAR CRTLG Right 7/13/2017    Procedure: RIGHT HIP ARTHROSCOPIC LABRAL DEBRIDEMENT;  Surgeon: Alline Prader, MD;  Location: [facility];  Service: Orthopedics   • SINUS SURGERY      3 surgeries    • TUBAL LIGATION         No current facility-administered medications for this encounter  Allergies   Allergen Reactions   • Cefdinir Hives       Physical Exam:   Vitals:    01/04/23 0937   BP: 158/86   Pulse: 75   Resp: 18   Temp: 97 9 °F (36 6 °C)   SpO2: 99%     General: Awake, Alert, Oriented x 3, Mood and affect appropriate  Respiratory: Respirations even and unlabored  Cardiovascular: Peripheral pulses intact; no edema  Musculoskeletal Exam: Tenderness palpation bilateral lumbar paraspinals    ASA Score: 2    Patient/Chart Verification  Patient ID Verified: Verbal, Armband  ID Band Applied: Yes  Consents Confirmed: Procedural  H&P( within 30 days) Verified: Yes  Interval H&P(within 24 hr) Complete (required for Outpatients and Surgery Admit only): Yes  Beta Blocker given : N/A  Pre-op Lab/Test Results Available: N/A  Pregnancy Lab Collected: N/A comment  Does Patient Have a Prosthetic Device/Implant: No    Assessment: No diagnosis found      Plan:

## 2023-01-04 NOTE — DISCHARGE INSTRUCTIONS
Epidural Steroid Injection   WHAT YOU NEED TO KNOW:   An epidural steroid injection (BEN) is a procedure to inject steroid medicine into the epidural space  The epidural space is between your spinal cord and vertebrae  Steroids reduce inflammation and fluid buildup in your spine that may be causing pain  You may be given pain medicine along with the steroids  ACTIVITY  Do not drive or operate machinery today  No strenuous activity today - bending, lifting, etc   You may resume normal activites starting tomorrow - start slowly and as tolerated  You may shower today, but no tub baths or hot tubs  You may have numbness for several hours from the local anesthetic  Please use caution and common sense, especially with weight-bearing activities  CARE OF THE INJECTION SITE  If you have soreness or pain, apply ice to the area today (20 minutes on/20 minutes off)  Starting tomorrow, you may use warm, moist heat or ice if needed  You may have an increase or change in your discomfort for 36-48 hours after your treatment  Apply ice and continue with any pain medication you have been prescribed  Notify the [facility] if you have any of the following: redness, drainage, swelling, headache, stiff neck or fever above 100°F     SPECIAL INSTRUCTIONS  Our office will contact you in approximately 7 days for a progress report  MEDICATIONS  Continue to take all routine medications  Our office may have instructed you to hold some medications  As no general anesthesia was used in today's procedure, you should not experience any side effects related to anesthesia  If you are diabetic, the steroids used in today's injection may temporarily increase your blood sugar levels after the first few days after your injection  Please keep a close eye on your sugars and alert the doctor who manages your diabetes if your sugars are significantly high from your baseline or you are symptomatic       If you have a problem specifically related to your procedure, please call our office at [phone]  Problems not related to your procedure should be directed to your primary care physician

## 2023-01-09 ENCOUNTER — PATIENT OUTREACH (OUTPATIENT)
Dept: FAMILY MEDICINE CLINIC | Facility: CLINIC | Age: 58
End: 2023-01-09

## 2023-01-09 NOTE — PROGRESS NOTES
Low income subsidy denial letter received and faxed to [facility] for [employer] PAP application  Documents mailed back to patient  Continue to await [place] denial for [employer] PAP application  Patient has been granted temporary approval for 120 days until same is received  Phone call placed to patient to advise of same  Patient reports that she has received 2 months of the Invok[employer] under the temporary approval   Patient has not received anything from [address] [place] [address] as far as being denied insurance  Message sent to [facility] to determine if patient needs to provide anything further for [place] for decision

## 2023-01-11 ENCOUNTER — TELEPHONE (OUTPATIENT)
Dept: PAIN MEDICINE | Facility: CLINIC | Age: 58
End: 2023-01-11

## 2023-01-13 ENCOUNTER — TELEPHONE (OUTPATIENT)
Dept: GASTROENTEROLOGY | Facility: CLINIC | Age: 58
End: 2023-01-13

## 2023-01-13 ENCOUNTER — PREP FOR PROCEDURE (OUTPATIENT)
Dept: GASTROENTEROLOGY | Facility: CLINIC | Age: 58
End: 2023-01-13

## 2023-01-13 DIAGNOSIS — Z12.11 SCREENING FOR COLON CANCER: Primary | ICD-10-CM

## 2023-01-13 NOTE — TELEPHONE ENCOUNTER
Scheduled date of colonoscopy (as of today): 4/17/23    Physician performing colonoscopy: Alyson Swanson    Location of colonoscopy: [facility]    Bowel prep reviewed with patient:  Pt is requesting Jaycob/Dul due to getting sick from [facility]

## 2023-01-13 NOTE — TELEPHONE ENCOUNTER
[facility]ov[facility] Assessment    Name: Karey Mcmanus  YOB: 1965  Last Height: 5' 3" (1 6 m)  Last weight: 83 5 kg (184 lb)  BMI: 32 59 kg/m²  Procedure: Colonoscopy  Diagnosis: Screening   Date of procedure: 4/17/23  Prep: ?  Responsible : Hari/WILMER  Phone#: [phone]  Name completing form: Quique Sandhu  Date form completed: 01/13/23      If the patient answers yes to any of these questions, schedule in a hospital  Are you pregnant: No  Do you rely on a wheelchair for mobility: No  Have you been diagnosed with End Stage Renal Disease (ESRD): No  Do you need oxygen during the day: No  Have you had a heart attack or stroke within the past three months: No  Have you had a seizure within the past three months: No  Have you ever been informed by anesthesia that you have a difficult airway: No  Additional Questions  Have you had any cardiac testing or are under the care of a Cardiologist (see cardiac list): No  Cardiac list:   Do you have an implanted cardiac defibrillator: No (Comment:  This patient should be scheduled in the hospital)    Have any bleeding problems, such as anemia or hemophilia (If patient has H&H result below 8, schedule in hospital   H&H must be within 30 days of procedure): No    Had an organ transplant within the past 3 months: No    Do you have any present infections: No  Do you get short of breath when walking a few blocks: No  Have you been diagnosed with diabetes: Yes  Comments (provide cardiac provider information if applicable): Type 2 oral meds

## 2023-01-17 ENCOUNTER — PATIENT OUTREACH (OUTPATIENT)
Dept: FAMILY MEDICINE CLINIC | Facility: CLINIC | Age: 58
End: 2023-01-17

## 2023-01-17 NOTE — PROGRESS NOTES
[address] [place] [address] called patient for scheduled outreach  Patient had not yet called the [address] assistance office to inquire why she was denied [place] and S[place] Benefits  [address] [place] [address] provided [employer] telephone number, patient is going to call  Patient does not that we need the letter of denial letter for PAP  Patient reports that the [employer] PAP card did work this month for the [employer]  This came as a surprise, patient she thought that PAP card was going to  on 22  Patient is going to call [address] [place] [address] with update from [facility]  If no call is received [facility] amiando [facility] will outreach patient in 2 weeks
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
## 2023-01-27 ENCOUNTER — OFFICE VISIT (OUTPATIENT)
Dept: PSYCHIATRY | Facility: CLINIC | Age: 58
End: 2023-01-27

## 2023-01-27 VITALS
HEIGHT: 63 IN | DIASTOLIC BLOOD PRESSURE: 79 MMHG | SYSTOLIC BLOOD PRESSURE: 119 MMHG | HEART RATE: 84 BPM | BODY MASS INDEX: 33.73 KG/M2 | WEIGHT: 190.4 LBS

## 2023-01-27 DIAGNOSIS — F33.1 MODERATE RECURRENT MAJOR DEPRESSION (HCC): ICD-10-CM

## 2023-01-27 DIAGNOSIS — F41.1 GENERALIZED ANXIETY DISORDER: ICD-10-CM

## 2023-01-27 DIAGNOSIS — Z63.4 BEREAVEMENT: ICD-10-CM

## 2023-01-27 DIAGNOSIS — F41.0 PANIC ATTACKS: ICD-10-CM

## 2023-01-27 DIAGNOSIS — F33.1 MODERATE RECURRENT MAJOR DEPRESSION (HCC): Primary | ICD-10-CM

## 2023-01-27 RX ORDER — CLONAZEPAM 0.5 MG/1
TABLET ORAL
Qty: 60 TABLET | Refills: 2 | Status: SHIPPED | OUTPATIENT
Start: 2023-01-27

## 2023-01-27 RX ORDER — PAROXETINE 30 MG/1
TABLET, FILM COATED ORAL
Qty: 90 TABLET | Refills: 0 | Status: SHIPPED | OUTPATIENT
Start: 2023-01-27

## 2023-01-27 RX ORDER — ARIPIPRAZOLE 2 MG/1
TABLET ORAL
Qty: 90 TABLET | Refills: 0 | Status: SHIPPED | OUTPATIENT
Start: 2023-01-27

## 2023-01-27 SDOH — SOCIAL STABILITY - SOCIAL INSECURITY: DISSAPEARANCE AND DEATH OF FAMILY MEMBER: Z63.4

## 2023-01-29 PROBLEM — Z00.00 MEDICARE ANNUAL WELLNESS VISIT, SUBSEQUENT: Status: RESOLVED | Noted: 2018-06-05 | Resolved: 2023-01-29

## 2023-02-01 ENCOUNTER — TELEPHONE (OUTPATIENT)
Dept: FAMILY MEDICINE CLINIC | Facility: CLINIC | Age: 58
End: 2023-02-01

## 2023-02-01 NOTE — TELEPHONE ENCOUNTER
I contacted [employer] Patient Assistance Program ([phone])  to get patients Invokana authorized for the new year and spoke with [facility]  She advised that Natalie no longer qualifies for the PAP program because she has insurance and they have updated their guidelines for 2023  I contacted Natalie and she will contact [employer] and her insurance to see what the next step is

## 2023-02-02 ENCOUNTER — PATIENT OUTREACH (OUTPATIENT)
Dept: FAMILY MEDICINE CLINIC | Facility: CLINIC | Age: 58
End: 2023-02-02

## 2023-02-02 ENCOUNTER — OFFICE VISIT (OUTPATIENT)
Dept: PAIN MEDICINE | Facility: CLINIC | Age: 58
End: 2023-02-02

## 2023-02-02 DIAGNOSIS — E66.01 OBESITY, MORBID (HCC): ICD-10-CM

## 2023-02-02 DIAGNOSIS — E11.65 UNCONTROLLED TYPE 2 DIABETES MELLITUS WITH HYPERGLYCEMIA (HCC): ICD-10-CM

## 2023-02-02 DIAGNOSIS — M48.061 LUMBAR FORAMINAL STENOSIS: ICD-10-CM

## 2023-02-02 DIAGNOSIS — M21.372 FOOT DROP, LEFT: ICD-10-CM

## 2023-02-02 DIAGNOSIS — I10 ESSENTIAL HYPERTENSION: ICD-10-CM

## 2023-02-02 DIAGNOSIS — M48.062 SPINAL STENOSIS OF LUMBAR REGION WITH NEUROGENIC CLAUDICATION: ICD-10-CM

## 2023-02-02 RX ORDER — GLIPIZIDE 10 MG/1
TABLET ORAL
Qty: 180 TABLET | Refills: 1 | Status: SHIPPED | OUTPATIENT
Start: 2023-02-02

## 2023-02-02 RX ORDER — LISINOPRIL 40 MG/1
TABLET ORAL
Qty: 90 TABLET | Refills: 1 | Status: SHIPPED | OUTPATIENT
Start: 2023-02-02

## 2023-02-02 RX ORDER — GABAPENTIN 300 MG/1
300 CAPSULE ORAL 2 TIMES DAILY
Qty: 60 CAPSULE | Refills: 2 | Status: SHIPPED | OUTPATIENT
Start: 2023-02-02

## 2023-02-02 NOTE — PROGRESS NOTES
[facility] called patient, left voice message explaining the purpose of my call  [facility] requested a return call  OPCM Rosie Ma is waiting for Medical Assistance  Denial Letter from the [facility] for PAP  Note dated 2/1/23 in patient chart from Macho Lorenzana -    " I contacted [employer] and [employer] Patient Assistance Program ([phone])  to get patients Invokana authorized for the new year and spoke with [facility]  She advised that Natalie no longer qualifies for the PAP program because she has insurance and they have updated their guidelines for 2023  "    [facility] will forward note to Estephanie Hardy and will outreach patient in 2 weeks time if no return call

## 2023-02-02 NOTE — PROGRESS NOTES
SW contacted [employer] to confirm PAP criteria  SW was advised that criteria is to be a [address] resident, meet income guidelines, and new for 2023 not able to assist insured patient  Except for [place]  Patient will not be eligible for patient assistance

## 2023-02-02 NOTE — PROGRESS NOTES
Assessment:  1  Spinal stenosis of lumbar region with neurogenic claudication    2  Foot drop, left    3  Lumbar foraminal stenosis    4  Obesity, morbid [facility])        Plan:  Ms Donte Guillermo is a pleasant 58-year-old female who presents for follow-up and reevaluation regarding lumbar radiculopathy into the left lower extremity  We previously performed a lumbar epidural steroid injection L5-S1 under fluoroscopy guidance on January 4, 2023 which she continues to report 100% relief in her pain  She continues with gabapentin and I have sent a refill 300 mg twice a day to her pharmacy  Extensive conversation regarding weaning off the medication was discussed  Plans to taper down to once a day and if tolerates over a week we will then taper off of the gabapentin  For now we will continue with twice a day and provide the patient with physician guided home core exercises to be done 3 days/week  All questions answered we will follow-up as needed  My impressions and treatment recommendations were discussed in detail with the patient who verbalized understanding and had no further questions  Discharge instructions were provided  I personally saw and examined the patient and I agree with the above discussed plan of care  No orders of the defined types were placed in this encounter  New Medications Ordered This Visit   Medications   • gabapentin (NEURONTIN) 300 mg capsule     Sig: Take 1 capsule (300 mg total) by mouth 2 (two) times a day     Dispense:  60 capsule     Refill:  2       History of Present Illness:  Kranthi Chavez is a 61 y o  female who presents for a follow up office visit in regards to Back Pain  The patient reports no pain at this time  We recently performed an L5-S1 lumbar epidural steroid injection on January 4, 2023 and she reports 100% relief in her pain  Presents today for follow-up      I have personally reviewed and/or updated the patient's past medical history, past surgical history, family history, social history, current medications, allergies, and vital signs today  Review of Systems   Respiratory: Negative for shortness of breath  Cardiovascular: Negative for chest pain  Gastrointestinal: Negative for constipation, diarrhea, nausea and vomiting  Musculoskeletal: Positive for back pain  Negative for arthralgias, gait problem, joint swelling and myalgias  Skin: Negative for rash  Neurological: Negative for dizziness, seizures and weakness  All other systems reviewed and are negative        Patient Active Problem List   Diagnosis   • Moderate recurrent major depression ([facility] )   • Chronic GERD   • Heart disorder   • Hyperlipemia   • Essential hypertension   • Lumbar radiculopathy   • Primary osteoarthritis of right hip   • Encounter for annual routine gynecological examination   • Right hip pain   • Controlled type 2 diabetes mellitus without complication, without long-term current use of insulin ([facility] )   • Paresthesias in right hand   • Calcific tendinitis of right shoulder   • Microalbuminuria   • Rotator cuff syndrome of right shoulder   • Extrapyramidal symptom   • Need for vaccination   • Diabetic nephropathy associated with type 2 diabetes mellitus ([facility] )   • Current every day smoker   • BMI 34 0-34 9,adult   • Simple chronic bronchitis (HCC)   • Multiple lung nodules on CT   • Chronic pain of both knees   • Dysuria   • Bereavement   • Obesity, morbid ([facility] )   • Vaginal candidiasis   • Bronchitis   • Tinea corporis       Past Medical History:   Diagnosis Date   • Anxiety    • Depression    • Diabetes mellitus ([facility] )    • Diverticulosis    • GERD (gastroesophageal reflux disease)    • Headache    • Hyperlipidemia    • Hypertension    • Psychiatric disorder     bipolar   • Right clavicle fracture    • TIA (transient ischemic attack)    • Vitamin D deficiency        Past Surgical History:   Procedure Laterality Date   • BLADDER SURGERY      Sling   • LUMBAR EPIDURAL INJECTION N/A 2023    Procedure: L5 S1  LUMBAR epidural steroid injection (57092);   Surgeon: Netta Amor DO;  Location: [facility] MAIN OR;  Service: Pain Management    • [place] ARTHRS HIP DEBRIDEMENT/SHAVING ARTICULAR CRTLG Right 2017    Procedure: RIGHT HIP ARTHROSCOPIC LABRAL DEBRIDEMENT;  Surgeon: Lolita Cloud MD;  Location: [facility] Main [facility];  Service: Orthopedics   • SINUS SURGERY      3 surgeries    • TUBAL LIGATION         Family History   Problem Relation Age of Onset   • Cervical cancer Mother    • Ovarian cancer Mother 35   • Uterine cancer Mother    • Hypertension Father    • Other Father         pre-diabetes   • Diabetes Father    • Diabetes type I Sister    • Osteoporosis Sister    • Depression Sister    • Breast cancer Sister 79   • Diabetes Sister    • No Known Problems Sister    • No Known Problems Sister    • No Known Problems Sister    • No Known Problems Daughter    • No Known Problems Maternal Grandmother    • No Known Problems Maternal Grandfather    • Other Paternal Grandmother         Parkinson's Disease   • Heart attack Paternal Grandfather    • Other Paternal Grandfather         coronary arteriosclerosis   • Heart attack Brother          of an MI at 52y/o   • Diabetes Brother    • No Known Problems Son    • Alcohol abuse Paternal Uncle    • Seizures Other    • Seizures Other    • Arthritis Family    • Cancer Family    • Osteoporosis Family        Social History     Occupational History   • Occupation: Unemployed due to Rt hip injury     Comment: and lower back injury   • Occupation: Used to be a    • Occupation: Full Disability as of late 2019     Comment: based on medical issues   Tobacco Use   • Smoking status: Every Day     Packs/day: 2 00     Years: 40 00     Pack years: 80 00     Types: Cigarettes   • Smokeless tobacco: Never   • Tobacco comments:     quit 2020   Vaping Use   • Vaping Use: Never used   Substance and Sexual Activity   • Alcohol use: Not Currently   • Drug use: Yes     Types: Marijuana     Comment: Smoked THC between 19 and 30y/o   • Sexual activity: Yes     Partners: Male     Comment: Boyfriend       Current Outpatient Medications on File Prior to Visit   Medication Sig   • albuterol (Ventolin HFA) 90 mcg/act inhaler Inhale 2 puffs every 6 (six) hours as needed for wheezing   • amLODIPine (NORVASC) 5 mg tablet Take 1 tablet (5 mg total) by mouth daily   • ARIPiprazole (ABILIFY) 2 mg tablet take 1 tablet by mouth at bedtime   • aspirin 81 mg chewable tablet Chew 81 mg    • atorvastatin (LIPITOR) 20 mg tablet Take 1 tablet (20 mg total) by mouth every evening   • Canagliflozin (Invokana) 300 MG TABS Take 1 tablet (300 mg total) by mouth daily   • clonazePAM (KlonoPIN) 0 5 mg tablet Take 1/2 tab by mouth twice daily and 1 full tab nightly   • clotrimazole-betamethasone (LOTRISONE) 1-0 05 % cream Apply topically 2 (two) times a day for 14 days   • fluticasone (FLONASE) 50 mcg/act nasal spray 1 spray into each nostril daily   • Fluticasone-Salmeterol (Wixela Inhub) 250-50 mcg/dose inhaler Inhale 1 puff 2 (two) times a day Rinse mouth after use  • glucose blood (ONE TOUCH ULTRA TEST) test strip Check blood sugar once daily   • Lancets (onetouch ultrasoft) lancets Check blood sugar twice/ week     • metFORMIN (GLUCOPHAGE) 500 mg tablet Take 1 tablet (500 mg total) by mouth 2 (two) times a day with meals   • nystatin (MYCOSTATIN) powder Apply topically 2 (two) times a day   • omeprazole (PriLOSEC) 20 mg delayed release capsule Take 1 capsule (20 mg total) by mouth daily   • PARoxetine (PAXIL) 30 mg tablet take 1 tablet by mouth once daily   • [DISCONTINUED] gabapentin (NEURONTIN) 300 mg capsule Take 1 capsule (300 mg total) by mouth 2 (two) times a day   • [DISCONTINUED] glipiZIDE (GLUCOTROL) 10 mg tablet Take 1 tablet (10 mg total) by mouth 2 (two) times a day before meals   • [DISCONTINUED] lisinopril (ZESTRIL) 40 mg tablet Take 1 tablet (40 mg total) by mouth daily   • [DISCONTINUED] methylPREDNISolone 4 MG tablet therapy pack Use as directed on package     No current facility-administered medications on file prior to visit  Allergies   Allergen Reactions   • Cefdinir Hives       Physical Exam:    There were no vitals taken for this visit  Constitutional:normal, well developed, well nourished, alert, in no distress and non-toxic and no overt pain behavior    Eyes:anicteric  HEENT:grossly intact  Neck:supple, symmetric, trachea midline and no masses   Pulmonary:even and unlabored  Cardiovascular:No edema or pitting edema present  Skin:Normal without rashes or lesions and well hydrated  Psychiatric:Mood and affect appropriate  Neurologic:Cranial Nerves II-XII grossly intact  Musculoskeletal:normal    Imaging

## 2023-02-03 DIAGNOSIS — E11.65 UNCONTROLLED TYPE 2 DIABETES MELLITUS WITH HYPERGLYCEMIA (HCC): Primary | ICD-10-CM

## 2023-02-03 NOTE — PROGRESS NOTES
Per Dr Marinelli Richey patient is to try Jardiance 10 mg samples are being supplied and a script was sent to the pharmacy  Patient was advised she will  samples on Monday and then have her A1c done at the end of the month

## 2023-02-13 ENCOUNTER — TELEPHONE (OUTPATIENT)
Dept: FAMILY MEDICINE CLINIC | Facility: CLINIC | Age: 58
End: 2023-02-13

## 2023-02-13 NOTE — TELEPHONE ENCOUNTER
Natalie was given 10mg Jardiance samples but says she had been taking 25mg  She wanted to know if that was an error or if she is supposed to change dose

## 2023-02-14 NOTE — TELEPHONE ENCOUNTER
Called and left Candy a message she was given 10 mg and it was suppose to be 25 mg  She can come  the correct samples

## 2023-02-16 ENCOUNTER — OFFICE VISIT (OUTPATIENT)
Dept: FAMILY MEDICINE CLINIC | Facility: CLINIC | Age: 58
End: 2023-02-16

## 2023-02-16 VITALS
HEART RATE: 103 BPM | HEIGHT: 63 IN | RESPIRATION RATE: 16 BRPM | WEIGHT: 193 LBS | OXYGEN SATURATION: 97 % | SYSTOLIC BLOOD PRESSURE: 138 MMHG | DIASTOLIC BLOOD PRESSURE: 74 MMHG | BODY MASS INDEX: 34.2 KG/M2

## 2023-02-16 DIAGNOSIS — J06.9 UPPER RESPIRATORY TRACT INFECTION, UNSPECIFIED TYPE: Primary | ICD-10-CM

## 2023-02-16 DIAGNOSIS — E11.9 TYPE 2 DIABETES MELLITUS WITHOUT COMPLICATION, WITHOUT LONG-TERM CURRENT USE OF INSULIN (HCC): ICD-10-CM

## 2023-02-16 RX ORDER — AZITHROMYCIN 250 MG/1
TABLET, FILM COATED ORAL
Qty: 6 TABLET | Refills: 0 | Status: SHIPPED | OUTPATIENT
Start: 2023-02-16 | End: 2023-02-21

## 2023-02-16 NOTE — PROGRESS NOTES
Name: Miesha Oscar      : 1965      MRN: [id number]  Encounter Provider: Charmaine Lorenzana MD  Encounter Date: 2023   Encounter department: [facility]     1  Upper respiratory tract infection, unspecified type  -     azithromycin (Zithromax) 250 mg tablet; Take 2 tablets (500 mg total) by mouth daily for 1 day, THEN 1 tablet (250 mg total) daily for 4 days  2  Type 2 diabetes mellitus without complication, without long-term current use of insulin ([facility])  Assessment & Plan:    Lab Results   Component Value Date    HGBA1C 7 0 (H) 2022   on oral med     Orders:  -     glucose blood (ONE TOUCH ULTRA TEST) test strip; Check blood sugar once daily      BMI Counseling: Body mass index is 34 19 kg/m²  The BMI is above normal  Nutrition recommendations include decreasing portion sizes  Rationale for BMI follow-up plan is due to patient being overweight or obese  Annita Yoder is a 61year old female, presents with 3-day history of nasal congestion coughing headache and 100 temperature, she says her boyfriend had the same symptoms and he went to the doctor and was given Zithromax and he got better, now she has yellow discharge from her nostril and when she coughs she has yellow phlegm  Denies nausea vomiting diarrhea, she is diabetic, hypertension and taking over-the-counter medication and feels symptoms are getting worse    Review of Systems   Constitutional: Positive for chills and fever  HENT: Positive for congestion, ear pain, rhinorrhea and sore throat  Eyes: Negative  Respiratory: Positive for cough  Neurological: Positive for headaches         Past Medical History:   Diagnosis Date   • Anxiety    • Depression    • Diabetes mellitus ([facility] )    • Diverticulosis    • GERD (gastroesophageal reflux disease)    • Headache    • Hyperlipidemia    • Hypertension    • Psychiatric disorder     bipolar   • Right clavicle fracture • TIA (transient ischemic attack)    • Vitamin D deficiency      Past Surgical History:   Procedure Laterality Date   • BLADDER SURGERY      Sling   • LUMBAR EPIDURAL INJECTION N/A 2023    Procedure: L5 S1  LUMBAR epidural steroid injection (54354);   Surgeon: Saintclair Pay, DO;  [facility] MAIN OR;  Service: Pain Management    • [place] ARTHRS HIP DEBRIDEMENT/SHAVING ARTICULAR CRTLG Right 2017    Procedure: RIGHT HIP ARTHROSCOPIC LABRAL DEBRIDEMENT;  Surgeon: Taylor Alcaraz MD;  Location: [facility];  Service: Orthopedics   • SINUS SURGERY      3 surgeries    • TUBAL LIGATION       Family History   Problem Relation Age of Onset   • Cervical cancer Mother    • Ovarian cancer Mother 35   • Uterine cancer Mother    • Hypertension Father    • Other Father         pre-diabetes   • Diabetes Father    • Diabetes type I Sister    • Osteoporosis Sister    • Depression Sister    • Breast cancer Sister 79   • Diabetes Sister    • No Known Problems Sister    • No Known Problems Sister    • No Known Problems Sister    • No Known Problems Daughter    • No Known Problems Maternal Grandmother    • No Known Problems Maternal Grandfather    • Other Paternal Grandmother         Parkinson's Disease   • Heart attack Paternal Grandfather    • Other Paternal Grandfather         coronary arteriosclerosis   • Heart attack Brother          of an MI at 52y/o   • Diabetes Brother    • No Known Problems Son    • Alcohol abuse Paternal Uncle    • Seizures Other    • Seizures Other    • Arthritis Family    • Cancer Family    • Osteoporosis Family      Social History     Socioeconomic History   • Marital status:      Spouse name: None   • Number of children: 2   • Years of education: None   • Highest education level: None   Occupational History   • Occupation: Unemployed due to Rt hip injury     Comment: and lower back injury   • Occupation: Used to be a    • Occupation: Full Disability as of late 2019     Comment: based on medical issues   Tobacco Use   • Smoking status: Every Day     Packs/day: 2 00     Years: 40 00     Pack years: 80 00     Types: Cigarettes   • Smokeless tobacco: Never   • Tobacco comments:     quit 2020   Vaping Use   • Vaping Use: Never used   Substance and Sexual Activity   • Alcohol use: Not Currently   • Drug use: Yes     Types: Marijuana     Comment: Smoked THC between 19 and 28y/o   • Sexual activity: Yes     Partners: Male     Comment: Boyfriend   Other Topics Concern   • None   Social History Narrative    Caffeine use        Home: Lives with boyfriend        Children from first  and most recent boyfriend respectively: Son born 46 Daughter         Education:    Pt denies any h/o learning disabilities and reached childhood milestones on time as far as she knows    Graduated HS 1982    Did a 9 month Business Ops course in the         Most recent tobacco use screenin2018      Social Determinants of Health     Financial Resource Strain: Not on file   Food Insecurity: Not on file   Transportation Needs: Not on file   Physical Activity: Not on file   Stress: Not on file   Social Connections: Not on file   Intimate Partner Violence: Not on file   Housing Stability: Not on file     Current Outpatient Medications on File Prior to Visit   Medication Sig   • albuterol (Ventolin HFA) 90 mcg/act inhaler Inhale 2 puffs every 6 (six) hours as needed for wheezing   • amLODIPine (NORVASC) 5 mg tablet Take 1 tablet (5 mg total) by mouth daily   • ARIPiprazole (ABILIFY) 2 mg tablet take 1 tablet by mouth at bedtime   • aspirin 81 mg chewable tablet Chew 81 mg    • atorvastatin (LIPITOR) 20 mg tablet Take 1 tablet (20 mg total) by mouth every evening   • clonazePAM (KlonoPIN) 0 5 mg tablet Take 1/2 tab by mouth twice daily and 1 full tab nightly   • clotrimazole-betamethasone (LOTRISONE) 1-0 05 % cream Apply topically 2 (two) times a day for 14 days   • Empagliflozin (JARDIANCE) 10 MG TABS tablet Take 1 tablet (10 mg total) by mouth every morning   • fluticasone (FLONASE) 50 mcg/act nasal spray 1 spray into each nostril daily   • Fluticasone-Salmeterol (Wixela Inhub) 250-50 mcg/dose inhaler Inhale 1 puff 2 (two) times a day Rinse mouth after use  • gabapentin (NEURONTIN) 300 mg capsule Take 1 capsule (300 mg total) by mouth 2 (two) times a day   • glipiZIDE (GLUCOTROL) 10 mg tablet take 1 tablet by mouth twice a day before meals   • Lancets (onetouch ultrasoft) lancets Check blood sugar twice/ week  • lisinopril (ZESTRIL) 40 mg tablet take 1 tablet by mouth once daily   • metFORMIN (GLUCOPHAGE) 500 mg tablet Take 1 tablet (500 mg total) by mouth 2 (two) times a day with meals   • nystatin (MYCOSTATIN) powder Apply topically 2 (two) times a day   • omeprazole (PriLOSEC) 20 mg delayed release capsule Take 1 capsule (20 mg total) by mouth daily   • PARoxetine (PAXIL) 30 mg tablet take 1 tablet by mouth once daily   • [DISCONTINUED] glucose blood (ONE TOUCH ULTRA TEST) test strip Check blood sugar once daily     Allergies   Allergen Reactions   • Cefdinir Hives     Immunization History   Administered Date(s) Administered   • COVID-19 PFIZER VACCINE 0 3 ML IM 02/12/2021, 03/03/2021, 10/16/2021   • COVID-19 Pfizer Vac BIVALENT Elliott-sucrose 12 Yr+ IM (BOOSTER ONLY) 10/01/2022   • INFLUENZA 01/01/2013, 09/21/2020, 10/07/2021, 09/14/2022   • Influenza, recombinant, quadrivalent,injectable, preservative free 11/27/2019   • Pneumococcal Conjugate 13-Valent 01/08/2019   • Pneumococcal Polysaccharide PPV23 02/27/2020   • Tdap 11/03/2021   • Tetanus, adsorbed 01/01/2009       Objective     /74 (BP Location: Left arm, Patient Position: Sitting, Cuff Size: Standard)   Pulse 103   Resp 16   Ht 5' 3" (1 6 m)   Wt 87 5 kg (193 lb)   SpO2 97%   BMI 34 19 kg/m²     Physical Exam  Vitals and nursing note reviewed     Constitutional:       Appearance: She is well-developed  She is not ill-appearing  HENT:      Head: Normocephalic and atraumatic  Right Ear: Tympanic membrane, ear canal and external ear normal       Left Ear: Tympanic membrane, ear canal and external ear normal       Nose: Congestion present  Mouth/Throat:      Pharynx: Posterior oropharyngeal erythema present  Eyes:      General: No scleral icterus  Conjunctiva/sclera: Conjunctivae normal       Pupils: Pupils are equal, round, and reactive to light  Neck:      Thyroid: No thyromegaly  Cardiovascular:      Rate and Rhythm: Normal rate and regular rhythm  Heart sounds: Normal heart sounds  No murmur heard  Pulmonary:      Effort: Pulmonary effort is normal       Breath sounds: Normal breath sounds  No wheezing or rales  Musculoskeletal:      Cervical back: Normal range of motion and neck supple  Lymphadenopathy:      Cervical: No cervical adenopathy  Skin:     Coloration: Skin is not jaundiced  Findings: No erythema or rash  Neurological:      Mental Status: She is alert         Otgeorge Ortiz MD

## 2023-02-24 ENCOUNTER — RA CDI HCC (OUTPATIENT)
Dept: OTHER | Facility: HOSPITAL | Age: 58
End: 2023-02-24

## 2023-02-24 ENCOUNTER — APPOINTMENT (OUTPATIENT)
Dept: LAB | Facility: CLINIC | Age: 58
End: 2023-02-24

## 2023-02-24 DIAGNOSIS — E78.5 HYPERLIPIDEMIA, UNSPECIFIED HYPERLIPIDEMIA TYPE: ICD-10-CM

## 2023-02-24 DIAGNOSIS — E11.65 UNCONTROLLED TYPE 2 DIABETES MELLITUS WITH HYPERGLYCEMIA (HCC): ICD-10-CM

## 2023-02-24 LAB
ALBUMIN SERPL BCP-MCNC: 3.3 G/DL (ref 3.5–5)
ALP SERPL-CCNC: 131 U/L (ref 46–116)
ALT SERPL W P-5'-P-CCNC: 22 U/L (ref 12–78)
ANION GAP SERPL CALCULATED.3IONS-SCNC: 11 MMOL/L (ref 4–13)
AST SERPL W P-5'-P-CCNC: 20 U/L (ref 5–45)
BILIRUB SERPL-MCNC: 0.23 MG/DL (ref 0.2–1)
BUN SERPL-MCNC: 12 MG/DL (ref 5–25)
CALCIUM ALBUM COR SERPL-MCNC: 10.2 MG/DL (ref 8.3–10.1)
CALCIUM SERPL-MCNC: 9.6 MG/DL (ref 8.3–10.1)
CHLORIDE SERPL-SCNC: 100 MMOL/L (ref 96–108)
CHOLEST SERPL-MCNC: 202 MG/DL
CO2 SERPL-SCNC: 22 MMOL/L (ref 21–32)
CREAT SERPL-MCNC: 0.94 MG/DL (ref 0.6–1.3)
CREAT UR-MCNC: 27.7 MG/DL
EST. AVERAGE GLUCOSE BLD GHB EST-MCNC: 160 MG/DL
GFR SERPL CREATININE-BSD FRML MDRD: 67 ML/MIN/1.73SQ M
GLUCOSE P FAST SERPL-MCNC: 146 MG/DL (ref 65–99)
HBA1C MFR BLD: 7.2 %
HDLC SERPL-MCNC: 44 MG/DL
LDLC SERPL CALC-MCNC: 124 MG/DL (ref 0–100)
MICROALBUMIN UR-MCNC: <5 MG/L (ref 0–20)
MICROALBUMIN/CREAT 24H UR: <18 MG/G CREATININE (ref 0–30)
NONHDLC SERPL-MCNC: 158 MG/DL
POTASSIUM SERPL-SCNC: 4.3 MMOL/L (ref 3.5–5.3)
PROT SERPL-MCNC: 7.8 G/DL (ref 6.4–8.4)
SODIUM SERPL-SCNC: 133 MMOL/L (ref 135–147)
TRIGL SERPL-MCNC: 168 MG/DL

## 2023-02-24 NOTE — PROGRESS NOTES
[id number]  [facility]  coding opportunities          Chart Reviewed number of suggestions sent to Provider: 1     Patients Insurance     Medicare Insurance: [employer]

## 2023-03-01 ENCOUNTER — VBI (OUTPATIENT)
Dept: ADMINISTRATIVE | Facility: OTHER | Age: 58
End: 2023-03-01

## 2023-03-03 ENCOUNTER — OFFICE VISIT (OUTPATIENT)
Dept: FAMILY MEDICINE CLINIC | Facility: CLINIC | Age: 58
End: 2023-03-03

## 2023-03-03 VITALS
OXYGEN SATURATION: 98 % | HEART RATE: 85 BPM | BODY MASS INDEX: 34.76 KG/M2 | HEIGHT: 63 IN | DIASTOLIC BLOOD PRESSURE: 78 MMHG | WEIGHT: 196.2 LBS | SYSTOLIC BLOOD PRESSURE: 135 MMHG

## 2023-03-03 DIAGNOSIS — I10 ESSENTIAL HYPERTENSION: ICD-10-CM

## 2023-03-03 DIAGNOSIS — K21.9 CHRONIC GERD: ICD-10-CM

## 2023-03-03 DIAGNOSIS — E11.9 TYPE 2 DIABETES MELLITUS WITHOUT COMPLICATION, WITHOUT LONG-TERM CURRENT USE OF INSULIN (HCC): ICD-10-CM

## 2023-03-03 DIAGNOSIS — E78.5 HYPERLIPIDEMIA, UNSPECIFIED HYPERLIPIDEMIA TYPE: ICD-10-CM

## 2023-03-03 DIAGNOSIS — R80.9 MICROALBUMINURIA: ICD-10-CM

## 2023-03-03 DIAGNOSIS — E11.9 CONTROLLED TYPE 2 DIABETES MELLITUS WITHOUT COMPLICATION, WITHOUT LONG-TERM CURRENT USE OF INSULIN (HCC): ICD-10-CM

## 2023-03-03 DIAGNOSIS — J41.0 SIMPLE CHRONIC BRONCHITIS (HCC): ICD-10-CM

## 2023-03-03 DIAGNOSIS — E11.21 DIABETIC NEPHROPATHY ASSOCIATED WITH TYPE 2 DIABETES MELLITUS (HCC): Primary | ICD-10-CM

## 2023-03-03 PROBLEM — J40 BRONCHITIS: Status: RESOLVED | Noted: 2022-10-25 | Resolved: 2023-03-03

## 2023-03-03 PROBLEM — B37.31 VAGINAL CANDIDIASIS: Status: RESOLVED | Noted: 2022-09-14 | Resolved: 2023-03-03

## 2023-03-03 RX ORDER — OMEPRAZOLE 20 MG/1
20 CAPSULE, DELAYED RELEASE ORAL DAILY
Qty: 90 CAPSULE | Refills: 1 | Status: SHIPPED | OUTPATIENT
Start: 2023-03-03

## 2023-03-03 RX ORDER — LANCETS
EACH MISCELLANEOUS
Qty: 100 EACH | Refills: 5 | Status: SHIPPED | OUTPATIENT
Start: 2023-03-03

## 2023-03-03 RX ORDER — ATORVASTATIN CALCIUM 20 MG/1
20 TABLET, FILM COATED ORAL EVERY EVENING
Qty: 90 TABLET | Refills: 1 | Status: SHIPPED | OUTPATIENT
Start: 2023-03-03 | End: 2023-08-30

## 2023-03-03 RX ORDER — AMLODIPINE BESYLATE 5 MG/1
5 TABLET ORAL DAILY
Qty: 90 TABLET | Refills: 1 | Status: SHIPPED | OUTPATIENT
Start: 2023-03-03

## 2023-03-03 RX ORDER — NEEDLES, DISPOSABLE 25GX5/8"
NEEDLE, DISPOSABLE MISCELLANEOUS WEEKLY
Qty: 100 EACH | Refills: 5 | Status: SHIPPED | OUTPATIENT
Start: 2023-03-03

## 2023-03-03 NOTE — ASSESSMENT & PLAN NOTE
Resolved with improved glycemic control  Blood pressure is at goal   Continue lisinopril  Continue with annual monitoring

## 2023-03-03 NOTE — ASSESSMENT & PLAN NOTE
Symptoms improved significantly since patient stopped smoking    Patient has not been using Wixela and or albuterol

## 2023-03-03 NOTE — PROGRESS NOTES
Subjective:      Patient ID: Dann Herbert is a 61 y o  female  Diabetes  She presents for her follow-up diabetic visit  She has type 2 diabetes mellitus  Her disease course has been stable (A1c 7 2)  There are no hypoglycemic associated symptoms  Pertinent negatives for diabetes include no chest pain  There are no hypoglycemic complications  Symptoms are stable  Diabetic complications include nephropathy (Resolved)  Risk factors for coronary artery disease include diabetes mellitus, dyslipidemia, hypertension and post-menopausal  Current diabetic treatment includes oral agent (triple therapy)  She is compliant with treatment all of the time  An ACE inhibitor/angiotensin II receptor blocker is being taken  Eye exam is current  Hyperlipidemia  This is a chronic problem  The current episode started more than 1 year ago  The problem is controlled  Lipid results:   Pertinent negatives include no chest pain  Current antihyperlipidemic treatment includes statins  The current treatment provides mild improvement of lipids  There are no compliance problems  Hypertension  This is a chronic problem  The current episode started more than 1 year ago  The problem is controlled  Pertinent negatives include no chest pain  Past treatments include calcium channel blockers and ACE inhibitors  The current treatment provides significant improvement  There are no compliance problems  Heartburn  She complains of heartburn  She reports no chest pain  This is a chronic problem  The symptoms are aggravated by certain foods  She has tried a PPI for the symptoms  The treatment provided significant relief         Past Medical History:   Diagnosis Date   • Anxiety    • Depression    • [facility]    • Diverticulosis    • GERD (gastroesophageal reflux disease)    • Headache    • Hyperlipidemia    • Hypertension    • Psychiatric disorder     bipolar   • Right clavicle fracture    • TIA (transient ischemic attack) • Vitamin D deficiency        Family History   Problem Relation Age of Onset   • Cervical cancer Mother    • Ovarian cancer Mother 35   • Uterine cancer Mother    • Hypertension Father    • Other Father         pre-diabetes   • Diabetes Father    • Diabetes type I Sister    • Osteoporosis Sister    • Depression Sister    • Breast cancer Sister 79   • Diabetes Sister    • No Known Problems Sister    • No Known Problems Sister    • No Known Problems Sister    • No Known Problems Daughter    • No Known Problems Maternal Grandmother    • No Known Problems Maternal Grandfather    • Other Paternal Grandmother         Parkinson's Disease   • Heart attack Paternal Grandfather    • Other Paternal Grandfather         coronary arteriosclerosis   • Heart attack Brother          of an MI at 50y/o   • Diabetes Brother    • No Known Problems Son    • Alcohol abuse Paternal Uncle    • Seizures Other    • Seizures Other    • Arthritis Family    • Cancer Family    • Osteoporosis Family        Past Surgical History:   Procedure Laterality Date   • BLADDER SURGERY      Sling   • LUMBAR EPIDURAL INJECTION N/A 2023    Procedure: L5 S1  LUMBAR epidural steroid injection (63497); Surgeon: Teto Shepherd DO;  Location: [facility] MAIN OR;  Service: Pain Management    • CT ARTHRS HIP DEBRIDEMENT/SHAVING ARTICULAR CRTLG Right 2017    Procedure: RIGHT HIP ARTHROSCOPIC LABRAL DEBRIDEMENT;  Surgeon: Gabriela Meredith MD;  [facility] Main [facility];  Service: Orthopedics   • SINUS SURGERY      3 surgeries    • TUBAL LIGATION          reports that she has been smoking cigarettes  She has a 80 00 pack-year smoking history  She has never used smokeless tobacco  She reports that she does not currently use alcohol  She reports current drug use  Drug: Marijuana        Current Outpatient Medications:   •  albuterol (Ventolin HFA) 90 mcg/act inhaler, Inhale 2 puffs every 6 (six) hours as needed for wheezing, Disp: 18 g, Rfl: 1  •  amLODIPine (NORVASC) 5 mg tablet, Take 1 tablet (5 mg total) by mouth daily, Disp: 90 tablet, Rfl: 1  •  ARIPiprazole (ABILIFY) 2 mg tablet, take 1 tablet by mouth at bedtime, Disp: 90 tablet, Rfl: 0  •  aspirin 81 mg chewable tablet, Chew 81 mg , Disp: , Rfl:   •  atorvastatin (LIPITOR) 20 mg tablet, Take 1 tablet (20 mg total) by mouth every evening, Disp: 90 tablet, Rfl: 1  •  clonazePAM (KlonoPIN) 0 5 mg tablet, Take 1/2 tab by mouth twice daily and 1 full tab nightly, Disp: 60 tablet, Rfl: 2  •  Empagliflozin 25 MG TABS, Take 1 tablet (25 mg total) by mouth daily, Disp: 30 tablet, Rfl: 0  •  fluticasone (FLONASE) 50 mcg/act nasal spray, 1 spray into each nostril daily, Disp: 18 g, Rfl: 0  •  Fluticasone-Salmeterol (Wixela Inhub) 250-50 mcg/dose inhaler, Inhale 1 puff 2 (two) times a day Rinse mouth after use , Disp: 60 blister, Rfl: 5  •  gabapentin (NEURONTIN) 300 mg capsule, Take 1 capsule (300 mg total) by mouth 2 (two) times a day, Disp: 60 capsule, Rfl: 2  •  glipiZIDE (GLUCOTROL) 10 mg tablet, take 1 tablet by mouth twice a day before meals, Disp: 180 tablet, Rfl: 1  •  glucose blood (ONE TOUCH ULTRA TEST) test strip, Check blood sugar once daily, Disp: 100 each, Rfl: 0  •  Lancets (onetouch ultrasoft) lancets, Check blood sugar twice/ week , Disp: 100 each, Rfl: 5  •  lisinopril (ZESTRIL) 40 mg tablet, take 1 tablet by mouth once daily, Disp: 90 tablet, Rfl: 1  •  metFORMIN (GLUCOPHAGE) 500 mg tablet, Take 1 tablet (500 mg total) by mouth 2 (two) times a day with meals, Disp: 180 tablet, Rfl: 1  •  nystatin (MYCOSTATIN) powder, Apply topically 2 (two) times a day, Disp: 60 g, Rfl: 2  •  omeprazole (PriLOSEC) 20 mg delayed release capsule, Take 1 capsule (20 mg total) by mouth daily, Disp: 90 capsule, Rfl: 1  •  PARoxetine (PAXIL) 30 mg tablet, take 1 tablet by mouth once daily, Disp: 90 tablet, Rfl: 0  •  semaglutide, 0 25 or 0 5 mg/dose, (Ozempic) 2 mg/1 5 mL injection pen, Inject 0 19 mL (0 25 mg total) under the skin every 7 days, Disp: 1 5 mL, Rfl: 11  •  clotrimazole-betamethasone (LOTRISONE) 1-0 05 % cream, Apply topically 2 (two) times a day for 14 days, Disp: 30 g, Rfl: 0    The following portions of the patient's history were reviewed and updated as appropriate: allergies, current medications, past family history, past medical history, past social history, past surgical history and problem list     Review of Systems   Constitutional: Negative  Respiratory: Negative  Cardiovascular: Negative  Negative for chest pain  Gastrointestinal: Positive for heartburn  Musculoskeletal: Negative  Objective:    /78   Pulse 85   Ht 5' 3" (1 6 m)   Wt 89 kg (196 lb 3 2 oz)   SpO2 98%   BMI 34 76 kg/m²      Physical Exam  Constitutional:       Appearance: Normal appearance  Cardiovascular:      Pulses: no weak pulses     Heart sounds: Normal heart sounds  Pulmonary:      Breath sounds: Normal breath sounds  Feet:      Right foot:      Skin integrity: No ulcer, skin breakdown, erythema, warmth, callus or dry skin  Left foot:      Skin integrity: No ulcer, skin breakdown, erythema, warmth, callus or dry skin  Patient's shoes and socks removed  Right Foot/Ankle   Right Foot Inspection  Skin Exam: skin normal and skin intact  No dry skin, no warmth, no callus, no erythema, no maceration, no abnormal color, no pre-ulcer, no ulcer and no callus  Toe Exam: ROM and strength within normal limits  Sensory   Vibration: intact  Proprioception: intact  Monofilament testing: intact    Vascular  Capillary refills: < 3 seconds          Left Foot/Ankle  Left Foot Inspection  Skin Exam: skin normal and skin intact  No dry skin, no warmth, no erythema, no maceration, normal color, no pre-ulcer, no ulcer and no callus  Toe Exam: ROM and strength within normal limits       Sensory   Vibration: intact  Proprioception: intact  Monofilament testing: intact    Vascular  Capillary refills: < 3 seconds        Assign Risk Category  No deformity present  No loss of protective sensation  No weak pulses  Risk: 0      Recent Results (from the past 1008 hour(s))   Comprehensive metabolic panel    Collection Time: 02/24/23  8:53 AM   Result Value Ref Range    Sodium 133 (L) 135 - 147 mmol/L    Potassium 4 3 3 5 - 5 3 mmol/L    Chloride 100 96 - 108 mmol/L    CO2 22 21 - 32 mmol/L    ANION GAP 11 4 - 13 mmol/L    BUN 12 5 - 25 mg/dL    Creatinine 0 94 0 60 - 1 30 mg/dL    Glucose, Fasting 146 (H) 65 - 99 mg/dL    Calcium 9 6 8 3 - 10 1 mg/dL    Corrected Calcium 10 2 (H) 8 3 - 10 1 mg/dL    AST 20 5 - 45 U/L    ALT 22 12 - 78 U/L    Alkaline Phosphatase 131 (H) 46 - 116 U/L    Total Protein 7 8 6 4 - 8 4 g/dL    Albumin 3 3 (L) 3 5 - 5 0 g/dL    Total Bilirubin 0 23 0 20 - 1 00 mg/dL    eGFR 67 ml/min/1 73sq m   Hemoglobin A1C    Collection Time: 02/24/23  8:53 AM   Result Value Ref Range    Hemoglobin A1C 7 2 (H) Normal 3 8-5 6%; PreDiabetic 5 7-6 4%; Diabetic >=6 5%; Glycemic control for adults with diabetes <7 0% %     mg/dl   Microalbumin / creatinine urine ratio    Collection Time: 02/24/23  8:53 AM   Result Value Ref Range    Creatinine, Ur 27 7 mg/dL    Microalbum  ,U,Random <5 0 0 0 - 20 0 mg/L    Microalb Creat Ratio <18 0 - 30 mg/g creatinine   Lipid panel    Collection Time: 02/24/23  8:53 AM   Result Value Ref Range    Cholesterol 202 (H) See Comment mg/dL    Triglycerides 168 (H) See Comment mg/dL    HDL, Direct 44 (L) >=50 mg/dL    LDL Calculated 124 (H) 0 - 100 mg/dL    Non-HDL-Chol (CHOL-HDL) 158 mg/dl       Assessment/Plan:    No problem-specific Assessment & Plan notes found for this encounter  Problem List Items Addressed This Visit        Digestive    Chronic GERD     Stable on omeprazole 20 milligram   Continue same           Relevant Medications    omeprazole (PriLOSEC) 20 mg delayed release capsule       Endocrine    Type 2 diabetes mellitus without complication, without long-term current use of insulin (HCC)    Relevant Medications    Empagliflozin 25 MG TABS    semaglutide, 0 25 or 0 5 mg/dose, (Ozempic) 2 mg/1 5 mL injection pen    Lancets (onetouch ultrasoft) lancets    metFORMIN (GLUCOPHAGE) 500 mg tablet    glucose blood (ONE TOUCH ULTRA TEST) test strip    Diabetic nephropathy associated with type 2 diabetes mellitus ([facility] ) - Primary       Lab Results   Component Value Date    HGBA1C 7 2 (H) 02/24/2023     A1c well controlled  Patient is on metformin 500 twice daily, glipizide 10 mg twice daily, Jardiance 25 mg , samples  Failed insurance prior Auth on Jardiance  Can try Ozempic  Patient monitors her blood sugar daily  If the blood sugar drops then patient is going to reduce the dose of Jardiance to 10 mg ,  And later discontinue Jardiance since these are samples  Recommended metabolic labs/follow-up x 4 months  Relevant Medications    Empagliflozin 25 MG TABS    semaglutide, 0 25 or 0 5 mg/dose, (Ozempic) 2 mg/1 5 mL injection pen    metFORMIN (GLUCOPHAGE) 500 mg tablet    NEEDLE, DISP, 30 G (B-D DISP NEEDLE 30GX1") 30G X 1" MISC    Other Relevant Orders    Comprehensive metabolic panel    Hemoglobin A1C       Respiratory    Simple chronic bronchitis (HCC)     Symptoms improved significantly since patient stopped smoking  Patient has not been using [address] [place] [address] Street and or albuterol            Cardiovascular and Mediastinum    Essential hypertension     Patient's blood pressure is at goal   Continue amlodipine 5 milligram, lisinopril 40 milligram         Relevant Medications    amLODIPine (NORVASC) 5 mg tablet       Other    Hyperlipemia     LDL improved with lifestyle modifications  Continue same  Continue atorvastatin 20         Relevant Medications    atorvastatin (LIPITOR) 20 mg tablet    Other Relevant Orders    Lipid panel    Microalbuminuria     Resolved with improved glycemic control  Blood pressure is at goal   Continue lisinopril    Continue with annual monitoring  Other Visit Diagnoses     Controlled type 2 diabetes mellitus without complication, without long-term current use of insulin (HCC)        Relevant Medications    Empagliflozin 25 MG TABS    semaglutide, 0 25 or 0 5 mg/dose, (Ozempic) 2 mg/1 5 mL injection pen    metFORMIN (GLUCOPHAGE) 500 mg tablet        I have spent a total time of 40 minutes on 03/03/23 in caring for this patient including Diagnostic results, Prognosis, Risks and benefits of tx options, Instructions for management, Patient and family education, Importance of tx compliance, Risk factor reductions, Impressions, Counseling / Coordination of care, Documenting in the medical record, Reviewing / ordering tests, medicine, procedures   and Obtaining or reviewing history

## 2023-03-03 NOTE — ASSESSMENT & PLAN NOTE
Lab Results   Component Value Date    HGBA1C 7 2 (H) 02/24/2023     A1c well controlled  Patient is on metformin 500 twice daily, glipizide 10 mg twice daily, Jardiance 25 mg , samples  Failed insurance prior Auth on Jardiance  Can try Ozempic  Patient monitors her blood sugar daily  If the blood sugar drops then patient is going to reduce the dose of Jardiance to 10 mg ,  And later discontinue Jardiance since these are samples  Recommended metabolic labs/follow-up x 4 months

## 2023-03-17 ENCOUNTER — PATIENT OUTREACH (OUTPATIENT)
Dept: FAMILY MEDICINE CLINIC | Facility: CLINIC | Age: 58
End: 2023-03-17

## 2023-03-17 NOTE — PROGRESS NOTES
[facility]       [employer] information was faxed  Referral closed at this time  SW remains available for future needs

## 2023-04-03 ENCOUNTER — TELEPHONE (OUTPATIENT)
Dept: GASTROENTEROLOGY | Facility: CLINIC | Age: 58
End: 2023-04-03

## 2023-04-03 NOTE — TELEPHONE ENCOUNTER
"Jovanny Castañedazquez 26 Assessment    Name: Chase Tinsley  YOB: 1965  Last Height: 5' 3\" (1 6 m)  Last weight: 89 kg (196 lb 3 2 oz)  BMI: 34 76 kg/m²  Procedure: Colon  Diagnosis: screening  Date of procedure: 04/17/23  Prep: Miralax, dulcolax  Responsible : yes  Phone#:   Name completing form: Saji Vega  Date form completed: 04/03/23      If the patient answers yes to any of these questions, schedule in a hospital  Are you pregnant: No  Do you rely on a wheelchair for mobility: No  Have you been diagnosed with End Stage Renal Disease (ESRD): No  Do you need oxygen during the day: No  Have you had a heart attack or stroke within the past three months: No  Have you had a seizure within the past three months: No  Have you ever been informed by anesthesia that you have a difficult airway: No  Additional Questions  Have you had any cardiac testing or are under the care of a Cardiologist (see cardiac list): No  Cardiac list:   Do you have an implanted cardiac defibrillator: No (Comment:  This patient should be scheduled in the hospital)    Have any bleeding problems, such as anemia or hemophilia (If patient has H&H result below 8, schedule in hospital   H&H must be within 30 days of procedure): No    Had an organ transplant within the past 3 months: No    Do you have any present infections: No  Do you get short of breath when walking a few blocks: No  Have you been diagnosed with diabetes: Yes  Comments (provide cardiac provider information if applicable): Type 2, oral meds       "

## 2023-04-04 ENCOUNTER — TELEPHONE (OUTPATIENT)
Dept: PSYCHIATRY | Facility: CLINIC | Age: 58
End: 2023-04-04

## 2023-04-04 NOTE — TELEPHONE ENCOUNTER
Patient is calling regarding cancelling an appointment      Date/Time: 4/7/2023 1:30pm    Reason:     Patient was rescheduled: YES [x] NO []  If yes, when was Patient reschedule for: 4/14/2023 9:30am    Patient requesting call back to reschedule: YES [] NO [x]

## 2023-04-21 ENCOUNTER — TELEPHONE (OUTPATIENT)
Dept: FAMILY MEDICINE CLINIC | Facility: CLINIC | Age: 58
End: 2023-04-21

## 2023-04-21 DIAGNOSIS — E11.21 DIABETIC NEPHROPATHY ASSOCIATED WITH TYPE 2 DIABETES MELLITUS (HCC): ICD-10-CM

## 2023-04-21 NOTE — TELEPHONE ENCOUNTER
Yeah, this is [facility]  My birthday is [birth year] and my phone number is [phone]  I want to know if you can call me in some Jardiance 25 milligrams  I'm almost out of it and I was getting samples from her, but I want to see if I can get him with this coupon now that I have  If you have any questions, give me a call back  Thank you   mohsen Arthur  (Copied from Teams call)

## 2023-04-24 DIAGNOSIS — E11.21 DIABETIC NEPHROPATHY ASSOCIATED WITH TYPE 2 DIABETES MELLITUS (HCC): ICD-10-CM

## 2023-04-27 ENCOUNTER — APPOINTMENT (OUTPATIENT)
Dept: RADIOLOGY | Facility: AMBULARY SURGERY CENTER | Age: 58
End: 2023-04-27
Attending: ORTHOPAEDIC SURGERY

## 2023-04-27 ENCOUNTER — OFFICE VISIT (OUTPATIENT)
Dept: OBGYN CLINIC | Facility: CLINIC | Age: 58
End: 2023-04-27

## 2023-04-27 VITALS
SYSTOLIC BLOOD PRESSURE: 105 MMHG | DIASTOLIC BLOOD PRESSURE: 75 MMHG | HEART RATE: 70 BPM | HEIGHT: 63 IN | WEIGHT: 196.7 LBS | BODY MASS INDEX: 34.85 KG/M2

## 2023-04-27 DIAGNOSIS — M25.561 CHRONIC PAIN OF RIGHT KNEE: ICD-10-CM

## 2023-04-27 DIAGNOSIS — G89.29 CHRONIC PAIN OF RIGHT KNEE: ICD-10-CM

## 2023-04-27 DIAGNOSIS — M17.11 PRIMARY OSTEOARTHRITIS OF RIGHT KNEE: Primary | ICD-10-CM

## 2023-04-27 DIAGNOSIS — M17.11 PRIMARY OSTEOARTHRITIS OF RIGHT KNEE: ICD-10-CM

## 2023-04-27 NOTE — PROGRESS NOTES
Assessment:       1  Primary osteoarthritis of right knee    2  Chronic pain of right knee          Plan:        Explained my current clinical findings and reviewed the radiological findings of right knee with the patient today  She is having a recurrence of the right knee pain which is likely secondary to underlying osteoarthritis  I do have suspicion of an associated degenerative medial meniscus tear  However, no progressive true mechanical symptoms at this time  Hence she is agreeable to a retrial of right knee viscosupplementation injection since this helped her in the past   An insurance approval will be sought for the same and I will see her back in the office following this approval             Subjective:     Patient ID: Larissa Klein is a 60 y o  female  Chief Complaint:    HPI  Carol Ann Oh is here today for evaluation of right knee pain  She has a known history of right knee osteoarthritis for which she underwent an intra-articular viscosupplementation injection by Dr Riki Davalos on 10/12/2021  Subsequently she had good relief of the right knee pain until about 2 to 3 months ago when the right knee pain has gradually recurred  She notices an occasional clicking sensation of the right knee and pain is worse with knee flexion or ambulation  Denies any new injury  Denies any recent fall  Mild intermittent swelling of the right knee  No systemic symptoms like fever or chills    Patient is a known diabetic  Past Medical History:   Diagnosis Date   • Anxiety    • Colon polyp    • Depression    • [facility]    • Diverticulosis    • GERD (gastroesophageal reflux disease)    • Headache    • Hyperlipidemia    • Hypertension    • Psychiatric disorder     bipolar   • Right clavicle fracture    • Sinus congestion    • TIA (transient ischemic attack)    • Vitamin D deficiency      Patient Active Problem List   Diagnosis   • Moderate recurrent major depression ([facility] )   • Chronic GERD   • Heart disorder   • Hyperlipemia   • Essential hypertension   • Lumbar radiculopathy   • Primary osteoarthritis of right hip   • Encounter for annual routine gynecological examination   • Right hip pain   • Type 2 diabetes mellitus without complication, without long-term current use of insulin (HCC)   • Paresthesias in right hand   • Calcific tendinitis of right shoulder   • Microalbuminuria   • Rotator cuff syndrome of right shoulder   • Extrapyramidal symptom   • Need for vaccination   • Diabetic nephropathy associated with type 2 diabetes mellitus ([facility] )   • Current every day smoker   • BMI 34 0-34 9,adult   • Simple chronic bronchitis (HCC)   • Multiple lung nodules on CT   • Chronic pain of both knees   • Dysuria   • Bereavement   • Obesity, morbid (HCC)   • Tinea corporis   • Upper respiratory tract infection     Past Surgical History:   Procedure Laterality Date   • BLADDER SURGERY      Sling   • LUMBAR EPIDURAL INJECTION N/A 1/4/2023    Procedure: L5 S1  LUMBAR epidural steroid injection (37864);   Surgeon: Dora Hogan DO;  Location: [facility] MAIN OR;  Service: Pain Management    • [place] ARTHRS HIP DEBRIDEMENT/SHAVING ARTICULAR CRTLG Right 7/13/2017    Procedure: RIGHT HIP ARTHROSCOPIC LABRAL DEBRIDEMENT;  Surgeon: Boone Carter MD;  [facility] Main OR;  Service: Orthopedics   • SINUS SURGERY      3 surgeries    • TUBAL LIGATION       Current Outpatient Medications on File Prior to Visit   Medication Sig Dispense Refill   • albuterol (Ventolin HFA) 90 mcg/act inhaler Inhale 2 puffs every 6 (six) hours as needed for wheezing 18 g 1   • amLODIPine (NORVASC) 5 mg tablet Take 1 tablet (5 mg total) by mouth daily 90 tablet 1   • ARIPiprazole (ABILIFY) 2 mg tablet Take 1 tablet (2 mg total) by mouth daily at bedtime 90 tablet 1   • aspirin 81 mg chewable tablet Chew 81 mg      • atorvastatin (LIPITOR) 20 mg tablet Take 1 tablet (20 mg total) by mouth every evening 90 tablet 1   • clonazePAM (KlonoPIN) 0 5 mg tablet "Take 1/2 tab by mouth twice daily and 1 full tab nightly 60 tablet 3   • Empagliflozin 25 MG TABS Take 1 tablet (25 mg total) by mouth daily 30 tablet 5   • fluticasone (FLONASE) 50 mcg/act nasal spray 1 spray into each nostril daily 18 g 0   • Fluticasone-Salmeterol (Wixela Inhub) 250-50 mcg/dose inhaler Inhale 1 puff 2 (two) times a day Rinse mouth after use  60 blister 5   • glipiZIDE (GLUCOTROL) 10 mg tablet take 1 tablet by mouth twice a day before meals 180 tablet 1   • Lancets (onetouch ultrasoft) lancets Check blood sugar twice/ week  100 each 5   • lisinopril (ZESTRIL) 40 mg tablet take 1 tablet by mouth once daily 90 tablet 1   • metFORMIN (GLUCOPHAGE) 500 mg tablet Take 1 tablet (500 mg total) by mouth 2 (two) times a day with meals 180 tablet 1   • NEEDLE, DISP, 30 G (B-D DISP NEEDLE 30GX1\") 30G X 1\" MISC Use once a week 100 each 5   • nystatin (MYCOSTATIN) powder Apply topically 2 (two) times a day 60 g 2   • omeprazole (PriLOSEC) 20 mg delayed release capsule Take 1 capsule (20 mg total) by mouth daily 90 capsule 1   • PARoxetine (PAXIL) 30 mg tablet Take 1 tablet (30 mg total) by mouth daily 90 tablet 1   • semaglutide, 0 25 or 0 5 mg/dose, (Ozempic) 2 mg/1 5 mL injection pen Inject 0 19 mL (0 25 mg total) under the skin every 7 days 1 5 mL 11   • clotrimazole-betamethasone (LOTRISONE) 1-0 05 % cream Apply topically 2 (two) times a day for 14 days 30 g 0   • gabapentin (NEURONTIN) 300 mg capsule Take 1 capsule (300 mg total) by mouth 2 (two) times a day (Patient not taking: Reported on 4/27/2023) 60 capsule 2   • glucose blood (ONE TOUCH ULTRA TEST) test strip Check blood sugar once daily 100 each 11     No current facility-administered medications on file prior to visit       Allergies   Allergen Reactions   • Cefdinir Hives          Social History     Occupational History   • Occupation: Unemployed due to Rt hip injury     Comment: and lower back injury   • Occupation: Used to be a  " • Occupation: Full Disability as of late 9/2019     Comment: based on medical issues   Tobacco Use   • Smoking status: Former     Packs/day: 2 00     Years: 40 00     Pack years: 80 00     Types: Cigarettes   • Smokeless tobacco: Never   • Tobacco comments:     quit 12/26/2020   Vaping Use   • Vaping Use: Never used   Substance and Sexual Activity   • Alcohol use: Not Currently   • Drug use: Yes     Types: Marijuana     Comment: Smoked THC between 19 and 28y/o   • Sexual activity: Yes     Partners: Male     Comment: Boyfriend      Review of Systems        Objective:     Right Knee Exam     Tenderness   The patient is experiencing tenderness in the medial joint line  Range of Motion   Extension: normal   Flexion: 120     Tests   Lynnette:  Medial - positive Lateral - negative  Varus: negative Valgus: negative  Lachman:  Anterior - negative      Drawer:  Anterior - negative    Posterior - negative  Patellar apprehension: negative    Other   Erythema: absent  Sensation: normal  Swelling: none  Effusion: no effusion present          Observations     Right Knee   Negative for effusion  Physical Exam  Vitals and nursing note reviewed  Constitutional:       Appearance: She is well-developed  HENT:      Head: Normocephalic and atraumatic  Eyes:      Conjunctiva/sclera: Conjunctivae normal       Pupils: Pupils are equal, round, and reactive to light  Cardiovascular:      Rate and Rhythm: Normal rate and regular rhythm  Pulmonary:      Effort: Pulmonary effort is normal  No respiratory distress  Musculoskeletal:      Right knee: No effusion  Instability Tests: Medial Lynnette test positive  Lateral Lynnette test negative  Skin:     General: Skin is warm and dry  Findings: No erythema  Neurological:      Mental Status: She is alert and oriented to person, place, and time  Cranial Nerves: No cranial nerve deficit     Psychiatric:         Behavior: Behavior normal          Thought Content: Thought content normal          Judgment: Judgment normal            I have personally reviewed pertinent films in PACS and my interpretation is Plain radiograph of the right knee performed today reveals medial and patellofemoral compartment osteoarthritis  No acute osseous injury noted  Del Carl

## 2023-05-01 ENCOUNTER — TELEPHONE (OUTPATIENT)
Dept: OBGYN CLINIC | Facility: CLINIC | Age: 58
End: 2023-05-01

## 2023-05-01 NOTE — TELEPHONE ENCOUNTER
Caller: Yeison Rojas    Doctor: Aj Yost    Reason for call: f/u on gel injection/US approval; informed can take up to a week    Will call once received    Call back#: n/a

## 2023-05-10 NOTE — TELEPHONE ENCOUNTER
Caller: patient  Doctor: Aye Baldwin    Reason for call: patient calling to schedule the [facility]     Call back#: [phone]

## 2023-05-11 ENCOUNTER — TELEPHONE (OUTPATIENT)
Dept: OBGYN CLINIC | Facility: CLINIC | Age: 58
End: 2023-05-11

## 2023-05-11 NOTE — TELEPHONE ENCOUNTER
Patient came into the [facility] office today as she stated she did not receive a call to schedule the visco and she knew that a call was supposed to have been placed yesterday to her and she was hoping to be seen today  RIGHT KNEE DUROLANE BUY AND BILL - ULTRASOUND GUIDED    Informed pt no openings today after speaking with Dr Angie Feliz and Ariel Oliveira  Put her on schedule for 6/8 at 11:30 at [facility] for now, but pt would like to be seen sooner at either office     She also asked if she could be seen sooner in the [place] office by a different provider as that is the closer office for her    Please call her at [phone]

## 2023-05-12 ENCOUNTER — PROCEDURE VISIT (OUTPATIENT)
Dept: OBGYN CLINIC | Facility: OTHER | Age: 58
End: 2023-05-12

## 2023-05-12 VITALS
SYSTOLIC BLOOD PRESSURE: 107 MMHG | BODY MASS INDEX: 35.08 KG/M2 | WEIGHT: 198 LBS | HEART RATE: 87 BPM | DIASTOLIC BLOOD PRESSURE: 66 MMHG | HEIGHT: 63 IN

## 2023-05-12 DIAGNOSIS — M17.11 PRIMARY OSTEOARTHRITIS OF RIGHT KNEE: Primary | ICD-10-CM

## 2023-05-12 NOTE — PROGRESS NOTES
"Chief Complaint: Follow up of viscosupplementation injection of right knee         Large joint arthrocentesis: R supra patellar bursa  Universal Protocol:  Consent: Verbal consent obtained  Risks and benefits: risks, benefits and alternatives were discussed  Consent given by: patient  Patient understanding: patient states understanding of the procedure being performed  Patient consent: the patient's understanding of the procedure matches consent given  Site marked: the operative site was marked  Radiology Images displayed and confirmed  If images not available, report reviewed: imaging studies available  Required items: required blood products, implants, devices, and special equipment available  Patient identity confirmed: verbally with patient    Supporting Documentation  Indications: pain   Procedure Details  Location: knee - R supra patellar bursa  Preparation: Patient was prepped and draped in the usual sterile fashion  Ultrasound guidance: yes  Approach: anterolateral  Medications administered: 3 mL sodium hyaluronate 60 MG/3ML    Patient tolerance: patient tolerated the procedure well with no immediate complications  Dressing:  Sterile dressing applied             Assessment:     Diagnosis ICD-10-CM Associated Orders   1  Primary osteoarthritis of right knee  M17 11 Large joint arthrocentesis: R supra patellar bursa           Plan:    We discussed clinical examination and findings with Natalie Gomes  Reviewed imaging  Patient received USG right knee viscosupplementation injection today  Discussed risks and benefits  Follow-Up:    Return for Follow up as needed or if symptoms do NOT improve  Portions of the record may have been created with voice recognition software  Occasional wrong word or \"sound alike\" substitutions may have occurred due to the inherent limitations of voice recognition software   Please review the chart carefully and recognize, using context, where " substitutions/typographical errors may have occurred

## 2023-06-30 ENCOUNTER — APPOINTMENT (OUTPATIENT)
Dept: LAB | Facility: CLINIC | Age: 58
End: 2023-06-30
Payer: MEDICARE

## 2023-06-30 DIAGNOSIS — E78.5 HYPERLIPIDEMIA, UNSPECIFIED HYPERLIPIDEMIA TYPE: ICD-10-CM

## 2023-06-30 DIAGNOSIS — E11.21 DIABETIC NEPHROPATHY ASSOCIATED WITH TYPE 2 DIABETES MELLITUS (HCC): ICD-10-CM

## 2023-06-30 LAB
ALBUMIN SERPL BCP-MCNC: 3.3 G/DL (ref 3.5–5)
ALP SERPL-CCNC: 136 U/L (ref 46–116)
ALT SERPL W P-5'-P-CCNC: 21 U/L (ref 12–78)
ANION GAP SERPL CALCULATED.3IONS-SCNC: 8 MMOL/L
AST SERPL W P-5'-P-CCNC: 13 U/L (ref 5–45)
BILIRUB SERPL-MCNC: 0.64 MG/DL (ref 0.2–1)
BUN SERPL-MCNC: 13 MG/DL (ref 5–25)
CALCIUM ALBUM COR SERPL-MCNC: 10.1 MG/DL (ref 8.3–10.1)
CALCIUM SERPL-MCNC: 9.5 MG/DL (ref 8.3–10.1)
CHLORIDE SERPL-SCNC: 105 MMOL/L (ref 96–108)
CHOLEST SERPL-MCNC: 208 MG/DL
CO2 SERPL-SCNC: 25 MMOL/L (ref 21–32)
CREAT SERPL-MCNC: 1.08 MG/DL (ref 0.6–1.3)
EST. AVERAGE GLUCOSE BLD GHB EST-MCNC: 166 MG/DL
GFR SERPL CREATININE-BSD FRML MDRD: 56 ML/MIN/1.73SQ M
GLUCOSE P FAST SERPL-MCNC: 132 MG/DL (ref 65–99)
HBA1C MFR BLD: 7.4 %
HDLC SERPL-MCNC: 55 MG/DL
LDLC SERPL CALC-MCNC: 119 MG/DL (ref 0–100)
NONHDLC SERPL-MCNC: 153 MG/DL
POTASSIUM SERPL-SCNC: 4.5 MMOL/L (ref 3.5–5.3)
PROT SERPL-MCNC: 7.6 G/DL (ref 6.4–8.4)
SODIUM SERPL-SCNC: 138 MMOL/L (ref 135–147)
TRIGL SERPL-MCNC: 168 MG/DL

## 2023-06-30 PROCEDURE — 83036 HEMOGLOBIN GLYCOSYLATED A1C: CPT

## 2023-06-30 PROCEDURE — 80061 LIPID PANEL: CPT

## 2023-06-30 PROCEDURE — 36415 COLL VENOUS BLD VENIPUNCTURE: CPT

## 2023-06-30 PROCEDURE — 80053 COMPREHEN METABOLIC PANEL: CPT
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
## 2023-07-03 ENCOUNTER — RA CDI HCC (OUTPATIENT)
Dept: OTHER | Facility: HOSPITAL | Age: 58
End: 2023-07-03

## 2023-07-03 NOTE — PROGRESS NOTES
e11.65  [address] [facility] coding opportunities          Chart Reviewed number of suggestions sent to Provider: 1     Patients Insurance     Medicare Insurance: [employer]

## 2023-07-07 ENCOUNTER — OFFICE VISIT (OUTPATIENT)
Dept: PSYCHIATRY | Facility: CLINIC | Age: 58
End: 2023-07-07
Payer: MEDICARE

## 2023-07-07 VITALS — HEIGHT: 63 IN | WEIGHT: 201.2 LBS | BODY MASS INDEX: 35.65 KG/M2

## 2023-07-07 DIAGNOSIS — Z63.4 BEREAVEMENT: ICD-10-CM

## 2023-07-07 DIAGNOSIS — F41.0 PANIC ATTACKS: ICD-10-CM

## 2023-07-07 DIAGNOSIS — F33.1 MODERATE RECURRENT MAJOR DEPRESSION (HCC): Primary | ICD-10-CM

## 2023-07-07 DIAGNOSIS — F41.1 GENERALIZED ANXIETY DISORDER: ICD-10-CM

## 2023-07-07 PROCEDURE — 99213 OFFICE O/P EST LOW 20 MIN: CPT | Performed by: PHYSICIAN ASSISTANT

## 2023-07-07 RX ORDER — CLONAZEPAM 0.5 MG/1
TABLET ORAL
Qty: 60 TABLET | Refills: 5 | Status: SHIPPED | OUTPATIENT
Start: 2023-07-07

## 2023-07-07 SDOH — SOCIAL STABILITY - SOCIAL INSECURITY: DISSAPEARANCE AND DEATH OF FAMILY MEMBER: Z63.4

## 2023-07-10 ENCOUNTER — OFFICE VISIT (OUTPATIENT)
Dept: FAMILY MEDICINE CLINIC | Facility: CLINIC | Age: 58
End: 2023-07-10
Payer: MEDICARE

## 2023-07-10 VITALS
BODY MASS INDEX: 36.32 KG/M2 | DIASTOLIC BLOOD PRESSURE: 78 MMHG | WEIGHT: 205 LBS | OXYGEN SATURATION: 97 % | HEIGHT: 63 IN | HEART RATE: 77 BPM | SYSTOLIC BLOOD PRESSURE: 130 MMHG

## 2023-07-10 DIAGNOSIS — E78.5 HYPERLIPIDEMIA, UNSPECIFIED HYPERLIPIDEMIA TYPE: ICD-10-CM

## 2023-07-10 DIAGNOSIS — J41.0 SIMPLE CHRONIC BRONCHITIS (HCC): ICD-10-CM

## 2023-07-10 DIAGNOSIS — N18.31 STAGE 3A CHRONIC KIDNEY DISEASE (HCC): Primary | ICD-10-CM

## 2023-07-10 DIAGNOSIS — E11.65 UNCONTROLLED TYPE 2 DIABETES MELLITUS WITH HYPERGLYCEMIA (HCC): ICD-10-CM

## 2023-07-10 DIAGNOSIS — I10 ESSENTIAL HYPERTENSION: ICD-10-CM

## 2023-07-10 DIAGNOSIS — E11.9 TYPE 2 DIABETES MELLITUS WITHOUT COMPLICATION, WITHOUT LONG-TERM CURRENT USE OF INSULIN (HCC): ICD-10-CM

## 2023-07-10 DIAGNOSIS — Z12.31 VISIT FOR SCREENING MAMMOGRAM: ICD-10-CM

## 2023-07-10 DIAGNOSIS — E11.9 CONTROLLED TYPE 2 DIABETES MELLITUS WITHOUT COMPLICATION, WITHOUT LONG-TERM CURRENT USE OF INSULIN (HCC): ICD-10-CM

## 2023-07-10 DIAGNOSIS — F33.1 MODERATE RECURRENT MAJOR DEPRESSION (HCC): ICD-10-CM

## 2023-07-10 DIAGNOSIS — E11.21 DIABETIC NEPHROPATHY ASSOCIATED WITH TYPE 2 DIABETES MELLITUS (HCC): ICD-10-CM

## 2023-07-10 DIAGNOSIS — K21.9 CHRONIC GERD: ICD-10-CM

## 2023-07-10 PROBLEM — F17.200 CURRENT EVERY DAY SMOKER: Status: RESOLVED | Noted: 2020-03-12 | Resolved: 2023-07-10

## 2023-07-10 PROBLEM — Z23 NEED FOR VACCINATION: Status: RESOLVED | Noted: 2019-11-27 | Resolved: 2023-07-10

## 2023-07-10 PROBLEM — R30.0 DYSURIA: Status: RESOLVED | Noted: 2021-07-29 | Resolved: 2023-07-10

## 2023-07-10 PROBLEM — R29.818 EXTRAPYRAMIDAL SYMPTOM: Status: RESOLVED | Noted: 2019-01-10 | Resolved: 2023-07-10

## 2023-07-10 PROBLEM — M25.551 RIGHT HIP PAIN: Status: RESOLVED | Noted: 2018-05-29 | Resolved: 2023-07-10

## 2023-07-10 PROBLEM — Z01.419 ENCOUNTER FOR ANNUAL ROUTINE GYNECOLOGICAL EXAMINATION: Status: RESOLVED | Noted: 2018-02-28 | Resolved: 2023-07-10

## 2023-07-10 PROBLEM — E66.01 OBESITY, MORBID (HCC): Status: RESOLVED | Noted: 2021-11-03 | Resolved: 2023-07-10

## 2023-07-10 PROBLEM — Z63.4 BEREAVEMENT: Status: RESOLVED | Noted: 2021-09-16 | Resolved: 2023-07-10

## 2023-07-10 PROCEDURE — 99215 OFFICE O/P EST HI 40 MIN: CPT | Performed by: FAMILY MEDICINE

## 2023-07-10 RX ORDER — AMLODIPINE BESYLATE 5 MG/1
5 TABLET ORAL DAILY
Qty: 90 TABLET | Refills: 1 | Status: SHIPPED | OUTPATIENT
Start: 2023-07-10

## 2023-07-10 RX ORDER — ATORVASTATIN CALCIUM 20 MG/1
20 TABLET, FILM COATED ORAL EVERY EVENING
Qty: 90 TABLET | Refills: 1 | Status: SHIPPED | OUTPATIENT
Start: 2023-07-10 | End: 2024-01-06

## 2023-07-10 RX ORDER — LISINOPRIL 40 MG/1
40 TABLET ORAL DAILY
Qty: 90 TABLET | Refills: 1 | Status: SHIPPED | OUTPATIENT
Start: 2023-07-10

## 2023-07-10 RX ORDER — GLIPIZIDE 10 MG/1
10 TABLET ORAL
Qty: 180 TABLET | Refills: 1 | Status: SHIPPED | OUTPATIENT
Start: 2023-07-10

## 2023-07-10 RX ORDER — OMEPRAZOLE 20 MG/1
20 CAPSULE, DELAYED RELEASE ORAL DAILY
Qty: 90 CAPSULE | Refills: 1 | Status: SHIPPED | OUTPATIENT
Start: 2023-07-10

## 2023-07-10 NOTE — ASSESSMENT & PLAN NOTE
Lab Results   Component Value Date    HGBA1C 7.4 (H) 06/30/2023     Renal function stable. Continue with improved hydration, avoid nephrotoxins.

## 2023-07-10 NOTE — ASSESSMENT & PLAN NOTE
Lab Results   Component Value Date    HGBA1C 7.4 (H) 06/30/2023     A1c has improved. Patient is unable to afford Jardiance. Currently on metformin 500 mg twice daily, glipizide 10 mg bid. Patient started on Ozempic. Patient denies any personal or family history of thyroid cancer or multiple endocrine neoplasia. Medication side effects discussed with patient. Patient will continue monitoring her blood sugar at home, follow-up 4 to 6 weeks with a blood sugar log.

## 2023-07-10 NOTE — ASSESSMENT & PLAN NOTE
Symptoms improved significantly since patient stopped smoking.   Patient has not been using Wixela and or albuterol

## 2023-07-10 NOTE — PROGRESS NOTES
Subjective:      Patient ID: Corinne Maya is a 60 y.o. female. HPI    Patient is here for routine follow-up. Has history of type 2 diabetes, hypertension, dyslipidemia. Her fasting sugar and A1c improved. Patient is currently on metformin, glipizide. Struggles to get coverage and other medications. Currently also taking Jardiance which she would like to discontinue. Patient is requesting to be switched over to Ozempic since she is also trying to lose weight. Denies any personal or family history of thyroid cancer or M EN neoplasia. Patient's blood pressure and LDL are at goal.  Former smoker with history of chronic bronchitis. She has not been using her inhalers since her breathing has improved significantly. Patient tries to eat healthy and exercise regularly. Denies any symptoms of chest pain or shortness of breath.     Past Medical History:   Diagnosis Date   • Anxiety    • Colon polyp    • Depression    • Diabetes mellitus ([address])    • Diverticulosis    • GERD (gastroesophageal reflux disease)    • Headache    • Hyperlipidemia    • Hypertension    • Psychiatric disorder     bipolar   • Right clavicle fracture    • Sinus congestion    • TIA (transient ischemic attack)    • Vitamin D deficiency        Family History   Problem Relation Age of Onset   • Cervical cancer Mother    • Ovarian cancer Mother 35   • Uterine cancer Mother    • Cancer Father    • Hypertension Father    • Other Father         pre-diabetes   • Diabetes Father    • Diabetes type I Sister    • Osteoporosis Sister    • Depression Sister    • Breast cancer Sister 79   • Diabetes Sister    • No Known Problems Sister    • No Known Problems Sister    • No Known Problems Sister    • Heart attack Brother          of an MI at 52y/o   • Diabetes Brother    • No Known Problems Daughter    • No Known Problems Son    • No Known Problems Maternal Grandmother    • No Known Problems Maternal Grandfather    • Other Paternal Grandmother Parkinson's Disease   • Heart attack Paternal Grandfather    • Other Paternal Grandfather         coronary arteriosclerosis   • Alcohol abuse Paternal Uncle    • Seizures Other    • Seizures Other    • Arthritis Family    • Cancer Family    • Osteoporosis Family        Past Surgical History:   Procedure Laterality Date   • BLADDER SURGERY      Sling   • LUMBAR EPIDURAL INJECTION N/A 1/4/2023    Procedure: L5 S1  LUMBAR epidural steroid injection (77099); Surgeon: Fern Cespedes DO;  Location: [facility] MAIN OR;  Service: Pain Management    • [place] ARTHRS HIP DEBRIDEMENT/SHAVING ARTICULAR CRTLG Right 7/13/2017    Procedure: RIGHT HIP ARTHROSCOPIC LABRAL DEBRIDEMENT;  Surgeon: Yousuf Lambert MD;  Location: [facility];  Service: Orthopedics   • SINUS SURGERY      3 surgeries    • TUBAL LIGATION          reports that she has quit smoking. Her smoking use included cigarettes. She has a 80.00 pack-year smoking history. She has never used smokeless tobacco. She reports that she does not currently use alcohol. She reports current drug use. Drug: Marijuana.       Current Outpatient Medications:   •  albuterol (Ventolin HFA) 90 mcg/act inhaler, Inhale 2 puffs every 6 (six) hours as needed for wheezing, Disp: 18 g, Rfl: 1  •  amLODIPine (NORVASC) 5 mg tablet, Take 1 tablet (5 mg total) by mouth daily, Disp: 90 tablet, Rfl: 1  •  ARIPiprazole (ABILIFY) 2 mg tablet, Take 1 tablet (2 mg total) by mouth daily at bedtime, Disp: 90 tablet, Rfl: 1  •  aspirin 81 mg chewable tablet, Chew 81 mg , Disp: , Rfl:   •  atorvastatin (LIPITOR) 20 mg tablet, Take 1 tablet (20 mg total) by mouth every evening, Disp: 90 tablet, Rfl: 1  •  clonazePAM (KlonoPIN) 0.5 mg tablet, Take 1/2 tab by mouth twice daily and 1 full tab nightly, Disp: 60 tablet, Rfl: 5  •  fluticasone (FLONASE) 50 mcg/act nasal spray, 1 spray into each nostril daily, Disp: 18 g, Rfl: 0  •  Fluticasone-Salmeterol (Wixela Inhub) 250-50 mcg/dose inhaler, Inhale 1 puff 2 (two) times a day Rinse mouth after use., Disp: 60 blister, Rfl: 5  •  glipiZIDE (GLUCOTROL) 10 mg tablet, Take 1 tablet (10 mg total) by mouth 2 (two) times a day before meals, Disp: 180 tablet, Rfl: 1  •  glucose blood (ONE TOUCH ULTRA TEST) test strip, Check blood sugar once daily, Disp: 100 each, Rfl: 11  •  Lancets (onetouch ultrasoft) lancets, Check blood sugar twice/ week., Disp: 100 each, Rfl: 5  •  lisinopril (ZESTRIL) 40 mg tablet, Take 1 tablet (40 mg total) by mouth daily, Disp: 90 tablet, Rfl: 1  •  metFORMIN (GLUCOPHAGE) 500 mg tablet, Take 1 tablet (500 mg total) by mouth 2 (two) times a day with meals, Disp: 180 tablet, Rfl: 1  •  NEEDLE, DISP, 30 G (B-D DISP NEEDLE 30GX1") 30G X 1" MISC, Use once a week, Disp: 100 each, Rfl: 5  •  nystatin (MYCOSTATIN) powder, Apply topically 2 (two) times a day, Disp: 60 g, Rfl: 2  •  omeprazole (PriLOSEC) 20 mg delayed release capsule, Take 1 capsule (20 mg total) by mouth daily, Disp: 90 capsule, Rfl: 1  •  PARoxetine (PAXIL) 30 mg tablet, Take 1 tablet (30 mg total) by mouth daily, Disp: 90 tablet, Rfl: 1  •  semaglutide, 0.25 or 0.5 mg/dose, (Ozempic) 2 mg/1.5 mL injection pen, Inject 0.19 mL (0.25 mg total) under the skin every 7 days, Disp: 1.5 mL, Rfl: 1  •  clotrimazole-betamethasone (LOTRISONE) 1-0.05 % cream, Apply topically 2 (two) times a day for 14 days, Disp: 30 g, Rfl: 0    The following portions of the patient's history were reviewed and updated as appropriate: allergies, current medications, past family history, past medical history, past social history, past surgical history and problem list.    Review of Systems   Constitutional: Negative. Respiratory: Negative. Cardiovascular: Negative. Objective:    /78   Pulse 77   Ht 5' 3" (1.6 m)   Wt 93 kg (205 lb)   SpO2 97%   BMI 36.31 kg/m²      Physical Exam  Constitutional:       Appearance: Normal appearance. Cardiovascular:      Heart sounds: Normal heart sounds.    Pulmonary: Breath sounds: Normal breath sounds. Abdominal:      Palpations: Abdomen is soft. Musculoskeletal:      Right lower leg: No edema. Left lower leg: No edema. Neurological:      Mental Status: She is oriented to person, place, and time. Psychiatric:         Mood and Affect: Mood normal.           Recent Results (from the past 1008 hour(s))   Comprehensive metabolic panel    Collection Time: 06/30/23  8:56 AM   Result Value Ref Range    Sodium 138 135 - 147 mmol/L    Potassium 4.5 3.5 - 5.3 mmol/L    Chloride 105 96 - 108 mmol/L    CO2 25 21 - 32 mmol/L    ANION GAP 8 mmol/L    BUN 13 5 - 25 mg/dL    Creatinine 1.08 0.60 - 1.30 mg/dL    Glucose, Fasting 132 (H) 65 - 99 mg/dL    Calcium 9.5 8.3 - 10.1 mg/dL    Corrected Calcium 10.1 8.3 - 10.1 mg/dL    AST 13 5 - 45 U/L    ALT 21 12 - 78 U/L    Alkaline Phosphatase 136 (H) 46 - 116 U/L    Total Protein 7.6 6.4 - 8.4 g/dL    Albumin 3.3 (L) 3.5 - 5.0 g/dL    Total Bilirubin 0.64 0.20 - 1.00 mg/dL    eGFR 56 ml/min/1.73sq m   Hemoglobin A1C    Collection Time: 06/30/23  8:56 AM   Result Value Ref Range    Hemoglobin A1C 7.4 (H) Normal 3.8-5.6%; PreDiabetic 5.7-6.4%; Diabetic >=6.5%; Glycemic control for adults with diabetes <7.0% %     mg/dl   Lipid panel    Collection Time: 06/30/23  8:56 AM   Result Value Ref Range    Cholesterol 208 (H) See Comment mg/dL    Triglycerides 168 (H) See Comment mg/dL    HDL, Direct 55 >=50 mg/dL    LDL Calculated 119 (H) 0 - 100 mg/dL    Non-HDL-Chol (CHOL-HDL) 153 mg/dl       Assessment/Plan:    No problem-specific Assessment & Plan notes found for this encounter. Problem List Items Addressed This Visit        Digestive    Chronic GERD     Stable on omeprazole 20 milligram.  Continue same.          Relevant Medications    omeprazole (PriLOSEC) 20 mg delayed release capsule       Endocrine    Type 2 diabetes mellitus without complication, without long-term current use of insulin ([address])       Lab Results Component Value Date    HGBA1C 7.4 (H) 06/30/2023     A1c has improved. Patient is unable to afford Jardiance. Currently on metformin 500 mg twice daily, glipizide 10 mg bid. Patient started on Ozempic. Patient denies any personal or family history of thyroid cancer or multiple endocrine neoplasia. Medication side effects discussed with patient. Patient will continue monitoring her blood sugar at home, follow-up 4 to 6 weeks with a blood sugar log. Relevant Medications    glipiZIDE (GLUCOTROL) 10 mg tablet    glucose blood (ONE TOUCH ULTRA TEST) test strip    metFORMIN (GLUCOPHAGE) 500 mg tablet    semaglutide, 0.25 or 0.5 mg/dose, (Ozempic) 2 mg/1.5 mL injection pen    Other Relevant Orders    Comprehensive metabolic panel    Diabetic nephropathy associated with type 2 diabetes mellitus ([address] [facility])       Lab Results   Component Value Date    HGBA1C 7.4 (H) 06/30/2023     Renal function stable. Continue with improved hydration, avoid nephrotoxins. Relevant Medications    glipiZIDE (GLUCOTROL) 10 mg tablet    metFORMIN (GLUCOPHAGE) 500 mg tablet    semaglutide, 0.25 or 0.5 mg/dose, (Ozempic) 2 mg/1.5 mL injection pen       Respiratory    Simple chronic bronchitis (HCC)     Symptoms improved significantly since patient stopped smoking. Patient has not been using [address] and or albuterol            Cardiovascular and Mediastinum    Essential hypertension     Patient's blood pressure is at goal.  Continue amlodipine 5 milligram, lisinopril 40 milligram         Relevant Medications    amLODIPine (NORVASC) 5 mg tablet    lisinopril (ZESTRIL) 40 mg tablet       Genitourinary    Stage 3a chronic kidney disease ([address] [facility]) - Primary     Lab Results   Component Value Date    EGFR 56 06/30/2023    EGFR 67 02/24/2023    EGFR 74 11/29/2022    CREATININE 1.08 06/30/2023    CREATININE 0.94 02/24/2023    CREATININE 0.86 11/29/2022     Stable. Continue monitoring.             Other    Moderate recurrent major depression ([address])     Stable. Continue medications per psychiatrist.         Hyperlipemia     LDL improved with lifestyle modifications. Continue same. Continue atorvastatin 20         Relevant Medications    atorvastatin (LIPITOR) 20 mg tablet   Other Visit Diagnoses     Uncontrolled type 2 diabetes mellitus with hyperglycemia (HCC)        Relevant Medications    glipiZIDE (GLUCOTROL) 10 mg tablet    metFORMIN (GLUCOPHAGE) 500 mg tablet    semaglutide, 0.25 or 0.5 mg/dose, (Ozempic) 2 mg/1.5 mL injection pen    Controlled type 2 diabetes mellitus without complication, without long-term current use of insulin (HCC)        Relevant Medications    glipiZIDE (GLUCOTROL) 10 mg tablet    metFORMIN (GLUCOPHAGE) 500 mg tablet    semaglutide, 0.25 or 0.5 mg/dose, (Ozempic) 2 mg/1.5 mL injection pen    Visit for screening mammogram        Relevant Orders    Mammo screening bilateral w 3d & cad        I have spent a total time of 40 minutes on 07/10/23 in caring for this patient including Diagnostic results, Prognosis, Risks and benefits of tx options, Instructions for management, Patient and family education, Importance of tx compliance, Risk factor reductions, Impressions, Counseling / Coordination of care, Documenting in the medical record, Reviewing / ordering tests, medicine, procedures   and Obtaining or reviewing history  .

## 2023-07-12 LAB
LEFT EYE DIABETIC RETINOPATHY: NORMAL
RIGHT EYE DIABETIC RETINOPATHY: NORMAL

## 2023-08-02 ENCOUNTER — CLINICAL SUPPORT (OUTPATIENT)
Dept: FAMILY MEDICINE CLINIC | Facility: CLINIC | Age: 58
End: 2023-08-02

## 2023-08-02 DIAGNOSIS — E11.65 UNCONTROLLED TYPE 2 DIABETES MELLITUS WITH HYPERGLYCEMIA (HCC): Primary | ICD-10-CM

## 2023-08-02 NOTE — PATIENT INSTRUCTIONS
Pt presents for training on ozempic. Pt demonstrated proper use of insulin.   Told to call with any questions

## 2023-08-16 ENCOUNTER — APPOINTMENT (OUTPATIENT)
Dept: LAB | Facility: CLINIC | Age: 58
End: 2023-08-16
Payer: MEDICARE

## 2023-08-16 ENCOUNTER — RA CDI HCC (OUTPATIENT)
Dept: OTHER | Facility: HOSPITAL | Age: 58
End: 2023-08-16

## 2023-08-16 DIAGNOSIS — E11.9 TYPE 2 DIABETES MELLITUS WITHOUT COMPLICATION, WITHOUT LONG-TERM CURRENT USE OF INSULIN (HCC): ICD-10-CM

## 2023-08-16 LAB
ALBUMIN SERPL BCP-MCNC: 3.4 G/DL (ref 3.5–5)
ALP SERPL-CCNC: 147 U/L (ref 46–116)
ALT SERPL W P-5'-P-CCNC: 25 U/L (ref 12–78)
ANION GAP SERPL CALCULATED.3IONS-SCNC: 9 MMOL/L
AST SERPL W P-5'-P-CCNC: 22 U/L (ref 5–45)
BILIRUB SERPL-MCNC: 0.6 MG/DL (ref 0.2–1)
BUN SERPL-MCNC: 8 MG/DL (ref 5–25)
CALCIUM ALBUM COR SERPL-MCNC: 10 MG/DL (ref 8.3–10.1)
CALCIUM SERPL-MCNC: 9.5 MG/DL (ref 8.3–10.1)
CHLORIDE SERPL-SCNC: 104 MMOL/L (ref 96–108)
CO2 SERPL-SCNC: 24 MMOL/L (ref 21–32)
CREAT SERPL-MCNC: 0.8 MG/DL (ref 0.6–1.3)
GFR SERPL CREATININE-BSD FRML MDRD: 80 ML/MIN/1.73SQ M
GLUCOSE P FAST SERPL-MCNC: 90 MG/DL (ref 65–99)
POTASSIUM SERPL-SCNC: 4 MMOL/L (ref 3.5–5.3)
PROT SERPL-MCNC: 7.7 G/DL (ref 6.4–8.4)
SODIUM SERPL-SCNC: 137 MMOL/L (ref 135–147)

## 2023-08-16 PROCEDURE — 36415 COLL VENOUS BLD VENIPUNCTURE: CPT

## 2023-08-16 PROCEDURE — 80053 COMPREHEN METABOLIC PANEL: CPT

## 2023-08-16 NOTE — PROGRESS NOTES
e11.65  [address] [facility] coding opportunities          Chart Reviewed number of suggestions sent to Provider: 1     Patients Insurance     Medicare Insurance: [employer]

## 2023-08-23 ENCOUNTER — OFFICE VISIT (OUTPATIENT)
Dept: FAMILY MEDICINE CLINIC | Facility: CLINIC | Age: 58
End: 2023-08-23
Payer: MEDICARE

## 2023-08-23 VITALS
SYSTOLIC BLOOD PRESSURE: 125 MMHG | DIASTOLIC BLOOD PRESSURE: 72 MMHG | OXYGEN SATURATION: 99 % | HEART RATE: 92 BPM | WEIGHT: 205 LBS | HEIGHT: 63 IN | BODY MASS INDEX: 36.32 KG/M2

## 2023-08-23 DIAGNOSIS — E11.9 TYPE 2 DIABETES MELLITUS WITHOUT COMPLICATION, WITHOUT LONG-TERM CURRENT USE OF INSULIN (HCC): ICD-10-CM

## 2023-08-23 DIAGNOSIS — R80.9 MICROALBUMINURIA: ICD-10-CM

## 2023-08-23 DIAGNOSIS — E78.5 HYPERLIPIDEMIA, UNSPECIFIED HYPERLIPIDEMIA TYPE: ICD-10-CM

## 2023-08-23 DIAGNOSIS — E11.65 UNCONTROLLED TYPE 2 DIABETES MELLITUS WITH HYPERGLYCEMIA (HCC): Primary | ICD-10-CM

## 2023-08-23 PROCEDURE — 99214 OFFICE O/P EST MOD 30 MIN: CPT | Performed by: FAMILY MEDICINE

## 2023-08-23 NOTE — ASSESSMENT & PLAN NOTE
Lab Results   Component Value Date    HGBA1C 7.4 (H) 06/30/2023     Patient was started on Ozempic since her fasting blood sugars were high and her A1c was elevated. Patient's fasting sugar has not dropped to 90. She is tolerating Ozempic without any side effects. Advised to drop the dose of glipizide to 5 mg twice daily. If she continues to experience symptoms of hypoglycemia then she can slowly wean herself off the glipizide with continued monitoring. Patient can call the office back if she has any problem. Treatment of hypoglycemia discussed with patient. Continue metformin 500 twice daily. Patient recommended metabolic labs/follow-up at 6 months interval.  Encouraged to continue with low-carb low-fat diet and improve physical activity.   Increase Ozempic dose from 0.25 to 0.5 mg.

## 2023-08-23 NOTE — PROGRESS NOTES
Subjective:      Patient ID: Chanell Hernández is a 60 y.o. female. HPI    Patient is following up on her diabetes. Patient has poorly controlled diabetes with elevated fasting blood sugar ranging between 130-150. Patient was started on Ozempic in addition to metformin and glipizide. She was unable to afford other diabetes medications due to poor insurance coverage. Patient has been monitoring her blood sugar. Morning blood sugar usually ranges between . Patient is also on metformin and glipizide. Glipizide dose will be adjusted today to avoid symptoms of hypoglycemia. Patient has tolerated the medication without any side effect. Her fasting blood sugar is 90, GFR stable.     Past Medical History:   Diagnosis Date   • Anxiety    • Colon polyp    • Depression    • Diabetes mellitus ([address])    • Diverticulosis    • GERD (gastroesophageal reflux disease)    • Headache    • Hyperlipidemia    • Hypertension    • Psychiatric disorder     bipolar   • Right clavicle fracture    • Sinus congestion    • TIA (transient ischemic attack)    • Vitamin D deficiency        Family History   Problem Relation Age of Onset   • Cervical cancer Mother    • Ovarian cancer Mother 35   • Uterine cancer Mother    • Cancer Father    • Hypertension Father    • Other Father         pre-diabetes   • Diabetes Father    • Diabetes type I Sister    • Osteoporosis Sister    • Depression Sister    • Breast cancer Sister 79   • Diabetes Sister    • No Known Problems Sister    • No Known Problems Sister    • No Known Problems Sister    • Heart attack Brother          of an MI at 50y/o   • Diabetes Brother    • No Known Problems Daughter    • No Known Problems Son    • No Known Problems Maternal Grandmother    • No Known Problems Maternal Grandfather    • Other Paternal Grandmother         Parkinson's Disease   • Heart attack Paternal Grandfather    • Other Paternal Grandfather         coronary arteriosclerosis   • Alcohol abuse Paternal Uncle    • Seizures Other    • Seizures Other    • Arthritis Family    • Cancer Family    • Osteoporosis Family        Past Surgical History:   Procedure Laterality Date   • BLADDER SURGERY      Sling   • LUMBAR EPIDURAL INJECTION N/A 1/4/2023    Procedure: L5 S1  LUMBAR epidural steroid injection (12288); Surgeon: Neelima Rose DO;  Location: [facility] MAIN OR;  Service: Pain Management    • [place] ARTHRS HIP DEBRIDEMENT/SHAVING ARTICULAR CRTLG Right 7/13/2017    Procedure: RIGHT HIP ARTHROSCOPIC LABRAL DEBRIDEMENT;  Surgeon: Jordyn Bellamy MD;  Location: [facility];  Service: Orthopedics   • SINUS SURGERY      3 surgeries    • TUBAL LIGATION          reports that she has quit smoking. Her smoking use included cigarettes. She has a 80.00 pack-year smoking history. She has never used smokeless tobacco. She reports that she does not currently use alcohol. She reports current drug use. Drug: Marijuana.       Current Outpatient Medications:   •  albuterol (Ventolin HFA) 90 mcg/act inhaler, Inhale 2 puffs every 6 (six) hours as needed for wheezing, Disp: 18 g, Rfl: 1  •  amLODIPine (NORVASC) 5 mg tablet, Take 1 tablet (5 mg total) by mouth daily, Disp: 90 tablet, Rfl: 1  •  ARIPiprazole (ABILIFY) 2 mg tablet, Take 1 tablet (2 mg total) by mouth daily at bedtime, Disp: 90 tablet, Rfl: 1  •  aspirin 81 mg chewable tablet, Chew 81 mg , Disp: , Rfl:   •  atorvastatin (LIPITOR) 20 mg tablet, Take 1 tablet (20 mg total) by mouth every evening, Disp: 90 tablet, Rfl: 1  •  clonazePAM (KlonoPIN) 0.5 mg tablet, Take 1/2 tab by mouth twice daily and 1 full tab nightly, Disp: 60 tablet, Rfl: 5  •  fluticasone (FLONASE) 50 mcg/act nasal spray, 1 spray into each nostril daily, Disp: 18 g, Rfl: 0  •  Fluticasone-Salmeterol (Wixela Inhub) 250-50 mcg/dose inhaler, Inhale 1 puff 2 (two) times a day Rinse mouth after use., Disp: 60 blister, Rfl: 5  •  glipiZIDE (GLUCOTROL) 10 mg tablet, Take 1 tablet (10 mg total) by mouth 2 (two) times a day before meals, Disp: 180 tablet, Rfl: 1  •  glucose blood (ONE TOUCH ULTRA TEST) test strip, Check blood sugar once daily, Disp: 100 each, Rfl: 11  •  Lancets (onetouch ultrasoft) lancets, Check blood sugar twice/ week., Disp: 100 each, Rfl: 5  •  lisinopril (ZESTRIL) 40 mg tablet, Take 1 tablet (40 mg total) by mouth daily, Disp: 90 tablet, Rfl: 1  •  metFORMIN (GLUCOPHAGE) 500 mg tablet, Take 1 tablet (500 mg total) by mouth 2 (two) times a day with meals, Disp: 180 tablet, Rfl: 1  •  NEEDLE, DISP, 30 G (B-D DISP NEEDLE 30GX1") 30G X 1" MISC, Use once a week, Disp: 100 each, Rfl: 5  •  nystatin (MYCOSTATIN) powder, Apply topically 2 (two) times a day, Disp: 60 g, Rfl: 2  •  omeprazole (PriLOSEC) 20 mg delayed release capsule, Take 1 capsule (20 mg total) by mouth daily, Disp: 90 capsule, Rfl: 1  •  PARoxetine (PAXIL) 30 mg tablet, Take 1 tablet (30 mg total) by mouth daily, Disp: 90 tablet, Rfl: 1  •  semaglutide, 0.25 or 0.5 mg/dose, (Ozempic, 0.25 or 0.5 MG/DOSE,) 2 mg/3 mL injection pen, Inject 0.75 mL (0.5 mg total) under the skin every 7 days, Disp: 3 mL, Rfl: 1  •  clotrimazole-betamethasone (LOTRISONE) 1-0.05 % cream, Apply topically 2 (two) times a day for 14 days, Disp: 30 g, Rfl: 0    The following portions of the patient's history were reviewed and updated as appropriate: allergies, current medications, past family history, past medical history, past social history, past surgical history and problem list.    Review of Systems   Constitutional: Negative. Cardiovascular: Negative. Gastrointestinal: Negative. Objective:    /72   Pulse 92   Ht 5' 3" (1.6 m)   Wt 93 kg (205 lb)   SpO2 99%   BMI 36.31 kg/m²      Physical Exam  Constitutional:       Appearance: Normal appearance. Cardiovascular:      Heart sounds: Normal heart sounds. Pulmonary:      Breath sounds: Normal breath sounds. Abdominal:      General: There is no distension.       Palpations: Abdomen is soft.      Tenderness: There is no abdominal tenderness. There is no guarding. Recent Results (from the past 1008 hour(s))   Comprehensive metabolic panel    Collection Time: 08/16/23  8:12 AM   Result Value Ref Range    Sodium 137 135 - 147 mmol/L    Potassium 4.0 3.5 - 5.3 mmol/L    Chloride 104 96 - 108 mmol/L    CO2 24 21 - 32 mmol/L    ANION GAP 9 mmol/L    BUN 8 5 - 25 mg/dL    Creatinine 0.80 0.60 - 1.30 mg/dL    Glucose, Fasting 90 65 - 99 mg/dL    Calcium 9.5 8.3 - 10.1 mg/dL    Corrected Calcium 10.0 8.3 - 10.1 mg/dL    AST 22 5 - 45 U/L    ALT 25 12 - 78 U/L    Alkaline Phosphatase 147 (H) 46 - 116 U/L    Total Protein 7.7 6.4 - 8.4 g/dL    Albumin 3.4 (L) 3.5 - 5.0 g/dL    Total Bilirubin 0.60 0.20 - 1.00 mg/dL    eGFR 80 ml/min/1.73sq m       Assessment/Plan:    No problem-specific Assessment & Plan notes found for this encounter. Problem List Items Addressed This Visit        Endocrine    Type 2 diabetes mellitus without complication, without long-term current use of insulin ([address])       Lab Results   Component Value Date    HGBA1C 7.4 (H) 06/30/2023     Patient was started on Ozempic since her fasting blood sugars were high and her A1c was elevated. Patient's fasting sugar has not dropped to 90. She is tolerating Ozempic without any side effects. Advised to drop the dose of glipizide to 5 mg twice daily. If she continues to experience symptoms of hypoglycemia then she can slowly wean herself off the glipizide with continued monitoring. Patient can call the office back if she has any problem. Treatment of hypoglycemia discussed with patient. Continue metformin 500 twice daily. Patient recommended metabolic labs/follow-up at 6 months interval.  Encouraged to continue with low-carb low-fat diet and improve physical activity.   Increase Ozempic dose from 0.25 to 0.5 mg.           Relevant Medications    semaglutide, 0.25 or 0.5 mg/dose, (Ozempic, 0.25 or 0.5 MG/DOSE,) 2 mg/3 mL injection pen       Other    Hyperlipemia    Relevant Orders    Lipid panel    Microalbuminuria    Relevant Orders    Albumin / creatinine urine ratio   Other Visit Diagnoses     Uncontrolled type 2 diabetes mellitus with hyperglycemia (HCC)    -  Primary    Relevant Medications    semaglutide, 0.25 or 0.5 mg/dose, (Ozempic, 0.25 or 0.5 MG/DOSE,) 2 mg/3 mL injection pen    Other Relevant Orders    Comprehensive metabolic panel    Hemoglobin A1C    Albumin / creatinine urine ratio

## 2023-09-19 DIAGNOSIS — E11.65 UNCONTROLLED TYPE 2 DIABETES MELLITUS WITH HYPERGLYCEMIA (HCC): ICD-10-CM

## 2023-09-25 ENCOUNTER — TELEPHONE (OUTPATIENT)
Age: 58
End: 2023-09-25

## 2023-09-25 DIAGNOSIS — E11.9 TYPE 2 DIABETES MELLITUS WITHOUT COMPLICATION, WITHOUT LONG-TERM CURRENT USE OF INSULIN (HCC): Primary | ICD-10-CM

## 2023-09-25 NOTE — TELEPHONE ENCOUNTER
Pt called stating the Ozempic would cost her over $250 . Pt is asking if you could sent Jardiance to her [facility] instead?     Please advise

## 2023-09-25 NOTE — TELEPHONE ENCOUNTER
Patient called back to check the status of her prescription request.  She is done with the Ozempic and needs the Jardiance by this Wednesday

## 2023-09-26 NOTE — TELEPHONE ENCOUNTER
Patient is requesting a call back from the doctor regarding her medication for JARDIANCE 25 MG TABLET stating that [facility] wants over $400 and she cannot afford this.   Please call to discuss
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
## 2023-09-29 ENCOUNTER — OFFICE VISIT (OUTPATIENT)
Dept: PSYCHIATRY | Facility: CLINIC | Age: 58
End: 2023-09-29
Payer: MEDICARE

## 2023-09-29 VITALS — BODY MASS INDEX: 35.98 KG/M2 | WEIGHT: 203.1 LBS | HEIGHT: 63 IN

## 2023-09-29 DIAGNOSIS — F41.0 PANIC ATTACKS: ICD-10-CM

## 2023-09-29 DIAGNOSIS — E11.65 UNCONTROLLED TYPE 2 DIABETES MELLITUS WITH HYPERGLYCEMIA (HCC): Primary | ICD-10-CM

## 2023-09-29 DIAGNOSIS — F33.1 MODERATE RECURRENT MAJOR DEPRESSION (HCC): ICD-10-CM

## 2023-09-29 DIAGNOSIS — F41.1 GENERALIZED ANXIETY DISORDER: Primary | ICD-10-CM

## 2023-09-29 DIAGNOSIS — E11.65 UNCONTROLLED TYPE 2 DIABETES MELLITUS WITH HYPERGLYCEMIA (HCC): ICD-10-CM

## 2023-09-29 PROCEDURE — 99213 OFFICE O/P EST LOW 20 MIN: CPT | Performed by: PHYSICIAN ASSISTANT

## 2023-09-29 RX ORDER — PAROXETINE 30 MG/1
30 TABLET, FILM COATED ORAL DAILY
Qty: 90 TABLET | Refills: 1 | Status: SHIPPED | OUTPATIENT
Start: 2023-09-29

## 2023-09-29 RX ORDER — ARIPIPRAZOLE 2 MG/1
2 TABLET ORAL
Qty: 90 TABLET | Refills: 1 | Status: SHIPPED | OUTPATIENT
Start: 2023-09-29

## 2023-09-29 NOTE — BH TREATMENT PLAN
TREATMENT PLAN (Medication Management Only)        [address] [facility]    Name/Date of Birth/MRN:  Amelia Mendiola 60 y.o. 1965 MRN: [id number]  Date of Treatment Plan: 2023  Diagnosis/Diagnoses:   1. Generalized anxiety disorder    2. Panic attacks    3. Moderate recurrent major depression [facility])      Strengths/Personal Resources for Self-Care: "Try not to think about things that bother me; Not letting things bother me". Area/Areas of need (in own words): ""I had an episode the day of my dad and sister's  in 2023. I was real anxious.". 1. Long Term Goal:   Maintain mood stability and control of anxiety  Target Date: 3-6 months  Person/Persons responsible for completion of goal: Anil  2. Short Term Objective (s) - How will we reach this goal?:   A. Provider new recommended medication/dosage changes and/or continue medication(s): Pt is having moderate anxiety with one recent panic attack related to family , and minimal depressive mood. PHQ 9 and TIFFANY 7 scores: both Zero at this time. Tx options discussed and Pt feels stable, does not want any changes to her regimen. She appears stable and I will continue the present medications. She declines referral for psychotherapy. Treatment plan done and Pt accepts the plan.    Paroxetine  30mg (1) tab po qd # 90 R1  Aripipazole  2mg (1)  tab po qhs # 90 R1  Clonazepam  0.5mg (1/2) tab po bid, and (1) tab po qhs-- Pt given a Rx with R5 on 2023 -- per PDMP 2 fills have been done from that Rx: on 2023 and 2023  F/U PCP and specialists for medical issues  Pt to get CMP, Lipids, HgbA1C and Urine Alb/Cr ratio -- per PCP  Return 12 weeks, Call sooner prn        Target Date: 6 months - 3/29/2024  Person/Persons Responsible for Completion of Goal: Anil   Progress Towards Goals: stable, continuing treatment  Treatment Modality: Medication mgt  Review due 180 days from date of this plan: 6 months - 3/29/2024  Expected length of service: ongoing treatment unless revised  My Physician/PA/NP and I have developed this plan together and I agree to work on the goals and objectives. I understand the treatment goals that were developed for my treatment.   Electronic Signatures: on file (unless signed below)    Alex Olivarez PA-C 09/29/23

## 2023-09-29 NOTE — TELEPHONE ENCOUNTER
Pt called back. She said that the Jardiance would be too expensive but she said the Ozempic at [facility] at [facility], it would only cost her $112. She would like Ozempic sent to [facility]. Thank you.

## 2023-09-29 NOTE — TELEPHONE ENCOUNTER
Rx was sent to [facility] and it needs to go to [facility]. Please resend for Dr. Lex Scheuermann since she is out of the office.

## 2023-10-02 ENCOUNTER — TELEPHONE (OUTPATIENT)
Dept: PSYCHIATRY | Facility: CLINIC | Age: 58
End: 2023-10-02

## 2023-10-19 ENCOUNTER — OFFICE VISIT (OUTPATIENT)
Dept: OBGYN CLINIC | Facility: CLINIC | Age: 58
End: 2023-10-19

## 2023-10-19 VITALS — BODY MASS INDEX: 35.98 KG/M2 | HEIGHT: 63 IN | WEIGHT: 203.1 LBS

## 2023-10-19 DIAGNOSIS — G89.29 CHRONIC PAIN OF RIGHT KNEE: ICD-10-CM

## 2023-10-19 DIAGNOSIS — M25.561 CHRONIC PAIN OF RIGHT KNEE: ICD-10-CM

## 2023-10-19 DIAGNOSIS — M17.11 PRIMARY OSTEOARTHRITIS OF RIGHT KNEE: Primary | ICD-10-CM

## 2023-10-19 RX ORDER — TRIAMCINOLONE ACETONIDE 40 MG/ML
40 INJECTION, SUSPENSION INTRA-ARTICULAR; INTRAMUSCULAR
Status: COMPLETED | OUTPATIENT
Start: 2023-10-19 | End: 2023-10-19

## 2023-10-19 RX ORDER — BUPIVACAINE HYDROCHLORIDE 2.5 MG/ML
4 INJECTION, SOLUTION INFILTRATION; PERINEURAL
Status: COMPLETED | OUTPATIENT
Start: 2023-10-19 | End: 2023-10-19

## 2023-10-19 RX ADMIN — BUPIVACAINE HYDROCHLORIDE 4 ML: 2.5 INJECTION, SOLUTION INFILTRATION; PERINEURAL at 11:15

## 2023-10-19 RX ADMIN — TRIAMCINOLONE ACETONIDE 40 MG: 40 INJECTION, SUSPENSION INTRA-ARTICULAR; INTRAMUSCULAR at 11:15

## 2023-10-19 NOTE — PROGRESS NOTES
Chief Complaint: right knee pain    HPI:    Nirali Madison is a 60year old Female who presents today for follow up of right knee pain      Description of symptoms: Patient has known history of right knee osteoarthritis for which she has previously tried both intra-articular cortisone injection as well as viscosupplementation injection. Today, the patient reports that she gets better relief with the corticosteroid injection. She is interested in repeating a right knee corticosteroid injection. She previously received a right knee viscosupplementation injection about 5 months ago. Has noticed a recurrence of the right knee pain gradually. Denies new injury. I have personally reviewed pertinent films in PACS.     Patient Active Problem List   Diagnosis    Moderate recurrent major depression (HCC)    Chronic GERD    Heart disorder    Hyperlipemia    Essential hypertension    Lumbar radiculopathy    Primary osteoarthritis of right hip    Type 2 diabetes mellitus without complication, without long-term current use of insulin (HCC)    Paresthesias in right hand    Calcific tendinitis of right shoulder    Microalbuminuria    Rotator cuff syndrome of right shoulder    Diabetic nephropathy associated with type 2 diabetes mellitus (HCC)    BMI 34.0-34.9,adult    Simple chronic bronchitis (HCC)    Multiple lung nodules on CT    Chronic pain of both knees    Tinea corporis    Upper respiratory tract infection    Stage 3a chronic kidney disease (HCC)        Current Outpatient Medications on File Prior to Visit   Medication Sig Dispense Refill    albuterol (Ventolin HFA) 90 mcg/act inhaler Inhale 2 puffs every 6 (six) hours as needed for wheezing 18 g 1    amLODIPine (NORVASC) 5 mg tablet Take 1 tablet (5 mg total) by mouth daily 90 tablet 1    ARIPiprazole (ABILIFY) 2 mg tablet Take 1 tablet (2 mg total) by mouth daily at bedtime 90 tablet 1    aspirin 81 mg chewable tablet Chew 81 mg       atorvastatin (LIPITOR) 20 mg tablet Take 1 tablet (20 mg total) by mouth every evening 90 tablet 1    clonazePAM (KlonoPIN) 0.5 mg tablet Take 1/2 tab by mouth twice daily and 1 full tab nightly 60 tablet 5    Empagliflozin 25 MG TABS Take 1 tablet (25 mg total) by mouth daily 90 tablet 3    fluticasone (FLONASE) 50 mcg/act nasal spray 1 spray into each nostril daily 18 g 0    Fluticasone-Salmeterol (Wixela Inhub) 250-50 mcg/dose inhaler Inhale 1 puff 2 (two) times a day Rinse mouth after use. 60 blister 5    glipiZIDE (GLUCOTROL) 10 mg tablet Take 1 tablet (10 mg total) by mouth 2 (two) times a day before meals 180 tablet 1    glucose blood (ONE TOUCH ULTRA TEST) test strip Check blood sugar once daily 100 each 11    Lancets (onetouch ultrasoft) lancets Check blood sugar twice/ week. 100 each 5    lisinopril (ZESTRIL) 40 mg tablet Take 1 tablet (40 mg total) by mouth daily 90 tablet 1    metFORMIN (GLUCOPHAGE) 500 mg tablet Take 1 tablet (500 mg total) by mouth 2 (two) times a day with meals 180 tablet 1    NEEDLE, DISP, 30 G (B-D DISP NEEDLE 30GX1") 30G X 1" MISC Use once a week 100 each 5    nystatin (MYCOSTATIN) powder Apply topically 2 (two) times a day 60 g 2    omeprazole (PriLOSEC) 20 mg delayed release capsule Take 1 capsule (20 mg total) by mouth daily 90 capsule 1    PARoxetine (PAXIL) 30 mg tablet Take 1 tablet (30 mg total) by mouth daily 90 tablet 1    semaglutide, 0.25 or 0.5 mg/dose, (Ozempic, 0.25 or 0.5 MG/DOSE,) 2 mg/3 mL injection pen 0.25 mg under the skin every 7 days for 4 doses (28 days), THEN 0.5 mg under the skin every 7 days 9 mL 2    clotrimazole-betamethasone (LOTRISONE) 1-0.05 % cream Apply topically 2 (two) times a day for 14 days 30 g 0     No current facility-administered medications on file prior to visit.         Allergies   Allergen Reactions    Cefdinir Hives        Tobacco Use: Medium Risk (10/19/2023)    Patient History     Smoking Tobacco Use: Former     Smokeless Tobacco Use: Never     Passive Exposure: Not on file        Social Determinants of Health     Tobacco Use: Medium Risk (10/19/2023)    Patient History     Smoking Tobacco Use: Former     Smokeless Tobacco Use: Never     Passive Exposure: Not on file   Alcohol Use: Not on file   Financial Resource Strain: High Risk (2/2/2021)    Overall Financial Resource Strain (CARDIA)     Difficulty of Paying Living Expenses: Hard   Food Insecurity: No Food Insecurity (2/2/2021)    Hunger Vital Sign     Worried About Running Out of Food in the Last Year: Never true     Ran Out of Food in the Last Year: Never true   Transportation Needs: No Transportation Needs (2/2/2021)    PRAPARE - Transportation     Lack of Transportation (Medical): No     Lack of Transportation (Non-Medical): No   Physical Activity: Unknown (2/2/2021)    Exercise Vital Sign     Days of Exercise per Week: 0 days     Minutes of Exercise per Session: Not on file   Stress: Not on file   Social Connections: Not on file   Intimate Partner Violence: Not on file   Depression: Not at risk (11/3/2021)    PHQ-2     PHQ-2 Score: 0   Housing Stability: Not on file   Utilities: Not on file               Review of Systems     Body mass index is 35.98 kg/m². Physical Exam     Ortho Exam:    Body part: right knee    Inspection: No visible deformity or effusion    Palpation: Tender to palpation over the patellofemoral area and the medial joint line    Range of motion: Full extension to 100 degrees of knee flexion    Special Tests: No patellar apprehension. Negative Lachman. Negative valgus and varus stress test.  Negative medial and lateral Lynnette's. Distal Neurovascular Status: Intact, Yes        Large joint arthrocentesis: R knee  Universal Protocol:  Consent: Verbal consent obtained.   Risks and benefits: risks, benefits and alternatives were discussed  Consent given by: patient  Patient understanding: patient states understanding of the procedure being performed  Site marked: the operative site was marked  Radiology Images displayed and confirmed. If images not available, report reviewed: imaging studies available  Required items: required blood products, implants, devices, and special equipment available  Patient identity confirmed: verbally with patient  Supporting Documentation  Indications: pain   Procedure Details  Location: knee - R knee  Preparation: Patient was prepped and draped in the usual sterile fashion  Needle size: 22 G  Ultrasound guidance: no  Approach: anterolateral  Medications administered: 4 mL bupivacaine 0.25 %; 40 mg triamcinolone acetonide 40 mg/mL    Patient tolerance: patient tolerated the procedure well with no immediate complications  Dressing:  Sterile dressing applied             Assessment:     Diagnosis ICD-10-CM Associated Orders   1. Primary osteoarthritis of right knee  M17.11       2. Chronic pain of right knee  M25.561     G89.29            Plan:    Explained my current clinical findings with the patient today. She had a recurrence of the right knee osteoarthritis pain for which she was interested in receiving an intra-articular cortisone injection. This was provided on today's office visit without any immediate complications. I do recommend her to monitor her blood sugar levels over the next week and contact her primary care physician if there is any significant elevation. She will call us back in the future on an as-needed basis. Portions of the record may have been created with voice recognition software. Occasional wrong word or "sound alike" substitutions may have occurred due to the inherent limitations of voice recognition software. Please review the chart carefully and recognize, using context, where substitutions/typographical errors may have occurred.

## 2023-11-08 ENCOUNTER — TELEPHONE (OUTPATIENT)
Dept: PSYCHIATRY | Facility: CLINIC | Age: 58
End: 2023-11-08

## 2023-11-08 NOTE — TELEPHONE ENCOUNTER
Patient is calling regarding cancelling an appointment.     Date/Time: 12/27/2023 at 10 am    Reason: conflict with another appt    Patient was rescheduled: YES [x] NO []  If yes, when was Patient reschedule for: 1/3/2024 at 10:30 am    Patient requesting call back to reschedule: YES [] NO [x]

## 2023-11-15 ENCOUNTER — HOSPITAL ENCOUNTER (OUTPATIENT)
Dept: MAMMOGRAPHY | Facility: HOSPITAL | Age: 58
Discharge: HOME | End: 2023-11-15
Payer: MEDICARE

## 2023-11-15 VITALS — HEIGHT: 63 IN | WEIGHT: 203 LBS | BODY MASS INDEX: 35.97 KG/M2

## 2023-11-15 DIAGNOSIS — Z12.31 VISIT FOR SCREENING MAMMOGRAM: ICD-10-CM

## 2023-11-15 PROCEDURE — 77063 BREAST TOMOSYNTHESIS BI: CPT

## 2023-11-15 PROCEDURE — 77067 SCR MAMMO BI INCL CAD: CPT

## 2023-11-16 ENCOUNTER — TELEPHONE (OUTPATIENT)
Dept: FAMILY MEDICINE CLINIC | Facility: CLINIC | Age: 58
End: 2023-11-16

## 2023-11-16 NOTE — TELEPHONE ENCOUNTER
----- Message from Taya Sultana MD sent at 11/16/2023 11:52 AM EST -----  Please call patient with normal results.

## 2023-11-17 ENCOUNTER — APPOINTMENT (OUTPATIENT)
Dept: LAB | Facility: CLINIC | Age: 58
End: 2023-11-17
Payer: MEDICARE

## 2023-11-17 ENCOUNTER — RA CDI HCC (OUTPATIENT)
Dept: OTHER | Facility: HOSPITAL | Age: 58
End: 2023-11-17

## 2023-11-17 DIAGNOSIS — E78.5 HYPERLIPIDEMIA, UNSPECIFIED HYPERLIPIDEMIA TYPE: ICD-10-CM

## 2023-11-17 DIAGNOSIS — E11.65 UNCONTROLLED TYPE 2 DIABETES MELLITUS WITH HYPERGLYCEMIA (HCC): ICD-10-CM

## 2023-11-17 LAB
ALBUMIN SERPL BCP-MCNC: 4 G/DL (ref 3.5–5)
ALP SERPL-CCNC: 126 U/L (ref 34–104)
ALT SERPL W P-5'-P-CCNC: 15 U/L (ref 7–52)
ANION GAP SERPL CALCULATED.3IONS-SCNC: 10 MMOL/L
AST SERPL W P-5'-P-CCNC: 25 U/L (ref 13–39)
BILIRUB SERPL-MCNC: 0.54 MG/DL (ref 0.2–1)
BUN SERPL-MCNC: 12 MG/DL (ref 5–25)
CALCIUM SERPL-MCNC: 9.6 MG/DL (ref 8.4–10.2)
CHLORIDE SERPL-SCNC: 98 MMOL/L (ref 96–108)
CHOLEST SERPL-MCNC: 197 MG/DL
CO2 SERPL-SCNC: 28 MMOL/L (ref 21–32)
CREAT SERPL-MCNC: 1.11 MG/DL (ref 0.6–1.3)
CREAT UR-MCNC: 64.8 MG/DL
EST. AVERAGE GLUCOSE BLD GHB EST-MCNC: 180 MG/DL
GFR SERPL CREATININE-BSD FRML MDRD: 54 ML/MIN/1.73SQ M
GLUCOSE P FAST SERPL-MCNC: 145 MG/DL (ref 65–99)
HBA1C MFR BLD: 7.9 %
HDLC SERPL-MCNC: 59 MG/DL
LDLC SERPL CALC-MCNC: 111 MG/DL (ref 0–100)
MICROALBUMIN UR-MCNC: <7 MG/L
MICROALBUMIN/CREAT 24H UR: <11 MG/G CREATININE (ref 0–30)
NONHDLC SERPL-MCNC: 138 MG/DL
POTASSIUM SERPL-SCNC: 5.2 MMOL/L (ref 3.5–5.3)
PROT SERPL-MCNC: 7.4 G/DL (ref 6.4–8.4)
SODIUM SERPL-SCNC: 136 MMOL/L (ref 135–147)
TRIGL SERPL-MCNC: 133 MG/DL

## 2023-11-17 PROCEDURE — 83036 HEMOGLOBIN GLYCOSYLATED A1C: CPT

## 2023-11-17 PROCEDURE — 80053 COMPREHEN METABOLIC PANEL: CPT

## 2023-11-17 PROCEDURE — 36415 COLL VENOUS BLD VENIPUNCTURE: CPT

## 2023-11-17 PROCEDURE — 80061 LIPID PANEL: CPT

## 2023-11-28 ENCOUNTER — OFFICE VISIT (OUTPATIENT)
Dept: FAMILY MEDICINE CLINIC | Facility: CLINIC | Age: 58
End: 2023-11-28
Payer: MEDICARE

## 2023-11-28 VITALS
SYSTOLIC BLOOD PRESSURE: 132 MMHG | HEIGHT: 63 IN | HEART RATE: 70 BPM | DIASTOLIC BLOOD PRESSURE: 80 MMHG | WEIGHT: 203 LBS | BODY MASS INDEX: 35.97 KG/M2 | OXYGEN SATURATION: 98 %

## 2023-11-28 DIAGNOSIS — Z78.0 MENOPAUSE: ICD-10-CM

## 2023-11-28 DIAGNOSIS — Z00.00 MEDICARE ANNUAL WELLNESS VISIT, SUBSEQUENT: ICD-10-CM

## 2023-11-28 DIAGNOSIS — E78.5 HYPERLIPIDEMIA, UNSPECIFIED HYPERLIPIDEMIA TYPE: ICD-10-CM

## 2023-11-28 DIAGNOSIS — E11.9 CONTROLLED TYPE 2 DIABETES MELLITUS WITHOUT COMPLICATION, WITHOUT LONG-TERM CURRENT USE OF INSULIN (HCC): Primary | ICD-10-CM

## 2023-11-28 DIAGNOSIS — E11.65 UNCONTROLLED TYPE 2 DIABETES MELLITUS WITH HYPERGLYCEMIA (HCC): ICD-10-CM

## 2023-11-28 DIAGNOSIS — I10 ESSENTIAL HYPERTENSION: ICD-10-CM

## 2023-11-28 DIAGNOSIS — E11.9 TYPE 2 DIABETES MELLITUS WITHOUT COMPLICATION, WITHOUT LONG-TERM CURRENT USE OF INSULIN (HCC): ICD-10-CM

## 2023-11-28 DIAGNOSIS — K21.9 CHRONIC GERD: ICD-10-CM

## 2023-11-28 PROCEDURE — 99214 OFFICE O/P EST MOD 30 MIN: CPT | Performed by: FAMILY MEDICINE

## 2023-11-28 PROCEDURE — G0439 PPPS, SUBSEQ VISIT: HCPCS | Performed by: FAMILY MEDICINE

## 2023-11-28 RX ORDER — ATORVASTATIN CALCIUM 20 MG/1
20 TABLET, FILM COATED ORAL EVERY EVENING
Qty: 90 TABLET | Refills: 1 | Status: SHIPPED | OUTPATIENT
Start: 2023-11-28 | End: 2024-05-26

## 2023-11-28 RX ORDER — LISINOPRIL 40 MG/1
40 TABLET ORAL DAILY
Qty: 90 TABLET | Refills: 1 | Status: SHIPPED | OUTPATIENT
Start: 2023-11-28

## 2023-11-28 RX ORDER — GLIPIZIDE 10 MG/1
10 TABLET ORAL
Qty: 180 TABLET | Refills: 1 | Status: SHIPPED | OUTPATIENT
Start: 2023-11-28

## 2023-11-28 RX ORDER — OMEPRAZOLE 20 MG/1
20 CAPSULE, DELAYED RELEASE ORAL DAILY
Qty: 90 CAPSULE | Refills: 1 | Status: SHIPPED | OUTPATIENT
Start: 2023-11-28

## 2023-11-28 RX ORDER — AMLODIPINE BESYLATE 5 MG/1
5 TABLET ORAL DAILY
Qty: 90 TABLET | Refills: 1 | Status: SHIPPED | OUTPATIENT
Start: 2023-11-28

## 2023-11-28 NOTE — ASSESSMENT & PLAN NOTE
Lab Results   Component Value Date    HGBA1C 7.9 (H) 11/17/2023     A1c stable. Patient is on metformin 500 twice daily, glipizide 10 mg twice daily, Jardiance 25 mg daily. Unable to afford Ozempic, costs are over $400. Provided samples of Jardiance. Recommended to continue with low-carb diet, increase physical activity.   Continue monitoring A1c at 4-month interval.

## 2023-11-28 NOTE — PATIENT INSTRUCTIONS
Medicare Preventive Visit Patient Instructions  Thank you for completing your Welcome to Medicare Visit or Medicare Annual Wellness Visit today. Your next wellness visit will be due in one year (11/28/2024). The screening/preventive services that you may require over the next 5-10 years are detailed below. Some tests may not apply to you based off risk factors and/or age. Screening tests ordered at today's visit but not completed yet may show as past due. Also, please note that scanned in results may not display below. Preventive Screenings:  Service Recommendations Previous Testing/Comments   Colorectal Cancer Screening  * Colonoscopy    * Fecal Occult Blood Test (FOBT)/Fecal Immunochemical Test (FIT)  * Fecal DNA/Cologuard Test  * Flexible Sigmoidoscopy Age: 43-73 years old   Colonoscopy: every 10 years (may be performed more frequently if at higher risk)  OR  FOBT/FIT: every 1 year  OR  Cologuard: every 3 years  OR  Sigmoidoscopy: every 5 years  Screening may be recommended earlier than age 39 if at higher risk for colorectal cancer. Also, an individualized decision between you and your healthcare provider will decide whether screening between the ages of 77-80 would be appropriate. Colonoscopy: 04/17/2023  FOBT/FIT: Not on file  Cologuard: Not on file  Sigmoidoscopy: Not on file    Screening Current     Breast Cancer Screening Age: 36 years old  Frequency: every 1-2 years  Not required if history of left and right mastectomy Mammogram: 11/15/2023    Screening Current   Cervical Cancer Screening Between the ages of 21-29, pap smear recommended once every 3 years. Between the ages of 32-69, can perform pap smear with HPV co-testing every 5 years.    Recommendations may differ for women with a history of total hysterectomy, cervical cancer, or abnormal pap smears in past. Pap Smear: 01/08/2019        Hepatitis C Screening Once for adults born between 1945 and 1965  More frequently in patients at high risk for Hepatitis C Hep C Antibody: Not on file        Diabetes Screening 1-2 times per year if you're at risk for diabetes or have pre-diabetes Fasting glucose: 145 mg/dL (11/17/2023)  A1C: 7.9 % (11/17/2023)  Screening Not Indicated  History Diabetes   Cholesterol Screening Once every 5 years if you don't have a lipid disorder. May order more often based on risk factors. Lipid panel: 11/17/2023    Screening Not Indicated  History Lipid Disorder     Other Preventive Screenings Covered by Medicare:  Abdominal Aortic Aneurysm (AAA) Screening: covered once if your at risk. You're considered to be at risk if you have a family history of AAA. Lung Cancer Screening: covers low dose CT scan once per year if you meet all of the following conditions: (1) Age 48-67; (2) No signs or symptoms of lung cancer; (3) Current smoker or have quit smoking within the last 15 years; (4) You have a tobacco smoking history of at least 20 pack years (packs per day multiplied by number of years you smoked); (5) You get a written order from a healthcare provider. Glaucoma Screening: covered annually if you're considered high risk: (1) You have diabetes OR (2) Family history of glaucoma OR (3)  aged 48 and older OR (3)  American aged 72 and older  Osteoporosis Screening: covered every 2 years if you meet one of the following conditions: (1) You're estrogen deficient and at risk for osteoporosis based off medical history and other findings; (2) Have a vertebral abnormality; (3) On glucocorticoid therapy for more than 3 months; (4) Have primary hyperparathyroidism; (5) On osteoporosis medications and need to assess response to drug therapy. Last bone density test (DXA Scan): Not on file. HIV Screening: covered annually if you're between the age of 14-79. Also covered annually if you are younger than 13 and older than 72 with risk factors for HIV infection.  For pregnant patients, it is covered up to 3 times per pregnancy. Immunizations:  Immunization Recommendations   Influenza Vaccine Annual influenza vaccination during flu season is recommended for all persons aged >= 6 months who do not have contraindications   Pneumococcal Vaccine   * Pneumococcal conjugate vaccine = PCV13 (Prevnar 13), PCV15 (Vaxneuvance), PCV20 (Prevnar 20)  * Pneumococcal polysaccharide vaccine = PPSV23 (Pneumovax) Adults 33-63 yo with certain risk factors or if 69+ yo  If never received any pneumonia vaccine: recommend Prevnar 20 (PCV20)  Give PCV20 if previously received 1 dose of PCV13 or PPSV23   Hepatitis B Vaccine 3 dose series if at intermediate or high risk (ex: diabetes, end stage renal disease, liver disease)   Respiratory syncytial virus (RSV) Vaccine - COVERED BY MEDICARE PART D  * RSVPreF3 (Arexvy) CDC recommends that adults 61years of age and older may receive a single dose of RSV vaccine using shared clinical decision-making (SCDM)   Tetanus (Td) Vaccine - COST NOT COVERED BY MEDICARE PART B Following completion of primary series, a booster dose should be given every 10 years to maintain immunity against tetanus. Td may also be given as tetanus wound prophylaxis. Tdap Vaccine - COST NOT COVERED BY MEDICARE PART B Recommended at least once for all adults. For pregnant patients, recommended with each pregnancy. Shingles Vaccine (Shingrix) - COST NOT COVERED BY MEDICARE PART B  2 shot series recommended in those 19 years and older who have or will have weakened immune systems or those 50 years and older     Health Maintenance Due:      Topic Date Due   • Hepatitis C Screening  Never done   • HIV Screening  Never done   • Cervical Cancer Screening  01/08/2024   • Breast Cancer Screening: Mammogram  11/15/2024   • Colorectal Cancer Screening  04/14/2033   • Lung Cancer Screening  Discontinued     Immunizations Due:  There are no preventive care reminders to display for this patient.   Advance Directives   What are advance directives? Advance directives are legal documents that state your wishes and plans for medical care. These plans are made ahead of time in case you lose your ability to make decisions for yourself. Advance directives can apply to any medical decision, such as the treatments you want, and if you want to donate organs. What are the types of advance directives? There are many types of advance directives, and each state has rules about how to use them. You may choose a combination of any of the following:  Living will: This is a written record of the treatment you want. You can also choose which treatments you do not want, which to limit, and which to stop at a certain time. This includes surgery, medicine, IV fluid, and tube feedings. Durable power of  for [facility]): This is a written record that states who you want to make healthcare choices for you when you are unable to make them for yourself. This person, called a proxy, is usually a family member or a friend. You may choose more than 1 proxy. Do not resuscitate (DNR) order:  A DNR order is used in case your heart stops beating or you stop breathing. It is a request not to have certain forms of treatment, such as CPR. A DNR order may be included in other types of advance directives. Medical directive: This covers the care that you want if you are in a coma, near death, or unable to make decisions for yourself. You can list the treatments you want for each condition. Treatment may include pain medicine, surgery, blood transfusions, dialysis, IV or tube feedings, and a ventilator (breathing machine). Values history: This document has questions about your views, beliefs, and how you feel and think about life. This information can help others choose the care that you would choose. Why are advance directives important? An advance directive helps you control your care.  Although spoken wishes may be used, it is better to have your wishes written down. Spoken wishes can be misunderstood, or not followed. Treatments may be given even if you do not want them. An advance directive may make it easier for your family to make difficult choices about your care. Weight Management   Why it is important to manage your weight:  Being overweight increases your risk of health conditions such as heart disease, high blood pressure, type 2 diabetes, and certain types of cancer. It can also increase your risk for osteoarthritis, sleep apnea, and other respiratory problems. Aim for a slow, steady weight loss. Even a small amount of weight loss can lower your risk of health problems. How to lose weight safely:  A safe and healthy way to lose weight is to eat fewer calories and get regular exercise. You can lose up about 1 pound a week by decreasing the number of calories you eat by 500 calories each day. Healthy meal plan for weight management:  A healthy meal plan includes a variety of foods, contains fewer calories, and helps you stay healthy. A healthy meal plan includes the following:  Eat whole-grain foods more often. A healthy meal plan should contain fiber. Fiber is the part of grains, fruits, and vegetables that is not broken down by your body. Whole-grain foods are healthy and provide extra fiber in your diet. Some examples of whole-grain foods are whole-wheat breads and pastas, oatmeal, brown rice, and bulgur. Eat a variety of vegetables every day. Include dark, leafy greens such as spinach, kale, nissa greens, and mustard greens. Eat yellow and orange vegetables such as carrots, sweet potatoes, and winter squash. Eat a variety of fruits every day. Choose fresh or canned fruit (canned in its own juice or light syrup) instead of juice. Fruit juice has very little or no fiber. Eat low-fat dairy foods. Drink fat-free (skim) milk or 1% milk. Eat fat-free yogurt and low-fat cottage cheese.  Try low-fat cheeses such as mozzarella and other reduced-fat cheeses. Choose meat and other protein foods that are low in fat. Choose beans or other legumes such as split peas or lentils. Choose fish, skinless poultry (chicken or turkey), or lean cuts of red meat (beef or pork). Before you cook meat or poultry, cut off any visible fat. Use less fat and oil. Try baking foods instead of frying them. Add less fat, such as margarine, sour cream, regular salad dressing and mayonnaise to foods. Eat fewer high-fat foods. Some examples of high-fat foods include french fries, doughnuts, ice cream, and cakes. Eat fewer sweets. Limit foods and drinks that are high in sugar. This includes candy, cookies, regular soda, and sweetened drinks. Exercise:  Exercise at least 30 minutes per day on most days of the week. Some examples of exercise include walking, biking, dancing, and swimming. You can also fit in more physical activity by taking the stairs instead of the elevator or parking farther away from stores. Ask your healthcare provider about the best exercise plan for you. © Copyright [employer] 2018 Information is for End User's use only and may not be sold, redistributed or otherwise used for commercial purposes.  All illustrations and images included in CareNotes® are the copyrighted property of [employer] or [address] [place] [address]

## 2023-11-28 NOTE — PROGRESS NOTES
Assessment and Plan:     Problem List Items Addressed This Visit    None  1. Controlled type 2 diabetes mellitus without complication, without long-term current use of insulin (HCC)  Assessment & Plan:    Lab Results   Component Value Date    HGBA1C 7.9 (H) 11/17/2023     A1c stable. Patient is on metformin 500 twice daily, glipizide 10 mg twice daily, Jardiance 25 mg daily. Unable to afford Ozempic, costs are over $400. Provided samples of Jardiance. Recommended to continue with low-carb diet, increase physical activity. Continue monitoring A1c at 4-month interval.    Orders:  -     metFORMIN (GLUCOPHAGE) 500 mg tablet; Take 1 tablet (500 mg total) by mouth 2 (two) times a day with meals  -     Comprehensive metabolic panel; Future; Expected date: 03/27/2024  -     Hemoglobin A1c (w/out EAG) ([facility] ONLY); Future; Expected date: 03/27/2024  -     Hemoglobin A1c (w/out EAG) ([facility] ONLY)    2. Essential hypertension  Assessment & Plan:  Patient's blood pressure is at goal.  Continue amlodipine 5 milligram, lisinopril 40 milligram    Orders:  -     amLODIPine (NORVASC) 5 mg tablet; Take 1 tablet (5 mg total) by mouth daily  -     lisinopril (ZESTRIL) 40 mg tablet; Take 1 tablet (40 mg total) by mouth daily    3. Hyperlipidemia, unspecified hyperlipidemia type  Assessment & Plan:  LDL improved with lifestyle modifications. Continue same. Continue atorvastatin 20    Orders:  -     atorvastatin (LIPITOR) 20 mg tablet; Take 1 tablet (20 mg total) by mouth every evening  -     Lipid Panel with Direct LDL reflex; Future; Expected date: 03/27/2024    4. Uncontrolled type 2 diabetes mellitus with hyperglycemia (HCC)  -     glipiZIDE (GLUCOTROL) 10 mg tablet; Take 1 tablet (10 mg total) by mouth 2 (two) times a day before meals    5.  Type 2 diabetes mellitus without complication, without long-term current use of insulin [facility]  Assessment & Plan:    Lab Results   Component Value Date    HGBA1C 7.9 (H) 11/17/2023     A1c stable. Patient is on metformin 500 twice daily, glipizide 10 mg twice daily, Jardiance 25 mg daily. Unable to afford Ozempic, costs are over $400. Provided samples of Jardiance. Recommended to continue with low-carb diet, increase physical activity. Continue monitoring A1c at 4-month interval.    Orders:  -     glucose blood (ONE TOUCH ULTRA TEST) test strip; Check blood sugar once daily    6. Chronic GERD  Assessment & Plan:  Stable on omeprazole 20 milligram.  Continue same. Orders:  -     omeprazole (PriLOSEC) 20 mg delayed release capsule; Take 1 capsule (20 mg total) by mouth daily    7. Menopause  -     DXA bone density spine hip and pelvis; Future; Expected date: 11/28/2023    8. Medicare annual wellness visit, subsequent  Assessment & Plan:  UTD             Preventive health issues were discussed with patient, and age appropriate screening tests were ordered as noted in patient's After Visit Summary. Personalized health advice and appropriate referrals for health education or preventive services given if needed, as noted in patient's After Visit Summary. History of Present Illness:     Patient presents for a Medicare Wellness Visit    HPI     Patient is here for routine follow-up. Has history of hypertension, type 2 diabetes, dyslipidemia. Metabolic labs reveal N2U of 7.9, blood pressure and LDL are at goal.  Patient denies any symptoms of chest pain or shortness of breath. Patient was started on Ozempic however she is unable to afford the medication. Tolerating Jardiance well without any side effects. Also on metformin and glipizide. Her A1c is stable. Patient denies any symptoms of chest pain or shortness of breath. Patient Care Team:  Mallory Muhammad MD as PCP - General (Family Medicine)  MD Heather Melgoza MD     Review of Systems:     Review of Systems   Constitutional: Negative. Respiratory: Negative. Cardiovascular: Negative.          Problem List: Patient Active Problem List   Diagnosis    Moderate recurrent major depression ([address])    Chronic GERD    Heart disorder    Hyperlipemia    Essential hypertension    Lumbar radiculopathy    Primary osteoarthritis of right hip    Type 2 diabetes mellitus without complication, without long-term current use of insulin (HCC)    Paresthesias in right hand    Calcific tendinitis of right shoulder    Microalbuminuria    Rotator cuff syndrome of right shoulder    Diabetic nephropathy associated with type 2 diabetes mellitus (HCC)    BMI 34.0-34.9,adult    Simple chronic bronchitis (HCC)    Multiple lung nodules on CT    Chronic pain of both knees    Tinea corporis    Upper respiratory tract infection    Stage 3a chronic kidney disease (HCC)      Past Medical and Surgical History:     Past Medical History:   Diagnosis Date    Anxiety     Colon polyp     Depression     Diabetes mellitus (HCC)     Diverticulosis     GERD (gastroesophageal reflux disease)     Headache     Hyperlipidemia     Hypertension     Psychiatric disorder     bipolar    Right clavicle fracture     Sinus congestion     TIA (transient ischemic attack)     Vitamin D deficiency      Past Surgical History:   Procedure Laterality Date    BLADDER SURGERY      Sling    LUMBAR EPIDURAL INJECTION N/A 1/4/2023    Procedure: L5 S1  LUMBAR epidural steroid injection (01126);   Surgeon: Christiano De Oliveira DO;  Location: [facility] MAIN OR;  Service: Pain Management     IA ARTHRS HIP DEBRIDEMENT/SHAVING ARTICULAR CRTLG Right 7/13/2017    Procedure: RIGHT HIP ARTHROSCOPIC LABRAL DEBRIDEMENT;  Surgeon: Abbie Mojica MD;  Location: [facility];  Service: Orthopedics    SINUS SURGERY      3 surgeries     TUBAL LIGATION        Family History:     Family History   Problem Relation Age of Onset    Cervical cancer Mother     Ovarian cancer Mother 35    Uterine cancer Mother     Cancer Father     Hypertension Father     Other Father         pre-diabetes    Diabetes Father Diabetes type I Sister     Osteoporosis Sister     Depression Sister     Breast cancer Sister 79    Diabetes Sister     No Known Problems Sister     No Known Problems Sister     No Known Problems Sister     No Known Problems Daughter     No Known Problems Maternal Grandmother     No Known Problems Maternal Grandfather     Other Paternal Grandmother         Parkinson's Disease    Heart attack Paternal Grandfather     Other Paternal Grandfather         coronary arteriosclerosis    Heart attack Brother          of an MI at 50y/o    Diabetes Brother     No Known Problems Son     Alcohol abuse Paternal Uncle     Seizures Other     Seizures Other     No Known Problems Paternal Aunt     No Known Problems Paternal Aunt     No Known Problems Maternal Aunt       Social History:     Social History     Socioeconomic History    Marital status:      Spouse name: Not on file    Number of children: 2    Years of education: Not on file    Highest education level: Not on file   Occupational History    Occupation: Unemployed due to Rt hip injury     Comment: and lower back injury    Occupation: Used to be a     Occupation: Full Disability as of late 2019     Comment: based on medical issues   Tobacco Use    Smoking status: Former     Packs/day: 2.00     Years: 40.00     Total pack years: 80.00     Types: Cigarettes    Smokeless tobacco: Never    Tobacco comments:     quit 2020   Vaping Use    Vaping Use: Never used   Substance and Sexual Activity    Alcohol use: Not Currently    Drug use: Yes     Types: Marijuana     Comment: Smoked THC between 19 and 30y/o    Sexual activity: Yes     Partners: Male     Comment: Boyfriend   Other Topics Concern    Not on file   Social History Narrative    Caffeine use        Home: Lives with boyfriend        Children from first  and most recent boyfriend respectively: Son born  Daughter         Education:    Pt denies any h/o learning disabilities and reached childhood milestones on time as far as she knows    Graduated HS 1982    Did a 9 month Business Ops course in the         Most recent tobacco use screenin2018      Social Determinants of Health     Financial Resource Strain: High Risk (2021)    Overall Financial Resource Strain (CARDIA)     Difficulty of Paying Living Expenses: Hard   Food Insecurity: No Food Insecurity (2021)    Hunger Vital Sign     Worried About Running Out of Food in the Last Year: Never true     Ran Out of Food in the Last Year: Never true   Transportation Needs: No Transportation Needs (2021)    PRAPARE - Transportation     Lack of Transportation (Medical): No     Lack of Transportation (Non-Medical):  No   Physical Activity: Unknown (2021)    Exercise Vital Sign     Days of Exercise per Week: 0 days     Minutes of Exercise per Session: Not on file   Stress: Not on file   Social Connections: Not on file   Intimate Partner Violence: Not on file   Housing Stability: Not on file      Medications and Allergies:     Current Outpatient Medications   Medication Sig Dispense Refill    albuterol (Ventolin HFA) 90 mcg/act inhaler Inhale 2 puffs every 6 (six) hours as needed for wheezing 18 g 1    amLODIPine (NORVASC) 5 mg tablet Take 1 tablet (5 mg total) by mouth daily 90 tablet 1    ARIPiprazole (ABILIFY) 2 mg tablet Take 1 tablet (2 mg total) by mouth daily at bedtime 90 tablet 1    aspirin 81 mg chewable tablet Chew 81 mg       atorvastatin (LIPITOR) 20 mg tablet Take 1 tablet (20 mg total) by mouth every evening 90 tablet 1    clonazePAM (KlonoPIN) 0.5 mg tablet Take 1/2 tab by mouth twice daily and 1 full tab nightly 60 tablet 5    Empagliflozin 25 MG TABS Take 1 tablet (25 mg total) by mouth daily 90 tablet 3    fluticasone (FLONASE) 50 mcg/act nasal spray 1 spray into each nostril daily 18 g 0    Fluticasone-Salmeterol (Wixela Inhub) 250-50 mcg/dose inhaler Inhale 1 puff 2 (two) times a day Rinse mouth after use. 60 blister 5    glipiZIDE (GLUCOTROL) 10 mg tablet Take 1 tablet (10 mg total) by mouth 2 (two) times a day before meals 180 tablet 1    glucose blood (ONE TOUCH ULTRA TEST) test strip Check blood sugar once daily 100 each 11    Lancets (onetouch ultrasoft) lancets Check blood sugar twice/ week. 100 each 5    lisinopril (ZESTRIL) 40 mg tablet Take 1 tablet (40 mg total) by mouth daily 90 tablet 1    metFORMIN (GLUCOPHAGE) 500 mg tablet Take 1 tablet (500 mg total) by mouth 2 (two) times a day with meals 180 tablet 1    NEEDLE, DISP, 30 G (B-D DISP NEEDLE 30GX1") 30G X 1" MISC Use once a week 100 each 5    nystatin (MYCOSTATIN) powder Apply topically 2 (two) times a day 60 g 2    omeprazole (PriLOSEC) 20 mg delayed release capsule Take 1 capsule (20 mg total) by mouth daily 90 capsule 1    PARoxetine (PAXIL) 30 mg tablet Take 1 tablet (30 mg total) by mouth daily 90 tablet 1    semaglutide, 0.25 or 0.5 mg/dose, (Ozempic, 0.25 or 0.5 MG/DOSE,) 2 mg/3 mL injection pen 0.25 mg under the skin every 7 days for 4 doses (28 days), THEN 0.5 mg under the skin every 7 days 9 mL 2    clotrimazole-betamethasone (LOTRISONE) 1-0.05 % cream Apply topically 2 (two) times a day for 14 days 30 g 0     No current facility-administered medications for this visit.      Allergies   Allergen Reactions    Cefdinir Hives      Immunizations:     Immunization History   Administered Date(s) Administered    COVID-19 PFIZER VACCINE 0.3 ML IM 02/12/2021, 03/03/2021, 10/16/2021    COVID-19 Pfizer Vac BIVALENT Elliott-sucrose 12 Yr+ IM 10/01/2022    INFLUENZA 01/01/2013, 09/21/2020, 10/07/2021, 09/14/2022, 09/07/2023    Influenza, recombinant, quadrivalent,injectable, preservative free 11/27/2019    Pneumococcal Conjugate 13-Valent 01/08/2019    Pneumococcal Polysaccharide PPV23 02/27/2020    Tdap 11/03/2021    Tetanus, adsorbed 01/01/2009      Health Maintenance:         Topic Date Due    Hepatitis C Screening  Never done    HIV Screening Never done    Cervical Cancer Screening  01/08/2024    Breast Cancer Screening: Mammogram  11/15/2024    Colorectal Cancer Screening  04/14/2033    Lung Cancer Screening  Discontinued     There are no preventive care reminders to display for this patient. Medicare Screening Tests and Risk Assessments:     Bruno Miranda is here for her Subsequent Wellness visit. Last Medicare Wellness visit information reviewed, patient interviewed, no change since last AWV. Health Risk Assessment:   Patient rates overall health as very good. Patient feels that their physical health rating is slightly better. Patient is very satisfied with their life. Eyesight was rated as same. Hearing was rated as same. Patient feels that their emotional and mental health rating is much better. Patients states they are never, rarely angry. Pain experienced in the last 7 days has been none. Patient states that she has experienced no weight loss or gain in last 6 months. Depression Screening:   PHQ-9 Score: 0      Fall Risk Screening: In the past year, patient has experienced: no history of falling in past year      Urinary Incontinence Screening:   Patient has not leaked urine accidently in the last six months. Home Safety:  Patient does not have trouble with stairs inside or outside of their home. Patient has working smoke alarms and has working carbon monoxide detector. Home safety hazards include: none. Nutrition:   Current diet is Regular. Medications:   Patient is not currently taking any over-the-counter supplements. Patient is able to manage medications. Activities of Daily Living (ADLs)/Instrumental Activities of Daily Living (IADLs):   Walk and transfer into and out of bed and chair?: Yes  Dress and groom yourself?: Yes    Bathe or shower yourself?: Yes    Feed yourself?  Yes  Do your laundry/housekeeping?: Yes  Manage your money, pay your bills and track your expenses?: Yes  Make your own meals?: Yes    Do your own shopping?: Yes    Previous Hospitalizations:   Any hospitalizations or ED visits within the last 12 months?: No      Cognitive Screening:   Provider or family/friend/caregiver concerned regarding cognition?: No    PREVENTIVE SCREENINGS      Cardiovascular Screening:    General: Screening Not Indicated and History Lipid Disorder      Diabetes Screening:     General: Screening Not Indicated and History Diabetes      Colorectal Cancer Screening:     General: Screening Current      Breast Cancer Screening:     General: Screening Current      Osteoporosis Screening:    General: Risks and Benefits Discussed      Abdominal Aortic Aneurysm (AAA) Screening:        General: Screening Not Indicated      Lung Cancer Screening:     General: Screening Current      Hepatitis C Screening:    General: Patient Declines    Screening, Brief Intervention, and Referral to Treatment (SBIRT)    Screening  Typical number of drinks in a day: 0  Typical number of drinks in a week: 0  Interpretation: Low risk drinking behavior. Single Item Drug Screening:  How often have you used an illegal drug (including marijuana) or a prescription medication for non-medical reasons in the past year? never    Single Item Drug Screen Score: 0  Interpretation: Negative screen for possible drug use disorder    Other Counseling Topics:   Car/seat belt/driving safety, skin self-exam, sunscreen and regular weightbearing exercise and calcium and vitamin D intake. No results found. Physical Exam:     /80   Pulse 70   Ht 5' 3" (1.6 m)   Wt 92.1 kg (203 lb)   SpO2 98%   BMI 35.96 kg/m²     Physical Exam  Constitutional:       Appearance: Normal appearance. Cardiovascular:      Heart sounds: Normal heart sounds. Pulmonary:      Breath sounds: Normal breath sounds. Musculoskeletal:      Right lower leg: No edema. Left lower leg: No edema. Neurological:      Mental Status: She is oriented to person, place, and time.    Psychiatric: Mood and Affect: Mood normal.          Taylor Wilcox MD

## 2024-01-02 ENCOUNTER — TELEPHONE (OUTPATIENT)
Dept: PSYCHIATRY | Facility: CLINIC | Age: 59
End: 2024-01-02

## 2024-01-02 NOTE — TELEPHONE ENCOUNTER
Patient is calling regarding cancelling an appointment.    Date/Time: 1/3/2024  at 10:30 am    Reason: Patient has no ride and is unable to do virtual. Patient stated her boyfriend broke his neck.     Patient was rescheduled: YES [x] NO []  If yes, when was Patient reschedule for: 3/4/2024 at 10  am    Patient requesting call back to reschedule: YES [] NO [x]

## 2024-01-11 ENCOUNTER — TELEPHONE (OUTPATIENT)
Age: 59
End: 2024-01-11

## 2024-01-11 DIAGNOSIS — J41.0 SIMPLE CHRONIC BRONCHITIS (HCC): Primary | ICD-10-CM

## 2024-01-11 DIAGNOSIS — J01.10 ACUTE NON-RECURRENT FRONTAL SINUSITIS: ICD-10-CM

## 2024-01-11 RX ORDER — AZITHROMYCIN 250 MG/1
TABLET, FILM COATED ORAL
Qty: 6 TABLET | Refills: 0 | Status: SHIPPED | OUTPATIENT
Start: 2024-01-11 | End: 2024-01-12 | Stop reason: SDUPTHER

## 2024-01-11 NOTE — TELEPHONE ENCOUNTER
I spoke with Natalie and asked if she could do a virtual visit and she stated that she does not have a smart phone or a computer.  She has no transportation to get anywhere.  Her symptoms started yesterday and her highest temp was 99.9.  She has not done any covid testing. I advised that I would send the message to you.

## 2024-01-11 NOTE — TELEPHONE ENCOUNTER
The patient called she has been sick for the last two days   she has head congestion she has a sore throat  she has a non productive cough   she has a slight fever    she has no transportation   she would like something called into  [facility]   thank you

## 2024-01-12 DIAGNOSIS — J01.10 ACUTE NON-RECURRENT FRONTAL SINUSITIS: ICD-10-CM

## 2024-01-12 RX ORDER — AZITHROMYCIN 250 MG/1
TABLET, FILM COATED ORAL
Qty: 6 TABLET | Refills: 0 | Status: SHIPPED | OUTPATIENT
Start: 2024-01-12 | End: 2024-01-16

## 2024-01-12 NOTE — TELEPHONE ENCOUNTER
Patient called and stated medication went to wrong pharmacy.      azithromycin (ZITHROMAX) 250 mg tablet     [facility] in [place]    Please take [facility] out of patients chart

## 2024-01-27 PROBLEM — Z00.00 MEDICARE ANNUAL WELLNESS VISIT, SUBSEQUENT: Status: RESOLVED | Noted: 2018-06-05 | Resolved: 2024-01-27

## 2024-01-29 ENCOUNTER — OFFICE VISIT (OUTPATIENT)
Dept: FAMILY MEDICINE CLINIC | Facility: CLINIC | Age: 59
End: 2024-01-29
Payer: MEDICARE

## 2024-01-29 ENCOUNTER — APPOINTMENT (OUTPATIENT)
Dept: RADIOLOGY | Facility: CLINIC | Age: 59
End: 2024-01-29
Payer: MEDICARE

## 2024-01-29 VITALS
BODY MASS INDEX: 35.08 KG/M2 | WEIGHT: 198 LBS | SYSTOLIC BLOOD PRESSURE: 124 MMHG | OXYGEN SATURATION: 98 % | DIASTOLIC BLOOD PRESSURE: 78 MMHG | HEIGHT: 63 IN | RESPIRATION RATE: 18 BRPM | TEMPERATURE: 98.2 F | HEART RATE: 102 BPM

## 2024-01-29 DIAGNOSIS — R05.3 POST-COVID CHRONIC COUGH: ICD-10-CM

## 2024-01-29 DIAGNOSIS — J41.0 SIMPLE CHRONIC BRONCHITIS (HCC): Primary | ICD-10-CM

## 2024-01-29 DIAGNOSIS — F33.1 MODERATE RECURRENT MAJOR DEPRESSION (HCC): ICD-10-CM

## 2024-01-29 DIAGNOSIS — U09.9 POST-COVID CHRONIC COUGH: ICD-10-CM

## 2024-01-29 PROBLEM — J06.9 UPPER RESPIRATORY TRACT INFECTION: Status: RESOLVED | Noted: 2023-02-16 | Resolved: 2024-01-29

## 2024-01-29 PROCEDURE — 99214 OFFICE O/P EST MOD 30 MIN: CPT | Performed by: FAMILY MEDICINE

## 2024-01-29 RX ORDER — DOXYCYCLINE HYCLATE 100 MG/1
100 CAPSULE ORAL EVERY 12 HOURS SCHEDULED
Qty: 14 CAPSULE | Refills: 0 | Status: SHIPPED | OUTPATIENT
Start: 2024-01-29 | End: 2024-02-05

## 2024-01-29 RX ORDER — DEXTROMETHORPHAN HYDROBROMIDE AND PROMETHAZINE HYDROCHLORIDE 15; 6.25 MG/5ML; MG/5ML
5 SYRUP ORAL 4 TIMES DAILY PRN
Qty: 180 ML | Refills: 0 | Status: SHIPPED | OUTPATIENT
Start: 2024-01-29

## 2024-01-29 NOTE — PROGRESS NOTES
Subjective:      Patient ID: Natalie Gomes is a 58 y.o. female.    58-year-old female presents to the office complaining of persistent cough and fatigue for over 3 weeks.  She states that 3 weeks ago she did test positive for COVID-19 and had respiratory symptoms for approximately 1 week.  She did not have any treatment at that time.  She is a longtime smoker having quit 2 years ago.  She does use Wixela inhaler.  She states that she is not having any significant nasal congestion but does have coughing fits.  Loose cough productive of thick yellow/green mucus.  Patient did call into the office on January 11 for similar symptoms and PCP did send in a prescription for course of Zithromax.  Patient was not seen as she did not have transportation from up in the [facility].  No fever or chills.  She did have fever when she had COVID-19 infection.  No overt shortness of breath        Past Medical History:   Diagnosis Date   • Anxiety    • Colon polyp    • Depression    • Diabetes mellitus (HCC)    • Diverticulosis    • GERD (gastroesophageal reflux disease)    • Headache    • Hyperlipidemia    • Hypertension    • Psychiatric disorder     bipolar   • Right clavicle fracture    • Sinus congestion    • TIA (transient ischemic attack)    • Vitamin D deficiency        Family History   Problem Relation Age of Onset   • Cervical cancer Mother    • Ovarian cancer Mother 33   • Uterine cancer Mother    • Cancer Father    • Hypertension Father    • Other Father         pre-diabetes   • Diabetes Father    • Diabetes type I Sister    • Osteoporosis Sister    • Depression Sister    • Breast cancer Sister 67   • Diabetes Sister    • No Known Problems Sister    • No Known Problems Sister    • No Known Problems Sister    • No Known Problems Daughter    • No Known Problems Maternal Grandmother    • No Known Problems Maternal Grandfather    • Other Paternal Grandmother         Parkinson's Disease   • Heart attack Paternal Grandfather     • Other Paternal Grandfather         coronary arteriosclerosis   • Heart attack Brother          of an MI at 46y/o   • Diabetes Brother    • No Known Problems Son    • Alcohol abuse Paternal Uncle    • Seizures Other    • Seizures Other    • No Known Problems Paternal Aunt    • No Known Problems Paternal Aunt    • No Known Problems Maternal Aunt        Past Surgical History:   Procedure Laterality Date   • BLADDER SURGERY      Sling   • LUMBAR EPIDURAL INJECTION N/A 2023    Procedure: L5 S1  LUMBAR epidural steroid injection (84394);  Surgeon: Molina Costa DO;  Location: [facility] MAIN OR;  Service: Pain Management    • VT ARTHRS HIP DEBRIDEMENT/SHAVING ARTICULAR CRTLG Right 2017    Procedure: RIGHT HIP ARTHROSCOPIC LABRAL DEBRIDEMENT;  Surgeon: Kimo Jamil MD;  Location: [facility];  Service: Orthopedics   • SINUS SURGERY      3 surgeries    • TUBAL LIGATION          reports that she quit smoking about 14 months ago. Her smoking use included cigarettes. She started smoking about 41 years ago. She has a 80.0 pack-year smoking history. She has never used smokeless tobacco. She reports that she does not currently use alcohol. She reports current drug use. Drug: Marijuana.      Current Outpatient Medications:   •  albuterol (Ventolin HFA) 90 mcg/act inhaler, Inhale 2 puffs every 6 (six) hours as needed for wheezing, Disp: 18 g, Rfl: 1  •  amLODIPine (NORVASC) 5 mg tablet, Take 1 tablet (5 mg total) by mouth daily, Disp: 90 tablet, Rfl: 1  •  ARIPiprazole (ABILIFY) 2 mg tablet, Take 1 tablet (2 mg total) by mouth daily at bedtime, Disp: 90 tablet, Rfl: 1  •  aspirin 81 mg chewable tablet, Chew 81 mg , Disp: , Rfl:   •  atorvastatin (LIPITOR) 20 mg tablet, Take 1 tablet (20 mg total) by mouth every evening, Disp: 90 tablet, Rfl: 1  •  clonazePAM (KlonoPIN) 0.5 mg tablet, Take 1/2 tab by mouth twice daily and 1 full tab nightly, Disp: 60 tablet, Rfl: 5  •  doxycycline hyclate (VIBRAMYCIN) 100 mg  "capsule, Take 1 capsule (100 mg total) by mouth every 12 (twelve) hours for 7 days, Disp: 14 capsule, Rfl: 0  •  Empagliflozin 25 MG TABS, Take 1 tablet (25 mg total) by mouth daily, Disp: 90 tablet, Rfl: 3  •  fluticasone (FLONASE) 50 mcg/act nasal spray, 1 spray into each nostril daily, Disp: 18 g, Rfl: 0  •  Fluticasone-Salmeterol (Wixela Inhub) 250-50 mcg/dose inhaler, Inhale 1 puff 2 (two) times a day Rinse mouth after use., Disp: 60 blister, Rfl: 5  •  glipiZIDE (GLUCOTROL) 10 mg tablet, Take 1 tablet (10 mg total) by mouth 2 (two) times a day before meals, Disp: 180 tablet, Rfl: 1  •  glucose blood (ONE TOUCH ULTRA TEST) test strip, Check blood sugar once daily, Disp: 100 each, Rfl: 11  •  Lancets (onetouch ultrasoft) lancets, Check blood sugar twice/ week., Disp: 100 each, Rfl: 5  •  lisinopril (ZESTRIL) 40 mg tablet, Take 1 tablet (40 mg total) by mouth daily, Disp: 90 tablet, Rfl: 1  •  metFORMIN (GLUCOPHAGE) 500 mg tablet, Take 1 tablet (500 mg total) by mouth 2 (two) times a day with meals, Disp: 180 tablet, Rfl: 1  •  NEEDLE, DISP, 30 G (B-D DISP NEEDLE 30GX1\") 30G X 1\" MISC, Use once a week, Disp: 100 each, Rfl: 5  •  nystatin (MYCOSTATIN) powder, Apply topically 2 (two) times a day, Disp: 60 g, Rfl: 2  •  omeprazole (PriLOSEC) 20 mg delayed release capsule, Take 1 capsule (20 mg total) by mouth daily, Disp: 90 capsule, Rfl: 1  •  promethazine-dextromethorphan (PHENERGAN-DM) 6.25-15 mg/5 mL oral syrup, Take 5 mL by mouth 4 (four) times a day as needed for cough, Disp: 180 mL, Rfl: 0  •  clotrimazole-betamethasone (LOTRISONE) 1-0.05 % cream, Apply topically 2 (two) times a day for 14 days, Disp: 30 g, Rfl: 0  •  PARoxetine (PAXIL) 30 mg tablet, Take 1 tablet (30 mg total) by mouth daily, Disp: 90 tablet, Rfl: 1    The following portions of the patient's history were reviewed and updated as appropriate: allergies, current medications, past family history, past medical history, past social history, past " "surgical history and problem list.    Review of Systems   Constitutional:  Positive for fatigue. Negative for activity change, appetite change, chills, diaphoresis, fever and unexpected weight change.   HENT:  Negative for congestion, ear pain, postnasal drip, rhinorrhea and sore throat.    Respiratory:  Positive for cough. Negative for chest tightness, shortness of breath, wheezing and stridor.    Cardiovascular: Negative.    All other systems reviewed and are negative.          Objective:    /78   Pulse 102   Temp 98.2 °F (36.8 °C)   Resp 18   Ht 5' 3\" (1.6 m)   Wt 89.8 kg (198 lb)   SpO2 98%   BMI 35.07 kg/m²      Physical Exam  Vitals and nursing note reviewed.   Constitutional:       General: She is not in acute distress.     Appearance: She is well-developed. She is obese. She is not ill-appearing or toxic-appearing.   HENT:      Head: Normocephalic and atraumatic.      Right Ear: Hearing, tympanic membrane, ear canal and external ear normal.      Left Ear: Hearing, tympanic membrane and external ear normal.      Nose: No nasal deformity, septal deviation or mucosal edema.      Right Sinus: No maxillary sinus tenderness or frontal sinus tenderness.      Left Sinus: No maxillary sinus tenderness or frontal sinus tenderness.      Mouth/Throat:      Pharynx: No oropharyngeal exudate.   Eyes:      Extraocular Movements: Extraocular movements intact.      Conjunctiva/sclera: Conjunctivae normal.      Pupils: Pupils are equal, round, and reactive to light.   Cardiovascular:      Rate and Rhythm: Normal rate and regular rhythm.   Pulmonary:      Effort: Pulmonary effort is normal. No accessory muscle usage or respiratory distress.      Breath sounds: Rhonchi present. No wheezing or rales.   Musculoskeletal:      Cervical back: Normal range of motion and neck supple.   Neurological:      Mental Status: She is alert.           No results found for this or any previous visit (from the past 1008 " hour(s)).    Assessment/Plan:    Simple chronic bronchitis (HCC)  -Patient was a longtime smoker having quit 2 years ago    -Patient continues using her Wixela however she has had productive cough following COVID-19 infection 3 weeks ago.    -Patient will be sent for chest x-ray and will be placed on course of doxycycline.  Patient already had been prescribed a course of Zithromax 2 weeks ago without any improvement in her symptoms    -Limited options for antibiotics as patient does have history of hives with Omnicef use which eliminates the use of penicillins.  She is on other medication such as Abilify which would interact with araceli quinolone.  Patient will be placed on 1 week course of doxycycline    Post-COVID chronic cough  Explained to the patient that the COVID-19 associated cough can linger for some time especially in someone who was a prior longtime smoker    -Patient given prescription for Promethazine DM    -Patient will continue using her Wixela inhaler    Moderate recurrent major depression (HCC)  Managed by psychiatrist.  She is on Abilify.  Because of some of her psychiatric medication and interactions I declined to place her on floroquinolone.  Doxycycline should be tolerable          Problem List Items Addressed This Visit        Respiratory    Simple chronic bronchitis (HCC) - Primary     -Patient was a longtime smoker having quit 2 years ago    -Patient continues using her Wixela however she has had productive cough following COVID-19 infection 3 weeks ago.    -Patient will be sent for chest x-ray and will be placed on course of doxycycline.  Patient already had been prescribed a course of Zithromax 2 weeks ago without any improvement in her symptoms    -Limited options for antibiotics as patient does have history of hives with Omnicef use which eliminates the use of penicillins.  She is on other medication such as Abilify which would interact with araceli quinolone.  Patient will be placed on 1  week course of doxycycline         Relevant Medications    doxycycline hyclate (VIBRAMYCIN) 100 mg capsule    promethazine-dextromethorphan (PHENERGAN-DM) 6.25-15 mg/5 mL oral syrup    Other Relevant Orders    XR chest pa & lateral       Other    Moderate recurrent major depression (HCC)     Managed by psychiatrist.  She is on Abilify.  Because of some of her psychiatric medication and interactions I declined to place her on floroquinolone.  Doxycycline should be tolerable         Post-COVID chronic cough     Explained to the patient that the COVID-19 associated cough can linger for some time especially in someone who was a prior longtime smoker    -Patient given prescription for Promethazine DM    -Patient will continue using her Wixela inhaler

## 2024-01-29 NOTE — ASSESSMENT & PLAN NOTE
Explained to the patient that the COVID-19 associated cough can linger for some time especially in someone who was a prior longtime smoker    -Patient given prescription for Promethazine DM    -Patient will continue using her Wixela inhaler

## 2024-01-29 NOTE — ASSESSMENT & PLAN NOTE
Managed by psychiatrist.  She is on Abilify.  Because of some of her psychiatric medication and interactions I declined to place her on floroquinolone.  Doxycycline should be tolerable

## 2024-01-29 NOTE — ASSESSMENT & PLAN NOTE
-Patient was a longtime smoker having quit 2 years ago    -Patient continues using her Wixela however she has had productive cough following COVID-19 infection 3 weeks ago.    -Patient will be sent for chest x-ray and will be placed on course of doxycycline.  Patient already had been prescribed a course of Zithromax 2 weeks ago without any improvement in her symptoms    -Limited options for antibiotics as patient does have history of hives with Omnicef use which eliminates the use of penicillins.  She is on other medication such as Abilify which would interact with araceli quinolone.  Patient will be placed on 1 week course of doxycycline

## 2024-01-30 ENCOUNTER — TELEPHONE (OUTPATIENT)
Dept: FAMILY MEDICINE CLINIC | Facility: CLINIC | Age: 59
End: 2024-01-30

## 2024-01-30 NOTE — TELEPHONE ENCOUNTER
----- Message from Fei Morel DO sent at 1/30/2024  8:00 AM EST -----  Please call patient and notify that results are normal.  Chest x-ray shows no evidence of cardiopulmonary disease.  No evidence of pneumonia.  Take course of antibiotics as prescribed.  Contact the office to be seen by PCP if no improvement after completion of antibiotics

## 2024-02-06 DIAGNOSIS — F41.0 PANIC ATTACKS: ICD-10-CM

## 2024-02-06 DIAGNOSIS — F33.1 MODERATE RECURRENT MAJOR DEPRESSION (HCC): ICD-10-CM

## 2024-02-06 DIAGNOSIS — F41.1 GENERALIZED ANXIETY DISORDER: ICD-10-CM

## 2024-02-06 RX ORDER — CLONAZEPAM 0.5 MG/1
TABLET ORAL
Qty: 60 TABLET | Refills: 5 | Status: SHIPPED | OUTPATIENT
Start: 2024-02-06

## 2024-02-06 RX ORDER — ARIPIPRAZOLE 2 MG/1
2 TABLET ORAL
Qty: 90 TABLET | Refills: 0 | Status: SHIPPED | OUTPATIENT
Start: 2024-02-06

## 2024-02-06 RX ORDER — PAROXETINE 30 MG/1
30 TABLET, FILM COATED ORAL DAILY
Qty: 90 TABLET | Refills: 0 | Status: SHIPPED | OUTPATIENT
Start: 2024-02-06

## 2024-02-06 NOTE — TELEPHONE ENCOUNTER
Medication Refill Request     Name of Medication paxil   Dose/Frequency 30mg take 1 tablet by mouth daily  Quantity 90  Verified pharmacy   [x]  Verified ordering Provider   [x]  Does patient have enough for the next 3 days? Yes [x] No []  Does patient have a follow-up appointment scheduled? Yes [x] No []   If so when is appointment: 3/4/2024 10am    Medication Refill Request     Name of Medication klonopin  Dose/Frequency 0.5mg take 1/2 tab by mouth twice daily and 1 full tab nightly  Quantity 60  Verified pharmacy   [x]  Verified ordering Provider   [x]  Does patient have enough for the next 3 days? Yes [x] No []  Does patient have a follow-up appointment scheduled? Yes [x] No []   If so when is appointment: 3/4/2024 10am      Medication Refill Request     Name of Medication abilify  Dose/Frequency 2mg take 1 tablet by mouth daily at bedtime  Quantity 90  Verified pharmacy   [x]  Verified ordering Provider   [x]  Does patient have enough for the next 3 days? Yes [x] No []  Does patient have a follow-up appointment scheduled? Yes [x] No []   If so when is appointment: 3/4/2024 10am
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
## 2024-03-04 ENCOUNTER — OFFICE VISIT (OUTPATIENT)
Dept: PSYCHIATRY | Facility: CLINIC | Age: 59
End: 2024-03-04
Payer: MEDICARE

## 2024-03-04 VITALS — WEIGHT: 198.4 LBS | BODY MASS INDEX: 35.14 KG/M2

## 2024-03-04 DIAGNOSIS — F41.1 GENERALIZED ANXIETY DISORDER: Primary | ICD-10-CM

## 2024-03-04 DIAGNOSIS — F33.1 MODERATE RECURRENT MAJOR DEPRESSION (HCC): ICD-10-CM

## 2024-03-04 DIAGNOSIS — F41.0 PANIC ATTACKS: ICD-10-CM

## 2024-03-04 PROCEDURE — 99213 OFFICE O/P EST LOW 20 MIN: CPT | Performed by: PHYSICIAN ASSISTANT

## 2024-03-04 RX ORDER — PAROXETINE 30 MG/1
30 TABLET, FILM COATED ORAL DAILY
Qty: 90 TABLET | Refills: 0 | Status: SHIPPED | OUTPATIENT
Start: 2024-03-04

## 2024-03-04 RX ORDER — ARIPIPRAZOLE 2 MG/1
2 TABLET ORAL
Qty: 90 TABLET | Refills: 0 | Status: SHIPPED | OUTPATIENT
Start: 2024-03-04

## 2024-03-04 NOTE — BH TREATMENT PLAN
"TREATMENT PLAN (Medication Management Only)        [facility]    Name/Date of Birth/MRN:  Natalie Gomes 58 y.o. 1965 MRN: [id number]  Date of Treatment Plan: March 4, 2024  Diagnosis/Diagnoses:   1. Generalized anxiety disorder    2. Moderate recurrent major depression (HCC)    3. Panic attacks      Strengths/Personal Resources for Self-Care: \"Handling the situations: \".  Area/Areas of need (in own words): \"I've been OK, a little crazy, but I've been OK-- between the doctors. \".  1. Long Term Goal:   Maintain mood stability and control of anxiety  Target Date:  3-6 months  Person/Persons responsible for completion of goal: Anil  2.  Short Term Objective (s) - How will we reach this goal?:   A.  Provider new recommended medication/dosage changes and/or continue medication(s): Pt is having mild anxiety and depression with fairly sporadic but mild level panic attacks, mainly in the setting of multiple health issues between herself and her . Also the weather preventing her from doing some of the outdoor things she likes to do.  Pt feels stable and appears stable.  I will continue the present regimen. Treatment plan done and Pt accepts the plan.  Paper Safety plan given for Pt to fill out and return.   Continue:   Paroxetine 30mg (1) tab po qd # 90  Aripiprazole 2mg (1)  tab po qhs # 90  Clonazepam 0.5mg (1/2) tab po bid, and (1) tab po qhs Pt given a Rx for Qty 60 with R5 on 2/6/2024  F/U PCP and specialists for medical issues  Pt to get CMP, Lipids, HgbA1C -- due next month per PCP  Return 14 weeks,  Call sooner prn      Target Date:  3-6 months  Person/Persons Responsible for Completion of Goal: Anil   Progress Towards Goals: stable, continuing treatment  Treatment Modality:  Medication mgt  Review due 180 days from date of this plan: 6 months - 9/4/2024  Expected length of service: ongoing treatment unless revised  My " Physician/PA/NP and I have developed this plan together and I agree to work on the goals and objectives. I understand the treatment goals that were developed for my treatment.  Electronic Signatures: on file (unless signed below)    Laura Alcaraz PA-C 03/04/24

## 2024-03-27 ENCOUNTER — APPOINTMENT (OUTPATIENT)
Dept: LAB | Facility: CLINIC | Age: 59
End: 2024-03-27
Payer: MEDICARE

## 2024-03-27 DIAGNOSIS — E78.5 HYPERLIPIDEMIA, UNSPECIFIED HYPERLIPIDEMIA TYPE: ICD-10-CM

## 2024-03-27 DIAGNOSIS — F41.0 PANIC ATTACKS: ICD-10-CM

## 2024-03-27 DIAGNOSIS — E11.9 CONTROLLED TYPE 2 DIABETES MELLITUS WITHOUT COMPLICATION, WITHOUT LONG-TERM CURRENT USE OF INSULIN (HCC): ICD-10-CM

## 2024-03-27 DIAGNOSIS — F41.1 GENERALIZED ANXIETY DISORDER: ICD-10-CM

## 2024-03-27 DIAGNOSIS — F33.1 MODERATE RECURRENT MAJOR DEPRESSION (HCC): ICD-10-CM

## 2024-03-27 LAB
ALBUMIN SERPL BCP-MCNC: 3.9 G/DL (ref 3.5–5)
ALP SERPL-CCNC: 124 U/L (ref 34–104)
ALT SERPL W P-5'-P-CCNC: 13 U/L (ref 7–52)
ANION GAP SERPL CALCULATED.3IONS-SCNC: 11 MMOL/L (ref 4–13)
AST SERPL W P-5'-P-CCNC: 17 U/L (ref 13–39)
BASOPHILS # BLD AUTO: 0.09 THOUSANDS/ÂΜL (ref 0–0.1)
BASOPHILS NFR BLD AUTO: 1 % (ref 0–1)
BILIRUB SERPL-MCNC: 0.51 MG/DL (ref 0.2–1)
BUN SERPL-MCNC: 14 MG/DL (ref 5–25)
CALCIUM SERPL-MCNC: 9.1 MG/DL (ref 8.4–10.2)
CHLORIDE SERPL-SCNC: 103 MMOL/L (ref 96–108)
CHOLEST SERPL-MCNC: 187 MG/DL
CO2 SERPL-SCNC: 24 MMOL/L (ref 21–32)
CREAT SERPL-MCNC: 1.07 MG/DL (ref 0.6–1.3)
EOSINOPHIL # BLD AUTO: 0.26 THOUSAND/ÂΜL (ref 0–0.61)
EOSINOPHIL NFR BLD AUTO: 3 % (ref 0–6)
ERYTHROCYTE [DISTWIDTH] IN BLOOD BY AUTOMATED COUNT: 16.7 % (ref 11.6–15.1)
EST. AVERAGE GLUCOSE BLD GHB EST-MCNC: 272 MG/DL
GFR SERPL CREATININE-BSD FRML MDRD: 56 ML/MIN/1.73SQ M
GLUCOSE P FAST SERPL-MCNC: 209 MG/DL (ref 65–99)
HBA1C MFR BLD: 11.1 %
HCT VFR BLD AUTO: 36.3 % (ref 34.8–46.1)
HDLC SERPL-MCNC: 48 MG/DL
HGB BLD-MCNC: 10.9 G/DL (ref 11.5–15.4)
IMM GRANULOCYTES # BLD AUTO: 0.05 THOUSAND/UL (ref 0–0.2)
IMM GRANULOCYTES NFR BLD AUTO: 1 % (ref 0–2)
LDLC SERPL CALC-MCNC: 109 MG/DL (ref 0–100)
LYMPHOCYTES # BLD AUTO: 3.46 THOUSANDS/ÂΜL (ref 0.6–4.47)
LYMPHOCYTES NFR BLD AUTO: 33 % (ref 14–44)
MCH RBC QN AUTO: 23.7 PG (ref 26.8–34.3)
MCHC RBC AUTO-ENTMCNC: 30 G/DL (ref 31.4–37.4)
MCV RBC AUTO: 79 FL (ref 82–98)
MONOCYTES # BLD AUTO: 0.64 THOUSAND/ÂΜL (ref 0.17–1.22)
MONOCYTES NFR BLD AUTO: 6 % (ref 4–12)
NEUTROPHILS # BLD AUTO: 5.87 THOUSANDS/ÂΜL (ref 1.85–7.62)
NEUTS SEG NFR BLD AUTO: 56 % (ref 43–75)
NRBC BLD AUTO-RTO: 0 /100 WBCS
PLATELET # BLD AUTO: 307 THOUSANDS/UL (ref 149–390)
PMV BLD AUTO: 12 FL (ref 8.9–12.7)
POTASSIUM SERPL-SCNC: 4.7 MMOL/L (ref 3.5–5.3)
PROT SERPL-MCNC: 6.9 G/DL (ref 6.4–8.4)
RBC # BLD AUTO: 4.59 MILLION/UL (ref 3.81–5.12)
SODIUM SERPL-SCNC: 138 MMOL/L (ref 135–147)
TRIGL SERPL-MCNC: 150 MG/DL
WBC # BLD AUTO: 10.37 THOUSAND/UL (ref 4.31–10.16)

## 2024-03-27 PROCEDURE — 85025 COMPLETE CBC W/AUTO DIFF WBC: CPT

## 2024-03-27 PROCEDURE — 83036 HEMOGLOBIN GLYCOSYLATED A1C: CPT

## 2024-03-27 PROCEDURE — 80053 COMPREHEN METABOLIC PANEL: CPT

## 2024-03-27 PROCEDURE — 80061 LIPID PANEL: CPT

## 2024-03-27 PROCEDURE — 36415 COLL VENOUS BLD VENIPUNCTURE: CPT

## 2024-03-28 ENCOUNTER — TELEPHONE (OUTPATIENT)
Dept: FAMILY MEDICINE CLINIC | Facility: CLINIC | Age: 59
End: 2024-03-28

## 2024-03-28 NOTE — TELEPHONE ENCOUNTER
Patient advised and yes she has been taking all of her medications.  She stated she went out to dinner the night before

## 2024-03-28 NOTE — TELEPHONE ENCOUNTER
----- Message from Laura Huertas MD sent at 3/28/2024 12:14 PM EDT -----  Please call the patient regarding her abnormal result.  Her sugar is elevated.  Please check if she has been taking all her diabetes medications

## 2024-03-30 DIAGNOSIS — E78.5 HYPERLIPIDEMIA, UNSPECIFIED HYPERLIPIDEMIA TYPE: ICD-10-CM

## 2024-03-30 RX ORDER — ATORVASTATIN CALCIUM 20 MG/1
20 TABLET, FILM COATED ORAL EVERY EVENING
Qty: 90 TABLET | Refills: 1 | Status: SHIPPED | OUTPATIENT
Start: 2024-03-30

## 2024-04-03 ENCOUNTER — OFFICE VISIT (OUTPATIENT)
Dept: FAMILY MEDICINE CLINIC | Facility: CLINIC | Age: 59
End: 2024-04-03
Payer: MEDICARE

## 2024-04-03 VITALS
BODY MASS INDEX: 35.79 KG/M2 | SYSTOLIC BLOOD PRESSURE: 120 MMHG | HEART RATE: 89 BPM | DIASTOLIC BLOOD PRESSURE: 70 MMHG | HEIGHT: 63 IN | WEIGHT: 202 LBS | OXYGEN SATURATION: 98 %

## 2024-04-03 DIAGNOSIS — E78.5 HYPERLIPIDEMIA, UNSPECIFIED HYPERLIPIDEMIA TYPE: ICD-10-CM

## 2024-04-03 DIAGNOSIS — I10 ESSENTIAL HYPERTENSION: ICD-10-CM

## 2024-04-03 DIAGNOSIS — N18.31 STAGE 3A CHRONIC KIDNEY DISEASE (HCC): ICD-10-CM

## 2024-04-03 DIAGNOSIS — J40 BRONCHITIS: ICD-10-CM

## 2024-04-03 DIAGNOSIS — E11.9 TYPE 2 DIABETES MELLITUS WITHOUT COMPLICATION, WITHOUT LONG-TERM CURRENT USE OF INSULIN (HCC): ICD-10-CM

## 2024-04-03 DIAGNOSIS — Z12.4 CERVICAL CANCER SCREENING: ICD-10-CM

## 2024-04-03 DIAGNOSIS — R91.8 MULTIPLE LUNG NODULES ON CT: ICD-10-CM

## 2024-04-03 DIAGNOSIS — K21.9 CHRONIC GERD: ICD-10-CM

## 2024-04-03 DIAGNOSIS — E11.9 CONTROLLED TYPE 2 DIABETES MELLITUS WITHOUT COMPLICATION, WITHOUT LONG-TERM CURRENT USE OF INSULIN (HCC): ICD-10-CM

## 2024-04-03 DIAGNOSIS — E11.21 DIABETIC NEPHROPATHY ASSOCIATED WITH TYPE 2 DIABETES MELLITUS (HCC): ICD-10-CM

## 2024-04-03 DIAGNOSIS — J41.0 SIMPLE CHRONIC BRONCHITIS (HCC): ICD-10-CM

## 2024-04-03 DIAGNOSIS — E11.65 UNCONTROLLED TYPE 2 DIABETES MELLITUS WITH HYPERGLYCEMIA (HCC): Primary | ICD-10-CM

## 2024-04-03 PROCEDURE — 99215 OFFICE O/P EST HI 40 MIN: CPT | Performed by: FAMILY MEDICINE

## 2024-04-03 PROCEDURE — G0101 CA SCREEN;PELVIC/BREAST EXAM: HCPCS | Performed by: FAMILY MEDICINE

## 2024-04-03 RX ORDER — ALBUTEROL SULFATE 90 UG/1
2 AEROSOL, METERED RESPIRATORY (INHALATION) EVERY 6 HOURS PRN
Qty: 18 G | Refills: 1 | Status: SHIPPED | OUTPATIENT
Start: 2024-04-03

## 2024-04-03 RX ORDER — ROSUVASTATIN CALCIUM 20 MG/1
20 TABLET, COATED ORAL DAILY
Qty: 90 TABLET | Refills: 3 | Status: SHIPPED | OUTPATIENT
Start: 2024-04-03

## 2024-04-03 RX ORDER — FLUTICASONE PROPIONATE AND SALMETEROL 250; 50 UG/1; UG/1
1 POWDER RESPIRATORY (INHALATION) 2 TIMES DAILY
Qty: 60 BLISTER | Refills: 5 | Status: SHIPPED | OUTPATIENT
Start: 2024-04-03

## 2024-04-03 RX ORDER — AMLODIPINE BESYLATE 5 MG/1
5 TABLET ORAL DAILY
Qty: 90 TABLET | Refills: 1 | Status: SHIPPED | OUTPATIENT
Start: 2024-04-03

## 2024-04-03 RX ORDER — GLIPIZIDE 10 MG/1
10 TABLET ORAL
Qty: 180 TABLET | Refills: 1 | Status: SHIPPED | OUTPATIENT
Start: 2024-04-03

## 2024-04-03 RX ORDER — LISINOPRIL 40 MG/1
40 TABLET ORAL DAILY
Qty: 90 TABLET | Refills: 1 | Status: SHIPPED | OUTPATIENT
Start: 2024-04-03

## 2024-04-03 RX ORDER — OMEPRAZOLE 20 MG/1
20 CAPSULE, DELAYED RELEASE ORAL DAILY
Qty: 90 CAPSULE | Refills: 1 | Status: SHIPPED | OUTPATIENT
Start: 2024-04-03

## 2024-04-03 NOTE — ASSESSMENT & PLAN NOTE
Considering patient has uncontrolled diabetes, LDL is not at goal.  She is on atorvastatin 20 mg, switched over to Crestor 20 mg, will repeat lipid panel at 4 months.

## 2024-04-03 NOTE — ASSESSMENT & PLAN NOTE
Symptoms improved significantly since patient stopped smoking.  Patient  using Wixela and  albuterol intermittently as needed.

## 2024-04-03 NOTE — ASSESSMENT & PLAN NOTE
Lab Results   Component Value Date    EGFR 56 03/27/2024    EGFR 54 11/17/2023    EGFR 80 08/16/2023    CREATININE 1.07 03/27/2024    CREATININE 1.11 11/17/2023    CREATININE 0.80 08/16/2023   Overall renal function is stable.  Blood pressure is at goal.  Patient advised to improve hydration, continue to avoid nephrotoxins.

## 2024-04-03 NOTE — ASSESSMENT & PLAN NOTE
Lab Results   Component Value Date    HGBA1C 11.1 (H) 03/27/2024     Poorly controlled diabetes.  Patient is currently on metformin 500 twice daily, Jardiance 25 mg, glipizide 10 mg twice daily.  Patient did very well on Ozempic but had to discontinue the medication when she ended up in [facility].   Can restart her on Ozempic lower dose, and tighter heart up on the dose.  Recommended to repeat CMP after 4 weeks.  Patient encouraged to continue monitoring her blood sugar and follow-up in 4 to 6 weeks to review the blood sugar log.  Patient denies any symptoms of polyuria or polydipsia.

## 2024-04-03 NOTE — ASSESSMENT & PLAN NOTE
CAT scan done in April 2023 reveals stable background emphysema and small bilateral benign pulmonary nodules, patient has stopped smoking since.  Prefers to wait for another year to repeat the CAT scan.

## 2024-04-03 NOTE — PROGRESS NOTES
Subjective:      Patient ID: Natalie Gomes is a 58 y.o. female.    Gynecologic Exam        Patient is here for routine follow-up.  Has history of hypertension, type 2 diabetes, dyslipidemia.  Metabolic labs reveal A1c of 11.1, blood pressure and at goal.   Patient's LDL continues to be borderline high.  She is currently on atorvastatin 20 mg.   Patient was started on Ozempic however she is unable to afford the medication.  Tolerating Jardiance well without any side effects.  Also on metformin and glipizide.  Her A1c is uncontrolled.  Patient states that she should be able to afford Ozempic now that she is not in the donut hole.  Patient denies any symptoms of chest pain or shortness of breath.  Patient has history of chronic bronchitis, on intermittent use of Wixela and albuterol.  Patient  was able to quit smoking, reports significant improvement in her symptoms of shortness of breath and wheezing.  Acid reflux symptoms are stable on omeprazole patient is also due for.   Cervical cancer screening.  Pap will be done today.  Patient denies any symptoms of vaginal discharge or bleeding.  Last Pap smear was in 2019, reported normal  Patient is up-to-date on breast cancer screening and colorectal cancer screening.  Previous colonoscopy in 2023, next due 2033.    Past Medical History:   Diagnosis Date    Anxiety     Colon polyp     Depression     Diabetes mellitus (HCC)     Diverticulosis     GERD (gastroesophageal reflux disease)     Headache     Hyperlipidemia     Hypertension     Psychiatric disorder     bipolar    Right clavicle fracture     Sinus congestion     TIA (transient ischemic attack)     Vitamin D deficiency        Family History   Problem Relation Age of Onset    Cervical cancer Mother     Ovarian cancer Mother 33    Uterine cancer Mother     Cancer Father     Hypertension Father     Other Father         pre-diabetes    Diabetes Father     Diabetes type I Sister     Osteoporosis Sister     Depression  Sister     Breast cancer Sister 67    Diabetes Sister     No Known Problems Sister     No Known Problems Sister     No Known Problems Sister     No Known Problems Daughter     No Known Problems Maternal Grandmother     No Known Problems Maternal Grandfather     Other Paternal Grandmother         Parkinson's Disease    Heart attack Paternal Grandfather     Other Paternal Grandfather         coronary arteriosclerosis    Heart attack Brother          of an MI at 48y/o    Diabetes Brother     No Known Problems Son     Alcohol abuse Paternal Uncle     Seizures Other     Seizures Other     No Known Problems Paternal Aunt     No Known Problems Paternal Aunt     No Known Problems Maternal Aunt        Past Surgical History:   Procedure Laterality Date    BLADDER SURGERY      Sling    LUMBAR EPIDURAL INJECTION N/A 2023    Procedure: L5 S1  LUMBAR epidural steroid injection (65851);  Surgeon: Molina Costa DO;  Location: [facility] MAIN OR;  Service: Pain Management     NV ARTHRS HIP DEBRIDEMENT/SHAVING ARTICULAR CRTLG Right 2017    Procedure: RIGHT HIP ARTHROSCOPIC LABRAL DEBRIDEMENT;  Surgeon: Kimo Jamil MD;  Location: [facility] Main OR;  Service: Orthopedics    SINUS SURGERY      3 surgeries     TUBAL LIGATION          reports that she quit smoking about 16 months ago. Her smoking use included cigarettes. She started smoking about 41 years ago. She has a 80 pack-year smoking history. She has never used smokeless tobacco. She reports that she does not currently use alcohol. She reports current drug use. Drug: Marijuana.      Current Outpatient Medications:     albuterol (Ventolin HFA) 90 mcg/act inhaler, Inhale 2 puffs every 6 (six) hours as needed for wheezing, Disp: 18 g, Rfl: 1    amLODIPine (NORVASC) 5 mg tablet, Take 1 tablet (5 mg total) by mouth daily, Disp: 90 tablet, Rfl: 1    ARIPiprazole (ABILIFY) 2 mg tablet, Take 1 tablet (2 mg total) by mouth daily at bedtime, Disp: 90 tablet, Rfl: 0    aspirin 81 mg  "chewable tablet, Chew 81 mg , Disp: , Rfl:     clonazePAM (KlonoPIN) 0.5 mg tablet, Take 1/2 tab by mouth twice daily and 1 full tab nightly, Disp: 60 tablet, Rfl: 5    Empagliflozin 25 MG TABS, Take 1 tablet (25 mg total) by mouth daily, Disp: 90 tablet, Rfl: 3    fluticasone (FLONASE) 50 mcg/act nasal spray, 1 spray into each nostril daily, Disp: 18 g, Rfl: 0    Fluticasone-Salmeterol (Wixela Inhub) 250-50 mcg/dose inhaler, Inhale 1 puff 2 (two) times a day Rinse mouth after use., Disp: 60 blister, Rfl: 5    glipiZIDE (GLUCOTROL) 10 mg tablet, Take 1 tablet (10 mg total) by mouth 2 (two) times a day before meals, Disp: 180 tablet, Rfl: 1    glucose blood (ONE TOUCH ULTRA TEST) test strip, Check blood sugar once daily, Disp: 100 each, Rfl: 11    Lancets (onetouch ultrasoft) lancets, Check blood sugar twice/ week., Disp: 100 each, Rfl: 5    lisinopril (ZESTRIL) 40 mg tablet, Take 1 tablet (40 mg total) by mouth daily, Disp: 90 tablet, Rfl: 1    metFORMIN (GLUCOPHAGE) 500 mg tablet, Take 1 tablet (500 mg total) by mouth 2 (two) times a day with meals, Disp: 180 tablet, Rfl: 1    NEEDLE, DISP, 30 G (B-D DISP NEEDLE 30GX1\") 30G X 1\" MISC, Use once a week, Disp: 100 each, Rfl: 5    nystatin (MYCOSTATIN) powder, Apply topically 2 (two) times a day, Disp: 60 g, Rfl: 2    omeprazole (PriLOSEC) 20 mg delayed release capsule, Take 1 capsule (20 mg total) by mouth daily, Disp: 90 capsule, Rfl: 1    PARoxetine (PAXIL) 30 mg tablet, Take 1 tablet (30 mg total) by mouth daily, Disp: 90 tablet, Rfl: 0    promethazine-dextromethorphan (PHENERGAN-DM) 6.25-15 mg/5 mL oral syrup, Take 5 mL by mouth 4 (four) times a day as needed for cough, Disp: 180 mL, Rfl: 0    rosuvastatin (CRESTOR) 20 MG tablet, Take 1 tablet (20 mg total) by mouth daily, Disp: 90 tablet, Rfl: 3    semaglutide, 0.25 or 0.5 mg/dose, (Ozempic, 0.25 or 0.5 MG/DOSE,) 2 mg/3 mL injection pen, Inject 0.375 mL (0.25 mg total) under the skin every 7 days 0.25 mg under " "the skin every 7 days for 4 doses (28 days), THEN 0.5 mg under the skin every 7 days, Disp: 3 mL, Rfl: 1    clotrimazole-betamethasone (LOTRISONE) 1-0.05 % cream, Apply topically 2 (two) times a day for 14 days, Disp: 30 g, Rfl: 0    The following portions of the patient's history were reviewed and updated as appropriate: allergies, current medications, past family history, past medical history, past social history, past surgical history and problem list.    Review of Systems   Constitutional: Negative.    Respiratory: Negative.     Cardiovascular: Negative.            Objective:    /70   Pulse 89   Ht 5' 3\" (1.6 m)   Wt 91.6 kg (202 lb)   SpO2 98%   BMI 35.78 kg/m²      Physical Exam  Constitutional:       Appearance: Normal appearance.   Cardiovascular:      Heart sounds: Normal heart sounds.   Pulmonary:      Breath sounds: Normal breath sounds.   Neurological:      Mental Status: She is oriented to person, place, and time.   Psychiatric:         Mood and Affect: Mood normal.           Genitalia- normal. Cervix appears normal, no CMT.   Bimanual exam-  normal.       Recent Results (from the past 1008 hour(s))   Comprehensive metabolic panel    Collection Time: 03/27/24  9:28 AM   Result Value Ref Range    Sodium 138 135 - 147 mmol/L    Potassium 4.7 3.5 - 5.3 mmol/L    Chloride 103 96 - 108 mmol/L    CO2 24 21 - 32 mmol/L    ANION GAP 11 4 - 13 mmol/L    BUN 14 5 - 25 mg/dL    Creatinine 1.07 0.60 - 1.30 mg/dL    Glucose, Fasting 209 (H) 65 - 99 mg/dL    Calcium 9.1 8.4 - 10.2 mg/dL    AST 17 13 - 39 U/L    ALT 13 7 - 52 U/L    Alkaline Phosphatase 124 (H) 34 - 104 U/L    Total Protein 6.9 6.4 - 8.4 g/dL    Albumin 3.9 3.5 - 5.0 g/dL    Total Bilirubin 0.51 0.20 - 1.00 mg/dL    eGFR 56 ml/min/1.73sq m   Lipid Panel with Direct LDL reflex    Collection Time: 03/27/24  9:28 AM   Result Value Ref Range    Cholesterol 187 See Comment mg/dL    Triglycerides 150 (H) See Comment mg/dL    HDL, Direct 48 (L) " >=50 mg/dL    LDL Calculated 109 (H) 0 - 100 mg/dL   CBC and differential    Collection Time: 03/27/24  9:28 AM   Result Value Ref Range    WBC 10.37 (H) 4.31 - 10.16 Thousand/uL    RBC 4.59 3.81 - 5.12 Million/uL    Hemoglobin 10.9 (L) 11.5 - 15.4 g/dL    Hematocrit 36.3 34.8 - 46.1 %    MCV 79 (L) 82 - 98 fL    MCH 23.7 (L) 26.8 - 34.3 pg    MCHC 30.0 (L) 31.4 - 37.4 g/dL    RDW 16.7 (H) 11.6 - 15.1 %    MPV 12.0 8.9 - 12.7 fL    Platelets 307 149 - 390 Thousands/uL    nRBC 0 /100 WBCs    Neutrophils Relative 56 43 - 75 %    Immature Grans % 1 0 - 2 %    Lymphocytes Relative 33 14 - 44 %    Monocytes Relative 6 4 - 12 %    Eosinophils Relative 3 0 - 6 %    Basophils Relative 1 0 - 1 %    Neutrophils Absolute 5.87 1.85 - 7.62 Thousands/µL    Absolute Immature Grans 0.05 0.00 - 0.20 Thousand/uL    Absolute Lymphocytes 3.46 0.60 - 4.47 Thousands/µL    Absolute Monocytes 0.64 0.17 - 1.22 Thousand/µL    Eosinophils Absolute 0.26 0.00 - 0.61 Thousand/µL    Basophils Absolute 0.09 0.00 - 0.10 Thousands/µL   Hemoglobin A1C    Collection Time: 03/27/24  9:28 AM   Result Value Ref Range    Hemoglobin A1C 11.1 (H) Normal 4.0-5.6%; PreDiabetic 5.7-6.4%; Diabetic >=6.5%; Glycemic control for adults with diabetes <7.0% %     mg/dl       Assessment/Plan:             Problem List Items Addressed This Visit          Cardiovascular and Mediastinum    Essential hypertension     Patient's blood pressure is at goal.  Continue amlodipine 5 milligram, lisinopril 40 milligram         Relevant Medications    amLODIPine (NORVASC) 5 mg tablet    lisinopril (ZESTRIL) 40 mg tablet       Respiratory    Simple chronic bronchitis (HCC)     Symptoms improved significantly since patient stopped smoking.  Patient  using Wixela and  albuterol intermittently as needed.         Relevant Medications    albuterol (Ventolin HFA) 90 mcg/act inhaler    Fluticasone-Salmeterol (Wixela Inhub) 250-50 mcg/dose inhaler    Multiple lung nodules on CT      CAT scan done in April 2023 reveals stable background emphysema and small bilateral benign pulmonary nodules, patient has stopped smoking since.  Prefers to wait for another year to repeat the CAT scan.         Relevant Medications    albuterol (Ventolin HFA) 90 mcg/act inhaler    Fluticasone-Salmeterol (Wixela Inhub) 250-50 mcg/dose inhaler       Digestive    Chronic GERD     Stable on omeprazole 20 milligram.  Continue same.         Relevant Medications    omeprazole (PriLOSEC) 20 mg delayed release capsule       Endocrine    Controlled type 2 diabetes mellitus without complication, without long-term current use of insulin (HCC)    Relevant Medications    semaglutide, 0.25 or 0.5 mg/dose, (Ozempic, 0.25 or 0.5 MG/DOSE,) 2 mg/3 mL injection pen    Empagliflozin 25 MG TABS    metFORMIN (GLUCOPHAGE) 500 mg tablet    glucose blood (ONE TOUCH ULTRA TEST) test strip    glipiZIDE (GLUCOTROL) 10 mg tablet    Diabetic nephropathy associated with type 2 diabetes mellitus (HCC)       Lab Results   Component Value Date    HGBA1C 11.1 (H) 03/27/2024     Renal function stable.  Blood pressure is at goal however A1c is elevated.  Importance of improved glycemic control discussed with patient.  Continue with improved hydration, avoid nephrotoxins.         Relevant Medications    semaglutide, 0.25 or 0.5 mg/dose, (Ozempic, 0.25 or 0.5 MG/DOSE,) 2 mg/3 mL injection pen    Empagliflozin 25 MG TABS    metFORMIN (GLUCOPHAGE) 500 mg tablet    glipiZIDE (GLUCOTROL) 10 mg tablet    Uncontrolled type 2 diabetes mellitus with hyperglycemia (HCC) - Primary       Lab Results   Component Value Date    HGBA1C 11.1 (H) 03/27/2024     Poorly controlled diabetes.  Patient is currently on metformin 500 twice daily, Jardiance 25 mg, glipizide 10 mg twice daily.  Patient did very well on Ozempic but had to discontinue the medication when she ended up in [facility].   Can restart her on Ozempic lower dose, and tighter heart up on the dose.   Recommended to repeat CMP after 4 weeks.  Patient encouraged to continue monitoring her blood sugar and follow-up in 4 to 6 weeks to review the blood sugar log.  Patient denies any symptoms of polyuria or polydipsia.         Relevant Medications    semaglutide, 0.25 or 0.5 mg/dose, (Ozempic, 0.25 or 0.5 MG/DOSE,) 2 mg/3 mL injection pen    Empagliflozin 25 MG TABS    metFORMIN (GLUCOPHAGE) 500 mg tablet    glipiZIDE (GLUCOTROL) 10 mg tablet    Other Relevant Orders    Diabetic foot exam    Comprehensive metabolic panel    Comprehensive metabolic panel    Hemoglobin A1C       Genitourinary    Stage 3a chronic kidney disease (HCC)     Lab Results   Component Value Date    EGFR 56 03/27/2024    EGFR 54 11/17/2023    EGFR 80 08/16/2023    CREATININE 1.07 03/27/2024    CREATININE 1.11 11/17/2023    CREATININE 0.80 08/16/2023   Overall renal function is stable.  Blood pressure is at goal.  Patient advised to improve hydration, continue to avoid nephrotoxins.            Obstetrics/Gynecology    Cervical cancer screening     Pap smear done today.             Other    Hyperlipemia     Considering patient has uncontrolled diabetes, LDL is not at goal.  She is on atorvastatin 20 mg, switched over to Crestor 20 mg, will repeat lipid panel at 4 months.         Relevant Medications    rosuvastatin (CRESTOR) 20 MG tablet    Other Relevant Orders    Lipid panel     Other Visit Diagnoses       Type 2 diabetes mellitus without complication, without long-term current use of insulin ([facility])        Relevant Medications    semaglutide, 0.25 or 0.5 mg/dose, (Ozempic, 0.25 or 0.5 MG/DOSE,) 2 mg/3 mL injection pen    Empagliflozin 25 MG TABS    metFORMIN (GLUCOPHAGE) 500 mg tablet    glucose blood (ONE TOUCH ULTRA TEST) test strip    glipiZIDE (GLUCOTROL) 10 mg tablet    Bronchitis        Relevant Medications    albuterol (Ventolin HFA) 90 mcg/act inhaler    Fluticasone-Salmeterol (Wixela Inhub) 250-50 mcg/dose inhaler

## 2024-04-03 NOTE — ASSESSMENT & PLAN NOTE
Lab Results   Component Value Date    HGBA1C 11.1 (H) 03/27/2024     Renal function stable.  Blood pressure is at goal however A1c is elevated.  Importance of improved glycemic control discussed with patient.  Continue with improved hydration, avoid nephrotoxins.

## 2024-04-08 ENCOUNTER — TELEPHONE (OUTPATIENT)
Age: 59
End: 2024-04-08

## 2024-04-08 NOTE — TELEPHONE ENCOUNTER
PA for semaglutide, 0.25 or 0.5 mg/dose, (Ozempic, 0.25 or 0.5 MG/DOSE,) 2 mg/3 mL injection pen    Submitted via    [][employer]   [x][employer]-Case ID # [id number]  []Faxed to plan   []Other website   []Phone call Case ID #     Office notes sent, clinical questions answered. Awaiting determination    Turnaround time for your insurance to make a decision on your Prior Authorization can take 7-21 business days.

## 2024-04-08 NOTE — TELEPHONE ENCOUNTER
PA for semaglutide, 0.25 or 0.5 mg/dose, (Ozempic, 0.25 or 0.5 MG/DOSE,) 2 mg/3 mL injection pen already approved    Payer: [employer] Part D Case ID: [id number]    [phone]    [phone]  Note from payer: This medication or product was previously approved on [id number] from 01/01/2024 to 12/31/2024. You will be able to fill a prescription for this medication at your pharmacy. If your pharmacy has questions regarding the processing of your prescription, please have them call the [facility] pharmacy help desk at [phone].

## 2024-04-13 PROBLEM — E11.22 TYPE 2 DIABETES MELLITUS WITH STAGE 3A CHRONIC KIDNEY DISEASE (HCC): Status: ACTIVE | Noted: 2018-08-30

## 2024-04-13 PROBLEM — N18.31 TYPE 2 DIABETES MELLITUS WITH STAGE 3A CHRONIC KIDNEY DISEASE (HCC): Status: ACTIVE | Noted: 2018-08-30

## 2024-04-15 ENCOUNTER — TELEPHONE (OUTPATIENT)
Age: 59
End: 2024-04-15

## 2024-04-15 DIAGNOSIS — E11.9 TYPE 2 DIABETES MELLITUS WITHOUT COMPLICATION, WITHOUT LONG-TERM CURRENT USE OF INSULIN (HCC): ICD-10-CM

## 2024-04-15 NOTE — TELEPHONE ENCOUNTER
Patient needs Test strips called into [facility].  Apparently there is a problem transferring request from [place] to [facility].  Please have provider write a new script. Thank you.

## 2024-05-03 PROBLEM — Z12.4 CERVICAL CANCER SCREENING: Status: RESOLVED | Noted: 2024-04-03 | Resolved: 2024-05-03

## 2024-05-07 ENCOUNTER — APPOINTMENT (OUTPATIENT)
Dept: LAB | Facility: CLINIC | Age: 59
End: 2024-05-07
Payer: MEDICARE

## 2024-05-07 DIAGNOSIS — E11.65 UNCONTROLLED TYPE 2 DIABETES MELLITUS WITH HYPERGLYCEMIA (HCC): ICD-10-CM

## 2024-05-07 LAB
ALBUMIN SERPL BCP-MCNC: 4 G/DL (ref 3.5–5)
ALP SERPL-CCNC: 121 U/L (ref 34–104)
ALT SERPL W P-5'-P-CCNC: 14 U/L (ref 7–52)
ANION GAP SERPL CALCULATED.3IONS-SCNC: 12 MMOL/L (ref 4–13)
AST SERPL W P-5'-P-CCNC: 18 U/L (ref 13–39)
BILIRUB SERPL-MCNC: 0.41 MG/DL (ref 0.2–1)
BUN SERPL-MCNC: 15 MG/DL (ref 5–25)
CALCIUM SERPL-MCNC: 9.2 MG/DL (ref 8.4–10.2)
CHLORIDE SERPL-SCNC: 100 MMOL/L (ref 96–108)
CO2 SERPL-SCNC: 26 MMOL/L (ref 21–32)
CREAT SERPL-MCNC: 1.18 MG/DL (ref 0.6–1.3)
GFR SERPL CREATININE-BSD FRML MDRD: 50 ML/MIN/1.73SQ M
GLUCOSE P FAST SERPL-MCNC: 112 MG/DL (ref 65–99)
POTASSIUM SERPL-SCNC: 5 MMOL/L (ref 3.5–5.3)
PROT SERPL-MCNC: 7.6 G/DL (ref 6.4–8.4)
SODIUM SERPL-SCNC: 138 MMOL/L (ref 135–147)

## 2024-05-07 PROCEDURE — 80053 COMPREHEN METABOLIC PANEL: CPT

## 2024-05-07 PROCEDURE — 36415 COLL VENOUS BLD VENIPUNCTURE: CPT

## 2024-05-15 ENCOUNTER — OFFICE VISIT (OUTPATIENT)
Dept: FAMILY MEDICINE CLINIC | Facility: CLINIC | Age: 59
End: 2024-05-15
Payer: MEDICARE

## 2024-05-15 VITALS
WEIGHT: 198 LBS | DIASTOLIC BLOOD PRESSURE: 70 MMHG | SYSTOLIC BLOOD PRESSURE: 120 MMHG | OXYGEN SATURATION: 99 % | BODY MASS INDEX: 35.08 KG/M2 | HEIGHT: 63 IN | HEART RATE: 74 BPM

## 2024-05-15 DIAGNOSIS — R91.8 MULTIPLE LUNG NODULES ON CT: ICD-10-CM

## 2024-05-15 DIAGNOSIS — E11.65 UNCONTROLLED TYPE 2 DIABETES MELLITUS WITH HYPERGLYCEMIA (HCC): Primary | ICD-10-CM

## 2024-05-15 PROCEDURE — 99214 OFFICE O/P EST MOD 30 MIN: CPT | Performed by: FAMILY MEDICINE

## 2024-05-15 PROCEDURE — G2211 COMPLEX E/M VISIT ADD ON: HCPCS | Performed by: FAMILY MEDICINE

## 2024-05-15 NOTE — ASSESSMENT & PLAN NOTE
Lab Results   Component Value Date    HGBA1C 11.1 (H) 03/27/2024     Patient's fasting blood sugar has improved from over 200 212 on Ozempic.  She is currently on 0.5 mg dose.  Denies any symptoms of hypoglycemia.  Recommended to reduce dose of glipizide if FBS drops to 70.   Continue metformin, Jardiance.   Increase Ozempic to 1 mg dose.

## 2024-05-15 NOTE — ASSESSMENT & PLAN NOTE
CAT scan done in April 2023 reveals stable background emphysema and small bilateral benign pulmonary nodules, patient has stopped smoking since.  repeat CAT scan ordered.

## 2024-05-15 NOTE — PROGRESS NOTES
Subjective:      Patient ID: Natalie Gomes is a 59 y.o. female.    HPI  Patient is following up on uncontrolled diabetes  Started on Ozempic when noted to have fasting blood sugar of 207 and A1c of 11.1.  Patient has tolerated the medication without any side effects.  Has not experienced any symptoms of hypoglycemia.  Also on metformin, Jardiance and glipizide.  I have advised patient to wean herself slowly off the glipizide as her fasting blood sugar drops and her glycemic control improves to avoid the risk of hypoglycemia.  Patient has history of smoking, lung nodules.  Due for follow-up CAT scan.  Denies any symptoms of shortness of breath or chronic cough.   On Wixela, intermittent albuterol.    Past Medical History:   Diagnosis Date    Anxiety     Colon polyp     Depression     Diabetes mellitus (HCC)     Diverticulosis     GERD (gastroesophageal reflux disease)     Headache     Hyperlipidemia     Hypertension     Psychiatric disorder     bipolar    Right clavicle fracture     Sinus congestion     TIA (transient ischemic attack)     Vitamin D deficiency        Family History   Problem Relation Age of Onset    Cervical cancer Mother     Ovarian cancer Mother 33    Uterine cancer Mother     Cancer Father     Hypertension Father     Other Father         pre-diabetes    Diabetes Father     Diabetes type I Sister     Osteoporosis Sister     Depression Sister     Breast cancer Sister 67    Diabetes Sister     No Known Problems Sister     No Known Problems Sister     No Known Problems Sister     No Known Problems Daughter     No Known Problems Maternal Grandmother     No Known Problems Maternal Grandfather     Other Paternal Grandmother         Parkinson's Disease    Heart attack Paternal Grandfather     Other Paternal Grandfather         coronary arteriosclerosis    Heart attack Brother          of an MI at 48y/o    Diabetes Brother     No Known Problems Son     Alcohol abuse Paternal Uncle     Seizures  Other     Seizures Other     No Known Problems Paternal Aunt     No Known Problems Paternal Aunt     No Known Problems Maternal Aunt        Past Surgical History:   Procedure Laterality Date    BLADDER SURGERY      Sling    LUMBAR EPIDURAL INJECTION N/A 1/4/2023    Procedure: L5 S1  LUMBAR epidural steroid injection (35343);  Surgeon: Molina Costa DO;  Location: [facility] MAIN OR;  Service: Pain Management     [place] ARTHRS HIP DEBRIDEMENT/SHAVING ARTICULAR CRTLG Right 7/13/2017    Procedure: RIGHT HIP ARTHROSCOPIC LABRAL DEBRIDEMENT;  Surgeon: Kimo Jamil MD;  Location: [facility] Main OR;  Service: Orthopedics    SINUS SURGERY      3 surgeries     TUBAL LIGATION          reports that she quit smoking about 17 months ago. Her smoking use included cigarettes. She started smoking about 41 years ago. She has a 80 pack-year smoking history. She has never used smokeless tobacco. She reports that she does not currently use alcohol. She reports current drug use. Drug: Marijuana.      Current Outpatient Medications:     albuterol (Ventolin HFA) 90 mcg/act inhaler, Inhale 2 puffs every 6 (six) hours as needed for wheezing, Disp: 18 g, Rfl: 1    amLODIPine (NORVASC) 5 mg tablet, Take 1 tablet (5 mg total) by mouth daily, Disp: 90 tablet, Rfl: 1    ARIPiprazole (ABILIFY) 2 mg tablet, Take 1 tablet (2 mg total) by mouth daily at bedtime, Disp: 90 tablet, Rfl: 0    aspirin 81 mg chewable tablet, Chew 81 mg , Disp: , Rfl:     clonazePAM (KlonoPIN) 0.5 mg tablet, Take 1/2 tab by mouth twice daily and 1 full tab nightly, Disp: 60 tablet, Rfl: 5    Empagliflozin 25 MG TABS, Take 1 tablet (25 mg total) by mouth daily, Disp: 90 tablet, Rfl: 3    fluticasone (FLONASE) 50 mcg/act nasal spray, 1 spray into each nostril daily, Disp: 18 g, Rfl: 0    Fluticasone-Salmeterol (Wixela Inhub) 250-50 mcg/dose inhaler, Inhale 1 puff 2 (two) times a day Rinse mouth after use., Disp: 60 blister, Rfl: 5    glipiZIDE (GLUCOTROL) 10 mg tablet, Take 1 tablet  "(10 mg total) by mouth 2 (two) times a day before meals, Disp: 180 tablet, Rfl: 1    glucose blood (ONE TOUCH ULTRA TEST) test strip, Check blood sugar once daily, Disp: 100 each, Rfl: 11    Lancets (onetouch ultrasoft) lancets, Check blood sugar twice/ week., Disp: 100 each, Rfl: 5    lisinopril (ZESTRIL) 40 mg tablet, Take 1 tablet (40 mg total) by mouth daily, Disp: 90 tablet, Rfl: 1    metFORMIN (GLUCOPHAGE) 500 mg tablet, Take 1 tablet (500 mg total) by mouth 2 (two) times a day with meals, Disp: 180 tablet, Rfl: 1    NEEDLE, DISP, 30 G (B-D DISP NEEDLE 30GX1\") 30G X 1\" MISC, Use once a week, Disp: 100 each, Rfl: 5    nystatin (MYCOSTATIN) powder, Apply topically 2 (two) times a day, Disp: 60 g, Rfl: 2    omeprazole (PriLOSEC) 20 mg delayed release capsule, Take 1 capsule (20 mg total) by mouth daily, Disp: 90 capsule, Rfl: 1    PARoxetine (PAXIL) 30 mg tablet, Take 1 tablet (30 mg total) by mouth daily, Disp: 90 tablet, Rfl: 0    promethazine-dextromethorphan (PHENERGAN-DM) 6.25-15 mg/5 mL oral syrup, Take 5 mL by mouth 4 (four) times a day as needed for cough, Disp: 180 mL, Rfl: 0    rosuvastatin (CRESTOR) 20 MG tablet, Take 1 tablet (20 mg total) by mouth daily, Disp: 90 tablet, Rfl: 3    semaglutide, 0.25 or 0.5 mg/dose, (Ozempic, 0.25 or 0.5 MG/DOSE,) 2 mg/3 mL injection pen, Inject 0.375 mL (0.25 mg total) under the skin every 7 days 0.25 mg under the skin every 7 days for 4 doses (28 days), THEN 0.5 mg under the skin every 7 days, Disp: 3 mL, Rfl: 1    clotrimazole-betamethasone (LOTRISONE) 1-0.05 % cream, Apply topically 2 (two) times a day for 14 days, Disp: 30 g, Rfl: 0    The following portions of the patient's history were reviewed and updated as appropriate: allergies, current medications, past family history, past medical history, past social history, past surgical history and problem list.    Review of Systems   Constitutional: Negative.    Respiratory: Negative.     Cardiovascular: Negative.     " "       Objective:    /70   Pulse 74   Ht 5' 3\" (1.6 m)   Wt 89.8 kg (198 lb)   SpO2 99%   BMI 35.07 kg/m²      Physical Exam  Constitutional:       Appearance: Normal appearance.   Cardiovascular:      Pulses: no weak pulses.      Heart sounds: Normal heart sounds.   Pulmonary:      Breath sounds: Normal breath sounds.   Feet:      Right foot:      Skin integrity: No ulcer, skin breakdown, erythema, warmth, callus or dry skin.      Left foot:      Skin integrity: No ulcer, skin breakdown, erythema, warmth, callus or dry skin.         Patient's shoes and socks removed.    Right Foot/Ankle   Right Foot Inspection  Skin Exam: skin normal and skin intact. No dry skin, no warmth, no callus, no erythema, no maceration, no abnormal color, no pre-ulcer, no ulcer and no callus.     Toe Exam: ROM and strength within normal limits.     Sensory   Vibration: intact  Proprioception: intact  Monofilament testing: intact    Vascular  Capillary refills: < 3 seconds      Left Foot/Ankle  Left Foot Inspection  Skin Exam: skin normal and skin intact. No dry skin, no warmth, no erythema, no maceration, normal color, no pre-ulcer, no ulcer and no callus.     Toe Exam: ROM and strength within normal limits.     Sensory   Vibration: intact  Proprioception: intact  Monofilament testing: intact    Vascular  Capillary refills: < 3 seconds      Assign Risk Category  No deformity present  No loss of protective sensation  No weak pulses  Risk: 0      Recent Results (from the past 1008 hour(s))   Thinprep Tis Pap and HPV mRNA E6/E7 Reflex HPV 16,18/45    Collection Time: 04/03/24 10:47 AM   Result Value Ref Range    SL AMB CLINICAL INFORMATION Normal exam     LMP N/A     Prev. PAP N/A     Prev. BX N/A     Source Cervix     Statement of Adequacy      Interpretation/Result       Cytology Results: Negative for intraepithelial lesion or malignancy.    Comment       This Pap test has been evaluated with computer assisted technology.    SL " AMB CYTOTECHNOLOGIST:      Review Cytotechnologist      Comment      HPV mRNA E6/E7 Not Detected Not Detected   Comprehensive metabolic panel    Collection Time: 05/07/24  9:05 AM   Result Value Ref Range    Sodium 138 135 - 147 mmol/L    Potassium 5.0 3.5 - 5.3 mmol/L    Chloride 100 96 - 108 mmol/L    CO2 26 21 - 32 mmol/L    ANION GAP 12 4 - 13 mmol/L    BUN 15 5 - 25 mg/dL    Creatinine 1.18 0.60 - 1.30 mg/dL    Glucose, Fasting 112 (H) 65 - 99 mg/dL    Calcium 9.2 8.4 - 10.2 mg/dL    AST 18 13 - 39 U/L    ALT 14 7 - 52 U/L    Alkaline Phosphatase 121 (H) 34 - 104 U/L    Total Protein 7.6 6.4 - 8.4 g/dL    Albumin 4.0 3.5 - 5.0 g/dL    Total Bilirubin 0.41 0.20 - 1.00 mg/dL    eGFR 50 ml/min/1.73sq m       Assessment/Plan:    No problem-specific Assessment & Plan notes found for this encounter.           Problem List Items Addressed This Visit          Respiratory    Multiple lung nodules on CT     CAT scan done in April 2023 reveals stable background emphysema and small bilateral benign pulmonary nodules, patient has stopped smoking since.  repeat CAT scan ordered.         Relevant Orders    CT chest wo contrast       Endocrine    Uncontrolled type 2 diabetes mellitus with hyperglycemia (HCC) - Primary       Lab Results   Component Value Date    HGBA1C 11.1 (H) 03/27/2024     Patient's fasting blood sugar has improved from over 200 212 on Ozempic.  She is currently on 0.5 mg dose.  Denies any symptoms of hypoglycemia.  Recommended to reduce dose of glipizide if FBS drops to 70.   Continue metformin, Jardiance.   Increase Ozempic to 1 mg dose.         Relevant Medications    semaglutide, 1 mg/dose, (Ozempic, 1 MG/DOSE,) 4 mg/3 mL injection pen

## 2024-05-20 ENCOUNTER — TELEPHONE (OUTPATIENT)
Age: 59
End: 2024-05-20

## 2024-05-20 NOTE — TELEPHONE ENCOUNTER
PA for Semaglutide, 1 mg/dose, (Ozempic, 1 MG/DOSE,) 4 mg/3 mL injection pen    Submitted via    [][employer]KEY   [x][employer]-Case ID # [id number]  []Faxed to plan   []Other website   []Phone call Case ID #     Office notes sent, clinical questions answered. Awaiting determination    Turnaround time for your insurance to make a decision on your Prior Authorization can take 7-21 business days.

## 2024-05-20 NOTE — TELEPHONE ENCOUNTER
PA for Semaglutide, 1 mg/dose, (Ozempic, 1 MG/DOSE,) 4 mg/3 mL injection pen      Note from payer: This medication or product was previously approved on ~[id number] from 01/01/2024 to 12/31/2024. You will be able to fill a prescription for this medication at your pharmacy. If your pharmacy has questions regarding the processing of your prescription, please have them call the [facility] pharmacy help desk at [phone].  Payer: [employer] Part D Case ID: [id number]

## 2024-06-01 ENCOUNTER — HOSPITAL ENCOUNTER (OUTPATIENT)
Dept: CT IMAGING | Facility: HOSPITAL | Age: 59
Discharge: HOME | End: 2024-06-01
Payer: MEDICARE

## 2024-06-01 DIAGNOSIS — R91.8 MULTIPLE LUNG NODULES ON CT: ICD-10-CM

## 2024-06-01 PROCEDURE — 71250 CT THORAX DX C-: CPT

## 2024-06-05 ENCOUNTER — TELEPHONE (OUTPATIENT)
Age: 59
End: 2024-06-05

## 2024-06-06 ENCOUNTER — OFFICE VISIT (OUTPATIENT)
Dept: OBGYN CLINIC | Facility: CLINIC | Age: 59
End: 2024-06-06
Payer: MEDICARE

## 2024-06-06 VITALS
SYSTOLIC BLOOD PRESSURE: 121 MMHG | BODY MASS INDEX: 35.15 KG/M2 | HEIGHT: 63 IN | DIASTOLIC BLOOD PRESSURE: 80 MMHG | HEART RATE: 71 BPM | WEIGHT: 198.4 LBS

## 2024-06-06 DIAGNOSIS — M21.161 GENU VARUM OF RIGHT LOWER EXTREMITY: ICD-10-CM

## 2024-06-06 DIAGNOSIS — M17.11 PRIMARY OSTEOARTHRITIS OF RIGHT KNEE: Primary | ICD-10-CM

## 2024-06-06 PROCEDURE — 99213 OFFICE O/P EST LOW 20 MIN: CPT | Performed by: ORTHOPAEDIC SURGERY

## 2024-06-06 RX ORDER — AMOXICILLIN 500 MG/1
500 TABLET, FILM COATED ORAL
COMMUNITY
Start: 2024-05-30
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
## 2024-06-06 NOTE — PROGRESS NOTES
Assessment:       1. Primary osteoarthritis of right knee    2. Genu varum of right lower extremity          Plan:        Explained my current clinical findings to the patient.  At this time I would defer doing an intra-articular corticosteroid injection in the presence of an active tooth infection.  Patient is recommended to continue and complete her course of oral antibiotics.  We will see her back next week for consideration of intra-articular cortisone injection.  In the interim, I will also prescribe her a medial  knee brace that she may wear during periods of prolonged standing and ambulation.  Patient expressed understanding and was in agreement with the treatment plan.        Subjective:     Patient ID: Natalie Gomes is a 59 y.o. female.    Chief Complaint:    SABRINA Estrada is a 59-year-old lady with a known history of right knee osteoarthritis.  Previously received right knee intra-articular corticosteroid injection in 2023 which has provided her over 6 months of pain relief.  Now, reports that her right knee pain has gradually recurred over the last month.  No new reported injury, as well as a swelling.  She is currently on oral antibiotics for a tooth infection.  No reported fever or chills at this time.     Social History     Occupational History    Occupation: Unemployed due to Rt hip injury     Comment: and lower back injury    Occupation: Used to be a     Occupation: Full Disability as of late 2019     Comment: based on medical issues   Tobacco Use    Smoking status: Former     Current packs/day: 0.00     Average packs/day: 2.0 packs/day for 40.0 years (80.0 ttl pk-yrs)     Types: Cigarettes     Start date: 1982     Quit date: 2022     Years since quittin.5    Smokeless tobacco: Never    Tobacco comments:     quit 2020   Vaping Use    Vaping status: Never Used   Substance and Sexual Activity    Alcohol use: Not Currently    Drug use: Yes      Types: Marijuana     Comment: Smoked THC between 19 and 28y/o    Sexual activity: Yes     Partners: Male     Comment: Boyfriend      Review of Systems        Objective:     Right Knee Exam     Tenderness   The patient is experiencing tenderness in the medial joint line (Some tenderness over the pes anserine).    Tests   Lynnette:  Medial - negative Lateral - negative  Varus: negative Valgus: positive  Pivot shift: negative    Other   Erythema: absent  Swelling: none  Effusion: no effusion present    Comments:  Mild genu varum with some laxity noted on valgus stress testing.          Observations     Right Knee   Negative for effusion.   Physical Exam  Vitals and nursing note reviewed.   Constitutional:       Appearance: She is well-developed.   HENT:      Head: Normocephalic.   Cardiovascular:      Rate and Rhythm: Normal rate.   Pulmonary:      Effort: Pulmonary effort is normal. No respiratory distress.   Musculoskeletal:      Right knee: No effusion.      Instability Tests: Medial Lynnette test negative and lateral Lynnette test negative.   Skin:     General: Skin is warm and dry.      Findings: No erythema.   Neurological:      Mental Status: She is alert and oriented to person, place, and time.      Cranial Nerves: No cranial nerve deficit.   Psychiatric:         Behavior: Behavior normal.         Thought Content: Thought content normal.         Judgment: Judgment normal.           I have personally reviewed pertinent films in PACS.

## 2024-06-07 NOTE — TELEPHONE ENCOUNTER
Patient called to see if the results were in yet. I advised not yet. Please call patient when the results have been reviewed. Thank you.

## 2024-06-10 ENCOUNTER — ANESTHESIA EVENT (OUTPATIENT)
Dept: PERIOP | Facility: AMBULARY SURGERY CENTER | Age: 59
End: 2024-06-10
Payer: MEDICARE

## 2024-06-10 NOTE — PRE-PROCEDURE INSTRUCTIONS
Pre-Surgery Instructions:   Medication Instructions    albuterol (Ventolin HFA) 90 mcg/act inhaler Uses PRN- OK to take day of surgery    amLODIPine (NORVASC) 5 mg tablet Take day of surgery.    ARIPiprazole (ABILIFY) 2 mg tablet Take night before surgery    aspirin 81 mg chewable tablet Hold day of surgery.    clonazePAM (KlonoPIN) 0.5 mg tablet Take night before surgery    glipiZIDE (GLUCOTROL) 10 mg tablet Hold day of surgery.    lisinopril (ZESTRIL) 40 mg tablet Hold day of surgery.    metFORMIN (GLUCOPHAGE) 500 mg tablet Hold day of surgery.    omeprazole (PriLOSEC) 20 mg delayed release capsule Take day of surgery.    PARoxetine (PAXIL) 30 mg tablet Take day of surgery.    rosuvastatin (CRESTOR) 20 MG tablet Take night before surgery    semaglutide, 1 mg/dose, (Ozempic, 1 MG/DOSE,) 4 mg/3 mL injection pen Stop taking 7 days prior to surgery.      Medication instructions for day surgery reviewed. Please use only a sip of water to take your instructed medications. Avoid all over the counter vitamins, supplements and NSAIDS for one week prior to surgery per anesthesia guidelines. Tylenol is ok to take as needed.     You will receive a call one business day prior to surgery with an arrival time and hospital directions. If your surgery is scheduled on a Monday, the hospital will be calling you on the Friday prior to your surgery. If you have not heard from anyone by 8pm, please call the hospital supervisor through the hospital  at [phone]. ([place] [phone] or [place] [phone]).    Do not eat or drink anything after midnight the night before your surgery, including candy, mints, lifesavers, or chewing gum. Do not drink alcohol 24hrs before your surgery. Try not to smoke at least 24hrs before your surgery.       Follow the pre surgery showering instructions as listed in the “My Surgical Experience Booklet” or otherwise provided by your surgeon's office. Do not use a blade to shave the surgical  area 1 week before surgery. It is okay to use a clean electric clippers up to 24 hours before surgery. Do not apply any lotions, creams, including makeup, cologne, deodorant, or perfumes after showering on the day of your surgery. Do not use dry shampoo, hair spray, hair gel, or any type of hair products.     No contact lenses, eye make-up, or artificial eyelashes. Remove nail polish, including gel polish, and any artificial, gel, or acrylic nails if possible. Remove all jewelry including rings and body piercing jewelry.     Wear causal clothing that is easy to take on and off. Consider your type of surgery.    Keep any valuables, jewelry, piercings at home. Please bring any specially ordered equipment (sling, braces) if indicated.    Arrange for a responsible person to drive you to and from the hospital on the day of your surgery. Please confirm the visitor policy for the day of your procedure when you receive your phone call with an arrival time.     Call the surgeon's office with any new illnesses, exposures, or additional questions prior to surgery.    Please reference your “My Surgical Experience Booklet” for additional information to prepare for your upcoming surgery.

## 2024-06-10 NOTE — TELEPHONE ENCOUNTER
Call from patient expressing her frustration on waiting for her CT Scan results. She advised it has been well over a week. She is asking if someone can expedite her results? Please return her call with an update.

## 2024-06-12 ENCOUNTER — OFFICE VISIT (OUTPATIENT)
Dept: PSYCHIATRY | Facility: CLINIC | Age: 59
End: 2024-06-12

## 2024-06-12 DIAGNOSIS — F33.1 MODERATE RECURRENT MAJOR DEPRESSION (HCC): ICD-10-CM

## 2024-06-12 DIAGNOSIS — F41.1 GENERALIZED ANXIETY DISORDER: Primary | ICD-10-CM

## 2024-06-12 DIAGNOSIS — F41.0 PANIC ATTACKS: ICD-10-CM

## 2024-06-12 RX ORDER — CLONAZEPAM 0.5 MG/1
0.5 TABLET ORAL
Qty: 60 TABLET | Refills: 5 | Status: SHIPPED | OUTPATIENT
Start: 2024-06-12

## 2024-06-12 RX ORDER — PAROXETINE 30 MG/1
30 TABLET, FILM COATED ORAL DAILY
Qty: 90 TABLET | Refills: 1 | Status: SHIPPED | OUTPATIENT
Start: 2024-06-12

## 2024-06-12 RX ORDER — ARIPIPRAZOLE 2 MG/1
2 TABLET ORAL
Qty: 90 TABLET | Refills: 1 | Status: SHIPPED | OUTPATIENT
Start: 2024-06-12

## 2024-06-12 NOTE — BH CRISIS PLAN
"Client Name: Natalie Gomes       Client YOB: 1965    [place] Safety Plan      Creation Date: 6/12/24    Created By: Laura Alcaraz PA-C       Step 1: Warning Signs:   Warning Signs   Severe anxiety            Step 2: Internal Coping Strategies:   Internal Coping Strategies   \"Reading\"   \"Breathing\"   \"Just try to think of other things, like happier thoughts\"            Step 3: People and social settings that provide distraction:   Name Contact Information   Sister iLnda [phone]   Live in Boyfriend [facility] [phone]    Places   Sister's house   \"[employer]\"           Step 4: People whom I can ask for help during a crisis:      Name Contact Information    Same as Step 3       Step 5: Professionals or agencies I can contact during a crisis:      [facility]in[facility]/Agency Name Phone Emergency Contact    [facility] [phone]     [facility] [phone]            Local Emergency Department Emergency Department Phone Emergency Department Address    [facility]          Crisis Phone Numbers:   Suicide Prevention Lifeline: Call or Text  919 Crisis Text Line: Text HOME to 053-242   Please note: Some [facility] do not have a separate number for Child/Adolescent specific crisis. If your county is not listed under Child/Adolescent, please call the adult number for your county      Adult Crisis Numbers: Child/Adolescent Crisis Numbers   [facility]: [phone] [facility]: [phone]   [facility]: [phone] [facility]: [phone]   [facility]: [phone] [place]: [phone]   [facility]: [phone] [place]: [phone]   [place]: [phone]   [facility]: [phone]   [facility]: [phone]   [facility]: [phone] (daytime) [phone] (after hours, weekends, holidays)      Step 6: Making the environment safer (plan for lethal means " "safety):   Patient did not identify any lethal methods: Yes     Optional: What is most important to me and worth living for?   \"My grandkids, my kids\"     [place] Safety Plan. Kirstie Bond and Lisandro Singh. Used with permission of the authors.           "

## 2024-06-13 ENCOUNTER — TELEPHONE (OUTPATIENT)
Dept: PSYCHIATRY | Facility: CLINIC | Age: 59
End: 2024-06-13

## 2024-06-13 ENCOUNTER — OFFICE VISIT (OUTPATIENT)
Dept: OBGYN CLINIC | Facility: CLINIC | Age: 59
End: 2024-06-13
Payer: MEDICARE

## 2024-06-13 VITALS
DIASTOLIC BLOOD PRESSURE: 81 MMHG | SYSTOLIC BLOOD PRESSURE: 132 MMHG | BODY MASS INDEX: 35.08 KG/M2 | HEIGHT: 63 IN | HEART RATE: 89 BPM | WEIGHT: 197.97 LBS

## 2024-06-13 DIAGNOSIS — M17.11 PRIMARY OSTEOARTHRITIS OF RIGHT KNEE: Primary | ICD-10-CM

## 2024-06-13 PROCEDURE — 20610 DRAIN/INJ JOINT/BURSA W/O US: CPT | Performed by: ORTHOPAEDIC SURGERY

## 2024-06-13 RX ORDER — TRIAMCINOLONE ACETONIDE 40 MG/ML
40 INJECTION, SUSPENSION INTRA-ARTICULAR; INTRAMUSCULAR
Status: COMPLETED | OUTPATIENT
Start: 2024-06-13 | End: 2024-06-13

## 2024-06-13 RX ORDER — BUPIVACAINE HYDROCHLORIDE 2.5 MG/ML
4 INJECTION, SOLUTION INFILTRATION; PERINEURAL
Status: COMPLETED | OUTPATIENT
Start: 2024-06-13 | End: 2024-06-13

## 2024-06-13 RX ADMIN — TRIAMCINOLONE ACETONIDE 40 MG: 40 INJECTION, SUSPENSION INTRA-ARTICULAR; INTRAMUSCULAR at 09:30

## 2024-06-13 RX ADMIN — BUPIVACAINE HYDROCHLORIDE 4 ML: 2.5 INJECTION, SOLUTION INFILTRATION; PERINEURAL at 09:30

## 2024-06-13 NOTE — PROGRESS NOTES
Large joint arthrocentesis: R knee  Universal Protocol:  Consent: Verbal consent obtained.  Risks and benefits: risks, benefits and alternatives were discussed  Consent given by: patient  Patient understanding: patient states understanding of the procedure being performed  Site marked: the operative site was marked  Radiology Images displayed and confirmed. If images not available, report reviewed: imaging studies available  Required items: required blood products, implants, devices, and special equipment available  Patient identity confirmed: verbally with patient  Supporting Documentation  Indications: pain   Procedure Details  Location: knee - R knee  Preparation: Patient was prepped and draped in the usual sterile fashion  Needle size: 22 G  Ultrasound guidance: no  Approach: anterolateral  Medications administered: 4 mL bupivacaine 0.25 %; 40 mg triamcinolone acetonide 40 mg/mL    Patient tolerance: patient tolerated the procedure well with no immediate complications  Dressing:  Sterile dressing applied

## 2024-06-13 NOTE — TELEPHONE ENCOUNTER
Called and spoke with patient to schedule 12 week follow up with provider (Laura Alcaraz). Scheduled for 9/5/24 @ 9:30am.

## 2024-06-14 ENCOUNTER — NURSE TRIAGE (OUTPATIENT)
Age: 59
End: 2024-06-14

## 2024-06-14 ENCOUNTER — TELEPHONE (OUTPATIENT)
Age: 59
End: 2024-06-14

## 2024-06-14 NOTE — TELEPHONE ENCOUNTER
"Regarding: Medication/Blood Sugar advise  ----- Message from Edel DYER sent at 6/14/2024  2:12 PM EDT -----  Call from patient stating \"I need to talk to Dr. Huertas about my sugar levels. I had a cortisone shot in knee and and I am going for cataract surgery on Monday and my sugar is up from the shot. I was told I can't take my Ozempic until after the surgery. Can I take more metformin?\" Please return her call requesting advice. Thank you.    "

## 2024-06-14 NOTE — TELEPHONE ENCOUNTER
Call from patient requesting her CT results. Test was done on 6/1/24. Results in chart. Please return her call.

## 2024-06-14 NOTE — TELEPHONE ENCOUNTER
"Patient calls in today with uncontrolled blood sugars.  479 this morning and 312 at 2:30pm.  Patient took double dose of metformin this morning.  She had a steroid injection for her knee on 6/13.  She also stopped her Ozempic Wednesday for her upcoming cataract surgery on 6/17.  Patient feels spike is due to steroid injection.  She is worried if her sugars stay this high, they will not do her surgery on Monday.  She was told to hold Metformin the morning of the surgery.  Should she add something to her regimen to stabilize blood sugars.  Please advise.  Patient would also like to go over CT results.  Dr has not reviewed yet.    Reason for Disposition   Blood glucose > 300 mg/dL (16.7 mmol/L)    Answer Assessment - Initial Assessment Questions  1. BLOOD GLUCOSE: \"What is your blood glucose level?\"       312 at 2:30pm, 479 at 8 am.  Patient took 2 metformin instead of 1.  2. ONSET: \"When did you check the blood glucose?\"      Last check 2:30 pm  3. USUAL RANGE: \"What is your glucose level usually?\" (e.g., usual fasting morning value, usual evening value)      In range  4. KETONES: \"Do you check for ketones (urine or blood test strips)?\" If yes, ask: \"What does the test show now?\"       Did not test   5. TYPE 1 or 2:  \"Do you know what type of diabetes you have?\"  (e.g., Type 1, Type 2, Gestational; doesn't know)       Type 2  6. INSULIN: \"Do you take insulin?\" \"What type of insulin(s) do you use? What is the mode of delivery? (syringe, pen; injection or pump)?\"       Metformin, glucotrol and Ozempic  7. DIABETES PILLS: \"Do you take any pills for your diabetes?\" If yes, ask: \"Have you missed taking any pills recently?\"      See above   8. OTHER SYMPTOMS: \"Do you have any symptoms?\" (e.g., fever, frequent urination, difficulty breathing, dizziness, weakness, vomiting)      Denies  9. PREGNANCY: \"Is there any chance you are pregnant?\" \"When was your last menstrual period?\"      No    Protocols used: Diabetes - High Blood " Sugar-ADULT-[place]

## 2024-06-17 ENCOUNTER — HOSPITAL ENCOUNTER (OUTPATIENT)
Facility: AMBULARY SURGERY CENTER | Age: 59
Setting detail: OUTPATIENT SURGERY
Discharge: HOME | End: 2024-06-17
Attending: OPHTHALMOLOGY | Admitting: OPHTHALMOLOGY
Payer: MEDICARE

## 2024-06-17 ENCOUNTER — ANESTHESIA (OUTPATIENT)
Dept: PERIOP | Facility: AMBULARY SURGERY CENTER | Age: 59
End: 2024-06-17
Payer: MEDICARE

## 2024-06-17 VITALS
TEMPERATURE: 97.4 F | RESPIRATION RATE: 18 BRPM | DIASTOLIC BLOOD PRESSURE: 88 MMHG | HEART RATE: 92 BPM | BODY MASS INDEX: 34.55 KG/M2 | WEIGHT: 195 LBS | SYSTOLIC BLOOD PRESSURE: 118 MMHG | HEIGHT: 63 IN | OXYGEN SATURATION: 100 %

## 2024-06-17 DIAGNOSIS — H25.041 POSTERIOR SUBCAPSULAR POLAR AGE-RELATED CATARACT OF RIGHT EYE: Primary | ICD-10-CM

## 2024-06-17 LAB — GLUCOSE SERPL-MCNC: 152 MG/DL (ref 65–140)

## 2024-06-17 PROCEDURE — 82948 REAGENT STRIP/BLOOD GLUCOSE: CPT

## 2024-06-17 PROCEDURE — V2632 POST CHMBR INTRAOCULAR LENS: HCPCS | Performed by: OPHTHALMOLOGY

## 2024-06-17 DEVICE — STERILE UV AND BLUE LIGHT FILTERING ACRYLIC FOLDABLE ASPHERIC POSTERIOR CHAMBER INTRAOCULAR LENS
Type: IMPLANTABLE DEVICE | Site: EYE | Status: FUNCTIONAL
Brand: CLAREON

## 2024-06-17 RX ORDER — KETOROLAC TROMETHAMINE 5 MG/ML
1 SOLUTION OPHTHALMIC
Status: COMPLETED | OUTPATIENT
Start: 2024-06-17 | End: 2024-06-17

## 2024-06-17 RX ORDER — LIDOCAINE HYDROCHLORIDE 20 MG/ML
1 JELLY TOPICAL
Status: COMPLETED | OUTPATIENT
Start: 2024-06-17 | End: 2024-06-17

## 2024-06-17 RX ORDER — TETRACAINE HYDROCHLORIDE 5 MG/ML
SOLUTION OPHTHALMIC AS NEEDED
Status: DISCONTINUED | OUTPATIENT
Start: 2024-06-17 | End: 2024-06-17 | Stop reason: HOSPADM

## 2024-06-17 RX ORDER — TETRACAINE HYDROCHLORIDE 5 MG/ML
1 SOLUTION OPHTHALMIC ONCE
Status: COMPLETED | OUTPATIENT
Start: 2024-06-17 | End: 2024-06-17

## 2024-06-17 RX ORDER — GATIFLOXACIN 5 MG/ML
1 SOLUTION/ DROPS OPHTHALMIC 2 TIMES DAILY
Start: 2024-06-17

## 2024-06-17 RX ORDER — BALANCED SALT SOLUTION 6.4; .75; .48; .3; 3.9; 1.7 MG/ML; MG/ML; MG/ML; MG/ML; MG/ML; MG/ML
SOLUTION OPHTHALMIC AS NEEDED
Status: DISCONTINUED | OUTPATIENT
Start: 2024-06-17 | End: 2024-06-17 | Stop reason: HOSPADM

## 2024-06-17 RX ORDER — KETOROLAC TROMETHAMINE 5 MG/ML
1 SOLUTION OPHTHALMIC 4 TIMES DAILY
Start: 2024-06-17

## 2024-06-17 RX ORDER — PHENYLEPHRINE HYDROCHLORIDE 25 MG/ML
1 SOLUTION/ DROPS OPHTHALMIC
Status: COMPLETED | OUTPATIENT
Start: 2024-06-17 | End: 2024-06-17

## 2024-06-17 RX ORDER — GATIFLOXACIN 5 MG/ML
SOLUTION/ DROPS OPHTHALMIC AS NEEDED
Status: DISCONTINUED | OUTPATIENT
Start: 2024-06-17 | End: 2024-06-17 | Stop reason: HOSPADM

## 2024-06-17 RX ORDER — MIDAZOLAM HYDROCHLORIDE 2 MG/2ML
INJECTION, SOLUTION INTRAMUSCULAR; INTRAVENOUS AS NEEDED
Status: DISCONTINUED | OUTPATIENT
Start: 2024-06-17 | End: 2024-06-17

## 2024-06-17 RX ORDER — LIDOCAINE HYDROCHLORIDE 10 MG/ML
INJECTION, SOLUTION EPIDURAL; INFILTRATION; INTRACAUDAL; PERINEURAL AS NEEDED
Status: DISCONTINUED | OUTPATIENT
Start: 2024-06-17 | End: 2024-06-17 | Stop reason: HOSPADM

## 2024-06-17 RX ORDER — CYCLOPENTOLATE HYDROCHLORIDE 10 MG/ML
1 SOLUTION/ DROPS OPHTHALMIC
Status: COMPLETED | OUTPATIENT
Start: 2024-06-17 | End: 2024-06-17

## 2024-06-17 RX ORDER — PREDNISOLONE ACETATE 10 MG/ML
1 SUSPENSION/ DROPS OPHTHALMIC 4 TIMES DAILY
Start: 2024-06-17

## 2024-06-17 RX ADMIN — PHENYLEPHRINE HYDROCHLORIDE 1 DROP: 25 SOLUTION/ DROPS OPHTHALMIC at 06:35

## 2024-06-17 RX ADMIN — PHENYLEPHRINE HYDROCHLORIDE 1 DROP: 25 SOLUTION/ DROPS OPHTHALMIC at 06:20

## 2024-06-17 RX ADMIN — CYCLOPENTOLATE HYDROCHLORIDE 1 DROP: 10 SOLUTION/ DROPS OPHTHALMIC at 06:20

## 2024-06-17 RX ADMIN — KETOROLAC TROMETHAMINE 1 DROP: 5 SOLUTION OPHTHALMIC at 06:21

## 2024-06-17 RX ADMIN — CYCLOPENTOLATE HYDROCHLORIDE 1 DROP: 10 SOLUTION/ DROPS OPHTHALMIC at 06:35

## 2024-06-17 RX ADMIN — PHENYLEPHRINE HYDROCHLORIDE 1 DROP: 25 SOLUTION/ DROPS OPHTHALMIC at 06:50

## 2024-06-17 RX ADMIN — LIDOCAINE HYDROCHLORIDE 1 APPLICATION: 20 JELLY TOPICAL at 06:35

## 2024-06-17 RX ADMIN — LIDOCAINE HYDROCHLORIDE 1 APPLICATION: 20 JELLY TOPICAL at 06:50

## 2024-06-17 RX ADMIN — CYCLOPENTOLATE HYDROCHLORIDE 1 DROP: 10 SOLUTION/ DROPS OPHTHALMIC at 06:50

## 2024-06-17 RX ADMIN — CYCLOPENTOLATE HYDROCHLORIDE 1 DROP: 10 SOLUTION/ DROPS OPHTHALMIC at 07:00

## 2024-06-17 RX ADMIN — KETOROLAC TROMETHAMINE 1 DROP: 5 SOLUTION OPHTHALMIC at 07:00

## 2024-06-17 RX ADMIN — KETOROLAC TROMETHAMINE 1 DROP: 5 SOLUTION OPHTHALMIC at 06:50

## 2024-06-17 RX ADMIN — LIDOCAINE HYDROCHLORIDE 1 APPLICATION: 20 JELLY TOPICAL at 06:21

## 2024-06-17 RX ADMIN — TETRACAINE HYDROCHLORIDE 1 DROP: 5 SOLUTION OPHTHALMIC at 06:20

## 2024-06-17 RX ADMIN — KETOROLAC TROMETHAMINE 1 DROP: 5 SOLUTION OPHTHALMIC at 06:35

## 2024-06-17 RX ADMIN — PHENYLEPHRINE HYDROCHLORIDE 1 DROP: 25 SOLUTION/ DROPS OPHTHALMIC at 07:00

## 2024-06-17 RX ADMIN — MIDAZOLAM 2 MG: 1 INJECTION INTRAMUSCULAR; INTRAVENOUS at 07:02

## 2024-06-17 NOTE — DISCHARGE INSTR - AVS FIRST PAGE
Dr. Thompson Cataract Instructions    Activity:     1.  No Driving until instructed   2.  Keep shield on until seen tomorrow except when administering drops   3.  No heavy lifting   4.  No water in eye     Diet:     1.  Resume normal diet    Normal Symptoms:     1.  Mild Headache   2. Scratchy or picky feeling around eye    Call the office if:     1.  You have any questions or concerns   2.  If eye pain is not relieved by extra strength tylenol    Office phone number:  [phone]      Next appointment:     1.  See Dr Thompson at his office tomorrow as scheduled   __________________________________________________________   2.  Bring blue eye kit with you and eyedrops to the office    A new set of comprehensive instructions will be given and reviewed with you during your office visit tomorrow.

## 2024-06-17 NOTE — ANESTHESIA POSTPROCEDURE EVALUATION
Post-Op Assessment Note    CV Status:  Stable  Pain Score: 0    Pain management: adequate       Mental Status:  Alert   Hydration Status:  Stable   PONV Controlled:  None   Airway Patency:  Patent  Two or more mitigation strategies used for obstructive sleep apnea   Post Op Vitals Reviewed: Yes    No anethesia notable event occurred.    Staff: CRNA               BP   122/59   Temp 97   Pulse 85   16      99

## 2024-06-17 NOTE — TELEPHONE ENCOUNTER
Spoke with pt she said her blood sugar hasn't been high since Saturday she feels fine now. I did let her know if it happens again to call us. Also, she would like you to review the CT she had done so she can have the results

## 2024-06-17 NOTE — ANESTHESIA PREPROCEDURE EVALUATION
Procedure:  EXTRACTION EXTRACAPSULAR CATARACT PHACO INTRAOCULAR LENS (IOL) (Right: Eye)    Relevant Problems   CARDIO   (+) Essential hypertension   (+) Hyperlipemia      ENDO   (+) Type 2 diabetes mellitus with stage 3a chronic kidney disease (HCC)      GI/HEPATIC   (+) Chronic GERD      /RENAL   (+) Diabetic nephropathy associated with type 2 diabetes mellitus (HCC)   (+) Stage 3a chronic kidney disease (HCC)      MUSCULOSKELETAL   (+) Primary osteoarthritis of right hip   (+) Primary osteoarthritis of right knee      NEURO/PSYCH   (+) Moderate recurrent major depression (HCC)   (+) Paresthesias in right hand      PULMONARY   (+) Simple chronic bronchitis (HCC)        Physical Exam    Airway    Mallampati score: II  TM Distance: >3 FB  Neck ROM: full     Dental   No notable dental hx     Cardiovascular  Cardiovascular exam normal    Pulmonary  Pulmonary exam normal     Other Findings  post-pubertal.      Anesthesia Plan  ASA Score- 3     Anesthesia Type- IV sedation with anesthesia with ASA Monitors.         Additional Monitors:     Airway Plan:            Plan Factors-Exercise tolerance (METS): >4 METS.    Chart reviewed.   Existing labs reviewed. Patient summary reviewed.    Patient is not a current smoker.      Obstructive sleep apnea risk education given perioperatively.        Induction-     Postoperative Plan-     Perioperative Resuscitation Plan - Level 1 - Full Code.       Informed Consent- Anesthetic plan and risks discussed with patient.  I personally reviewed this patient with the CRNA. Discussed and agreed on the Anesthesia Plan with the CRNA..

## 2024-06-17 NOTE — OP NOTE
OPERATIVE REPORT    PATIENT NAME: Natalie Gomes    :  1965  MRN: [id number]  Pt Location: [facility] OR ROOM 01    Surgery Date: 2024    Surgeons and Role:     * Tyson Thompson MD - Primary    Posterior subcapsular polar age-related cataract, right eye [H25.041]    Post-Op Diagnosis Codes:     * Posterior subcapsular polar age-related cataract, right eye [H25.041]    Procedure(s):  EXTRACTION EXTRACAPSULAR CATARACT PHACO INTRAOCULAR LENS (IOL)    Anesthesia Type:   IV Sedation with Anesthesia    Operative Indications:  Posterior subcapsular polar age-related cataract, right eye [H25.041]  Decreased vision to 20/400. With problems driving.  Pt requested cataract sx the right eye    Procedure and Technique:    Procedure Details     The patient was brought in the OR in stable condition and placed on the operative table. The right eye was prepped and draped in the usual sterile manner. Attention was directed to the right eye where a lid speculum was placed. A 2.4 mm clear corneal incision was made temperally. 1/2 cc of 1% MPF Lidocaine was irrigated into the anterior chamber followed by viscoat. The side port incision was placed superiorly. The capsularrhexis was made and the nucleus was hydrodissected with BSS. The nucleus was then removed with the phaco handpiece followed by removal of the cortical material with the I/A handpiece. The capsular bag was then filled with Provisc. The IOL was folded and placed in to the capsular bag and centered well. The remaining Provisc was removed from the eye with the I/A. The wounds were hydrated with BSS and found to be water tight. The lid speculum was removed and 2 drops of Gatifloxicin were placed over the cornea. A protective eye shield was taped over the eye and the patient went to PACU in stable condition. I will see the patient in the office tomorrow and the expected post op period is a few weeks.       Complications: None        Disposition: PACU   Condition:  Stable    SIGNATURE: Tyson Thompson MD  DATE: June 17, 2024  TIME: 8:04 AM

## 2024-07-10 ENCOUNTER — TELEPHONE (OUTPATIENT)
Age: 59
End: 2024-07-10

## 2024-07-10 NOTE — TELEPHONE ENCOUNTER
Caller: patient    Doctor: ashok    Reason for call: patient needs knee brace and to her understanding it was being covered, patients states someone in our office told her it would be covered  Please advise     Call back#: [phone]

## 2024-08-07 ENCOUNTER — TELEPHONE (OUTPATIENT)
Age: 59
End: 2024-08-07

## 2024-08-07 NOTE — TELEPHONE ENCOUNTER
Brendon from [facility] called the Rx refill line stating they need more information on the patient for her Ozempic. Patient PA was approved from 1/2024-12/2024 but she is looking to get the medication at a cheaper cost and they need more information. Please contact the PA dept at [facility] at [phone].  Case # is [id number].

## 2024-08-12 ENCOUNTER — TELEPHONE (OUTPATIENT)
Age: 59
End: 2024-08-12

## 2024-08-12 NOTE — TELEPHONE ENCOUNTER
The patient called  she is in a donut hole with her insurance --hop   the price of the ozempic is up to $233   her insurance company faxed a form to the office on 8/6/24   the insurance company is trying to help so the patient can get the medication  if you did not receive a form please call the patient   thank you

## 2024-08-14 ENCOUNTER — APPOINTMENT (OUTPATIENT)
Dept: LAB | Facility: CLINIC | Age: 59
End: 2024-08-14
Payer: MEDICARE

## 2024-08-14 DIAGNOSIS — E78.5 HYPERLIPIDEMIA, UNSPECIFIED HYPERLIPIDEMIA TYPE: ICD-10-CM

## 2024-08-14 DIAGNOSIS — E11.65 UNCONTROLLED TYPE 2 DIABETES MELLITUS WITH HYPERGLYCEMIA (HCC): ICD-10-CM

## 2024-08-14 LAB
ALBUMIN SERPL BCG-MCNC: 3.9 G/DL (ref 3.5–5)
ALP SERPL-CCNC: 109 U/L (ref 34–104)
ALT SERPL W P-5'-P-CCNC: 15 U/L (ref 7–52)
ANION GAP SERPL CALCULATED.3IONS-SCNC: 13 MMOL/L (ref 4–13)
AST SERPL W P-5'-P-CCNC: 19 U/L (ref 13–39)
BILIRUB SERPL-MCNC: 0.34 MG/DL (ref 0.2–1)
BUN SERPL-MCNC: 15 MG/DL (ref 5–25)
CALCIUM SERPL-MCNC: 9.2 MG/DL (ref 8.4–10.2)
CHLORIDE SERPL-SCNC: 100 MMOL/L (ref 96–108)
CHOLEST SERPL-MCNC: 136 MG/DL
CO2 SERPL-SCNC: 24 MMOL/L (ref 21–32)
CREAT SERPL-MCNC: 1.44 MG/DL (ref 0.6–1.3)
EST. AVERAGE GLUCOSE BLD GHB EST-MCNC: 177 MG/DL
GFR SERPL CREATININE-BSD FRML MDRD: 39 ML/MIN/1.73SQ M
GLUCOSE P FAST SERPL-MCNC: 112 MG/DL (ref 65–99)
HBA1C MFR BLD: 7.8 %
HDLC SERPL-MCNC: 47 MG/DL
LDLC SERPL CALC-MCNC: 62 MG/DL (ref 0–100)
NONHDLC SERPL-MCNC: 89 MG/DL
POTASSIUM SERPL-SCNC: 4.4 MMOL/L (ref 3.5–5.3)
PROT SERPL-MCNC: 7.2 G/DL (ref 6.4–8.4)
SODIUM SERPL-SCNC: 137 MMOL/L (ref 135–147)
TRIGL SERPL-MCNC: 133 MG/DL

## 2024-08-14 PROCEDURE — 80061 LIPID PANEL: CPT

## 2024-08-14 PROCEDURE — 80053 COMPREHEN METABOLIC PANEL: CPT

## 2024-08-14 PROCEDURE — 83036 HEMOGLOBIN GLYCOSYLATED A1C: CPT

## 2024-08-14 PROCEDURE — 36415 COLL VENOUS BLD VENIPUNCTURE: CPT

## 2024-08-14 NOTE — TELEPHONE ENCOUNTER
Spoke with pt she told me she is the donut hole again and can not afford the Ozempic. She has the Jardiance samples she is going to take she said. She has a follow up next week with you .

## 2024-08-15 NOTE — TELEPHONE ENCOUNTER
Spoke with Natalie I am going to see if the insurance sends us another form. She said that Dr Huertas needs to send a request?

## 2024-08-15 NOTE — TELEPHONE ENCOUNTER
Pt would like Letitia to call her regarding her ozempic and the insurance. She said it was denied. Please, call pt back. Thank you.

## 2024-08-19 ENCOUNTER — OFFICE VISIT (OUTPATIENT)
Dept: FAMILY MEDICINE CLINIC | Facility: CLINIC | Age: 59
End: 2024-08-19
Payer: MEDICARE

## 2024-08-19 VITALS
HEIGHT: 63 IN | BODY MASS INDEX: 35.44 KG/M2 | DIASTOLIC BLOOD PRESSURE: 78 MMHG | SYSTOLIC BLOOD PRESSURE: 122 MMHG | WEIGHT: 200 LBS

## 2024-08-19 DIAGNOSIS — E78.5 HYPERLIPIDEMIA, UNSPECIFIED HYPERLIPIDEMIA TYPE: ICD-10-CM

## 2024-08-19 DIAGNOSIS — K21.9 CHRONIC GERD: ICD-10-CM

## 2024-08-19 DIAGNOSIS — Z12.31 SCREENING MAMMOGRAM FOR BREAST CANCER: ICD-10-CM

## 2024-08-19 DIAGNOSIS — I10 ESSENTIAL HYPERTENSION: ICD-10-CM

## 2024-08-19 DIAGNOSIS — E11.65 UNCONTROLLED TYPE 2 DIABETES MELLITUS WITH HYPERGLYCEMIA (HCC): Primary | ICD-10-CM

## 2024-08-19 PROCEDURE — G2211 COMPLEX E/M VISIT ADD ON: HCPCS | Performed by: FAMILY MEDICINE

## 2024-08-19 PROCEDURE — 99214 OFFICE O/P EST MOD 30 MIN: CPT | Performed by: FAMILY MEDICINE

## 2024-08-19 RX ORDER — GLIPIZIDE 10 MG/1
10 TABLET ORAL
Qty: 180 TABLET | Refills: 1 | Status: SHIPPED | OUTPATIENT
Start: 2024-08-19

## 2024-08-19 RX ORDER — ROSUVASTATIN CALCIUM 20 MG/1
20 TABLET, COATED ORAL
Qty: 100 TABLET | Refills: 1 | Status: SHIPPED | OUTPATIENT
Start: 2024-08-19

## 2024-08-19 RX ORDER — AMLODIPINE BESYLATE 5 MG/1
5 TABLET ORAL DAILY
Qty: 90 TABLET | Refills: 1 | Status: SHIPPED | OUTPATIENT
Start: 2024-08-19

## 2024-08-19 RX ORDER — LISINOPRIL 40 MG/1
40 TABLET ORAL DAILY
Qty: 90 TABLET | Refills: 1 | Status: SHIPPED | OUTPATIENT
Start: 2024-08-19

## 2024-08-19 NOTE — PROGRESS NOTES
Subjective:      Patient ID: Natalie Gomes is a 59 y.o. female.    HPI    Patient is here for routine follow-up.  Has history of hypertension, type 2 diabetes, dyslipidemia.  Metabolic labs reveal A1c improved from 11-7.8 as patient took ozempic.   Currently on 1 mg dose,unable to afford Ozempic now that she is  in the donut hole.   Patient has history of chronic bronchitis, on intermittent use of Wixela and albuterol.  Patient  was able to quit smoking, reports significant improvement in her symptoms of shortness of breath and wheezing.  Acid reflux symptoms are stable on omeprazole.   Cholesterol panel improved significantly since patient was switched over to Crestor 20 mg.  Blood pressure and renal function are stable.    Past Medical History:   Diagnosis Date    Anxiety     Colon polyp     Depression     Diabetes mellitus (HCC)     Diverticulosis     GERD (gastroesophageal reflux disease)     Headache     Hyperlipidemia     Hypertension     Psychiatric disorder     bipolar    Right clavicle fracture     Sinus congestion     TIA (transient ischemic attack)     Vitamin D deficiency        Family History   Problem Relation Age of Onset    Cervical cancer Mother     Ovarian cancer Mother 33    Uterine cancer Mother     Cancer Father     Hypertension Father     Other Father         pre-diabetes    Diabetes Father     Diabetes type I Sister     Osteoporosis Sister     Depression Sister     Breast cancer Sister 67    Diabetes Sister     No Known Problems Sister     No Known Problems Sister     No Known Problems Sister     No Known Problems Daughter     No Known Problems Maternal Grandmother     No Known Problems Maternal Grandfather     Other Paternal Grandmother         Parkinson's Disease    Heart attack Paternal Grandfather     Other Paternal Grandfather         coronary arteriosclerosis    Heart attack Brother          of an MI at 48y/o    Diabetes Brother     No Known Problems Son     Alcohol abuse  Paternal Uncle     Seizures Other     Seizures Other     No Known Problems Paternal Aunt     No Known Problems Paternal Aunt     No Known Problems Maternal Aunt        Past Surgical History:   Procedure Laterality Date    BLADDER SURGERY      Sling    LUMBAR EPIDURAL INJECTION N/A 1/4/2023    Procedure: L5 S1  LUMBAR epidural steroid injection (85364);  Surgeon: Molina Costa DO;  Location: [facility] MAIN OR;  Service: Pain Management     CO ARTHRS HIP DEBRIDEMENT/SHAVING ARTICULAR CRTLG Right 7/13/2017    Procedure: RIGHT HIP ARTHROSCOPIC LABRAL DEBRIDEMENT;  Surgeon: Kimo Jamil MD;  Location: [facility] Main OR;  Service: Orthopedics    CO XCAPSL CTRC RMVL INSJ IO LENS PROSTH W/O ECP Right 6/17/2024    Procedure: EXTRACTION EXTRACAPSULAR CATARACT PHACO INTRAOCULAR LENS (IOL);  Surgeon: Tyson Thompson MD;  Location: [facility] MAIN OR;  Service: Ophthalmology    SINUS SURGERY      3 surgeries     TUBAL LIGATION          reports that she quit smoking about 20 months ago. Her smoking use included cigarettes. She started smoking about 41 years ago. She has a 80 pack-year smoking history. She has never used smokeless tobacco. She reports that she does not currently use alcohol. She reports current drug use. Drug: Marijuana.      Current Outpatient Medications:     albuterol (Ventolin HFA) 90 mcg/act inhaler, Inhale 2 puffs every 6 (six) hours as needed for wheezing, Disp: 18 g, Rfl: 1    amLODIPine (NORVASC) 5 mg tablet, Take 1 tablet (5 mg total) by mouth daily, Disp: 90 tablet, Rfl: 1    amoxicillin (AMOXIL) 500 MG tablet, 500 mg, Disp: , Rfl:     ARIPiprazole (ABILIFY) 2 mg tablet, Take 1 tablet (2 mg total) by mouth daily at bedtime, Disp: 90 tablet, Rfl: 1    aspirin 81 mg chewable tablet, Chew 81 mg daily at bedtime, Disp: , Rfl:     clonazePAM (KlonoPIN) 0.5 mg tablet, Take 1 tablet (0.5 mg total) by mouth daily at bedtime, Disp: 60 tablet, Rfl: 5    fluticasone (FLONASE) 50 mcg/act nasal spray, 1 spray into each  "nostril daily, Disp: 18 g, Rfl: 0    gatifloxacin (ZYMAXID) 0.5 %, Administer 1 drop to the right eye 2 (two) times a day, Disp: , Rfl:     glipiZIDE (GLUCOTROL) 10 mg tablet, Take 1 tablet (10 mg total) by mouth 2 (two) times a day before meals, Disp: 180 tablet, Rfl: 1    glucose blood (ONE TOUCH ULTRA TEST) test strip, Check blood sugar once daily, Disp: 100 each, Rfl: 11    glucose blood test strip, , Disp: , Rfl:     ketorolac (ACULAR) 0.5 % ophthalmic solution, Administer 1 drop to the right eye 4 (four) times a day, Disp: , Rfl:     Lancets (onetouch ultrasoft) lancets, Check blood sugar twice/ week., Disp: 100 each, Rfl: 5    lisinopril (ZESTRIL) 40 mg tablet, Take 1 tablet (40 mg total) by mouth daily, Disp: 90 tablet, Rfl: 1    metFORMIN (GLUCOPHAGE) 500 mg tablet, Take 1 tablet (500 mg total) by mouth 2 (two) times a day with meals, Disp: 180 tablet, Rfl: 1    NEEDLE, DISP, 30 G (B-D DISP NEEDLE 30GX1\") 30G X 1\" MISC, Use once a week, Disp: 100 each, Rfl: 5    nystatin (MYCOSTATIN) powder, Apply topically 2 (two) times a day, Disp: 60 g, Rfl: 2    omeprazole (PriLOSEC) 20 mg delayed release capsule, Take 1 capsule (20 mg total) by mouth daily, Disp: 90 capsule, Rfl: 1    PARoxetine (PAXIL) 30 mg tablet, Take 1 tablet (30 mg total) by mouth daily, Disp: 90 tablet, Rfl: 1    prednisoLONE acetate (PRED FORTE) 1 % ophthalmic suspension, Administer 1 drop to the right eye 4 (four) times a day, Disp: , Rfl:     promethazine-dextromethorphan (PHENERGAN-DM) 6.25-15 mg/5 mL oral syrup, Take 5 mL by mouth 4 (four) times a day as needed for cough, Disp: 180 mL, Rfl: 0    rosuvastatin (CRESTOR) 20 MG tablet, Take 1 tablet (20 mg total) by mouth daily (Patient taking differently: Take 20 mg by mouth daily at bedtime), Disp: 90 tablet, Rfl: 3    clotrimazole-betamethasone (LOTRISONE) 1-0.05 % cream, Apply topically 2 (two) times a day for 14 days, Disp: 30 g, Rfl: 0    Empagliflozin 25 MG TABS, Take 1 tablet (25 mg " "total) by mouth daily (Patient not taking: Reported on 6/10/2024), Disp: 90 tablet, Rfl: 3    Fluticasone-Salmeterol (Wixela Inhub) 250-50 mcg/dose inhaler, Inhale 1 puff 2 (two) times a day Rinse mouth after use. (Patient not taking: Reported on 6/10/2024), Disp: 60 blister, Rfl: 5    semaglutide, 1 mg/dose, (Ozempic, 1 MG/DOSE,) 4 mg/3 mL injection pen, Inject 0.75 mL (1 mg total) under the skin once a week (Patient not taking: Reported on 8/19/2024), Disp: 9 mL, Rfl: 0    The following portions of the patient's history were reviewed and updated as appropriate: allergies, current medications, past family history, past medical history, past social history, past surgical history and problem list.    Review of Systems   Constitutional: Negative.    Respiratory: Negative.     Cardiovascular: Negative.            Objective:    /78   Ht 5' 3\" (1.6 m)   Wt 90.7 kg (200 lb)   BMI 35.43 kg/m²      Physical Exam  Constitutional:       Appearance: Normal appearance.   Cardiovascular:      Heart sounds: Normal heart sounds.   Pulmonary:      Breath sounds: Normal breath sounds.           Recent Results (from the past 1008 hour(s))   Comprehensive metabolic panel    Collection Time: 08/14/24  8:23 AM   Result Value Ref Range    Sodium 137 135 - 147 mmol/L    Potassium 4.4 3.5 - 5.3 mmol/L    Chloride 100 96 - 108 mmol/L    CO2 24 21 - 32 mmol/L    ANION GAP 13 4 - 13 mmol/L    BUN 15 5 - 25 mg/dL    Creatinine 1.44 (H) 0.60 - 1.30 mg/dL    Glucose, Fasting 112 (H) 65 - 99 mg/dL    Calcium 9.2 8.4 - 10.2 mg/dL    AST 19 13 - 39 U/L    ALT 15 7 - 52 U/L    Alkaline Phosphatase 109 (H) 34 - 104 U/L    Total Protein 7.2 6.4 - 8.4 g/dL    Albumin 3.9 3.5 - 5.0 g/dL    Total Bilirubin 0.34 0.20 - 1.00 mg/dL    eGFR 39 ml/min/1.73sq m   Hemoglobin A1C    Collection Time: 08/14/24  8:23 AM   Result Value Ref Range    Hemoglobin A1C 7.8 (H) Normal 4.0-5.6%; PreDiabetic 5.7-6.4%; Diabetic >=6.5%; Glycemic control for adults " with diabetes <7.0% %     mg/dl   Lipid panel    Collection Time: 08/14/24  8:23 AM   Result Value Ref Range    Cholesterol 136 See Comment mg/dL    Triglycerides 133 See Comment mg/dL    HDL, Direct 47 (L) >=50 mg/dL    LDL Calculated 62 0 - 100 mg/dL    Non-HDL-Chol (CHOL-HDL) 89 mg/dl       Assessment/Plan:    No problem-specific Assessment & Plan notes found for this encounter.           Problem List Items Addressed This Visit          Cardiovascular and Mediastinum    Essential hypertension     Patient's blood pressure is at goal.  Continue amlodipine 5 milligram, lisinopril 40 milligram         Relevant Medications    amLODIPine (NORVASC) 5 mg tablet    lisinopril (ZESTRIL) 40 mg tablet       Digestive    Chronic GERD     Stable on omeprazole 20 milligram.  Continue same.         Relevant Medications    omeprazole (PriLOSEC) 20 mg delayed release capsule       Endocrine    Uncontrolled type 2 diabetes mellitus with hyperglycemia (HCC) - Primary       Lab Results   Component Value Date    HGBA1C 7.8 (H) 08/14/2024     A1c improved since patient started Ozempic, currently on 1 mg dose.  Patient has to discontinue the medication since she is in the donut hole, and unable to afford the medication. She has been provided a sample of Rybelsus starting dose to tide her over.Unfortunately, when she restarts Ozempic injectable after 4 months, she will have to restart at the lower dose.   Continue jardiance, provided 10 mg samples, to tide over the donut hole. Continue glipizide.   Unable to tolerate metformin due to GI side effects.   Metabolic labs / fup x 4 months.          Relevant Medications    glipiZIDE (GLUCOTROL) 10 mg tablet    semaglutide (Rybelsus) 3 MG tablet    Other Relevant Orders    Comprehensive metabolic panel    Hemoglobin A1C    Albumin / creatinine urine ratio       Other    Hyperlipemia     LDL is at goal.  Continue Crestor 20 mg.  Continue monitoring with metabolic labs at 4 months  interval.         Relevant Medications    rosuvastatin (CRESTOR) 20 MG tablet    Other Relevant Orders    Lipid Panel with Direct LDL reflex     Other Visit Diagnoses       Screening mammogram for breast cancer        Relevant Orders    Mammo screening bilateral w 3d & cad

## 2024-08-19 NOTE — ASSESSMENT & PLAN NOTE
Lab Results   Component Value Date    HGBA1C 7.8 (H) 08/14/2024     A1c improved since patient started Ozempic, currently on 1 mg dose.  Patient has to discontinue the medication since she is in the donut hole, and unable to afford the medication. She has been provided a sample of Rybelsus starting dose to tide her over.Unfortunately, when she restarts Ozempic injectable after 4 months, she will have to restart at the lower dose.   Continue jardiance, provided 10 mg samples, to tide over the donut hole. Continue glipizide.   Unable to tolerate metformin due to GI side effects.   Metabolic labs / fup x 4 months.

## 2024-08-19 NOTE — ASSESSMENT & PLAN NOTE
LDL is at goal.  Continue Crestor 20 mg.  Continue monitoring with metabolic labs at 4 months interval.

## 2024-08-20 DIAGNOSIS — E11.9 CONTROLLED TYPE 2 DIABETES MELLITUS WITHOUT COMPLICATION, WITHOUT LONG-TERM CURRENT USE OF INSULIN (HCC): ICD-10-CM

## 2024-08-28 ENCOUNTER — TELEPHONE (OUTPATIENT)
Age: 59
End: 2024-08-28

## 2024-08-28 NOTE — TELEPHONE ENCOUNTER
Patient called in stating she received a call that Rybelsus samples were ready for her to . No documentation that a call was made to her in-regards to Rybelsus samples. Called clerical and spoke to Lroi and she stated samples are ready to be picked up. Patient was made aware and will be picking up samples on Friday. NFA needed at this time.
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
## 2024-09-06 ENCOUNTER — OFFICE VISIT (OUTPATIENT)
Dept: PSYCHIATRY | Facility: CLINIC | Age: 59
End: 2024-09-06
Payer: MEDICARE

## 2024-09-06 VITALS — HEIGHT: 63 IN | BODY MASS INDEX: 35.08 KG/M2 | WEIGHT: 198 LBS

## 2024-09-06 DIAGNOSIS — G89.29 CHRONIC PAIN OF BOTH KNEES: ICD-10-CM

## 2024-09-06 DIAGNOSIS — M25.561 CHRONIC PAIN OF BOTH KNEES: ICD-10-CM

## 2024-09-06 DIAGNOSIS — M25.562 CHRONIC PAIN OF BOTH KNEES: ICD-10-CM

## 2024-09-06 DIAGNOSIS — F33.1 MODERATE RECURRENT MAJOR DEPRESSION (HCC): ICD-10-CM

## 2024-09-06 DIAGNOSIS — F41.0 PANIC ATTACKS: ICD-10-CM

## 2024-09-06 DIAGNOSIS — F41.1 GENERALIZED ANXIETY DISORDER: Primary | ICD-10-CM

## 2024-09-06 PROCEDURE — 99214 OFFICE O/P EST MOD 30 MIN: CPT | Performed by: PHYSICIAN ASSISTANT

## 2024-09-06 NOTE — BH TREATMENT PLAN
"TREATMENT PLAN (Medication Management Only)        [facility]    Name/Date of Birth/MRN:  Natalie Gomes 59 y.o. 1965 MRN: [id number]  Date of Treatment Plan: September 6, 2024  Diagnosis/Diagnoses:   1. Generalized anxiety disorder    2. Moderate recurrent major depression (HCC)    3. Panic attacks    4. Chronic pain of both knees      Strengths/Personal Resources for Self-Care: \"Handling situations differently--so it doesn't bother me as much as it would, I'd rather laugh at it than be upset about it \"  Area/Areas of need (in own words): \"Pretty good, hanging in there. \"  1. Long Term Goal:   Maintain mood stability and control of anxiety  Target Date:  3-6months  Person/Persons responsible for completion of goal: Anil  2.  Short Term Objective (s) - How will we reach this goal?:   A.  Provider new recommended medication/dosage changes and/or continue medication(s):  Pt is having mild to moderate anxiety primarily at night, with some mild occasional anxiety in the day.  The Clonazepam is controlling her Sxs well, and she has had no panic attacks or depression recently.  Pt appears stable and I will continue the present medication regimen.  Treatment plan done and Pt accepts the plan. Safety Plan was done 6/12/2024 but Epic flags it as being due.  Continue:  Paroxetine 30mg (1) tab po qd -- Pt given a Rx for Qty 90 R1 on 6/12/2024  Aripiprazole 2mg (1)  tab po qhs -- Pt given a Rx for Qty 90 R1 on 6/12/2024  Clonazepam 0.5mg (1/2) tab po bid, and (1) tab po qhs Pt given a Rx with R5 on 6/12/2024  F/U PCP and specialists for medical issues  Pt to get Lipids, HgbA1C and Urine Albumin/create ratio, and mammo --  per PCP  Return 12 weeks, call sooner prn    Target Date:  3-6 months  Person/Persons Responsible for Completion of Goal: Anil   Progress Towards Goals: stable, continuing treatment  Treatment Modality:  Medication " mgt  Review due 180 days from date of this plan: 6 months - 3/6/2025  Expected length of service: ongoing treatment unless revised  My Physician/PA/NP and I have developed this plan together and I agree to work on the goals and objectives. I understand the treatment goals that were developed for my treatment.  Electronic Signatures: on file (unless signed below)    Laura Alcaraz PA-C 09/06/24

## 2024-09-11 ENCOUNTER — TELEPHONE (OUTPATIENT)
Age: 59
End: 2024-09-11

## 2024-09-11 NOTE — TELEPHONE ENCOUNTER
Patient stated sugars have been a little high lately.  Should she increase her Rybelsus to twice a day (one in a.m. and one at supper).  Please advise.  Thank you.

## 2024-09-11 NOTE — TELEPHONE ENCOUNTER
Spoke with pt she said her blood sugars have been between 170-200 more towards the 200. I did speak with Dr Huertas and she gave samples for Jardiance for her to  and she can take 2 pills

## 2024-09-18 ENCOUNTER — TELEPHONE (OUTPATIENT)
Age: 59
End: 2024-09-18

## 2024-09-18 NOTE — TELEPHONE ENCOUNTER
"Pt would like a call back specifically from Letitia \"Dr. Weiss nurse\". Pt states its in regards to her glucose medication and she would like to speak to her. Please call pt at house phone if unable to reach   "

## 2024-09-18 NOTE — TELEPHONE ENCOUNTER
Spoke with pt.  Ce is leaving bs in 200's and jardience is bringing it down in 140's.  Spoke with Dr Huertas and she is to continue Jardience and samples given

## 2024-10-28 DIAGNOSIS — E11.9 CONTROLLED TYPE 2 DIABETES MELLITUS WITHOUT COMPLICATION, WITHOUT LONG-TERM CURRENT USE OF INSULIN (HCC): ICD-10-CM

## 2024-10-28 NOTE — TELEPHONE ENCOUNTER
Medication Refill Request     Name Metformin   Dose/Frequency 500mg 2xday w/meals  Quantity 180  Verified pharmacy   [x]  Verified ordering Provider   [x]  Does patient have enough for the next 3 days? Yes [x] No []
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
## 2024-11-21 ENCOUNTER — APPOINTMENT (OUTPATIENT)
Dept: RADIOLOGY | Facility: AMBULARY SURGERY CENTER | Age: 59
End: 2024-11-21
Attending: ORTHOPAEDIC SURGERY
Payer: MEDICARE

## 2024-11-21 ENCOUNTER — OFFICE VISIT (OUTPATIENT)
Dept: OBGYN CLINIC | Facility: CLINIC | Age: 59
End: 2024-11-21
Payer: MEDICARE

## 2024-11-21 VITALS
HEIGHT: 63 IN | DIASTOLIC BLOOD PRESSURE: 80 MMHG | WEIGHT: 194 LBS | HEART RATE: 86 BPM | BODY MASS INDEX: 34.38 KG/M2 | SYSTOLIC BLOOD PRESSURE: 134 MMHG

## 2024-11-21 DIAGNOSIS — M17.0 BILATERAL PRIMARY OSTEOARTHRITIS OF KNEE: ICD-10-CM

## 2024-11-21 DIAGNOSIS — M25.562 CHRONIC PAIN OF LEFT KNEE: ICD-10-CM

## 2024-11-21 DIAGNOSIS — M17.0 BILATERAL PRIMARY OSTEOARTHRITIS OF KNEE: Primary | ICD-10-CM

## 2024-11-21 DIAGNOSIS — G89.29 CHRONIC PAIN OF LEFT KNEE: ICD-10-CM

## 2024-11-21 DIAGNOSIS — G89.29 CHRONIC PAIN OF RIGHT KNEE: ICD-10-CM

## 2024-11-21 DIAGNOSIS — M25.561 CHRONIC PAIN OF RIGHT KNEE: ICD-10-CM

## 2024-11-21 PROCEDURE — 73562 X-RAY EXAM OF KNEE 3: CPT

## 2024-11-21 PROCEDURE — 99214 OFFICE O/P EST MOD 30 MIN: CPT | Performed by: ORTHOPAEDIC SURGERY

## 2024-11-21 PROCEDURE — 20610 DRAIN/INJ JOINT/BURSA W/O US: CPT | Performed by: ORTHOPAEDIC SURGERY

## 2024-11-21 RX ORDER — LIDOCAINE HYDROCHLORIDE 10 MG/ML
4 INJECTION, SOLUTION INFILTRATION; PERINEURAL
Status: COMPLETED | OUTPATIENT
Start: 2024-11-21 | End: 2024-11-21

## 2024-11-21 RX ORDER — TRIAMCINOLONE ACETONIDE 40 MG/ML
40 INJECTION, SUSPENSION INTRA-ARTICULAR; INTRAMUSCULAR
Status: COMPLETED | OUTPATIENT
Start: 2024-11-21 | End: 2024-11-21

## 2024-11-21 RX ADMIN — TRIAMCINOLONE ACETONIDE 40 MG: 40 INJECTION, SUSPENSION INTRA-ARTICULAR; INTRAMUSCULAR at 08:15

## 2024-11-21 RX ADMIN — LIDOCAINE HYDROCHLORIDE 4 ML: 10 INJECTION, SOLUTION INFILTRATION; PERINEURAL at 08:15

## 2024-11-21 NOTE — PROGRESS NOTES
Chief Complaint: Bilateral knee pain    HPI:    Natalie Gomes is a 59year old Female who presents today for bilateral knee pain      Description of symptoms: Patient has known history of bilateral knee osteoarthritis.  Received right knee intra-articular cortisone injection in May 2024 with good relief of symptoms until a few weeks ago.  Now reports moderate intensity pain in bilateral knees.  Able to ambulate without any assistive device.  Denies locking episodes but does have an occasional sense of instability.  No new injury reported.      I have personally reviewed pertinent films in PACS and my interpretation is plain radiograph of of both knees performed today reveals moderate osteoarthritis bilaterally, slightly worse on the right side.  No acute injuries noted.    Patient Active Problem List   Diagnosis    Moderate recurrent major depression (HCC)    Chronic GERD    Heart disorder    Hyperlipemia    Essential hypertension    Lumbar radiculopathy    Primary osteoarthritis of right hip    Type 2 diabetes mellitus with stage 3a chronic kidney disease (HCC)    Paresthesias in right hand    Calcific tendinitis of right shoulder    Microalbuminuria    Rotator cuff syndrome of right shoulder    Diabetic nephropathy associated with type 2 diabetes mellitus (HCC)    BMI 34.0-34.9,adult    Simple chronic bronchitis (HCC)    Multiple lung nodules on CT    Chronic pain of both knees    Tinea corporis    Stage 3a chronic kidney disease (HCC)    Post-COVID chronic cough    Uncontrolled type 2 diabetes mellitus with hyperglycemia (HCC)    Primary osteoarthritis of right knee        Current Outpatient Medications on File Prior to Visit   Medication Sig Dispense Refill    albuterol (Ventolin HFA) 90 mcg/act inhaler Inhale 2 puffs every 6 (six) hours as needed for wheezing 18 g 1    amLODIPine (NORVASC) 5 mg tablet Take 1 tablet (5 mg total) by mouth daily 90 tablet 1    amoxicillin (AMOXIL) 500 MG tablet 500 mg       "ARIPiprazole (ABILIFY) 2 mg tablet Take 1 tablet (2 mg total) by mouth daily at bedtime 90 tablet 1    aspirin 81 mg chewable tablet Chew 81 mg daily at bedtime      clonazePAM (KlonoPIN) 0.5 mg tablet Take 1 tablet (0.5 mg total) by mouth daily at bedtime 60 tablet 5    clotrimazole-betamethasone (LOTRISONE) 1-0.05 % cream Apply topically 2 (two) times a day for 14 days 30 g 0    Empagliflozin 25 MG TABS Take 1 tablet (25 mg total) by mouth daily (Patient not taking: Reported on 6/10/2024) 90 tablet 3    fluticasone (FLONASE) 50 mcg/act nasal spray 1 spray into each nostril daily 18 g 0    Fluticasone-Salmeterol (Wixela Inhub) 250-50 mcg/dose inhaler Inhale 1 puff 2 (two) times a day Rinse mouth after use. (Patient not taking: Reported on 6/10/2024) 60 blister 5    gatifloxacin (ZYMAXID) 0.5 % Administer 1 drop to the right eye 2 (two) times a day      glipiZIDE (GLUCOTROL) 10 mg tablet Take 1 tablet (10 mg total) by mouth 2 (two) times a day before meals 180 tablet 1    glucose blood (ONE TOUCH ULTRA TEST) test strip Check blood sugar once daily 100 each 11    glucose blood test strip       ketorolac (ACULAR) 0.5 % ophthalmic solution Administer 1 drop to the right eye 4 (four) times a day      Lancets (onetouch ultrasoft) lancets Check blood sugar twice/ week. 100 each 5    lisinopril (ZESTRIL) 40 mg tablet Take 1 tablet (40 mg total) by mouth daily 90 tablet 1    metFORMIN (GLUCOPHAGE) 500 mg tablet TAKE 1 TABLET BY MOUTH TWICE A DAY WITH MEALS 180 tablet 1    NEEDLE, DISP, 30 G (B-D DISP NEEDLE 30GX1\") 30G X 1\" MISC Use once a week 100 each 5    nystatin (MYCOSTATIN) powder Apply topically 2 (two) times a day 60 g 2    omeprazole (PriLOSEC) 20 mg delayed release capsule Take 1 capsule (20 mg total) by mouth daily 90 capsule 1    PARoxetine (PAXIL) 30 mg tablet Take 1 tablet (30 mg total) by mouth daily 90 tablet 1    prednisoLONE acetate (PRED FORTE) 1 % ophthalmic suspension Administer 1 drop to the right eye " 4 (four) times a day      promethazine-dextromethorphan (PHENERGAN-DM) 6.25-15 mg/5 mL oral syrup Take 5 mL by mouth 4 (four) times a day as needed for cough 180 mL 0    rosuvastatin (CRESTOR) 20 MG tablet Take 1 tablet (20 mg total) by mouth daily at bedtime 100 tablet 1    semaglutide (Rybelsus) 3 MG tablet Take 1 tablet (3 mg total) by mouth daily before breakfast 30 tablet 0     No current facility-administered medications on file prior to visit.        Allergies   Allergen Reactions    Cefdinir Hives        Tobacco Use: Medium Risk (9/6/2024)    Patient History     Smoking Tobacco Use: Former     Smokeless Tobacco Use: Never     Passive Exposure: Not on file        Social Drivers of Health     Tobacco Use: Medium Risk (9/6/2024)    Patient History     Smoking Tobacco Use: Former     Smokeless Tobacco Use: Never     Passive Exposure: Not on file   Alcohol Use: Not on file   Financial Resource Strain: Low Risk  (11/28/2023)    Overall Financial Resource Strain (CARDIA)     Difficulty of Paying Living Expenses: Not hard at all   Food Insecurity: No Food Insecurity (2/2/2021)    Hunger Vital Sign     Worried About Running Out of Food in the Last Year: Never true     Ran Out of Food in the Last Year: Never true   Transportation Needs: No Transportation Needs (11/28/2023)    PRAPARE - Transportation     Lack of Transportation (Medical): No     Lack of Transportation (Non-Medical): No   Physical Activity: Unknown (2/2/2021)    Exercise Vital Sign     Days of Exercise per Week: 0 days     Minutes of Exercise per Session: Not on file   Stress: Not on file   Social Connections: Not on file   Intimate Partner Violence: Not on file   Depression: Not at risk (11/3/2021)    PHQ-2     PHQ-2 Score: 0   Housing Stability: Not on file   Utilities: Not on file   Health Literacy: Not on file               Review of Systems     There is no height or weight on file to calculate BMI.     Physical Exam  Vitals and nursing note  reviewed.   HENT:      Head: Atraumatic.   Cardiovascular:      Rate and Rhythm: Normal rate.      Pulses: Normal pulses.   Pulmonary:      Effort: Pulmonary effort is normal. No respiratory distress.   Neurological:      Mental Status: She is alert and oriented to person, place, and time.   Psychiatric:         Mood and Affect: Mood normal.         Behavior: Behavior normal.          Ortho Exam:    Body part: right knee    Inspection: No effusion.  No erythema.    Palpation: Tender to palpation over the medial joint line and palpable patellofemoral crepitus.    Range of motion: Full extension to 120 degrees of knee flexion    Special Tests: Mild laxity on valgus stress testing.  Negative varus stress testing.  Equivocal medial Lynnette's and negative lateral Lynnette's.    Distal Neurovascular Status: Intact, Yes    Body part: left knee    Inspection: No effusion.  No erythema.    Palpation: Pain to palpation over the medial joint line and palpable patellofemoral crepitus.    Range of motion: Full extension to 120 degrees of knee flexion    Special Tests: Negative valgus and varus stress test.  Negative medial and lateral Lynnette's.  Negative Lachman    Distal Neurovascular Status: Intact, Yes    Large joint arthrocentesis: R knee  Universal Protocol:  Consent: Verbal consent obtained.  Risks and benefits: risks, benefits and alternatives were discussed  Consent given by: patient  Patient understanding: patient states understanding of the procedure being performed  Site marked: the operative site was marked  Radiology Images displayed and confirmed. If images not available, report reviewed: imaging studies available  Required items: required blood products, implants, devices, and special equipment available  Patient identity confirmed: verbally with patient  Supporting Documentation  Indications: pain   Procedure Details  Location: knee - R knee  Preparation: Patient was prepped and draped in the usual sterile  "fashion  Needle size: 22 G  Ultrasound guidance: no  Approach: anterolateral  Medications administered: 4 mL lidocaine 1 %; 40 mg triamcinolone acetonide 40 mg/mL    Patient tolerance: patient tolerated the procedure well with no immediate complications  Dressing:  Sterile dressing applied             Assessment:     Diagnosis ICD-10-CM Associated Orders   1. Bilateral primary osteoarthritis of knee  M17.0 XR knee 3 vw right non injury     XR knee 3 vw left non injury      2. Chronic pain of left knee  M25.562     G89.29       3. Chronic pain of right knee  M25.561     G89.29            Plan:    I explained my current clinical findings to the patient.  She wishes to repeat bilateral knee corticosteroid injection since this has provided her relief in the past.  She currently does not wish to have a surgical intervention for her bilateral knee pain.  Since she is a diabetic we participated in shared decision making to do right knee injection on today's visit and see her back after 2 to 3 weeks for left knee injection.            Portions of the record may have been created with voice recognition software. Occasional wrong word or \"sound alike\" substitutions may have occurred due to the inherent limitations of voice recognition software. Please review the chart carefully and recognize, using context, where substitutions/typographical errors may have occurred.           "

## 2024-11-25 ENCOUNTER — OFFICE VISIT (OUTPATIENT)
Dept: PSYCHIATRY | Facility: CLINIC | Age: 59
End: 2024-11-25
Payer: MEDICARE

## 2024-11-25 VITALS — WEIGHT: 184.4 LBS | BODY MASS INDEX: 32.67 KG/M2 | HEIGHT: 63 IN

## 2024-11-25 DIAGNOSIS — F41.1 GENERALIZED ANXIETY DISORDER: ICD-10-CM

## 2024-11-25 DIAGNOSIS — F41.0 PANIC ATTACKS: ICD-10-CM

## 2024-11-25 DIAGNOSIS — F33.1 MODERATE RECURRENT MAJOR DEPRESSION (HCC): Primary | ICD-10-CM

## 2024-11-25 DIAGNOSIS — Z63.4 BEREAVEMENT: ICD-10-CM

## 2024-11-25 PROCEDURE — 99214 OFFICE O/P EST MOD 30 MIN: CPT | Performed by: PHYSICIAN ASSISTANT

## 2024-11-25 RX ORDER — CLONAZEPAM 0.5 MG/1
0.5 TABLET ORAL
Qty: 30 TABLET | Refills: 5 | Status: SHIPPED | OUTPATIENT
Start: 2024-11-25

## 2024-11-25 RX ORDER — PAROXETINE 30 MG/1
30 TABLET, FILM COATED ORAL DAILY
Qty: 90 TABLET | Refills: 0 | Status: SHIPPED | OUTPATIENT
Start: 2024-11-25

## 2024-11-25 RX ORDER — ARIPIPRAZOLE 2 MG/1
2 TABLET ORAL
Qty: 90 TABLET | Refills: 0 | Status: SHIPPED | OUTPATIENT
Start: 2024-11-25

## 2024-11-25 SDOH — SOCIAL STABILITY - SOCIAL INSECURITY: DISSAPEARANCE AND DEATH OF FAMILY MEMBER: Z63.4

## 2024-11-25 NOTE — ASSESSMENT & PLAN NOTE
Orders:    ARIPiprazole (ABILIFY) 2 mg tablet; Take 1 tablet (2 mg total) by mouth daily at bedtime    PARoxetine (PAXIL) 30 mg tablet; Take 1 tablet (30 mg total) by mouth daily

## 2024-11-27 DIAGNOSIS — J41.0 SIMPLE CHRONIC BRONCHITIS (HCC): ICD-10-CM

## 2024-11-27 RX ORDER — ALBUTEROL SULFATE 90 UG/1
2 INHALANT RESPIRATORY (INHALATION) EVERY 6 HOURS PRN
Qty: 18 G | Refills: 0 | Status: SHIPPED | OUTPATIENT
Start: 2024-11-27

## 2024-12-10 ENCOUNTER — APPOINTMENT (OUTPATIENT)
Dept: LAB | Facility: CLINIC | Age: 59
End: 2024-12-10
Payer: MEDICARE

## 2024-12-10 DIAGNOSIS — E78.5 HYPERLIPIDEMIA, UNSPECIFIED HYPERLIPIDEMIA TYPE: ICD-10-CM

## 2024-12-10 DIAGNOSIS — E11.65 UNCONTROLLED TYPE 2 DIABETES MELLITUS WITH HYPERGLYCEMIA (HCC): ICD-10-CM

## 2024-12-10 LAB
EST. AVERAGE GLUCOSE BLD GHB EST-MCNC: 214 MG/DL
HBA1C MFR BLD: 9.1 %

## 2024-12-10 PROCEDURE — 83036 HEMOGLOBIN GLYCOSYLATED A1C: CPT

## 2024-12-10 PROCEDURE — 82043 UR ALBUMIN QUANTITATIVE: CPT

## 2024-12-10 PROCEDURE — 82570 ASSAY OF URINE CREATININE: CPT

## 2024-12-10 PROCEDURE — 36415 COLL VENOUS BLD VENIPUNCTURE: CPT

## 2024-12-10 PROCEDURE — 80061 LIPID PANEL: CPT

## 2024-12-10 PROCEDURE — 80053 COMPREHEN METABOLIC PANEL: CPT

## 2024-12-11 LAB
ALBUMIN SERPL BCG-MCNC: 3.9 G/DL (ref 3.5–5)
ALP SERPL-CCNC: 92 U/L (ref 34–104)
ALT SERPL W P-5'-P-CCNC: 12 U/L (ref 7–52)
ANION GAP SERPL CALCULATED.3IONS-SCNC: 19 MMOL/L (ref 4–13)
AST SERPL W P-5'-P-CCNC: 17 U/L (ref 13–39)
BILIRUB SERPL-MCNC: 0.34 MG/DL (ref 0.2–1)
BUN SERPL-MCNC: 16 MG/DL (ref 5–25)
CALCIUM SERPL-MCNC: 9.4 MG/DL (ref 8.4–10.2)
CHLORIDE SERPL-SCNC: 103 MMOL/L (ref 96–108)
CHOLEST SERPL-MCNC: 157 MG/DL (ref ?–200)
CO2 SERPL-SCNC: 16 MMOL/L (ref 21–32)
CREAT SERPL-MCNC: 0.95 MG/DL (ref 0.6–1.3)
CREAT UR-MCNC: 37.3 MG/DL
GFR SERPL CREATININE-BSD FRML MDRD: 65 ML/MIN/1.73SQ M
GLUCOSE P FAST SERPL-MCNC: 62 MG/DL (ref 65–99)
HDLC SERPL-MCNC: 54 MG/DL
LDLC SERPL CALC-MCNC: 68 MG/DL (ref 0–100)
MICROALBUMIN UR-MCNC: <7 MG/L
POTASSIUM SERPL-SCNC: 4.3 MMOL/L (ref 3.5–5.3)
PROT SERPL-MCNC: 7.3 G/DL (ref 6.4–8.4)
SODIUM SERPL-SCNC: 138 MMOL/L (ref 135–147)
TRIGL SERPL-MCNC: 176 MG/DL (ref ?–150)

## 2024-12-12 ENCOUNTER — RESULTS FOLLOW-UP (OUTPATIENT)
Dept: FAMILY MEDICINE CLINIC | Facility: CLINIC | Age: 59
End: 2024-12-12

## 2024-12-12 ENCOUNTER — OFFICE VISIT (OUTPATIENT)
Dept: OBGYN CLINIC | Facility: CLINIC | Age: 59
End: 2024-12-12
Payer: MEDICARE

## 2024-12-12 VITALS — HEIGHT: 63 IN | BODY MASS INDEX: 32.78 KG/M2 | WEIGHT: 185 LBS

## 2024-12-12 DIAGNOSIS — G89.29 CHRONIC PAIN OF LEFT KNEE: ICD-10-CM

## 2024-12-12 DIAGNOSIS — M25.562 CHRONIC PAIN OF LEFT KNEE: ICD-10-CM

## 2024-12-12 DIAGNOSIS — M17.0 BILATERAL PRIMARY OSTEOARTHRITIS OF KNEE: Primary | ICD-10-CM

## 2024-12-12 PROCEDURE — 20610 DRAIN/INJ JOINT/BURSA W/O US: CPT | Performed by: ORTHOPAEDIC SURGERY

## 2024-12-12 RX ORDER — TRIAMCINOLONE ACETONIDE 40 MG/ML
40 INJECTION, SUSPENSION INTRA-ARTICULAR; INTRAMUSCULAR
Status: COMPLETED | OUTPATIENT
Start: 2024-12-12 | End: 2024-12-12

## 2024-12-12 RX ORDER — LIDOCAINE HYDROCHLORIDE 10 MG/ML
4 INJECTION, SOLUTION INFILTRATION; PERINEURAL
Status: COMPLETED | OUTPATIENT
Start: 2024-12-12 | End: 2024-12-12

## 2024-12-12 RX ADMIN — LIDOCAINE HYDROCHLORIDE 4 ML: 10 INJECTION, SOLUTION INFILTRATION; PERINEURAL at 11:15

## 2024-12-12 RX ADMIN — TRIAMCINOLONE ACETONIDE 40 MG: 40 INJECTION, SUSPENSION INTRA-ARTICULAR; INTRAMUSCULAR at 11:15

## 2024-12-12 NOTE — PROGRESS NOTES
Large joint arthrocentesis: L knee  Universal Protocol:  Consent: Verbal consent obtained.  Risks and benefits: risks, benefits and alternatives were discussed  Consent given by: patient  Patient understanding: patient states understanding of the procedure being performed  Site marked: the operative site was marked  Radiology Images displayed and confirmed. If images not available, report reviewed: imaging studies available  Required items: required blood products, implants, devices, and special equipment available  Patient identity confirmed: verbally with patient  Supporting Documentation  Indications: pain   Procedure Details  Location: knee - L knee  Needle size: 22 G  Ultrasound guidance: no  Approach: anterolateral  Medications administered: 4 mL lidocaine 1 %; 40 mg triamcinolone acetonide 40 mg/mL    Patient tolerance: patient tolerated the procedure well with no immediate complications  Dressing:  Sterile dressing applied

## 2024-12-19 ENCOUNTER — OFFICE VISIT (OUTPATIENT)
Dept: FAMILY MEDICINE CLINIC | Facility: CLINIC | Age: 59
End: 2024-12-19
Payer: MEDICARE

## 2024-12-19 VITALS
OXYGEN SATURATION: 97 % | HEART RATE: 94 BPM | DIASTOLIC BLOOD PRESSURE: 78 MMHG | WEIGHT: 185 LBS | BODY MASS INDEX: 32.78 KG/M2 | SYSTOLIC BLOOD PRESSURE: 128 MMHG | HEIGHT: 63 IN

## 2024-12-19 DIAGNOSIS — J01.10 ACUTE NON-RECURRENT FRONTAL SINUSITIS: ICD-10-CM

## 2024-12-19 DIAGNOSIS — J41.0 SIMPLE CHRONIC BRONCHITIS (HCC): ICD-10-CM

## 2024-12-19 DIAGNOSIS — Z00.00 MEDICARE ANNUAL WELLNESS VISIT, SUBSEQUENT: ICD-10-CM

## 2024-12-19 DIAGNOSIS — K21.9 CHRONIC GERD: ICD-10-CM

## 2024-12-19 DIAGNOSIS — E11.9 TYPE 2 DIABETES MELLITUS WITHOUT COMPLICATION, WITHOUT LONG-TERM CURRENT USE OF INSULIN (HCC): ICD-10-CM

## 2024-12-19 DIAGNOSIS — E78.5 HYPERLIPIDEMIA, UNSPECIFIED HYPERLIPIDEMIA TYPE: Primary | ICD-10-CM

## 2024-12-19 DIAGNOSIS — I10 ESSENTIAL HYPERTENSION: ICD-10-CM

## 2024-12-19 DIAGNOSIS — E11.65 UNCONTROLLED TYPE 2 DIABETES MELLITUS WITH HYPERGLYCEMIA (HCC): ICD-10-CM

## 2024-12-19 DIAGNOSIS — E11.9 CONTROLLED TYPE 2 DIABETES MELLITUS WITHOUT COMPLICATION, WITHOUT LONG-TERM CURRENT USE OF INSULIN (HCC): ICD-10-CM

## 2024-12-19 PROCEDURE — 99215 OFFICE O/P EST HI 40 MIN: CPT | Performed by: FAMILY MEDICINE

## 2024-12-19 PROCEDURE — G0439 PPPS, SUBSEQ VISIT: HCPCS | Performed by: FAMILY MEDICINE

## 2024-12-19 RX ORDER — ROSUVASTATIN CALCIUM 20 MG/1
20 TABLET, COATED ORAL
Qty: 100 TABLET | Refills: 2 | Status: SHIPPED | OUTPATIENT
Start: 2024-12-19

## 2024-12-19 RX ORDER — GLIPIZIDE 10 MG/1
10 TABLET ORAL
Qty: 180 TABLET | Refills: 1 | Status: SHIPPED | OUTPATIENT
Start: 2024-12-19

## 2024-12-19 RX ORDER — ALBUTEROL SULFATE 90 UG/1
2 INHALANT RESPIRATORY (INHALATION) EVERY 6 HOURS PRN
Qty: 18 G | Refills: 2 | Status: SHIPPED | OUTPATIENT
Start: 2024-12-19

## 2024-12-19 RX ORDER — AMLODIPINE BESYLATE 5 MG/1
5 TABLET ORAL DAILY
Qty: 90 TABLET | Refills: 2 | Status: SHIPPED | OUTPATIENT
Start: 2024-12-19

## 2024-12-19 RX ORDER — LISINOPRIL 40 MG/1
40 TABLET ORAL DAILY
Qty: 90 TABLET | Refills: 1 | Status: SHIPPED | OUTPATIENT
Start: 2024-12-19

## 2024-12-19 NOTE — ASSESSMENT & PLAN NOTE
LDL is at goal.  Continue Crestor.  Continue monitoring with metabolic labs at 6-month interval.  Orders:    rosuvastatin (CRESTOR) 20 MG tablet; Take 1 tablet (20 mg total) by mouth daily at bedtime    Lipid Panel with Direct LDL reflex; Future

## 2024-12-19 NOTE — ASSESSMENT & PLAN NOTE
Stable  Orders:    albuterol (Ventolin HFA) 90 mcg/act inhaler; Inhale 2 puffs every 6 (six) hours as needed for wheezing

## 2024-12-19 NOTE — PROGRESS NOTES
Name: Natalie Gomes      : 1965      MRN: [id number]  Encounter Provider: Laura Huertas MD  Encounter Date: 2024   Encounter department: [facility]    Assessment & Plan  Hyperlipidemia, unspecified hyperlipidemia type  LDL is at goal.  Continue Crestor.  Continue monitoring with metabolic labs at 6-month interval.  Orders:    rosuvastatin (CRESTOR) 20 MG tablet; Take 1 tablet (20 mg total) by mouth daily at bedtime    Lipid Panel with Direct LDL reflex; Future    Chronic GERD  Stable.  Continue PPI.  Orders:    omeprazole (PriLOSEC) 20 mg delayed release capsule; Take 1 capsule (20 mg total) by mouth daily    Controlled type 2 diabetes mellitus without complication, without long-term current use of insulin ([facility])    Lab Results   Component Value Date    HGBA1C 9.1 (H) 12/10/2024     Blood sugar is uncontrolled as patient was unable to get Ozempic at her local pharmacy since she was [place] the [place]ut [place].   Currently she is on metformin 1000 mg twice daily and glipizide 10 mg twice daily.   Patient provided samples of Jardiance 25 mg so she can continue with the medication.  According to patient the oral Rybelsus did not help control her symptoms so she would like that to be discontinued.  I had provided her a prescription of Ozempic which she will have to restart at the lowest dose which is 0.25 mg and then work her way up every 4 to 6 weeks as tolerated.  Patient discontinued at 1 mg dose almost 6 months ago.  Recommended metabolic labs/follow-up at 4 months.  Orders:    metFORMIN (GLUCOPHAGE) 500 mg tablet; Take 2 tablets (1,000 mg total) by mouth 2 (two) times a day with meals    Acute non-recurrent frontal sinusitis         Type 2 diabetes mellitus without complication, without long-term current use of insulin (HCC)    Lab Results   Component Value Date    HGBA1C 9.1 (H) 12/10/2024       Orders:    Empagliflozin 25 MG TABS; Take 1 tablet (25 mg total) by mouth  daily    Essential hypertension  Blood pressure is at goal.  Orders:    amLODIPine (NORVASC) 5 mg tablet; Take 1 tablet (5 mg total) by mouth daily    lisinopril (ZESTRIL) 40 mg tablet; Take 1 tablet (40 mg total) by mouth daily    Simple chronic bronchitis (HCC)  Stable  Orders:    albuterol (Ventolin HFA) 90 mcg/act inhaler; Inhale 2 puffs every 6 (six) hours as needed for wheezing    Uncontrolled type 2 diabetes mellitus with hyperglycemia (HCC)    Lab Results   Component Value Date    HGBA1C 9.1 (H) 12/10/2024       Orders:    semaglutide, 0.25 or 0.5 mg/dose, (Ozempic, 0.25 or 0.5 MG/DOSE,) 2 mg/3 mL injection pen; 0.25 mg under the skin every 7 days for 4 doses (28 days), THEN 0.5 mg under the skin every 7 days    glipiZIDE (GLUCOTROL) 10 mg tablet; Take 1 tablet (10 mg total) by mouth 2 (two) times a day before meals    Hemoglobin A1C; Future    Comprehensive metabolic panel; Future    Medicare annual wellness visit, subsequent  [facility]           Depression Screening and Follow-up Plan: Patient was screened for depression during today's encounter. They screened negative with a PHQ-9 score of 0.      Preventive health issues were discussed with patient, and age appropriate screening tests were ordered as noted in patient's After Visit Summary. Personalized health advice and appropriate referrals for health education or preventive services given if needed, as noted in patient's After Visit Summary.    History of Present Illness     HPI     Patient is here for routine follow-up.  Has history of hypertension, type 2 diabetes, dyslipidemia.  Metabolic labs reveal A1c 9.1. On metformin and glipizide, discontinued Ozempic 6 months ago since her insurance would not cover it.  Patient has history of chronic bronchitis, on intermittent use of Wixela and albuterol.  Patient  was able to quit smoking, reports significant improvement in her symptoms of shortness of breath and wheezing.  Acid reflux symptoms are stable on  omeprazole.   Cholesterol panel improved significantly since patient was switched over to Crestor 20 mg.  Blood pressure and renal function are stable.    Patient Care Team:  Laura Huertas MD as PCP - General (Family Medicine)  MD Kimo Hood MD    Review of Systems   Respiratory: Negative.     Cardiovascular: Negative.      Medical History Reviewed by provider this encounter:       Annual Wellness Visit Questionnaire   Natalie is here for her Subsequent Wellness visit. Last Medicare Wellness visit information reviewed, patient interviewed, no change since last AWV.     Health Risk Assessment:   Patient rates overall health as very good. Patient feels that their physical health rating is slightly better. Patient is very satisfied with their life. Eyesight was rated as same. Hearing was rated as same. Patient feels that their emotional and mental health rating is much better. Patients states they are never, rarely angry. Pain experienced in the last 7 days has been none. Patient states that she has experienced no weight loss or gain in last 6 months.     Depression Screening:   PHQ-9 Score: 0      Fall Risk Screening:   In the past year, patient has experienced: no history of falling in past year      Urinary Incontinence Screening:   Patient has not leaked urine accidently in the last six months.     Home Safety:  Patient does not have trouble with stairs inside or outside of their home. Patient has working smoke alarms and has working carbon monoxide detector. Home safety hazards include: none.     Nutrition:   Current diet is Regular.     Medications:   Patient is not currently taking any over-the-counter supplements. Patient is able to manage medications.     Activities of Daily Living (ADLs)/Instrumental Activities of Daily Living (IADLs):   Walk and transfer into and out of bed and chair?: Yes  Dress and groom yourself?: Yes    Bathe or shower yourself?: Yes    Feed yourself? Yes  Do your  laundry/housekeeping?: Yes  Manage your money, pay your bills and track your expenses?: Yes  Make your own meals?: Yes    Do your own shopping?: Yes    Previous Hospitalizations:   Any hospitalizations or ED visits within the last 12 months?: No      Cognitive Screening:   Provider or family/friend/caregiver concerned regarding cognition?: No    PREVENTIVE SCREENINGS      Cardiovascular Screening:    General: Screening Not Indicated and History Lipid Disorder      Diabetes Screening:     General: Screening Not Indicated and History Diabetes      Colorectal Cancer Screening:     General: Screening Current      Breast Cancer Screening:     General: Screening Current      Cervical Cancer Screening:    General: Screening Current      Osteoporosis Screening:    General: Risks and Benefits Discussed    Due for: DXA Axial      Abdominal Aortic Aneurysm (AAA) Screening:        General: Screening Not Indicated      Lung Cancer Screening:     General: Screening Current      Hepatitis C Screening:    General: Patient Declines    Screening, Brief Intervention, and Referral to Treatment (SBIRT)    Screening  Typical number of drinks in a day: 0  Typical number of drinks in a week: 0  Interpretation: Low risk drinking behavior.    Single Item Drug Screening:  How often have you used an illegal drug (including marijuana) or a prescription medication for non-medical reasons in the past year? never    Single Item Drug Screen Score: 0  Interpretation: Negative screen for possible drug use disorder    Other Counseling Topics:   Car/seat belt/driving safety, skin self-exam, sunscreen and regular weightbearing exercise and calcium and vitamin D intake.     Social Drivers of Health     Financial Resource Strain: Low Risk  (11/28/2023)    Overall Financial Resource Strain (CARDIA)     Difficulty of Paying Living Expenses: Not hard at all   Food Insecurity: No Food Insecurity (2/2/2021)    Hunger Vital Sign     Worried About Running Out of  Food in the Last Year: Never true     Ran Out of Food in the Last Year: Never true   Transportation Needs: No Transportation Needs (11/28/2023)    PRAPARE - Transportation     Lack of Transportation (Medical): No     Lack of Transportation (Non-Medical): No     No results found.    Objective   There were no vitals taken for this visit.    Physical Exam  Constitutional:       Appearance: Normal appearance.   Cardiovascular:      Heart sounds: Normal heart sounds.   Pulmonary:      Breath sounds: Normal breath sounds.   Neurological:      Mental Status: She is oriented to person, place, and time.   Psychiatric:         Mood and Affect: Mood normal.       Administrative Statements   I have spent a total time of 40 minutes in caring for this patient on the day of the visit/encounter including Diagnostic results, Prognosis, Risks and benefits of tx options, Instructions for management, Patient and family education, Importance of tx compliance, Risk factor reductions, Impressions, Counseling / Coordination of care, Documenting in the medical record, Reviewing / ordering tests, medicine, procedures  , and Obtaining or reviewing history  .

## 2024-12-19 NOTE — ASSESSMENT & PLAN NOTE
Lab Results   Component Value Date    HGBA1C 9.1 (H) 12/10/2024       Orders:    semaglutide, 0.25 or 0.5 mg/dose, (Ozempic, 0.25 or 0.5 MG/DOSE,) 2 mg/3 mL injection pen; 0.25 mg under the skin every 7 days for 4 doses (28 days), THEN 0.5 mg under the skin every 7 days    glipiZIDE (GLUCOTROL) 10 mg tablet; Take 1 tablet (10 mg total) by mouth 2 (two) times a day before meals    Hemoglobin A1C; Future    Comprehensive metabolic panel; Future

## 2024-12-19 NOTE — ASSESSMENT & PLAN NOTE
Stable.  Continue PPI.  Orders:    omeprazole (PriLOSEC) 20 mg delayed release capsule; Take 1 capsule (20 mg total) by mouth daily

## 2024-12-19 NOTE — PATIENT INSTRUCTIONS
Medicare Preventive Visit Patient Instructions  Thank you for completing your Welcome to Medicare Visit or Medicare Annual Wellness Visit today. Your next wellness visit will be due in one year (12/20/2025).  The screening/preventive services that you may require over the next 5-10 years are detailed below. Some tests may not apply to you based off risk factors and/or age. Screening tests ordered at today's visit but not completed yet may show as past due. Also, please note that scanned in results may not display below.  Preventive Screenings:  Service Recommendations Previous Testing/Comments   Colorectal Cancer Screening  * Colonoscopy    * Fecal Occult Blood Test (FOBT)/Fecal Immunochemical Test (FIT)  * Fecal DNA/Cologuard Test  * Flexible Sigmoidoscopy Age: 45-75 years old   Colonoscopy: every 10 years (may be performed more frequently if at higher risk)  OR  FOBT/FIT: every 1 year  OR  Cologuard: every 3 years  OR  Sigmoidoscopy: every 5 years  Screening may be recommended earlier than age 45 if at higher risk for colorectal cancer. Also, an individualized decision between you and your healthcare provider will decide whether screening between the ages of 76-85 would be appropriate. Colonoscopy: 04/17/2023  FOBT/FIT: Not on file  Cologuard: Not on file  Sigmoidoscopy: Not on file    Screening Current     Breast Cancer Screening Age: 40+ years old  Frequency: every 1-2 years  Not required if history of left and right mastectomy Mammogram: 11/15/2023    Screening Current   Cervical Cancer Screening Between the ages of 21-29, pap smear recommended once every 3 years.   Between the ages of 30-65, can perform pap smear with HPV co-testing every 5 years.   Recommendations may differ for women with a history of total hysterectomy, cervical cancer, or abnormal pap smears in past. Pap Smear: 04/03/2024    Screening Current   Hepatitis C Screening Once for adults born between 1945 and 1965  More frequently in patients at  high risk for Hepatitis C Hep C Antibody: Not on file    Patient Declines   Diabetes Screening 1-2 times per year if you're at risk for diabetes or have pre-diabetes Fasting glucose: 62 mg/dL (12/10/2024)  A1C: 9.1 % (12/10/2024)  Screening Not Indicated  History Diabetes   Cholesterol Screening Once every 5 years if you don't have a lipid disorder. May order more often based on risk factors. Lipid panel: 12/10/2024    Screening Not Indicated  History Lipid Disorder     Other Preventive Screenings Covered by Medicare:  Abdominal Aortic Aneurysm (AAA) Screening: covered once if your at risk. You're considered to be at risk if you have a family history of AAA.  Lung Cancer Screening: covers low dose CT scan once per year if you meet all of the following conditions: (1) Age 55-77; (2) No signs or symptoms of lung cancer; (3) Current smoker or have quit smoking within the last 15 years; (4) You have a tobacco smoking history of at least 20 pack years (packs per day multiplied by number of years you smoked); (5) You get a written order from a healthcare provider.  Glaucoma Screening: covered annually if you're considered high risk: (1) You have diabetes OR (2) Family history of glaucoma OR (3)  aged 50 and older OR (4)  American aged 65 and older  Osteoporosis Screening: covered every 2 years if you meet one of the following conditions: (1) You're estrogen deficient and at risk for osteoporosis based off medical history and other findings; (2) Have a vertebral abnormality; (3) On glucocorticoid therapy for more than 3 months; (4) Have primary hyperparathyroidism; (5) On osteoporosis medications and need to assess response to drug therapy.   Last bone density test (DXA Scan): Not on file.  HIV Screening: covered annually if you're between the age of 15-65. Also covered annually if you are younger than 15 and older than 65 with risk factors for HIV infection. For pregnant patients, it is covered up  to 3 times per pregnancy.    Immunizations:  Immunization Recommendations   Influenza Vaccine Annual influenza vaccination during flu season is recommended for all persons aged >= 6 months who do not have contraindications   Pneumococcal Vaccine   * Pneumococcal conjugate vaccine = PCV13 (Prevnar 13), PCV15 (Vaxneuvance), PCV20 (Prevnar 20)  * Pneumococcal polysaccharide vaccine = PPSV23 (Pneumovax) Adults 19-65 yo with certain risk factors or if 65+ yo  If never received any pneumonia vaccine: recommend Prevnar 20 (PCV20)  Give PCV20 if previously received 1 dose of PCV13 or PPSV23   Hepatitis B Vaccine 3 dose series if at intermediate or high risk (ex: diabetes, end stage renal disease, liver disease)   Respiratory syncytial virus (RSV) Vaccine - COVERED BY MEDICARE PART D  * RSVPreF3 (Arexvy) CDC recommends that adults 60 years of age and older may receive a single dose of RSV vaccine using shared clinical decision-making (SCDM)   Tetanus (Td) Vaccine - COST NOT COVERED BY MEDICARE PART B Following completion of primary series, a booster dose should be given every 10 years to maintain immunity against tetanus. Td may also be given as tetanus wound prophylaxis.   Tdap Vaccine - COST NOT COVERED BY MEDICARE PART B Recommended at least once for all adults. For pregnant patients, recommended with each pregnancy.   Shingles Vaccine (Shingrix) - COST NOT COVERED BY MEDICARE PART B  2 shot series recommended in those 19 years and older who have or will have weakened immune systems or those 50 years and older     Health Maintenance Due:      Topic Date Due   • Hepatitis C Screening  Never done   • HIV Screening  Never done   • Breast Cancer Screening: Mammogram  11/15/2024   • Lung Cancer Screening  06/01/2025   • Cervical Cancer Screening  04/03/2029   • Colorectal Cancer Screening  04/14/2033     Immunizations Due:      Topic Date Due   • Influenza Vaccine (1) 09/01/2024   • COVID-19 Vaccine (5 - 2024-25 season)  09/01/2024     Advance Directives   What are advance directives?  Advance directives are legal documents that state your wishes and plans for medical care. These plans are made ahead of time in case you lose your ability to make decisions for yourself. Advance directives can apply to any medical decision, such as the treatments you want, and if you want to donate organs.   What are the types of advance directives?  There are many types of advance directives, and each state has rules about how to use them. You may choose a combination of any of the following:  Living will:  This is a written record of the treatment you want. You can also choose which treatments you do not want, which to limit, and which to stop at a certain time. This includes surgery, medicine, IV fluid, and tube feedings.   Durable power of  for healthcare (DPAHC):  This is a written record that states who you want to make healthcare choices for you when you are unable to make them for yourself. This person, called a proxy, is usually a family member or a friend. You may choose more than 1 proxy.  Do not resuscitate (DNR) order:  A DNR order is used in case your heart stops beating or you stop breathing. It is a request not to have certain forms of treatment, such as CPR. A DNR order may be included in other types of advance directives.  Medical directive:  This covers the care that you want if you are in a coma, near death, or unable to make decisions for yourself. You can list the treatments you want for each condition. Treatment may include pain medicine, surgery, blood transfusions, dialysis, IV or tube feedings, and a ventilator (breathing machine).  Values history:  This document has questions about your views, beliefs, and how you feel and think about life. This information can help others choose the care that you would choose.  Why are advance directives important?  An advance directive helps you control your care. Although spoken  wishes may be used, it is better to have your wishes written down. Spoken wishes can be misunderstood, or not followed. Treatments may be given even if you do not want them. An advance directive may make it easier for your family to make difficult choices about your care.   Weight Management   Why it is important to manage your weight:  Being overweight increases your risk of health conditions such as heart disease, high blood pressure, type 2 diabetes, and certain types of cancer. It can also increase your risk for osteoarthritis, sleep apnea, and other respiratory problems. Aim for a slow, steady weight loss. Even a small amount of weight loss can lower your risk of health problems.  How to lose weight safely:  A safe and healthy way to lose weight is to eat fewer calories and get regular exercise. You can lose up about 1 pound a week by decreasing the number of calories you eat by 500 calories each day.   Healthy meal plan for weight management:  A healthy meal plan includes a variety of foods, contains fewer calories, and helps you stay healthy. A healthy meal plan includes the following:  Eat whole-grain foods more often.  A healthy meal plan should contain fiber. Fiber is the part of grains, fruits, and vegetables that is not broken down by your body. Whole-grain foods are healthy and provide extra fiber in your diet. Some examples of whole-grain foods are whole-wheat breads and pastas, oatmeal, brown rice, and bulgur.  Eat a variety of vegetables every day.  Include dark, leafy greens such as spinach, kale, nissa greens, and mustard greens. Eat yellow and orange vegetables such as carrots, sweet potatoes, and winter squash.   Eat a variety of fruits every day.  Choose fresh or canned fruit (canned in its own juice or light syrup) instead of juice. Fruit juice has very little or no fiber.  Eat low-fat dairy foods.  Drink fat-free (skim) milk or 1% milk. Eat fat-free yogurt and low-fat cottage cheese. Try  low-fat cheeses such as mozzarella and other reduced-fat cheeses.  Choose meat and other protein foods that are low in fat.  Choose beans or other legumes such as split peas or lentils. Choose fish, skinless poultry (chicken or turkey), or lean cuts of red meat (beef or pork). Before you cook meat or poultry, cut off any visible fat.   Use less fat and oil.  Try baking foods instead of frying them. Add less fat, such as margarine, sour cream, regular salad dressing and mayonnaise to foods. Eat fewer high-fat foods. Some examples of high-fat foods include french fries, doughnuts, ice cream, and cakes.  Eat fewer sweets.  Limit foods and drinks that are high in sugar. This includes candy, cookies, regular soda, and sweetened drinks.  Exercise:  Exercise at least 30 minutes per day on most days of the week. Some examples of exercise include walking, biking, dancing, and swimming. You can also fit in more physical activity by taking the stairs instead of the elevator or parking farther away from stores. Ask your healthcare provider about the best exercise plan for you.      © Copyright [employer] 2018 Information is for End User's use only and may not be sold, redistributed or otherwise used for commercial purposes. All illustrations and images included in CareNotes® are the copyrighted property of A.D.A.M., Inc. or [employer]

## 2024-12-19 NOTE — ASSESSMENT & PLAN NOTE
Blood pressure is at goal.  Orders:    amLODIPine (NORVASC) 5 mg tablet; Take 1 tablet (5 mg total) by mouth daily    lisinopril (ZESTRIL) 40 mg tablet; Take 1 tablet (40 mg total) by mouth daily

## 2025-01-08 ENCOUNTER — TELEPHONE (OUTPATIENT)
Age: 60
End: 2025-01-08

## 2025-01-08 DIAGNOSIS — E11.65 UNCONTROLLED TYPE 2 DIABETES MELLITUS WITH HYPERGLYCEMIA (HCC): Primary | ICD-10-CM

## 2025-01-08 NOTE — TELEPHONE ENCOUNTER
Patient called and stated she checked with her insurance and they do not have alternatives for Ozempic or Jardiance.  She would like to know what else would be recommended as the co-pays for the medication would be $200-$300+ and she stated she cannot afford this. Please advise.  Thank you!

## 2025-01-09 ENCOUNTER — TELEPHONE (OUTPATIENT)
Age: 60
End: 2025-01-09

## 2025-01-09 NOTE — TELEPHONE ENCOUNTER
The patient called the trulicity will cost $300 the patient is asking if there is something else she can take     if there is another medication before it is sent to the pharmacy please call the patient and she will call her insurance company to check the price   please call her cell phone if she does not answer please call and leave a message on the home phone  thank you

## 2025-01-09 NOTE — TELEPHONE ENCOUNTER
Called pt back and spoke with her she called her insurance company and the Januvia 15 mg or 25 mg is 60 something a month and for 3 month supply 177.00. She is requesting that be sent in for her.

## 2025-01-09 NOTE — TELEPHONE ENCOUNTER
Patient called stating that she needed to talk to Letitia regarding her refill of her sugar medications from Dr. Huertas.

## 2025-01-10 NOTE — TELEPHONE ENCOUNTER
Patient called back and states can send in 90 days of Januvia 25 mg Once daily with refills to [facility] in [place].

## 2025-01-11 DIAGNOSIS — E11.65 UNCONTROLLED TYPE 2 DIABETES MELLITUS WITH HYPERGLYCEMIA (HCC): Primary | ICD-10-CM

## 2025-01-14 DIAGNOSIS — E11.65 UNCONTROLLED TYPE 2 DIABETES MELLITUS WITH HYPERGLYCEMIA (HCC): ICD-10-CM

## 2025-01-14 NOTE — TELEPHONE ENCOUNTER
Patient called today and stated now it'll be over $300 and she cannot afford that so was asking if you could send something else if possible.  She called her insurance company and they told her to try Zepbound but didn't realize it was for weight loss and was an injection so she said no that won't work.

## 2025-01-15 ENCOUNTER — TELEPHONE (OUTPATIENT)
Dept: FAMILY MEDICINE CLINIC | Facility: CLINIC | Age: 60
End: 2025-01-15

## 2025-01-15 DIAGNOSIS — E66.09 CLASS 1 OBESITY DUE TO EXCESS CALORIES WITH SERIOUS COMORBIDITY AND BODY MASS INDEX (BMI) OF 32.0 TO 32.9 IN ADULT: ICD-10-CM

## 2025-01-15 DIAGNOSIS — E66.811 CLASS 1 OBESITY DUE TO EXCESS CALORIES WITH SERIOUS COMORBIDITY AND BODY MASS INDEX (BMI) OF 32.0 TO 32.9 IN ADULT: ICD-10-CM

## 2025-01-15 DIAGNOSIS — E66.09 CLASS 1 OBESITY DUE TO EXCESS CALORIES WITH SERIOUS COMORBIDITY AND BODY MASS INDEX (BMI) OF 32.0 TO 32.9 IN ADULT: Primary | ICD-10-CM

## 2025-01-15 DIAGNOSIS — E66.811 CLASS 1 OBESITY DUE TO EXCESS CALORIES WITH SERIOUS COMORBIDITY AND BODY MASS INDEX (BMI) OF 32.0 TO 32.9 IN ADULT: Primary | ICD-10-CM

## 2025-01-15 RX ORDER — TIRZEPATIDE 2.5 MG/.5ML
2.5 INJECTION, SOLUTION SUBCUTANEOUS WEEKLY
Qty: 2 ML | Refills: 0 | Status: SHIPPED | OUTPATIENT
Start: 2025-01-15 | End: 2025-02-12

## 2025-01-15 RX ORDER — SITAGLIPTIN 25 MG/1
TABLET, FILM COATED ORAL
Qty: 90 TABLET | Refills: 3 | OUTPATIENT
Start: 2025-01-15

## 2025-01-15 NOTE — TELEPHONE ENCOUNTER
PA for Januvia 25 mg SUBMITTED to [facility]     via    [x]CMM-KEY: [id number]  []Surescripts-Case ID #   []Availity-Auth ID # NDC #   []Faxed to plan   []Other website   []Phone call Case ID #     [x]PA sent as URGENT    All office notes, labs and other pertaining documents and studies sent. Clinical questions answered. Awaiting determination from insurance company.     Turnaround time for your insurance to make a decision on your Prior Authorization can take 7-21 business days.

## 2025-01-15 NOTE — TELEPHONE ENCOUNTER
PA for januvia 25 mg  NOT REQUIRED     Reason (screenshot if applicable)          Patient advised by          [x] MyChart Message  [] Phone call   []LMOM  []L/M to call office as no active Communication consent on file  []Unable to leave detailed message as VM not approved on Communication consent       Pharmacy advised by    [x]Fax  []Phone call

## 2025-01-16 ENCOUNTER — HOSPITAL ENCOUNTER (OUTPATIENT)
Dept: MAMMOGRAPHY | Facility: HOSPITAL | Age: 60
Discharge: HOME | End: 2025-01-16
Payer: MEDICARE

## 2025-01-16 VITALS — WEIGHT: 185 LBS | BODY MASS INDEX: 32.78 KG/M2 | HEIGHT: 63 IN

## 2025-01-16 DIAGNOSIS — Z12.31 SCREENING MAMMOGRAM FOR BREAST CANCER: ICD-10-CM

## 2025-01-16 PROCEDURE — 77067 SCR MAMMO BI INCL CAD: CPT

## 2025-01-16 PROCEDURE — 77063 BREAST TOMOSYNTHESIS BI: CPT

## 2025-01-18 PROBLEM — Z00.00 MEDICARE ANNUAL WELLNESS VISIT, SUBSEQUENT: Status: RESOLVED | Noted: 2018-06-05 | Resolved: 2025-01-18

## 2025-01-20 ENCOUNTER — TELEPHONE (OUTPATIENT)
Age: 60
End: 2025-01-20

## 2025-01-21 DIAGNOSIS — E11.65 UNCONTROLLED TYPE 2 DIABETES MELLITUS WITH HYPERGLYCEMIA (HCC): Primary | ICD-10-CM

## 2025-01-21 NOTE — TELEPHONE ENCOUNTER
Patient agrees to see Endo for diabetes management. Also patient only has a week left of Jardiance 25 mg that you had given her. She is not sure what she should do?

## 2025-01-22 NOTE — TELEPHONE ENCOUNTER
PA for ZEPBOUND 2.5MG SUBMITTED to [employer] [facility]    via    [x]CMM-KEY: [id number]  []Surescripts-Case ID #   []Availity-Auth ID # NDC #   []Faxed to plan   []Other website   []Phone call Case ID #     [x]PA sent as URGENT    All office notes, labs and other pertaining documents and studies sent. Clinical questions answered. Awaiting determination from insurance company.     Turnaround time for your insurance to make a decision on your Prior Authorization can take 7-21 business days.

## 2025-01-23 NOTE — TELEPHONE ENCOUNTER
PA for ZEPBOUND 2.5MG DENIED    Reason:(Screenshot if applicable)        Message sent to office clinical pool Yes    Denial letter scanned into Media Yes    Appeal started No (Provider will need to decide if appeal is warranted and send clinical documentation to Prior Authorization Team for initiation.)    **Please follow up with your patient regarding denial and next steps**
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
## 2025-02-24 ENCOUNTER — OFFICE VISIT (OUTPATIENT)
Dept: PSYCHIATRY | Facility: CLINIC | Age: 60
End: 2025-02-24
Payer: MEDICARE

## 2025-02-24 VITALS — HEIGHT: 63 IN | WEIGHT: 185.7 LBS | BODY MASS INDEX: 32.9 KG/M2

## 2025-02-24 DIAGNOSIS — F41.1 GENERALIZED ANXIETY DISORDER: ICD-10-CM

## 2025-02-24 DIAGNOSIS — F33.1 MODERATE RECURRENT MAJOR DEPRESSION (HCC): Primary | ICD-10-CM

## 2025-02-24 DIAGNOSIS — F41.0 PANIC ATTACKS: ICD-10-CM

## 2025-02-24 PROCEDURE — 99214 OFFICE O/P EST MOD 30 MIN: CPT | Performed by: PHYSICIAN ASSISTANT

## 2025-02-24 RX ORDER — ARIPIPRAZOLE 2 MG/1
2 TABLET ORAL
Qty: 90 TABLET | Refills: 1 | Status: SHIPPED | OUTPATIENT
Start: 2025-02-24

## 2025-02-24 RX ORDER — PAROXETINE 30 MG/1
30 TABLET, FILM COATED ORAL DAILY
Qty: 90 TABLET | Refills: 1 | Status: SHIPPED | OUTPATIENT
Start: 2025-02-24

## 2025-02-24 NOTE — BH TREATMENT PLAN
"TREATMENT PLAN (Medication Management Only)        [facility]    Name/Date of Birth/MRN:  Natalie Gomes 59 y.o. 1965 MRN: [id number]  Date of Treatment Plan: February 24, 2025  Diagnosis/Diagnoses:   1. Moderate recurrent major depression (HCC)    2. Generalized anxiety disorder    3. Panic attacks      Strengths/Personal Resources for Self-Care: \"Keeping my anger under control\"  Area/Areas of need (in own words): \"Alright\"  1. Long Term Goal:    Maintain mood stability and control of anxiety  Target Date: 6 months - 8/24/2025  Person/Persons responsible for completion of goal: Anil  2.  Short Term Objective (s) - How will we reach this goal?:   A.  Provider new recommended medication/dosage changes and/or continue medication(s):  Pt has no overt mood/anxiety/panic complaints and appears stable.  Discussed a trial reduction and Pt refuses at this time.  She feels well on her medicines and wants them continued -- this will be done.  Treatment plan done and Pt accepts the plan.  Safety Plan was done 6/12/2024 but Epic flags it as being due.  Continue:   Aripiprazole 2mg (1)  tab po qhs # 90 R1  Paroxetine 30mg (1) tab po qd # 90 R1  Clonazepam 0.5mg (1) tab po qhs - Rx renewed for Qty 30 R5 on 11/25/2024 and last filled 2/7/2025 per PDMP  F/U PCP and specialists for medical issues  Pt to get CMP, HgbA1C, Lipids --  per PCP  Return return 12 weeks, Call sooner prn    .  Target Date: 6 months - 8/24/2025  Person/Persons Responsible for Completion of Goal: Anil   Progress Towards Goals: stable, continuing treatment  Treatment Modality: medication management every 12 weeks  Review due 180 days from date of this plan: 6 months - 8/24/2025  Expected length of service: ongoing treatment unless revised  My Physician/PA/NP and I have developed this plan together and I agree to work on the goals and objectives. I understand the treatment " goals that were developed for my treatment.  Electronic Signatures: on file (unless signed below)    Laura Alcaraz PA-C 02/24/25

## 2025-02-24 NOTE — ASSESSMENT & PLAN NOTE
Orders:    ARIPiprazole (ABILIFY) 2 mg tablet; Take 1 tablet (2 mg total) by mouth daily at bedtime    PARoxetine (PAXIL) 30 mg tablet; Take 1 tablet (30 mg total) by mouth daily    CBC and differential; Future

## 2025-02-25 ENCOUNTER — TELEPHONE (OUTPATIENT)
Dept: PSYCHIATRY | Facility: CLINIC | Age: 60
End: 2025-02-25

## 2025-02-25 ENCOUNTER — TELEPHONE (OUTPATIENT)
Age: 60
End: 2025-02-25

## 2025-02-25 NOTE — TELEPHONE ENCOUNTER
Patient called in requesting MM EMMY.     Patient shared she's tired of getting to appt early and still been seen late.     Patient shared she would prefer to see a female dr.     Writer confirmed patients address and insurance.     Writer informed patient msg will be relay and someone will be in contact.

## 2025-02-25 NOTE — TELEPHONE ENCOUNTER
Called and left message for patient to return a call to [phone] and schedule 12 week follow up with provider (Laura Alcaraz). Please schedule upon return call. Thank you.

## 2025-02-26 ENCOUNTER — CONSULT (OUTPATIENT)
Dept: ENDOCRINOLOGY | Facility: CLINIC | Age: 60
End: 2025-02-26
Payer: MEDICARE

## 2025-02-26 ENCOUNTER — TELEPHONE (OUTPATIENT)
Age: 60
End: 2025-02-26

## 2025-02-26 VITALS
OXYGEN SATURATION: 97 % | BODY MASS INDEX: 31.71 KG/M2 | WEIGHT: 179 LBS | SYSTOLIC BLOOD PRESSURE: 130 MMHG | DIASTOLIC BLOOD PRESSURE: 70 MMHG | HEIGHT: 63 IN | HEART RATE: 88 BPM

## 2025-02-26 DIAGNOSIS — E78.2 MIXED HYPERLIPIDEMIA: ICD-10-CM

## 2025-02-26 DIAGNOSIS — N18.31 TYPE 2 DIABETES MELLITUS WITH STAGE 3A CHRONIC KIDNEY DISEASE, WITHOUT LONG-TERM CURRENT USE OF INSULIN (HCC): ICD-10-CM

## 2025-02-26 DIAGNOSIS — E11.65 UNCONTROLLED TYPE 2 DIABETES MELLITUS WITH HYPERGLYCEMIA (HCC): Primary | ICD-10-CM

## 2025-02-26 DIAGNOSIS — I10 PRIMARY HYPERTENSION: ICD-10-CM

## 2025-02-26 DIAGNOSIS — E11.22 TYPE 2 DIABETES MELLITUS WITH STAGE 3A CHRONIC KIDNEY DISEASE, WITHOUT LONG-TERM CURRENT USE OF INSULIN (HCC): ICD-10-CM

## 2025-02-26 PROCEDURE — 99204 OFFICE O/P NEW MOD 45 MIN: CPT | Performed by: INTERNAL MEDICINE

## 2025-02-26 RX ORDER — GLIPIZIDE 5 MG/1
TABLET, FILM COATED, EXTENDED RELEASE ORAL
Qty: 90 TABLET | Refills: 1 | Status: SHIPPED | OUTPATIENT
Start: 2025-02-26

## 2025-02-26 RX ORDER — INSULIN GLARGINE 100 [IU]/ML
INJECTION, SOLUTION SUBCUTANEOUS
Qty: 15 ML | Refills: 1 | Status: SHIPPED | OUTPATIENT
Start: 2025-02-26

## 2025-02-26 NOTE — TELEPHONE ENCOUNTER
Pt called she is asking for the samples provider said she was giving her.  The lantus pen and one touch ultra blue test strips.  Please call her because she will be I the area tomorrow and can stop in

## 2025-02-26 NOTE — PROGRESS NOTES
Natalie Gomes 59 y.o. female MRN: [id number]    Encounter: [id number]      Assessment & Plan     Assessment:  This is a 59 y.o.-year-old female with diabetes with hyperglycemia.    Plan:  Diagnoses and all orders for this visit:    Uncontrolled type 2 diabetes mellitus with hyperglycemia (HCC)  Lab Results   Component Value Date    HGBA1C 9.1 (H) 12/10/2024   A1c is uncontrolled 9.1%  Discussed about starting basal insulin, will start Lantus 15 units in the morning  Will change glipizide XL to 5 mg daily with breakfast  Discussed to continue Jardiance 25 mg daily.  Discussed the side effects and benefits.  Patient was given samples from our office.  Discussed the goal for blood sugar is 80 to 180 mg per DL.  Educated to check blood sugar before breakfast and before dinner and send the log in 2 weeks for review  -     Ambulatory referral to Podiatry; Future  -     Ambulatory Referral to Endocrinology  -     Insulin Glargine Solostar (Lantus SoloStar) 100 UNIT/ML SOPN; Inject 15 units in AM  -     glipiZIDE (GLUCOTROL XL) 5 mg 24 hr tablet; Take one tablet with breakfast    Mixed hyperlipidemia  Lab Results   Component Value Date    LDLCALC 68 12/10/2024   LDL is at goal  Continue Crestor 20 mg at bedtime  Continue to follow with PCP  Primary hypertension  Blood pressure is well-controlled, continue current management  Type 2 diabetes mellitus with stage 3a chronic kidney disease, without long-term current use of insulin (HCC)       Lab Results   Component Value Date    CREATININE 0.95 12/10/2024   Start basal insulin 15 units in the morning, start Glucotrol XL 5 mg with breakfast, continue Jardiance.  Will continue to monitor and make changes as needed      CC: Diabetes    History of Present Illness     HPI:  Natalie Gomes 59-year-old woman with medical history of uncontrolled type 2 diabetes, hypertension, hyperlipidemia is referred here for evaluation and of type 2 diabetes.  She has diabetes for more  than 5 years.  Medical management includes Jardiance 25 mg daily, glipizide 10 mg twice a day metformin  1000 mg twice a day  She tried Ozempic previously and it was working however because of the cost she stopped taking Ozempic    For hyperlipidemia, she takes Crestor 20 mg at bedtime  For hypertension, she takes Zestril 40 mg daily  She eats 3 meals, has difficulty following low carbohydrate diet    She checks blood sugar once daily.  They are in 130 to 160 mg per DL range  Lab Results   Component Value Date    HGBA1C 9.1 (H) 12/10/2024       Lab Results   Component Value Date    CREATININE 0.95 12/10/2024     Component      Latest Ref Rng 8/14/2024 12/10/2024   Sodium      135 - 147 mmol/L  138    Potassium      3.5 - 5.3 mmol/L  4.3    Chloride      96 - 108 mmol/L  103    Carbon Dioxide      21 - 32 mmol/L  16 (L)    ANION GAP      4 - 13 mmol/L  19 (H)    BUN      5 - 25 mg/dL  16    Creatinine      0.60 - 1.30 mg/dL  0.95    GLUCOSE, FASTING      65 - 99 mg/dL  62 (L)    Calcium      8.4 - 10.2 mg/dL  9.4    AST      13 - 39 U/L  17    ALT      7 - 52 U/L  12    ALK PHOS      34 - 104 U/L  92    Total Protein      6.4 - 8.4 g/dL  7.3    Albumin      3.5 - 5.0 g/dL  3.9    Total Bilirubin      0.20 - 1.00 mg/dL  0.34    GFR, Calculated      ml/min/1.73sq m  65    Cholesterol      See Comment mg/dL 136  157    Triglycerides      See Comment mg/dL 133  176 (H)    HDL      >=50 mg/dL 47 (L)  54    LDL Calculated      0 - 100 mg/dL 62  68    Non-HDL Cholesterol      mg/dl 89     EXT Creatinine Urine      Reference range not established. mg/dL  37.3    Albumin,U,Random      <20.0 mg/L  <7.0    Albumin Creat Ratio  --    Hemoglobin A1C      Normal 4.0-5.6%; PreDiabetic 5.7-6.4%; Diabetic >=6.5%; Glycemic control for adults with diabetes <7.0% % 7.8 (H)  9.1 (H)    eAG, EST AVG Glucose      mg/dl 177  214            Review of Systems   Constitutional:  Negative for activity change, diaphoresis, fatigue, fever and  unexpected weight change.   HENT: Negative.     Eyes:  Negative for visual disturbance.   Respiratory:  Negative for cough, chest tightness and shortness of breath.    Cardiovascular:  Negative for chest pain, palpitations and leg swelling.   Gastrointestinal:  Negative for abdominal pain, blood in stool, constipation, diarrhea, nausea and vomiting.   Endocrine: Negative for cold intolerance, heat intolerance, polydipsia, polyphagia and polyuria.   Genitourinary:  Negative for dysuria, enuresis, frequency and urgency.   Musculoskeletal:  Negative for arthralgias and myalgias.   Skin:  Negative for pallor, rash and wound.   Allergic/Immunologic: Negative.    Neurological:  Negative for dizziness, tremors, weakness and numbness.   Hematological: Negative.    Psychiatric/Behavioral: Negative.         Historical Information   Past Medical History:   Diagnosis Date    Anxiety     Colon polyp     Depression     Diabetes mellitus (HCC)     Diverticulosis     GERD (gastroesophageal reflux disease)     Headache     Hyperlipidemia     Hypertension     Psychiatric disorder     bipolar    Right clavicle fracture     Sinus congestion     TIA (transient ischemic attack)     Vitamin D deficiency      Past Surgical History:   Procedure Laterality Date    BLADDER SURGERY      Sling    LUMBAR EPIDURAL INJECTION N/A 1/4/2023    Procedure: L5 S1  LUMBAR epidural steroid injection (77770);  Surgeon: Molina Costa DO;  Location: [facility] MAIN OR;  Service: Pain Management     AR ARTHRS HIP DEBRIDEMENT/SHAVING ARTICULAR CRTLG Right 7/13/2017    Procedure: RIGHT HIP ARTHROSCOPIC LABRAL DEBRIDEMENT;  Surgeon: Kimo Jamil MD;  Location: [facility] Main OR;  Service: Orthopedics    AR XCAPSL CTRC RMVL INSJ IO LENS PROSTH W/O ECP Right 6/17/2024    Procedure: EXTRACTION EXTRACAPSULAR CATARACT PHACO INTRAOCULAR LENS (IOL);  Surgeon: Tyson Thompson MD;  Location: [facility] MAIN OR;  Service: Ophthalmology    SINUS SURGERY      3 surgeries     TUBAL  LIGATION       Social History   Social History     Substance and Sexual Activity   Alcohol Use Not Currently     Social History     Substance and Sexual Activity   Drug Use Yes    Types: Marijuana    Comment: Smoked THC between 19 and 28y/o     Social History     Tobacco Use   Smoking Status Former    Current packs/day: 0.00    Average packs/day: 2.0 packs/day for 40.0 years (80.0 ttl pk-yrs)    Types: Cigarettes    Start date: 1982    Quit date: 2022    Years since quittin.2   Smokeless Tobacco Never   Tobacco Comments    quit 2020     Family History:   Family History   Problem Relation Age of Onset    Cervical cancer Mother     Ovarian cancer Mother 33    Uterine cancer Mother     Cancer Father     Hypertension Father     Diabetes Father     Diabetes type I Sister     Osteoporosis Sister     Depression Sister     Breast cancer Sister 67    Diabetes Sister     Uterine cancer Sister     No Known Problems Sister     No Known Problems Sister     No Known Problems Daughter     No Known Problems Maternal Grandmother     No Known Problems Maternal Grandfather     Heart attack Paternal Grandfather     Heart attack Brother          of an MI at 46y/o    Diabetes Brother     No Known Problems Son     No Known Problems Maternal Aunt     No Known Problems Paternal Aunt     No Known Problems Paternal Aunt     Alcohol abuse Paternal Uncle        Meds/Allergies   Current Outpatient Medications   Medication Sig Dispense Refill    albuterol (Ventolin HFA) 90 mcg/act inhaler Inhale 2 puffs every 6 (six) hours as needed for wheezing 18 g 2    amLODIPine (NORVASC) 5 mg tablet Take 1 tablet (5 mg total) by mouth daily 90 tablet 2    ARIPiprazole (ABILIFY) 2 mg tablet Take 1 tablet (2 mg total) by mouth daily at bedtime 90 tablet 1    aspirin 81 mg chewable tablet Chew 81 mg daily at bedtime      clonazePAM (KlonoPIN) 0.5 mg tablet Take 1 tablet (0.5 mg total) by mouth daily at bedtime 30 tablet 5     "clotrimazole-betamethasone (LOTRISONE) 1-0.05 % cream Apply topically 2 (two) times a day for 14 days 30 g 0    Empagliflozin 25 MG TABS Take 1 tablet (25 mg total) by mouth daily 90 tablet 3    glipiZIDE (GLUCOTROL XL) 5 mg 24 hr tablet Take one tablet with breakfast 90 tablet 1    glucose blood (ONE TOUCH ULTRA TEST) test strip Check blood sugar once daily 100 each 11    glucose blood test strip       Insulin Glargine Solostar (Lantus SoloStar) 100 UNIT/ML SOPN Inject 15 units in AM 15 mL 1    Lancets (onetouch ultrasoft) lancets Check blood sugar twice/ week. 100 each 5    lisinopril (ZESTRIL) 40 mg tablet Take 1 tablet (40 mg total) by mouth daily 90 tablet 1    metFORMIN (GLUCOPHAGE) 500 mg tablet Take 2 tablets (1,000 mg total) by mouth 2 (two) times a day with meals 360 tablet 2    NEEDLE, DISP, 30 G (B-D DISP NEEDLE 30GX1\") 30G X 1\" MISC Use once a week 100 each 5    nystatin (MYCOSTATIN) powder Apply topically 2 (two) times a day 60 g 2    omeprazole (PriLOSEC) 20 mg delayed release capsule Take 1 capsule (20 mg total) by mouth daily 90 capsule 1    PARoxetine (PAXIL) 30 mg tablet Take 1 tablet (30 mg total) by mouth daily 90 tablet 1    rosuvastatin (CRESTOR) 20 MG tablet Take 1 tablet (20 mg total) by mouth daily at bedtime 100 tablet 2    fluticasone (FLONASE) 50 mcg/act nasal spray 1 spray into each nostril daily (Patient not taking: Reported on 12/12/2024) 18 g 0    Fluticasone-Salmeterol (Wixela Inhub) 250-50 mcg/dose inhaler Inhale 1 puff 2 (two) times a day Rinse mouth after use. (Patient not taking: Reported on 6/10/2024) 60 blister 5    gatifloxacin (ZYMAXID) 0.5 % Administer 1 drop to the right eye 2 (two) times a day (Patient not taking: Reported on 2/24/2025)      ketorolac (ACULAR) 0.5 % ophthalmic solution Administer 1 drop to the right eye 4 (four) times a day (Patient not taking: Reported on 2/24/2025)      prednisoLONE acetate (PRED FORTE) 1 % ophthalmic suspension Administer 1 drop to the " "right eye 4 (four) times a day (Patient not taking: Reported on 12/12/2024)       No current facility-administered medications for this visit.     Allergies   Allergen Reactions    Cefdinir Hives       Objective   Vitals: Blood pressure 130/70, pulse 88, height 5' 3\" (1.6 m), weight 81.2 kg (179 lb), SpO2 97%, not currently breastfeeding.    Physical Exam  Vitals reviewed.   Constitutional:       General: She is not in acute distress.     Appearance: Normal appearance. She is obese. She is not ill-appearing.   HENT:      Head: Normocephalic and atraumatic.      Nose: Nose normal.   Eyes:      Extraocular Movements: Extraocular movements intact.      Conjunctiva/sclera: Conjunctivae normal.   Pulmonary:      Effort: No respiratory distress.   Musculoskeletal:      Cervical back: Normal range of motion.   Neurological:      General: No focal deficit present.      Mental Status: She is alert and oriented to person, place, and time.   Psychiatric:         Mood and Affect: Mood normal.         Behavior: Behavior normal.         The history was obtained from the review of the chart, patient.    Lab Results:   Lab Results   Component Value Date/Time    Hemoglobin A1C 9.1 (H) 12/10/2024 09:03 AM    Hemoglobin A1C 7.8 (H) 08/14/2024 08:23 AM    Hemoglobin A1C 11.1 (H) 03/27/2024 09:28 AM    WBC 10.37 (H) 03/27/2024 09:28 AM    Hemoglobin 10.9 (L) 03/27/2024 09:28 AM    Hematocrit 36.3 03/27/2024 09:28 AM    MCV 79 (L) 03/27/2024 09:28 AM    Platelets 307 03/27/2024 09:28 AM    BUN 16 12/10/2024 09:03 AM    BUN 15 08/14/2024 08:23 AM    BUN 15 05/07/2024 09:05 AM    Potassium 4.3 12/10/2024 09:03 AM    Potassium 4.4 08/14/2024 08:23 AM    Potassium 5.0 05/07/2024 09:05 AM    Chloride 103 12/10/2024 09:03 AM    Chloride 100 08/14/2024 08:23 AM    Chloride 100 05/07/2024 09:05 AM    CO2 16 (L) 12/10/2024 09:03 AM    CO2 24 08/14/2024 08:23 AM    CO2 26 05/07/2024 09:05 AM    Creatinine 0.95 12/10/2024 09:03 AM    Creatinine " "1.44 (H) 08/14/2024 08:23 AM    Creatinine 1.18 05/07/2024 09:05 AM    AST 17 12/10/2024 09:03 AM    AST 19 08/14/2024 08:23 AM    AST 18 05/07/2024 09:05 AM    ALT 12 12/10/2024 09:03 AM    ALT 15 08/14/2024 08:23 AM    ALT 14 05/07/2024 09:05 AM    Total Protein 7.3 12/10/2024 09:03 AM    Total Protein 7.2 08/14/2024 08:23 AM    Total Protein 7.6 05/07/2024 09:05 AM    Albumin 3.9 12/10/2024 09:03 AM    Albumin 3.9 08/14/2024 08:23 AM    Albumin 4.0 05/07/2024 09:05 AM    HDL, Direct 54 12/10/2024 09:03 AM    HDL, Direct 47 (L) 08/14/2024 08:23 AM    HDL, Direct 48 (L) 03/27/2024 09:28 AM    Triglycerides 176 (H) 12/10/2024 09:03 AM    Triglycerides 133 08/14/2024 08:23 AM    Triglycerides 150 (H) 03/27/2024 09:28 AM           Imaging Studies: Results Review Statement: No pertinent imaging studies reviewed.    Portions of the record may have been created with voice recognition software. Occasional wrong word or \"sound a like\" substitutions may have occurred due to the inherent limitations of voice recognition software. Read the chart carefully and recognize, using context, where substitutions have occurred.    "

## 2025-02-26 NOTE — PATIENT INSTRUCTIONS
Take Lantus 15 units in the morning  Take Jardiance 25 mg with breakfast  Take glipizide XL 5.0 mg daily with breakfast  Take metformin 1000 mg with breakfast and 1000 mg with dinner  Check blood sugar before breakfast and before dinner and send log in 2 weeks  Goal for blood sugar is 80 to 200 mg per DL      Hypoglycemia instructions   Natalie Gomes  2/26/2025  [id number]    Low Blood Sugar    Steps to treat low blood sugar.    1. Test blood sugar if you have symptoms of low blood sugar:   Low Blood Sugar Symptoms:  o Sweaty  o Dizzy  o Rapid heartbeat  o Shaky    o Bad mood  o Hungry      2. Treat blood sugar less than 70 with 15 grams of fast-acting carbohydrate:   Examples of 15 grams Fast-Acting Carbohydrate:  o 4 oz juice  o 4 oz regular soda  o 3-4 glucose tablets (chew)  o 3-4 hard candies (chew)              3.   Wait 15 minutes and test your blood sugar again           4. If blood sugar is less than 100, repeat steps 2-3.      5. When your blood sugar is 100 or more, eat a snack if it will be longer than one hour until your next meal. The snack should be 15 grams of carbohydrate and a protein:   Examples of snacks:  o ½ sandwich  o 6 crackers with cheese  o Piece of fruit with cheese or peanut butter  o 6 crackers with peanut butter

## 2025-02-27 NOTE — TELEPHONE ENCOUNTER
[facility] approved     Spoke with patient and scheduled Medication Management  [facility] for 6/26/25 with Dr. Mckeon due to patient request.    Scheduled f/u for 9/15/25 at 10:30am.

## 2025-03-03 ENCOUNTER — TELEPHONE (OUTPATIENT)
Age: 60
End: 2025-03-03

## 2025-03-03 DIAGNOSIS — E11.65 UNCONTROLLED TYPE 2 DIABETES MELLITUS WITH HYPERGLYCEMIA (HCC): Primary | ICD-10-CM

## 2025-03-03 RX ORDER — BLOOD SUGAR DIAGNOSTIC
STRIP MISCELLANEOUS
Qty: 180 STRIP | Refills: 1 | Status: SHIPPED | OUTPATIENT
Start: 2025-03-03

## 2025-03-03 NOTE — TELEPHONE ENCOUNTER
Pt called she needs a new rx sent to [facility]      For the one touch ultra BLUE test strips  pt using 2 daily ( must be on the script)

## 2025-03-10 ENCOUNTER — TELEPHONE (OUTPATIENT)
Age: 60
End: 2025-03-10

## 2025-03-10 RX ORDER — TIRZEPATIDE 2.5 MG/.5ML
2.5 INJECTION, SOLUTION SUBCUTANEOUS WEEKLY
Refills: 0 | OUTPATIENT
Start: 2025-03-10 | End: 2025-04-07

## 2025-03-10 NOTE — TELEPHONE ENCOUNTER
Please inform patient that it is okay to stop Jardiance, she needs to send us the log of her blood sugars

## 2025-03-10 NOTE — TELEPHONE ENCOUNTER
Patient called in stating that she wanted to let Dr. Dickson know that she stopped her Jardiance as of yesterday because her sugar kept dropping.    Her last BG reading was this morning at 5 am and it was 102.    She also mentioned that she will not be able to continue Jardiance anyway because it is too expensive.     Please advise.

## 2025-03-11 ENCOUNTER — TELEPHONE (OUTPATIENT)
Age: 60
End: 2025-03-11

## 2025-03-11 NOTE — TELEPHONE ENCOUNTER
Patient called back informed of message and will get blood work and will call back. NFA at this time.

## 2025-03-11 NOTE — TELEPHONE ENCOUNTER
Patients appt was moved up to 4/4 Lab orders have expected dater for 4/14 Can PCP change the lab orders Would like to get prior to appt Please advise

## 2025-03-11 NOTE — TELEPHONE ENCOUNTER
[facility] patient can be scheduled after her lab date. There are appointment with Ghazal FALCON in mid April if patient is agreeable.

## 2025-03-18 ENCOUNTER — TELEPHONE (OUTPATIENT)
Dept: ENDOCRINOLOGY | Facility: CLINIC | Age: 60
End: 2025-03-18

## 2025-03-18 DIAGNOSIS — E11.65 UNCONTROLLED TYPE 2 DIABETES MELLITUS WITH HYPERGLYCEMIA (HCC): ICD-10-CM

## 2025-03-18 RX ORDER — INSULIN GLARGINE 100 [IU]/ML
INJECTION, SOLUTION SUBCUTANEOUS
Start: 2025-03-18

## 2025-03-18 NOTE — TELEPHONE ENCOUNTER
Blood sugar log reviewed, blood sugars are very tightly controlled.  Please inform patient that she should stop glipizide as blood sugars are tightly controlled  She should continue metformin, increase Lantus to 20  units in the morning  Check blood sugar before breakfast and before dinner and send log again in 2 weeks    Scar Dickson

## 2025-03-20 ENCOUNTER — OFFICE VISIT (OUTPATIENT)
Dept: OBGYN CLINIC | Facility: CLINIC | Age: 60
End: 2025-03-20
Payer: MEDICARE

## 2025-03-20 VITALS — WEIGHT: 185 LBS | HEIGHT: 63 IN | BODY MASS INDEX: 32.78 KG/M2

## 2025-03-20 DIAGNOSIS — M54.16 LEFT LUMBAR RADICULOPATHY: Primary | ICD-10-CM

## 2025-03-20 DIAGNOSIS — G89.29 CHRONIC PAIN OF RIGHT KNEE: ICD-10-CM

## 2025-03-20 DIAGNOSIS — M17.11 PRIMARY OSTEOARTHRITIS OF RIGHT KNEE: ICD-10-CM

## 2025-03-20 DIAGNOSIS — M25.561 CHRONIC PAIN OF RIGHT KNEE: ICD-10-CM

## 2025-03-20 PROCEDURE — 99214 OFFICE O/P EST MOD 30 MIN: CPT | Performed by: ORTHOPAEDIC SURGERY

## 2025-03-20 RX ORDER — GABAPENTIN 100 MG/1
100 CAPSULE ORAL
Qty: 30 CAPSULE | Refills: 1 | Status: SHIPPED | OUTPATIENT
Start: 2025-03-20

## 2025-03-20 NOTE — ASSESSMENT & PLAN NOTE
Currently, the symptoms are mild.  We participated in shared decision making to withhold any intra-articular corticosteroid injections at this time.  Patient has not had much benefit from viscosupplementation injections in the past.  I also discussed the possibility of PRP injection but the patient is not willing to consider this due to financial concerns.  Will be happy to see her back in about 2 months time and if symptoms do persist we could reconsider doing the intra-articular corticosteroid injection.

## 2025-03-20 NOTE — PROGRESS NOTES
Name: Natalie Gomes      : 1965      MRN: [id number]  Encounter Provider: Tha Solitario MD  Encounter Date: 3/20/2025   Encounter department: [facility]  :  Assessment & Plan  Left lumbar radiculopathy  Suggest trial of low-dose nighttime gabapentin for symptomatic relief.  Explained to the patient about increased risk of drowsiness and dizziness with Abilify and clonazepam.  Recommend that she withholds her clonazepam for about a week upon initiating gabapentin.  I also explained that gabapentin may help her with her sleep.  However, if she does not have any significant side effects and has difficulty sleeping then she may reintroduce her clonazepam.  Gabapentin dose can be slowly titrated based on her symptom control by her primary care physician.  Orders:    gabapentin (Neurontin) 100 mg capsule; Take 1 capsule (100 mg total) by mouth daily at bedtime    Chronic pain of right knee         Primary osteoarthritis of right knee  Currently, the symptoms are mild.  We participated in shared decision making to withhold any intra-articular corticosteroid injections at this time.  Patient has not had much benefit from viscosupplementation injections in the past.  I also discussed the possibility of PRP injection but the patient is not willing to consider this due to financial concerns.  Will be happy to see her back in about 2 months time and if symptoms do persist we could reconsider doing the intra-articular corticosteroid injection.           History of Present Illness   HPI  Natalie Gomes is a 59 y.o. female who presents for follow up of her right knee pain.  She has a known history of bilateral knee osteoarthritis for which she has received corticosteroid injections in the past with good relief.  Her last corticosteroid injection for the right knee was on 2024.  This provided her about 3 months of pain relief following which her symptoms have gradually  "started recurring.  She does mention that the right knee pain is of mild intensity at this time.  Notably, she has had longer period of pain relief from corticosteroid injections in the past.  Denies any new injury or instability.  In addition to her knee pain, patient also mentions having pain in the lateral aspect of her left leg that sometimes radiates to her left dorsal foot.  Denies any worsening tingling numbness of the lower extremities or any new onset progressive weakness of the lower extremities.  Reports an occasional back pain as well.  History obtained from: patient    Review of Systems  Current Outpatient Medications on File Prior to Visit   Medication Sig Dispense Refill    albuterol (Ventolin HFA) 90 mcg/act inhaler Inhale 2 puffs every 6 (six) hours as needed for wheezing 18 g 2    amLODIPine (NORVASC) 5 mg tablet Take 1 tablet (5 mg total) by mouth daily 90 tablet 2    ARIPiprazole (ABILIFY) 2 mg tablet Take 1 tablet (2 mg total) by mouth daily at bedtime 90 tablet 1    aspirin 81 mg chewable tablet Chew 81 mg daily at bedtime      clonazePAM (KlonoPIN) 0.5 mg tablet Take 1 tablet (0.5 mg total) by mouth daily at bedtime 30 tablet 5    glucose blood (ONE TOUCH ULTRA TEST) test strip Check blood sugar once daily 100 each 11    glucose blood (OneTouch Ultra Blue Test) test strip Use to test BG twice daily 180 strip 1    glucose blood test strip       Insulin Glargine Solostar (Lantus SoloStar) 100 UNIT/ML SOPN Inject 20  units in AM      Lancets (onetouch ultrasoft) lancets Check blood sugar twice/ week. 100 each 5    lisinopril (ZESTRIL) 40 mg tablet Take 1 tablet (40 mg total) by mouth daily 90 tablet 1    metFORMIN (GLUCOPHAGE) 500 mg tablet Take 2 tablets (1,000 mg total) by mouth 2 (two) times a day with meals 360 tablet 2    NEEDLE, DISP, 30 G (B-D DISP NEEDLE 30GX1\") 30G X 1\" MISC Use once a week 100 each 5    nystatin (MYCOSTATIN) powder Apply topically 2 (two) times a day 60 g 2    " "omeprazole (PriLOSEC) 20 mg delayed release capsule Take 1 capsule (20 mg total) by mouth daily 90 capsule 1    PARoxetine (PAXIL) 30 mg tablet Take 1 tablet (30 mg total) by mouth daily 90 tablet 1    prednisoLONE acetate (PRED FORTE) 1 % ophthalmic suspension Administer 1 drop to the right eye 4 (four) times a day      rosuvastatin (CRESTOR) 20 MG tablet Take 1 tablet (20 mg total) by mouth daily at bedtime 100 tablet 2    clotrimazole-betamethasone (LOTRISONE) 1-0.05 % cream Apply topically 2 (two) times a day for 14 days 30 g 0    fluticasone (FLONASE) 50 mcg/act nasal spray 1 spray into each nostril daily (Patient not taking: Reported on 12/12/2024) 18 g 0    Fluticasone-Salmeterol (Wixela Inhub) 250-50 mcg/dose inhaler Inhale 1 puff 2 (two) times a day Rinse mouth after use. (Patient not taking: Reported on 6/10/2024) 60 blister 5    gatifloxacin (ZYMAXID) 0.5 % Administer 1 drop to the right eye 2 (two) times a day (Patient not taking: Reported on 2/24/2025)      ketorolac (ACULAR) 0.5 % ophthalmic solution Administer 1 drop to the right eye 4 (four) times a day (Patient not taking: Reported on 2/24/2025)       No current facility-administered medications on file prior to visit.         Objective   Ht 5' 3\" (1.6 m)   Wt 83.9 kg (185 lb)   BMI 32.77 kg/m²      Physical Exam  Musculoskeletal:      Right knee: No effusion.      Instability Tests: Medial Lynnette test negative and lateral Lynnette test negative.      Left knee: No effusion.      Instability Tests: Medial Lynnette test negative and lateral Lynnette test negative.              Right Knee Exam     Tenderness   The patient is experiencing tenderness in the medial joint line (Patellofemoral crepitus with some discomfort).    Range of Motion   Extension:  normal   Flexion:  130     Tests   Lynnette:  Medial - negative Lateral - negative  Varus: negative Valgus: negative  Lachman:  Anterior - negative      Drawer:  Anterior - negative    Posterior - " "negative  Patellar apprehension: negative    Other   Erythema: absent  Effusion: no effusion present      Left Knee Exam     Tenderness   The patient is experiencing tenderness in the medial joint line (Patellofemoral crepitus).    Range of Motion   Extension:  normal   Flexion:  140     Tests   Lynnette:  Medial - negative Lateral - negative  Varus: negative Valgus: negative  Lachman:  Anterior - negative      Drawer:  Anterior - negative     Posterior - negative  Patellar apprehension: negative    Other   Erythema: absent  Effusion: no effusion present      Back Exam     Tenderness   The patient is experiencing tenderness in the lumbar (Some discomfort at the L4-L5 level.).    Comments:  Have SLR negative on the right and positive on left at 70 degrees.  Normal sensation to light touch in all dermatomes of bilateral lower extremities and grade 5/5 strength of all myotomes of bilateral lower extremities.  No significant back pain with ALLYSON.           I have personally reviewed pertinent films in PACS.   Procedures  Portions of the record may have been created with voice recognition software. Occasional wrong word or \"sound alike\" substitutions may have occurred due to the inherent limitations of voice recognition software. Please review the chart carefully and recognize, using context, where substitutions/typographical errors may have occurred.      "

## 2025-03-27 ENCOUNTER — TELEPHONE (OUTPATIENT)
Age: 60
End: 2025-03-27

## 2025-03-27 NOTE — TELEPHONE ENCOUNTER
"Called patient. I explained that this medication can take a couple weeks until patients see improvement. I advised patient that if she feels she is not improving and needs a higher dose, this should be titrated up by her PCP. She does have an upcoming appointment with her PCP and will discuss then. Per Dr. Solitario's last office note \" Gabapentin dose can be slowly titrated based on her symptom control by her primary care physician. \"  "

## 2025-03-27 NOTE — TELEPHONE ENCOUNTER
Caller: Patient    Doctor: Dr. Solitario    Reason for call:     Patient calling with a question, he prescribed Gabapentin of 100 mg, she is stating it is not working for pain on right knee, can she increase the medication to 2 tablets.  Please advise the patient...    Call back#: [phone], may leave message if not available..

## 2025-03-28 ENCOUNTER — APPOINTMENT (OUTPATIENT)
Dept: LAB | Facility: CLINIC | Age: 60
End: 2025-03-28
Payer: MEDICARE

## 2025-03-28 DIAGNOSIS — F41.0 PANIC ATTACKS: ICD-10-CM

## 2025-03-28 DIAGNOSIS — E11.65 UNCONTROLLED TYPE 2 DIABETES MELLITUS WITH HYPERGLYCEMIA (HCC): ICD-10-CM

## 2025-03-28 DIAGNOSIS — F41.1 GENERALIZED ANXIETY DISORDER: ICD-10-CM

## 2025-03-28 DIAGNOSIS — F33.1 MODERATE RECURRENT MAJOR DEPRESSION (HCC): ICD-10-CM

## 2025-03-28 DIAGNOSIS — E78.5 HYPERLIPIDEMIA, UNSPECIFIED HYPERLIPIDEMIA TYPE: ICD-10-CM

## 2025-03-28 LAB
ALBUMIN SERPL BCG-MCNC: 3.9 G/DL (ref 3.5–5)
ALP SERPL-CCNC: 104 U/L (ref 34–104)
ALT SERPL W P-5'-P-CCNC: 8 U/L (ref 7–52)
ANION GAP SERPL CALCULATED.3IONS-SCNC: 8 MMOL/L (ref 4–13)
AST SERPL W P-5'-P-CCNC: 12 U/L (ref 13–39)
BASOPHILS # BLD AUTO: 0.09 THOUSANDS/ÂΜL (ref 0–0.1)
BASOPHILS NFR BLD AUTO: 1 % (ref 0–1)
BILIRUB SERPL-MCNC: 0.31 MG/DL (ref 0.2–1)
BUN SERPL-MCNC: 9 MG/DL (ref 5–25)
CALCIUM SERPL-MCNC: 9 MG/DL (ref 8.4–10.2)
CHLORIDE SERPL-SCNC: 102 MMOL/L (ref 96–108)
CHOLEST SERPL-MCNC: 146 MG/DL (ref ?–200)
CO2 SERPL-SCNC: 27 MMOL/L (ref 21–32)
CREAT SERPL-MCNC: 0.81 MG/DL (ref 0.6–1.3)
EOSINOPHIL # BLD AUTO: 0.23 THOUSAND/ÂΜL (ref 0–0.61)
EOSINOPHIL NFR BLD AUTO: 2 % (ref 0–6)
ERYTHROCYTE [DISTWIDTH] IN BLOOD BY AUTOMATED COUNT: 18.8 % (ref 11.6–15.1)
EST. AVERAGE GLUCOSE BLD GHB EST-MCNC: 171 MG/DL
GFR SERPL CREATININE-BSD FRML MDRD: 79 ML/MIN/1.73SQ M
GLUCOSE P FAST SERPL-MCNC: 82 MG/DL (ref 65–99)
HBA1C MFR BLD: 7.6 %
HCT VFR BLD AUTO: 37.6 % (ref 34.8–46.1)
HDLC SERPL-MCNC: 58 MG/DL
HGB BLD-MCNC: 11.6 G/DL (ref 11.5–15.4)
IMM GRANULOCYTES # BLD AUTO: 0.07 THOUSAND/UL (ref 0–0.2)
IMM GRANULOCYTES NFR BLD AUTO: 1 % (ref 0–2)
LDLC SERPL CALC-MCNC: 59 MG/DL (ref 0–100)
LYMPHOCYTES # BLD AUTO: 3.03 THOUSANDS/ÂΜL (ref 0.6–4.47)
LYMPHOCYTES NFR BLD AUTO: 28 % (ref 14–44)
MCH RBC QN AUTO: 23.1 PG (ref 26.8–34.3)
MCHC RBC AUTO-ENTMCNC: 30.9 G/DL (ref 31.4–37.4)
MCV RBC AUTO: 75 FL (ref 82–98)
MONOCYTES # BLD AUTO: 0.6 THOUSAND/ÂΜL (ref 0.17–1.22)
MONOCYTES NFR BLD AUTO: 6 % (ref 4–12)
NEUTROPHILS # BLD AUTO: 6.96 THOUSANDS/ÂΜL (ref 1.85–7.62)
NEUTS SEG NFR BLD AUTO: 62 % (ref 43–75)
NRBC BLD AUTO-RTO: 0 /100 WBCS
PLATELET # BLD AUTO: 323 THOUSANDS/UL (ref 149–390)
PMV BLD AUTO: 11.8 FL (ref 8.9–12.7)
POTASSIUM SERPL-SCNC: 4.4 MMOL/L (ref 3.5–5.3)
PROT SERPL-MCNC: 7.3 G/DL (ref 6.4–8.4)
RBC # BLD AUTO: 5.03 MILLION/UL (ref 3.81–5.12)
SODIUM SERPL-SCNC: 137 MMOL/L (ref 135–147)
TRIGL SERPL-MCNC: 145 MG/DL (ref ?–150)
WBC # BLD AUTO: 10.98 THOUSAND/UL (ref 4.31–10.16)

## 2025-03-28 PROCEDURE — 80053 COMPREHEN METABOLIC PANEL: CPT

## 2025-03-28 PROCEDURE — 80061 LIPID PANEL: CPT

## 2025-03-28 PROCEDURE — 36415 COLL VENOUS BLD VENIPUNCTURE: CPT

## 2025-03-28 PROCEDURE — 85025 COMPLETE CBC W/AUTO DIFF WBC: CPT

## 2025-03-28 PROCEDURE — 83036 HEMOGLOBIN GLYCOSYLATED A1C: CPT

## 2025-04-01 ENCOUNTER — TELEPHONE (OUTPATIENT)
Age: 60
End: 2025-04-01

## 2025-04-01 ENCOUNTER — NURSE TRIAGE (OUTPATIENT)
Age: 60
End: 2025-04-01

## 2025-04-01 DIAGNOSIS — E11.65 UNCONTROLLED TYPE 2 DIABETES MELLITUS WITH HYPERGLYCEMIA (HCC): ICD-10-CM

## 2025-04-01 RX ORDER — INSULIN GLARGINE 100 [IU]/ML
INJECTION, SOLUTION SUBCUTANEOUS
Start: 2025-04-01

## 2025-04-01 NOTE — TELEPHONE ENCOUNTER
Reviewed the note, patient is having low blood sugars in the morning  Please inform patient to reduce Lantus to 12 units in the morning, continue metformin 1000 mg twice a day

## 2025-04-01 NOTE — TELEPHONE ENCOUNTER
"FOLLOW UP: 24-48 hour response    REASON FOR CONVERSATION: Hypoglycemia    SYMPTOMS: shaky     OTHER: low bs still with discontinuing januvia.     DISPOSITION: 24-48 Hour Provider Response      Patient called stating that she is still going low with her blood sugars. She said last night she was 87 and 101 this morning. On the 28th she was 79 and 70 and on the 27th she was 77. She experiences shakiness when her sugars go low. She is asking if the metformin can be decreased or if the glipizide can be discontinued? Please advise.    Answer Assessment - Initial Assessment Questions  1. SYMPTOMS: \"What symptoms are you concerned about?\"      Shaky for the last 2 weeks  2. ONSET:  \"When did the symptoms start?\"      Shaky   3. BLOOD GLUCOSE: \"What is your blood glucose level?\"       87 last night 101 this morning   4. USUAL RANGE: \"What is your blood glucose level usually?\" (e.g., usual fasting morning value, usual evening value)      115  5. TYPE 1 or 2:  \"Do you know what type of diabetes you have?\"  (e.g., Type 1, Type 2, Gestational; doesn't know)       Type 2  6. INSULIN: \"Do you take insulin?\" \"What type of insulin(s) do you use? What is the mode of delivery? (syringe, pen; injection or pump) \"When did you last give yourself an insulin dose?\" (i.e., time or hours/minutes ago) \"How much did you give?\" (i.e., how many units)      yes  7. DIABETES PILLS: \"Do you take any pills for your diabetes?\" If Yes, ask: \"What is the name of the medicine(s) that you take for high blood sugar?\"      yes  8. OTHER SYMPTOMS: \"Do you have any symptoms?\" (e.g., fever, frequent urination, difficulty breathing, vomiting)      D[place]yky   9. LOW BLOOD GLUCOSE TREATMENT: \"What have you done so far to treat the low blood glucose level?\"      Eat some candy or take glucose tablet   10. FOOD: \"When did you last eat or drink?\"        -  11. ALONE: \"Are you alone right now or is someone with you?\"         Someone is always there    Protocols used: " Diabetes - Low Blood Sugar-Adult-OH

## 2025-04-04 ENCOUNTER — OFFICE VISIT (OUTPATIENT)
Dept: FAMILY MEDICINE CLINIC | Facility: CLINIC | Age: 60
End: 2025-04-04
Payer: MEDICARE

## 2025-04-04 VITALS
BODY MASS INDEX: 33.13 KG/M2 | HEIGHT: 63 IN | SYSTOLIC BLOOD PRESSURE: 138 MMHG | DIASTOLIC BLOOD PRESSURE: 78 MMHG | OXYGEN SATURATION: 99 % | WEIGHT: 187 LBS | HEART RATE: 90 BPM

## 2025-04-04 DIAGNOSIS — E78.5 HYPERLIPIDEMIA, UNSPECIFIED HYPERLIPIDEMIA TYPE: ICD-10-CM

## 2025-04-04 DIAGNOSIS — F17.211 NICOTINE DEPENDENCE, CIGARETTES, IN REMISSION: ICD-10-CM

## 2025-04-04 DIAGNOSIS — E11.65 UNCONTROLLED TYPE 2 DIABETES MELLITUS WITH HYPERGLYCEMIA (HCC): Primary | ICD-10-CM

## 2025-04-04 DIAGNOSIS — J41.0 SIMPLE CHRONIC BRONCHITIS (HCC): ICD-10-CM

## 2025-04-04 DIAGNOSIS — E11.9 CONTROLLED TYPE 2 DIABETES MELLITUS WITHOUT COMPLICATION, WITHOUT LONG-TERM CURRENT USE OF INSULIN (HCC): ICD-10-CM

## 2025-04-04 DIAGNOSIS — M54.16 LUMBAR RADICULOPATHY: ICD-10-CM

## 2025-04-04 DIAGNOSIS — I10 ESSENTIAL HYPERTENSION: ICD-10-CM

## 2025-04-04 DIAGNOSIS — K21.9 CHRONIC GERD: ICD-10-CM

## 2025-04-04 DIAGNOSIS — F33.1 MODERATE RECURRENT MAJOR DEPRESSION (HCC): ICD-10-CM

## 2025-04-04 PROBLEM — G89.29 CHRONIC PAIN OF BOTH KNEES: Status: RESOLVED | Noted: 2021-03-19 | Resolved: 2025-04-04

## 2025-04-04 PROBLEM — U09.9 POST-COVID CHRONIC COUGH: Status: RESOLVED | Noted: 2024-01-29 | Resolved: 2025-04-04

## 2025-04-04 PROBLEM — M75.101 ROTATOR CUFF SYNDROME OF RIGHT SHOULDER: Status: RESOLVED | Noted: 2019-01-08 | Resolved: 2025-04-04

## 2025-04-04 PROBLEM — R05.3 POST-COVID CHRONIC COUGH: Status: RESOLVED | Noted: 2024-01-29 | Resolved: 2025-04-04

## 2025-04-04 PROBLEM — Z63.4 BEREAVEMENT: Status: RESOLVED | Noted: 2021-09-16 | Resolved: 2025-04-04

## 2025-04-04 PROBLEM — B35.4 TINEA CORPORIS: Status: RESOLVED | Noted: 2022-11-30 | Resolved: 2025-04-04

## 2025-04-04 PROBLEM — M25.562 CHRONIC PAIN OF BOTH KNEES: Status: RESOLVED | Noted: 2021-03-19 | Resolved: 2025-04-04

## 2025-04-04 PROBLEM — M25.561 CHRONIC PAIN OF BOTH KNEES: Status: RESOLVED | Noted: 2021-03-19 | Resolved: 2025-04-04

## 2025-04-04 PROBLEM — I51.9 HEART DISORDER: Status: RESOLVED | Noted: 2017-06-06 | Resolved: 2025-04-04

## 2025-04-04 PROBLEM — R20.2 PARESTHESIAS IN RIGHT HAND: Status: RESOLVED | Noted: 2018-11-01 | Resolved: 2025-04-04

## 2025-04-04 PROCEDURE — 99214 OFFICE O/P EST MOD 30 MIN: CPT

## 2025-04-04 RX ORDER — LISINOPRIL 40 MG/1
40 TABLET ORAL DAILY
Qty: 90 TABLET | Refills: 1 | Status: SHIPPED | OUTPATIENT
Start: 2025-04-04

## 2025-04-04 RX ORDER — AMLODIPINE BESYLATE 5 MG/1
5 TABLET ORAL DAILY
Qty: 90 TABLET | Refills: 2 | Status: SHIPPED | OUTPATIENT
Start: 2025-04-04

## 2025-04-04 RX ORDER — ROSUVASTATIN CALCIUM 20 MG/1
20 TABLET, COATED ORAL
Qty: 100 TABLET | Refills: 2 | Status: SHIPPED | OUTPATIENT
Start: 2025-04-04

## 2025-04-04 RX ORDER — GABAPENTIN 100 MG/1
200 CAPSULE ORAL
Qty: 60 CAPSULE | Refills: 0 | Status: SHIPPED | OUTPATIENT
Start: 2025-04-04

## 2025-04-04 RX ORDER — OMEPRAZOLE 20 MG/1
20 CAPSULE, DELAYED RELEASE ORAL DAILY
Qty: 90 CAPSULE | Refills: 1 | Status: SHIPPED | OUTPATIENT
Start: 2025-04-04

## 2025-04-04 NOTE — ASSESSMENT & PLAN NOTE
Suspect secondary to smoking. Managed with omeprazole - long-term use per patient. Refill provided.   Orders:    omeprazole (PriLOSEC) 20 mg delayed release capsule; Take 1 capsule (20 mg total) by mouth daily

## 2025-04-04 NOTE — ASSESSMENT & PLAN NOTE
Stable on current medications prescribed, refill provided. Will recheck in 6 months.  Orders:    amLODIPine (NORVASC) 5 mg tablet; Take 1 tablet (5 mg total) by mouth daily    lisinopril (ZESTRIL) 40 mg tablet; Take 1 tablet (40 mg total) by mouth daily    CBC and differential; Future

## 2025-04-04 NOTE — ASSESSMENT & PLAN NOTE
On 2 mg Abilify, 30 mg Paxil, and 0.5 mg Ativan prn. Managed by Psych - next f/u scheduled for 06/30/25. Overall feels well, denies SI/HI.

## 2025-04-04 NOTE — ASSESSMENT & PLAN NOTE
Stable on 20 mg crestor - refill provided. Will recheck in 6 months.  Orders:    rosuvastatin (CRESTOR) 20 MG tablet; Take 1 tablet (20 mg total) by mouth daily at bedtime    Lipid Panel with Direct LDL reflex; Future                Component  Ref Range & Units (hover) 3/28/25 10:07 AM 12/10/24  9:03 AM 8/14/24  8:23 AM 3/27/24  9:28 AM 11/17/23  9:01 AM 6/30/23  8:56 AM 2/24/23  8:53 AM   Cholesterol 146 157     High   High  CM   Comment: Cholesterol:        Pediatric <18 Years        Desirable          <170 mg/dL      Borderline High    170-199 mg/dL      High               >=200 mg/dL        Adult >=18 Years           Desirable         <200 mg/dL      Borderline High   200-239 mg/dL      High             >239 mg/dL   Triglycerides 145 176 High    High    High   High  CM   Comment: Triglyceride:     0-9Y            <75mg/dL     10Y-17Y         <90 mg/dL       >=18Y     Normal          <150 mg/dL     Borderline High 150-199 mg/dL     High            200-499 mg/dL       Very High       >499 mg/dL    Specimen collection should occur prior to Metamizole administration due to the potential for falsely depressed results.   HDL, Direct 58 54 47 Low  48 Low  59 55 CM 44 Low  CM   LDL Calculated 59 68 CM 62  High   High   High   High  CM   Comment: LDL Cholesterol:    Optimal           <100 mg/dl

## 2025-04-04 NOTE — ASSESSMENT & PLAN NOTE
Previously seen by orthopedics and prescribed Gapantin which she has found to be somewhat helpful. Orthopedics recommended her to f/u with us regarding medication refills. Patient requesting increase dose adjustment. Will trial 200 mg and advised re-check in 1 month. Patient educated on not taking Xanax with Gabapentin.   Orders:    gabapentin (Neurontin) 100 mg capsule; Take 2 capsules (200 mg total) by mouth daily at bedtime

## 2025-04-04 NOTE — PROGRESS NOTES
Name: Natalie Gomes      : 1965      MRN: [id number]  Encounter Provider: TERRIE Sosa  Encounter Date: 2025   Encounter department: [facility]  :  Assessment & Plan  Uncontrolled type 2 diabetes mellitus with hyperglycemia (HCC)  Managed by Endocrine - on Metformin 1000 mg BID and Lantus. A1C improved from the fall. Recently advised to decrease Lantus dosing due to presence of hypoglycemia. Next f/u scheduled for 2025. Refill for Metformin provided.     Lab Results   Component Value Date    HGBA1C 7.6 (H) 2025       Orders:    Diabetic foot exam; Future    Albumin / creatinine urine ratio; Future    Nicotine dependence, cigarettes, in remission  Planning to quit tomorrow after her birthday. Has quit in the past without issue. Not requesting medication to assist with smoking cessation.   Orders:    CT lung screening program; Future    Lumbar radiculopathy  Previously seen by orthopedics and prescribed Gapantin which she has found to be somewhat helpful. Orthopedics recommended her to f/u with us regarding medication refills. Patient requesting increase dose adjustment. Will trial 200 mg and advised re-check in 1 month. Patient educated on not taking Xanax with Gabapentin.   Orders:    gabapentin (Neurontin) 100 mg capsule; Take 2 capsules (200 mg total) by mouth daily at bedtime    Essential hypertension  Stable on current medications prescribed, refill provided. Will recheck in 6 months.  Orders:    amLODIPine (NORVASC) 5 mg tablet; Take 1 tablet (5 mg total) by mouth daily    lisinopril (ZESTRIL) 40 mg tablet; Take 1 tablet (40 mg total) by mouth daily    CBC and differential; Future    Hyperlipidemia, unspecified hyperlipidemia type  Stable on 20 mg crestor - refill provided. Will recheck in 6 months.  Orders:    rosuvastatin (CRESTOR) 20 MG tablet; Take 1 tablet (20 mg total) by mouth daily at bedtime    Lipid Panel with Direct LDL reflex; Future                 Component  Ref Range & Units (hover) 3/28/25 10:07 AM 12/10/24  9:03 AM 8/14/24  8:23 AM 3/27/24  9:28 AM 11/17/23  9:01 AM 6/30/23  8:56 AM 2/24/23  8:53 AM   Cholesterol 146 157     High   High  CM   Comment: Cholesterol:        Pediatric <18 Years        Desirable          <170 mg/dL      Borderline High    170-199 mg/dL      High               >=200 mg/dL        Adult >=18 Years           Desirable         <200 mg/dL      Borderline High   200-239 mg/dL      High             >239 mg/dL   Triglycerides 145 176 High    High    High   High  CM   Comment: Triglyceride:     0-9Y            <75mg/dL     10Y-17Y         <90 mg/dL       >=18Y     Normal          <150 mg/dL     Borderline High 150-199 mg/dL     High            200-499 mg/dL       Very High       >499 mg/dL    Specimen collection should occur prior to Metamizole administration due to the potential for falsely depressed results.   HDL, Direct 58 54 47 Low  48 Low  59 55 CM 44 Low  CM   LDL Calculated 59 68 CM 62  High   High   High   High  CM   Comment: LDL Cholesterol:    Optimal           <100 mg/dl          Controlled type 2 diabetes mellitus without complication, without long-term current use of insulin (HCC)  Managed by Endocrine - on Metformin 1000 mg BID and Lantus. A1C improved from the fall. Recently advised to decrease Lantus dosing due to presence of hypoglycemia. Next f/u scheduled for July 2025. Refill for Metformin provided.     Lab Results   Component Value Date    HGBA1C 7.6 (H) 03/28/2025       Orders:    metFORMIN (GLUCOPHAGE) 500 mg tablet; Take 2 tablets (1,000 mg total) by mouth 2 (two) times a day with meals    Comprehensive metabolic panel; Future    Hemoglobin A1C; Future    Chronic GERD  Suspect secondary to smoking. Managed with omeprazole - long-term use per patient. Refill provided.   Orders:    omeprazole (PriLOSEC) 20 mg delayed release  "capsule; Take 1 capsule (20 mg total) by mouth daily    Moderate recurrent major depression (HCC)  On 2 mg Abilify, 30 mg Paxil, and 0.5 mg Ativan prn. Managed by Psych - next f/u scheduled for 06/30/25. Overall feels well, denies SI/HI.                History of Present Illness   59 year old female presents for a 6-month follow-up regarding chronic conditions. PMH significant for GERD, HLD, HTN, DM, and depression. She is metformin 1000 mg BID and 12 units lantus nightly for her diabetes, managed by Endocrine. Her A1C has improved from 9.2 in 10/2024 to 7.6 in 03/25. She was advised by Endocrine to decrease her Lantus from 20 units to 12 units due to overnight hypogylcemic events. She is to f/u with [facility] in July. Her BP is stable on 40 mg Lisinopril. HLD is stable on 20 mg Crestor - tolerating without issue. She is current everyday smoker for 20+ years but is planning to quit this weekened. She has done so last year without issue but recently started smoking again. Overall feels well. Tomorrow is her birthday and she is going to [employer] to have [employer] pasta (which she doesn't do often).       Review of Systems   Constitutional:  Negative for activity change, appetite change, chills, fatigue and fever.   HENT:  Negative for congestion, rhinorrhea and sore throat.    Respiratory:  Negative for cough, chest tightness and shortness of breath.    Cardiovascular:  Negative for chest pain and palpitations.   Gastrointestinal:  Negative for abdominal pain, constipation, diarrhea, nausea and vomiting.   Musculoskeletal:  Negative for arthralgias, back pain, myalgias and neck pain.   Skin:  Negative for color change and pallor.   Allergic/Immunologic: Negative for environmental allergies and food allergies.   Neurological:  Negative for dizziness, light-headedness and headaches.       Objective   /78   Pulse 90   Ht 5' 3\" (1.6 m)   Wt 84.8 kg (187 lb)   SpO2 99%   BMI 33.13 kg/m²      Physical Exam  Vitals " and nursing note reviewed.   Constitutional:       General: She is awake. She is not in acute distress.     Appearance: Normal appearance. She is obese.   HENT:      Head: Normocephalic and atraumatic.      Right Ear: Tympanic membrane, ear canal and external ear normal.      Left Ear: Tympanic membrane, ear canal and external ear normal.      Nose: No congestion or rhinorrhea.      Mouth/Throat:      Mouth: Mucous membranes are moist.   Eyes:      Extraocular Movements: Extraocular movements intact.      Conjunctiva/sclera: Conjunctivae normal.      Pupils: Pupils are equal, round, and reactive to light.   Cardiovascular:      Rate and Rhythm: Normal rate and regular rhythm.      Pulses: no weak pulses.           Dorsalis pedis pulses are 2+ on the right side and 2+ on the left side.        Posterior tibial pulses are 2+ on the right side and 2+ on the left side.      Heart sounds: Normal heart sounds.   Pulmonary:      Effort: Pulmonary effort is normal.      Breath sounds: Normal breath sounds.   Abdominal:      General: Bowel sounds are normal.      Palpations: Abdomen is soft.   Musculoskeletal:         General: Normal range of motion.      Cervical back: Normal range of motion and neck supple.   Feet:      Right foot:      Skin integrity: No ulcer, skin breakdown, erythema, warmth, callus or dry skin.      Left foot:      Skin integrity: No ulcer, skin breakdown, erythema, warmth, callus or dry skin.   Skin:     General: Skin is warm and dry.   Neurological:      General: No focal deficit present.      Mental Status: She is alert and oriented to person, place, and time.   Psychiatric:         Mood and Affect: Mood normal.         Behavior: Behavior normal. Behavior is cooperative.         Thought Content: Thought content normal.         Judgment: Judgment normal.       Patient's shoes and socks removed.    Right Foot/Ankle   Right Foot Inspection  Skin Exam: skin normal and skin intact. No dry skin, no warmth,  no callus, no erythema, no maceration, no abnormal color, no pre-ulcer, no ulcer and no callus.     Toe Exam: ROM and strength within normal limits.     Sensory   Vibration: intact  Proprioception: intact  Monofilament testing: intact    Vascular  Capillary refills: < 3 seconds  The right DP pulse is 2+. The right PT pulse is 2+.     Left Foot/Ankle  Left Foot Inspection  Skin Exam: skin normal and skin intact. No dry skin, no warmth, no erythema, no maceration, normal color, no pre-ulcer, no ulcer and no callus.     Toe Exam: ROM and strength within normal limits.     Sensory   Vibration: intact  Proprioception: intact  Monofilament testing: intact    Vascular  Capillary refills: < 3 seconds  The left DP pulse is 2+. The left PT pulse is 2+.     Assign Risk Category  No deformity present  No loss of protective sensation  No weak pulses  Risk: 0

## 2025-05-07 ENCOUNTER — HOSPITAL ENCOUNTER (OUTPATIENT)
Dept: CT IMAGING | Facility: HOSPITAL | Age: 60
Discharge: HOME | End: 2025-05-07

## 2025-05-07 DIAGNOSIS — F17.211 NICOTINE DEPENDENCE, CIGARETTES, IN REMISSION: ICD-10-CM

## 2025-05-13 ENCOUNTER — RESULTS FOLLOW-UP (OUTPATIENT)
Dept: FAMILY MEDICINE CLINIC | Facility: CLINIC | Age: 60
End: 2025-05-13

## 2025-05-13 DIAGNOSIS — E11.65 UNCONTROLLED TYPE 2 DIABETES MELLITUS WITH HYPERGLYCEMIA (HCC): Primary | ICD-10-CM

## 2025-05-13 RX ORDER — GLIPIZIDE 5 MG/1
5 TABLET ORAL
Qty: 90 TABLET | Refills: 1 | Status: SHIPPED | OUTPATIENT
Start: 2025-05-13 | End: 2025-05-22

## 2025-05-13 NOTE — TELEPHONE ENCOUNTER
Patient called requesting results of CT scan.  I read what DIO FALCON resulted.  Stable findings, will re-order CT next year when due.  The patient understood the results, no questions.  Thank you.

## 2025-05-22 RX ORDER — GLIPIZIDE 5 MG/1
5 TABLET, FILM COATED, EXTENDED RELEASE ORAL DAILY
Qty: 90 TABLET | Refills: 1 | Status: SHIPPED | OUTPATIENT
Start: 2025-05-22

## 2025-05-22 NOTE — TELEPHONE ENCOUNTER
Call was transferred from [place] as patient had a medication concern. She said previously on 2/26/25 her glipizide was filled as the Glipizide ER 5 MG tablet, and this time she said the pharmacy is trying to give her the regular Glipizide. Patient wants to know if she should remain on the glipizide ER or just the glipizide? If it is the glipizide ER script needs to be updated and resent to the pharmacy. Please notify patient with update. Thank you.

## 2025-06-02 ENCOUNTER — TELEPHONE (OUTPATIENT)
Age: 60
End: 2025-06-02

## 2025-06-02 DIAGNOSIS — E11.65 TYPE 2 DIABETES MELLITUS WITH HYPERGLYCEMIA, WITH LONG-TERM CURRENT USE OF INSULIN (HCC): Primary | ICD-10-CM

## 2025-06-02 DIAGNOSIS — E11.21 DIABETIC NEPHROPATHY ASSOCIATED WITH TYPE 2 DIABETES MELLITUS (HCC): ICD-10-CM

## 2025-06-02 DIAGNOSIS — Z79.4 TYPE 2 DIABETES MELLITUS WITH HYPERGLYCEMIA, WITH LONG-TERM CURRENT USE OF INSULIN (HCC): Primary | ICD-10-CM

## 2025-06-02 RX ORDER — NEEDLES, DISPOSABLE 25GX5/8"
NEEDLE, DISPOSABLE MISCELLANEOUS WEEKLY
Qty: 100 EACH | Refills: 5 | Status: CANCELLED | OUTPATIENT
Start: 2025-06-02

## 2025-06-02 NOTE — TELEPHONE ENCOUNTER
Patient said that she was given a box of pen needles and she needs more but is not sure if she needs to come to the office to get them or through her pharmacy.    She does not know what size. She said the ones that attach to her lancets.    Please advise.

## 2025-06-10 DIAGNOSIS — F41.0 PANIC ATTACKS: ICD-10-CM

## 2025-06-10 DIAGNOSIS — F41.1 GENERALIZED ANXIETY DISORDER: ICD-10-CM

## 2025-06-10 RX ORDER — CLONAZEPAM 0.5 MG/1
0.5 TABLET ORAL
Qty: 16 TABLET | Refills: 0 | Status: SHIPPED | OUTPATIENT
Start: 2025-06-10

## 2025-06-10 NOTE — TELEPHONE ENCOUNTER
Refill cannot be delegated    1 [id number] ** 04/27/2025 11/25/2024 clonazePAM (Tablet) 30.0 30 0.5 MG  [facility] 4 / 5 [place]   1 [id number] ** 03/08/2025 11/25/2024 clonazePAM (Tablet) 30.0 30 0.5 MG  [facility] 3 / 5 [place]   1 [id number] ** 02/07/2025 11/25/2024 clonazePAM (Tablet) 30.0 30 0.5 MG  [facility] 2 / 5 [place]   1 [id number] ** 01/04/2025 11/25/2024 clonazePAM (Tablet) 30.0 30 0.5 MG  [facility] 1 / 5 [place]   1 [id number] ** 11/25/2024 11/25/2024 clonazePAM (Tablet) 30.0 30 0.5 MG [facility] Medicare 0 / 5 [place]   1 [id number] ** 09/15/2024 06/12/2024 clonazePAM (Tablet) 60.0 60 0.5 MG  [facility] 1 / 5 [place]   1 [id number] ** 06/23/2024 06/12/2024 clonazePAM (Tablet) 60.0 60 0.5 MG  [facility] 0 / 5 [place]

## 2025-06-19 ENCOUNTER — APPOINTMENT (OUTPATIENT)
Dept: RADIOLOGY | Facility: AMBULARY SURGERY CENTER | Age: 60
End: 2025-06-19
Attending: ORTHOPAEDIC SURGERY
Payer: MEDICARE

## 2025-06-19 ENCOUNTER — PROCEDURE VISIT (OUTPATIENT)
Dept: OBGYN CLINIC | Facility: CLINIC | Age: 60
End: 2025-06-19
Payer: MEDICARE

## 2025-06-19 VITALS — WEIGHT: 187 LBS | BODY MASS INDEX: 33.13 KG/M2 | HEIGHT: 63 IN

## 2025-06-19 DIAGNOSIS — M25.551 RIGHT HIP PAIN: ICD-10-CM

## 2025-06-19 DIAGNOSIS — M16.0 PRIMARY OSTEOARTHRITIS OF BOTH HIPS: ICD-10-CM

## 2025-06-19 DIAGNOSIS — M17.11 PRIMARY OSTEOARTHRITIS OF RIGHT KNEE: ICD-10-CM

## 2025-06-19 DIAGNOSIS — M25.552 LEFT HIP PAIN: ICD-10-CM

## 2025-06-19 DIAGNOSIS — M25.552 LEFT HIP PAIN: Primary | ICD-10-CM

## 2025-06-19 PROCEDURE — 20610 DRAIN/INJ JOINT/BURSA W/O US: CPT | Performed by: ORTHOPAEDIC SURGERY

## 2025-06-19 PROCEDURE — 99214 OFFICE O/P EST MOD 30 MIN: CPT | Performed by: ORTHOPAEDIC SURGERY

## 2025-06-19 PROCEDURE — 73522 X-RAY EXAM HIPS BI 3-4 VIEWS: CPT

## 2025-06-19 RX ORDER — TRIAMCINOLONE ACETONIDE 40 MG/ML
40 INJECTION, SUSPENSION INTRA-ARTICULAR; INTRAMUSCULAR
Status: COMPLETED | OUTPATIENT
Start: 2025-06-19 | End: 2025-06-19

## 2025-06-19 RX ORDER — LIDOCAINE HYDROCHLORIDE 10 MG/ML
4 INJECTION, SOLUTION INFILTRATION; PERINEURAL
Status: COMPLETED | OUTPATIENT
Start: 2025-06-19 | End: 2025-06-19

## 2025-06-19 RX ADMIN — LIDOCAINE HYDROCHLORIDE 4 ML: 10 INJECTION, SOLUTION INFILTRATION; PERINEURAL at 09:15

## 2025-06-19 RX ADMIN — TRIAMCINOLONE ACETONIDE 40 MG: 40 INJECTION, SUSPENSION INTRA-ARTICULAR; INTRAMUSCULAR at 09:15

## 2025-06-19 NOTE — ASSESSMENT & PLAN NOTE
Clinical and radiological assessment consistent with mild bilateral hip osteoarthritis.  Discussed management options including physical therapy, home exercises including aqua therapy, oral pain medications or trial of ultrasound-guided intra-articular corticosteroid injections.  Patient will continue with home exercises and aquatic exercises.  If symptoms persist we will consider doing hip cortisone injections.

## 2025-06-19 NOTE — PROGRESS NOTES
Name: Natalie Gomes      : 1965      MRN: [id number]  Encounter Provider: Tha Solitario MD  Encounter Date: 2025   Encounter department: [facility] [place]  :  Assessment & Plan  Primary osteoarthritis of right knee  Patient wishes to continue with nonsurgical management.  She was agreeable for repeat right knee intra-articular cortisone injection as this has provided her relief in the past.  Right knee was injected on today's office visit without any immediate complications.  Recommend continuation with home range of motion and strengthening exercises.       Primary osteoarthritis of both hips  Clinical and radiological assessment consistent with mild bilateral hip osteoarthritis.  Discussed management options including physical therapy, home exercises including aqua therapy, oral pain medications or trial of ultrasound-guided intra-articular corticosteroid injections.  Patient will continue with home exercises and aquatic exercises.  If symptoms persist we will consider doing hip cortisone injections.       Left hip pain    Orders:    XR hip/pelv 2-3 vws left if performed; Future    Right hip pain             History of Present Illness   HPI  Natalie Gomes is a 60 y.o. female who presents for follow-up of her right knee pain.  Additionally, she also mentions bilateral anterior hip/groin pain.  #1.  Right knee pain -has a known history of right knee osteoarthritis.  Last received right knee intra-articular cortisone injection on 2024 with good symptomatic relief.  Patient does not wish to have surgical intervention in this regard.  No new injuries reported.  Pain intensity is moderate and made worse with ambulation.    #2.  Bilateral hip pain -chronic in nature and made worse with prolonged sitting or trying to cross over her legs.  Some radiation down her thighs.  No reported injury prior to the onset of symptoms.  Symptoms slightly worse on the right  "side      Review of Systems       Objective   Ht 5' 3\" (1.6 m)   Wt 84.8 kg (187 lb)   BMI 33.13 kg/m²      Physical Exam    Musculoskeletal:      Right knee: No effusion.      Instability Tests: Medial Lynnette test negative and lateral Lynnette test negative.              Right Knee Exam     Tenderness   The patient is experiencing tenderness in the medial joint line (Patellofemoral crepitus).    Range of Motion   Extension:  normal   Flexion:  120     Tests   Lynnette:  Medial - negative Lateral - negative  Varus: negative Valgus: negative  Lachman:  Anterior - negative        Other   Erythema: absent  Effusion: no effusion present      Right Hip Exam     Tenderness   The patient is experiencing tenderness in the anterior.    Range of Motion   Flexion:  90   External rotation:  60   Internal rotation:  10     Tests   ALLYSON: positive    Comments:  Negative logroll.  Equivocal SLR at 70 degrees      Left Hip Exam     Tenderness   The patient is experiencing tenderness in the anterior.    Range of Motion   Flexion:  100   External rotation:  70   Internal rotation: 15     Tests   ALLYSON: positive    Comments:  Negative logroll.  Negative passive SLR.           I have personally reviewed pertinent films in PACS and my interpretation is plain radiograph of bilateral hips performed today does not reveal any acute osseous injury.  Mild degenerative changes noted of both hips..   Large joint arthrocentesis: R knee    Performed by: Tha Solitario MD  Authorized by: Tha Solitario MD    Universal Protocol:  Consent: Verbal consent obtained  Risks and benefits: risks, benefits and alternatives were discussed  Consent given by: patient  Patient understanding: patient states understanding of the procedure being performed  Site marked: the operative site was marked  Radiology Images displayed and confirmed. If images not available, report reviewed: imaging studies available  Required items: required blood products, implants, " "devices, and special equipment available  Patient identity confirmed: verbally with patient  Supporting Documentation  Indications: pain     Is this a Visco injection? NoProcedure Details  Location: knee - R knee  Preparation: Patient was prepped and draped in the usual sterile fashion  Needle size: 22 G  Ultrasound guidance: no  Approach: anterolateral  Medications administered: 4 mL lidocaine 1 %; 40 mg triamcinolone acetonide 40 mg/mL    Patient tolerance: patient tolerated the procedure well with no immediate complications  Dressing:  Sterile dressing applied        Portions of the record may have been created with voice recognition software. Occasional wrong word or \"sound alike\" substitutions may have occurred due to the inherent limitations of voice recognition software. Please review the chart carefully and recognize, using context, where substitutions/typographical errors may have occurred.      "

## 2025-06-19 NOTE — ASSESSMENT & PLAN NOTE
Patient wishes to continue with nonsurgical management.  She was agreeable for repeat right knee intra-articular cortisone injection as this has provided her relief in the past.  Right knee was injected on today's office visit without any immediate complications.  Recommend continuation with home range of motion and strengthening exercises.

## 2025-06-26 ENCOUNTER — TELEPHONE (OUTPATIENT)
Dept: PSYCHIATRY | Facility: CLINIC | Age: 60
End: 2025-06-26

## 2025-06-26 NOTE — TELEPHONE ENCOUNTER
NO-SHOW LETTER MAILED TO Natalie Gomes.  ADDRESS: [address] [place] [phone]  SENT VIA [place]

## 2025-06-26 NOTE — LETTER
25     Natalie Gomes   : 1965   [place] [id number]       It is the policy of [facility] to monitor and manage appointments that have been no-showed or cancelled with less than 48-hour notice. This is necessary to ensure that we are able to provide timely access for all patients to our providers. Undue numbers of unutilized appointments delays necessary medical care for all patients.      Dear Natalie Gomes       We are sorry that you missed your appointment with Yaima Mckeon MD on 2025 Your health and follow-up care are important to us. As this was a New Patient appointment, you will need to contact the office at [phone] if you would like to reschedule.    Please be aware that our office policy states that if you 'no show' two or more Medication Management  appointments without prior notice of cancellation within in a calendar year, you may be discharged from Medication Management  treatment.  We want to bring this to your attention now to prevent an interruption of your care.  If you have any questions about this policy, please call us at the number above.     If we do not hear from you within 10 business days to make a follow up appointment, we will assume you are no longer interested in care here.    Thank you in advance for your cooperation and assistance.       Sincerely,      [facility] Support Staff

## 2025-07-03 DIAGNOSIS — F41.0 PANIC ATTACKS: ICD-10-CM

## 2025-07-03 DIAGNOSIS — F41.1 GENERALIZED ANXIETY DISORDER: ICD-10-CM

## 2025-07-03 RX ORDER — CLONAZEPAM 0.5 MG/1
0.5 TABLET ORAL
Qty: 16 TABLET | Refills: 0 | Status: SHIPPED | OUTPATIENT
Start: 2025-07-03

## 2025-07-03 NOTE — TELEPHONE ENCOUNTER
Medication Refill Request     Name of Medication Klonopin  Dose/Frequency .5 mg  Quantity 16  Verified pharmacy   [x]  Verified ordering Provider   [x]  Does patient have enough for the next 3 days? Yes [x] No []  Does patient have a follow-up appointment scheduled? Yes [x] No []   If so when is appointment: 7/28/25 at 1:30pm [facility] appt

## 2025-07-24 ENCOUNTER — OFFICE VISIT (OUTPATIENT)
Dept: FAMILY MEDICINE CLINIC | Facility: CLINIC | Age: 60
End: 2025-07-24
Payer: MEDICARE

## 2025-07-24 VITALS
SYSTOLIC BLOOD PRESSURE: 120 MMHG | HEART RATE: 85 BPM | TEMPERATURE: 98.1 F | BODY MASS INDEX: 33.49 KG/M2 | WEIGHT: 189 LBS | HEIGHT: 63 IN | OXYGEN SATURATION: 97 % | DIASTOLIC BLOOD PRESSURE: 76 MMHG

## 2025-07-24 DIAGNOSIS — J02.9 SORE THROAT: ICD-10-CM

## 2025-07-24 DIAGNOSIS — J01.10 ACUTE NON-RECURRENT FRONTAL SINUSITIS: ICD-10-CM

## 2025-07-24 DIAGNOSIS — J40 BRONCHITIS: ICD-10-CM

## 2025-07-24 DIAGNOSIS — J41.0 SIMPLE CHRONIC BRONCHITIS (HCC): Primary | ICD-10-CM

## 2025-07-24 PROBLEM — E11.65 UNCONTROLLED TYPE 2 DIABETES MELLITUS WITH HYPERGLYCEMIA (HCC): Status: RESOLVED | Noted: 2024-04-03 | Resolved: 2025-07-24

## 2025-07-24 LAB
SARS-COV-2 AG UPPER RESP QL IA: NEGATIVE
VALID CONTROL: NORMAL

## 2025-07-24 PROCEDURE — 99214 OFFICE O/P EST MOD 30 MIN: CPT

## 2025-07-24 PROCEDURE — 87811 SARS-COV-2 COVID19 W/OPTIC: CPT

## 2025-07-24 RX ORDER — ALBUTEROL SULFATE 90 UG/1
2 INHALANT RESPIRATORY (INHALATION) EVERY 6 HOURS PRN
Qty: 18 G | Refills: 2 | Status: SHIPPED | OUTPATIENT
Start: 2025-07-24

## 2025-07-24 RX ORDER — FLUTICASONE PROPIONATE 50 MCG
1 SPRAY, SUSPENSION (ML) NASAL DAILY
Qty: 18 G | Refills: 0 | Status: SHIPPED | OUTPATIENT
Start: 2025-07-24

## 2025-07-24 RX ORDER — FLUTICASONE PROPIONATE AND SALMETEROL 250; 50 UG/1; UG/1
1 POWDER RESPIRATORY (INHALATION) 2 TIMES DAILY
Qty: 60 BLISTER | Refills: 0 | Status: SHIPPED | OUTPATIENT
Start: 2025-07-24

## 2025-07-24 RX ORDER — DOXYCYCLINE 100 MG/1
100 TABLET ORAL 2 TIMES DAILY
Qty: 14 TABLET | Refills: 0 | Status: SHIPPED | OUTPATIENT
Start: 2025-07-24 | End: 2025-07-31

## 2025-07-24 RX ORDER — BENZONATATE 100 MG/1
100 CAPSULE ORAL 3 TIMES DAILY PRN
Qty: 20 CAPSULE | Refills: 0 | Status: SHIPPED | OUTPATIENT
Start: 2025-07-24

## 2025-07-24 NOTE — ASSESSMENT & PLAN NOTE
Suspecting seasonal allergies exacerbated by underlying asthma. Discussed supportive treatment - wixela, albuterol, tessalon perles, flonase. Will use Wixela twice daily - morning and evening - advised to rinse out mouth after use. If need relief in between - albuterol as directed, Flonase up to twice daily to use with nasal saline first to avoid nasal irritation. Tessalon perles up to 3 times daily as needed - every 8 hours for dry cough. Recommend daily zyrtec and coricidin prior to bedtime as needed for productive cough. Unable to offer steroids due to DM current on insulin and metformin. If no improvement or worsening symptoms within the next 3-5 days, recommend starting antibiotic. Declining chest x-ray for now. Will f/u as needed. Next scheduled OV for October.  Orders:    albuterol (Ventolin HFA) 90 mcg/act inhaler; Inhale 2 puffs every 6 (six) hours as needed for wheezing    benzonatate (TESSALON PERLES) 100 mg capsule; Take 1 capsule (100 mg total) by mouth 3 (three) times a day as needed for cough    doxycycline (ADOXA) 100 MG tablet; Take 1 tablet (100 mg total) by mouth 2 (two) times a day for 7 days    XR chest pa and lateral; Future

## 2025-07-24 NOTE — PROGRESS NOTES
Name: Natalie Gomes      : 1965      MRN: [id number]  Encounter Provider: ETRRIE Sosa  Encounter Date: 2025   Encounter department: [facility]  :  Assessment & Plan  Simple chronic bronchitis (HCC)  Suspecting seasonal allergies exacerbated by underlying asthma. Discussed supportive treatment - wixela, albuterol, tessalon perles, flonase. Will use Wixela twice daily - morning and evening - advised to rinse out mouth after use. If need relief in between - albuterol as directed, Flonase up to twice daily to use with nasal saline first to avoid nasal irritation. Tessalon perles up to 3 times daily as needed - every 8 hours for dry cough. Recommend daily zyrtec and coricidin prior to bedtime as needed for productive cough. Unable to offer steroids due to DM current on insulin and metformin. If no improvement or worsening symptoms within the next 3-5 days, recommend starting antibiotic. Declining chest x-ray for now. Will f/u as needed. Next scheduled OV for October.  Orders:    albuterol (Ventolin HFA) 90 mcg/act inhaler; Inhale 2 puffs every 6 (six) hours as needed for wheezing    benzonatate (TESSALON PERLES) 100 mg capsule; Take 1 capsule (100 mg total) by mouth 3 (three) times a day as needed for cough    doxycycline (ADOXA) 100 MG tablet; Take 1 tablet (100 mg total) by mouth 2 (two) times a day for 7 days    XR chest pa and lateral; Future    Sore throat  Rapid COVID negative.   Orders:    POCT Rapid Covid Ag    Acute non-recurrent frontal sinusitis  Refill for flonase provided.   Orders:    fluticasone (FLONASE) 50 mcg/act nasal spray; 1 spray into each nostril daily    Bronchitis  Refill for Wixela provided - will use twice daily - morning and evening. Albuterol as rescue inhaler.  Orders:    Fluticasone-Salmeterol (Wixela Inhub) 250-50 mcg/dose inhaler; Inhale 1 puff 2 (two) times a day Rinse mouth after use.           History of Present Illness   60 year old  female presents for evaluation of cough/congestion x 2 days. PMH signficant for diabetes, multiple lung nodules, seasonal allergies, asthma s/p former smoker (80 pack year history, quit 2 years ago), obesity, CKD, HLD, and GERD.  She denies any known sick contacts. Reports h/o bronchitis and asthma - she lives in the [facility] where the pollen tends to make her symptoms worsen. She denies any known fevers, chest tightness, or SOB. She reports occasionally productive sputum, worse at night when lying down. She reports a mild sore throat secondary to PND that is improving. She denies body aches, chills, or ear pain. She was prescribed Wixela in the past for bronchitis but has not been using that in years. She has run out of her albuterol inhaler as well. She has not tried any OTC products for symptoms.     Cough  This is a new problem. The current episode started in the past 7 days. The problem has been unchanged. The problem occurs every few minutes. The cough is Non-productive. Associated symptoms include headaches, nasal congestion, postnasal drip, rhinorrhea, a sore throat and wheezing. Pertinent negatives include no chest pain, chills, ear congestion, ear pain, fever, heartburn, hemoptysis, myalgias, rash, shortness of breath, sweats or weight loss. The symptoms are aggravated by lying down. She has tried nothing for the symptoms. The treatment provided mild relief. Her past medical history is significant for bronchitis and environmental allergies. There is no history of asthma.     Review of Systems   Constitutional:  Positive for activity change and fatigue. Negative for appetite change, chills, fever and weight loss.   HENT:  Positive for congestion, postnasal drip, rhinorrhea, sinus pressure and sore throat. Negative for ear discharge, ear pain, sinus pain, sneezing and trouble swallowing.    Eyes:  Negative for visual disturbance.   Respiratory:  Positive for cough and wheezing. Negative for hemoptysis, chest  "tightness and shortness of breath.    Cardiovascular:  Negative for chest pain and palpitations.   Gastrointestinal:  Negative for abdominal pain, constipation, diarrhea, heartburn, nausea and vomiting.   Musculoskeletal:  Negative for arthralgias, back pain, myalgias and neck pain.   Skin:  Negative for color change, pallor and rash.   Allergic/Immunologic: Positive for environmental allergies. Negative for food allergies.   Neurological:  Positive for headaches. Negative for dizziness and light-headedness.       Objective   /76   Pulse 85   Temp 98.1 °F (36.7 °C)   Ht 5' 3\" (1.6 m)   Wt 85.7 kg (189 lb)   SpO2 97%   BMI 33.48 kg/m²      Physical Exam  Vitals and nursing note reviewed.   Constitutional:       General: She is awake. She is not in acute distress.     Appearance: Normal appearance. She is well-developed. She is obese.   HENT:      Head: Normocephalic and atraumatic.      Right Ear: Hearing, tympanic membrane, ear canal and external ear normal.      Left Ear: Hearing, tympanic membrane, ear canal and external ear normal.      Nose: Congestion and rhinorrhea present. Rhinorrhea is clear.      Right Turbinates: Not enlarged, swollen or pale.      Left Turbinates: Not enlarged, swollen or pale.      Right Sinus: No maxillary sinus tenderness or frontal sinus tenderness.      Left Sinus: No maxillary sinus tenderness or frontal sinus tenderness.      Mouth/Throat:      Lips: Pink.      Mouth: Mucous membranes are moist.      Pharynx: Oropharynx is clear. Uvula midline. Postnasal drip present.      Tonsils: No tonsillar exudate or tonsillar abscesses. 2+ on the right. 2+ on the left.     Eyes:      Conjunctiva/sclera: Conjunctivae normal.       Cardiovascular:      Rate and Rhythm: Normal rate and regular rhythm.      Pulses: Normal pulses.      Heart sounds: Normal heart sounds. No murmur heard.  Pulmonary:      Effort: Pulmonary effort is normal. No respiratory distress.      Breath sounds: " Examination of the right-upper field reveals wheezing. Examination of the left-upper field reveals wheezing. Wheezing present.      Comments: Mild expiratory and inspiratory wheeze in upper lobes; R > L  Abdominal:      Palpations: Abdomen is soft.      Tenderness: There is no abdominal tenderness.     Musculoskeletal:         General: No swelling.      Cervical back: Full passive range of motion without pain, normal range of motion and neck supple.   Lymphadenopathy:      Cervical: No cervical adenopathy.     Skin:     General: Skin is warm and dry.      Capillary Refill: Capillary refill takes less than 2 seconds.     Neurological:      General: No focal deficit present.      Mental Status: She is alert and oriented to person, place, and time.     Psychiatric:         Mood and Affect: Mood normal.         Behavior: Behavior normal. Behavior is cooperative.         Thought Content: Thought content normal.         Judgment: Judgment normal.

## 2025-07-28 ENCOUNTER — OFFICE VISIT (OUTPATIENT)
Dept: PSYCHIATRY | Facility: CLINIC | Age: 60
End: 2025-07-28
Payer: MEDICARE

## 2025-07-28 ENCOUNTER — TELEPHONE (OUTPATIENT)
Dept: FAMILY MEDICINE CLINIC | Facility: CLINIC | Age: 60
End: 2025-07-28

## 2025-07-28 ENCOUNTER — RESULTS FOLLOW-UP (OUTPATIENT)
Dept: FAMILY MEDICINE CLINIC | Facility: CLINIC | Age: 60
End: 2025-07-28

## 2025-07-28 ENCOUNTER — APPOINTMENT (OUTPATIENT)
Dept: RADIOLOGY | Facility: CLINIC | Age: 60
End: 2025-07-28
Payer: MEDICARE

## 2025-07-28 DIAGNOSIS — J41.0 SIMPLE CHRONIC BRONCHITIS (HCC): ICD-10-CM

## 2025-07-28 DIAGNOSIS — F41.1 GENERALIZED ANXIETY DISORDER: ICD-10-CM

## 2025-07-28 DIAGNOSIS — J41.0 SIMPLE CHRONIC BRONCHITIS (HCC): Primary | ICD-10-CM

## 2025-07-28 DIAGNOSIS — F33.1 MODERATE RECURRENT MAJOR DEPRESSION (HCC): ICD-10-CM

## 2025-07-28 DIAGNOSIS — F41.0 PANIC ATTACKS: ICD-10-CM

## 2025-07-28 PROCEDURE — 71046 X-RAY EXAM CHEST 2 VIEWS: CPT

## 2025-07-28 PROCEDURE — 99214 OFFICE O/P EST MOD 30 MIN: CPT | Performed by: PHYSICIAN ASSISTANT

## 2025-07-28 PROCEDURE — G2211 COMPLEX E/M VISIT ADD ON: HCPCS | Performed by: PHYSICIAN ASSISTANT

## 2025-07-28 RX ORDER — ARIPIPRAZOLE 2 MG/1
2 TABLET ORAL
Qty: 90 TABLET | Refills: 1 | Status: SHIPPED | OUTPATIENT
Start: 2025-07-28

## 2025-07-28 RX ORDER — DEXTROMETHORPHAN HYDROBROMIDE AND PROMETHAZINE HYDROCHLORIDE 15; 6.25 MG/5ML; MG/5ML
5 SYRUP ORAL 4 TIMES DAILY PRN
Qty: 118 ML | Refills: 0 | Status: SHIPPED | OUTPATIENT
Start: 2025-07-28

## 2025-07-28 RX ORDER — CLONAZEPAM 0.5 MG/1
0.5 TABLET ORAL
Qty: 30 TABLET | Refills: 0 | Status: SHIPPED | OUTPATIENT
Start: 2025-07-28 | End: 2025-08-27

## 2025-07-28 RX ORDER — PAROXETINE 30 MG/1
30 TABLET, FILM COATED ORAL DAILY
Qty: 90 TABLET | Refills: 1 | Status: SHIPPED | OUTPATIENT
Start: 2025-07-28

## 2025-07-29 ENCOUNTER — TELEPHONE (OUTPATIENT)
Age: 60
End: 2025-07-29

## 2025-07-29 DIAGNOSIS — J41.0 SIMPLE CHRONIC BRONCHITIS (HCC): Primary | ICD-10-CM

## 2025-07-29 RX ORDER — METHYLPREDNISOLONE 4 MG/1
TABLET ORAL
Qty: 21 EACH | Refills: 0 | Status: SHIPPED | OUTPATIENT
Start: 2025-07-29

## 2025-07-30 ENCOUNTER — APPOINTMENT (OUTPATIENT)
Dept: LAB | Facility: CLINIC | Age: 60
End: 2025-07-30
Attending: PHYSICIAN ASSISTANT
Payer: MEDICARE

## 2025-07-30 ENCOUNTER — OFFICE VISIT (OUTPATIENT)
Dept: ENDOCRINOLOGY | Facility: CLINIC | Age: 60
End: 2025-07-30
Payer: MEDICARE

## 2025-07-30 VITALS
SYSTOLIC BLOOD PRESSURE: 132 MMHG | DIASTOLIC BLOOD PRESSURE: 74 MMHG | OXYGEN SATURATION: 98 % | BODY MASS INDEX: 33.37 KG/M2 | HEART RATE: 100 BPM | WEIGHT: 188.4 LBS

## 2025-07-30 DIAGNOSIS — Z79.4 TYPE 2 DIABETES MELLITUS WITH STAGE 3A CHRONIC KIDNEY DISEASE, WITH LONG-TERM CURRENT USE OF INSULIN (HCC): Primary | ICD-10-CM

## 2025-07-30 DIAGNOSIS — E11.22 TYPE 2 DIABETES MELLITUS WITH STAGE 3A CHRONIC KIDNEY DISEASE, WITH LONG-TERM CURRENT USE OF INSULIN (HCC): ICD-10-CM

## 2025-07-30 DIAGNOSIS — N18.31 TYPE 2 DIABETES MELLITUS WITH STAGE 3A CHRONIC KIDNEY DISEASE, WITH LONG-TERM CURRENT USE OF INSULIN (HCC): Primary | ICD-10-CM

## 2025-07-30 DIAGNOSIS — E11.65 UNCONTROLLED TYPE 2 DIABETES MELLITUS WITH HYPERGLYCEMIA (HCC): ICD-10-CM

## 2025-07-30 DIAGNOSIS — N18.31 TYPE 2 DIABETES MELLITUS WITH STAGE 3A CHRONIC KIDNEY DISEASE, WITH LONG-TERM CURRENT USE OF INSULIN (HCC): ICD-10-CM

## 2025-07-30 DIAGNOSIS — Z79.4 TYPE 2 DIABETES MELLITUS WITH STAGE 3A CHRONIC KIDNEY DISEASE, WITH LONG-TERM CURRENT USE OF INSULIN (HCC): ICD-10-CM

## 2025-07-30 DIAGNOSIS — E78.2 MIXED HYPERLIPIDEMIA: ICD-10-CM

## 2025-07-30 DIAGNOSIS — E11.22 TYPE 2 DIABETES MELLITUS WITH STAGE 3A CHRONIC KIDNEY DISEASE, WITH LONG-TERM CURRENT USE OF INSULIN (HCC): Primary | ICD-10-CM

## 2025-07-30 DIAGNOSIS — I10 ESSENTIAL HYPERTENSION: ICD-10-CM

## 2025-07-30 LAB
ANION GAP SERPL CALCULATED.3IONS-SCNC: 14 MMOL/L (ref 4–13)
BUN SERPL-MCNC: 16 MG/DL (ref 5–25)
CALCIUM SERPL-MCNC: 9.6 MG/DL (ref 8.4–10.2)
CHLORIDE SERPL-SCNC: 97 MMOL/L (ref 96–108)
CO2 SERPL-SCNC: 21 MMOL/L (ref 21–32)
CREAT SERPL-MCNC: 0.94 MG/DL (ref 0.6–1.3)
CREAT UR-MCNC: 41.3 MG/DL
EST. AVERAGE GLUCOSE BLD GHB EST-MCNC: 163 MG/DL
GFR SERPL CREATININE-BSD FRML MDRD: 65 ML/MIN/1.73SQ M
GLUCOSE P FAST SERPL-MCNC: 93 MG/DL (ref 65–99)
HBA1C MFR BLD: 7.3 %
MICROALBUMIN UR-MCNC: 11.3 MG/L
MICROALBUMIN/CREAT 24H UR: 27 MG/G CREATININE (ref 0–30)
POTASSIUM SERPL-SCNC: 3.8 MMOL/L (ref 3.5–5.3)
SODIUM SERPL-SCNC: 132 MMOL/L (ref 135–147)

## 2025-07-30 PROCEDURE — 80048 BASIC METABOLIC PNL TOTAL CA: CPT

## 2025-07-30 PROCEDURE — 82570 ASSAY OF URINE CREATININE: CPT

## 2025-07-30 PROCEDURE — 82043 UR ALBUMIN QUANTITATIVE: CPT

## 2025-07-30 PROCEDURE — 99214 OFFICE O/P EST MOD 30 MIN: CPT | Performed by: PHYSICIAN ASSISTANT

## 2025-07-30 PROCEDURE — 36415 COLL VENOUS BLD VENIPUNCTURE: CPT

## 2025-07-30 PROCEDURE — 83036 HEMOGLOBIN GLYCOSYLATED A1C: CPT

## 2025-07-30 RX ORDER — INSULIN GLARGINE 100 [IU]/ML
INJECTION, SOLUTION SUBCUTANEOUS
Status: SHIPPED
Start: 2025-07-30

## 2025-08-04 ENCOUNTER — ANESTHESIA EVENT (OUTPATIENT)
Dept: PERIOP | Facility: AMBULARY SURGERY CENTER | Age: 60
End: 2025-08-04
Payer: MEDICARE

## 2025-08-04 ENCOUNTER — ANESTHESIA (OUTPATIENT)
Dept: PERIOP | Facility: AMBULARY SURGERY CENTER | Age: 60
End: 2025-08-04
Payer: MEDICARE

## 2025-08-04 ENCOUNTER — HOSPITAL ENCOUNTER (OUTPATIENT)
Facility: AMBULARY SURGERY CENTER | Age: 60
Setting detail: OUTPATIENT SURGERY
Discharge: HOME | End: 2025-08-04
Attending: OPHTHALMOLOGY | Admitting: OPHTHALMOLOGY
Payer: MEDICARE

## 2025-08-04 VITALS
SYSTOLIC BLOOD PRESSURE: 140 MMHG | TEMPERATURE: 97.8 F | RESPIRATION RATE: 16 BRPM | DIASTOLIC BLOOD PRESSURE: 79 MMHG | OXYGEN SATURATION: 97 % | HEART RATE: 96 BPM

## 2025-08-04 DIAGNOSIS — H25.041 POSTERIOR SUBCAPSULAR POLAR AGE-RELATED CATARACT OF RIGHT EYE: ICD-10-CM

## 2025-08-04 LAB — GLUCOSE SERPL-MCNC: 85 MG/DL (ref 65–140)

## 2025-08-04 PROCEDURE — V2632 POST CHMBR INTRAOCULAR LENS: HCPCS | Performed by: OPHTHALMOLOGY

## 2025-08-04 PROCEDURE — 82948 REAGENT STRIP/BLOOD GLUCOSE: CPT

## 2025-08-04 DEVICE — IMPLANTABLE DEVICE: Type: IMPLANTABLE DEVICE | Site: EYE | Status: FUNCTIONAL

## 2025-08-04 RX ORDER — LIDOCAINE HYDROCHLORIDE 20 MG/ML
1 JELLY TOPICAL
Status: COMPLETED | OUTPATIENT
Start: 2025-08-04 | End: 2025-08-04

## 2025-08-04 RX ORDER — LIDOCAINE HYDROCHLORIDE 10 MG/ML
INJECTION, SOLUTION EPIDURAL; INFILTRATION; INTRACAUDAL; PERINEURAL AS NEEDED
Status: DISCONTINUED | OUTPATIENT
Start: 2025-08-04 | End: 2025-08-04 | Stop reason: HOSPADM

## 2025-08-04 RX ORDER — KETOROLAC TROMETHAMINE 5 MG/ML
1 SOLUTION OPHTHALMIC 4 TIMES DAILY
Start: 2025-08-04

## 2025-08-04 RX ORDER — POVIDONE-IODINE 5 %
SOLUTION, NON-ORAL OPHTHALMIC (EYE) AS NEEDED
Status: DISCONTINUED | OUTPATIENT
Start: 2025-08-04 | End: 2025-08-04 | Stop reason: HOSPADM

## 2025-08-04 RX ORDER — GATIFLOXACIN 5 MG/ML
1 SOLUTION/ DROPS OPHTHALMIC 2 TIMES DAILY
Start: 2025-08-04

## 2025-08-04 RX ORDER — GATIFLOXACIN 5 MG/ML
SOLUTION/ DROPS OPHTHALMIC AS NEEDED
Status: DISCONTINUED | OUTPATIENT
Start: 2025-08-04 | End: 2025-08-04 | Stop reason: HOSPADM

## 2025-08-04 RX ORDER — CYCLOPENTOLATE HYDROCHLORIDE 10 MG/ML
1 SOLUTION/ DROPS OPHTHALMIC
Status: COMPLETED | OUTPATIENT
Start: 2025-08-04 | End: 2025-08-04

## 2025-08-04 RX ORDER — PREDNISOLONE ACETATE 10 MG/ML
1 SUSPENSION/ DROPS OPHTHALMIC 4 TIMES DAILY
Start: 2025-08-04

## 2025-08-04 RX ORDER — TETRACAINE HYDROCHLORIDE 5 MG/ML
SOLUTION OPHTHALMIC AS NEEDED
Status: DISCONTINUED | OUTPATIENT
Start: 2025-08-04 | End: 2025-08-04 | Stop reason: HOSPADM

## 2025-08-04 RX ORDER — BALANCED SALT SOLUTION 6.4; .75; .48; .3; 3.9; 1.7 MG/ML; MG/ML; MG/ML; MG/ML; MG/ML; MG/ML
SOLUTION OPHTHALMIC AS NEEDED
Status: DISCONTINUED | OUTPATIENT
Start: 2025-08-04 | End: 2025-08-04 | Stop reason: HOSPADM

## 2025-08-04 RX ORDER — GLYCOPYRROLATE 0.2 MG/ML
INJECTION INTRAMUSCULAR; INTRAVENOUS AS NEEDED
Status: DISCONTINUED | OUTPATIENT
Start: 2025-08-04 | End: 2025-08-04

## 2025-08-04 RX ORDER — SODIUM CHLORIDE, SODIUM LACTATE, POTASSIUM CHLORIDE, CALCIUM CHLORIDE 600; 310; 30; 20 MG/100ML; MG/100ML; MG/100ML; MG/100ML
20 INJECTION, SOLUTION INTRAVENOUS CONTINUOUS
Status: DISCONTINUED | OUTPATIENT
Start: 2025-08-04 | End: 2025-08-04 | Stop reason: HOSPADM

## 2025-08-04 RX ORDER — MIDAZOLAM HYDROCHLORIDE 2 MG/2ML
INJECTION, SOLUTION INTRAMUSCULAR; INTRAVENOUS AS NEEDED
Status: DISCONTINUED | OUTPATIENT
Start: 2025-08-04 | End: 2025-08-04

## 2025-08-04 RX ORDER — TETRACAINE HYDROCHLORIDE 5 MG/ML
1 SOLUTION OPHTHALMIC ONCE
Status: COMPLETED | OUTPATIENT
Start: 2025-08-04 | End: 2025-08-04

## 2025-08-04 RX ORDER — KETOROLAC TROMETHAMINE 5 MG/ML
1 SOLUTION OPHTHALMIC
Status: COMPLETED | OUTPATIENT
Start: 2025-08-04 | End: 2025-08-04

## 2025-08-04 RX ORDER — PHENYLEPHRINE HYDROCHLORIDE 25 MG/ML
1 SOLUTION/ DROPS OPHTHALMIC
Status: COMPLETED | OUTPATIENT
Start: 2025-08-04 | End: 2025-08-04

## 2025-08-04 RX ADMIN — MIDAZOLAM 2 MG: 1 INJECTION INTRAMUSCULAR; INTRAVENOUS at 08:21

## 2025-08-04 RX ADMIN — KETOROLAC TROMETHAMINE 1 DROP: 5 SOLUTION OPHTHALMIC at 07:16

## 2025-08-04 RX ADMIN — PHENYLEPHRINE HYDROCHLORIDE 1 DROP: 25 SOLUTION OPHTHALMIC at 07:16

## 2025-08-04 RX ADMIN — CYCLOPENTOLATE HYDROCHLORIDE 1 DROP: 10 SOLUTION OPHTHALMIC at 07:46

## 2025-08-04 RX ADMIN — KETOROLAC TROMETHAMINE 1 DROP: 5 SOLUTION OPHTHALMIC at 07:30

## 2025-08-04 RX ADMIN — CYCLOPENTOLATE HYDROCHLORIDE 1 DROP: 10 SOLUTION OPHTHALMIC at 08:00

## 2025-08-04 RX ADMIN — PHENYLEPHRINE HYDROCHLORIDE 1 DROP: 25 SOLUTION OPHTHALMIC at 07:30

## 2025-08-04 RX ADMIN — CYCLOPENTOLATE HYDROCHLORIDE 1 DROP: 10 SOLUTION OPHTHALMIC at 07:30

## 2025-08-04 RX ADMIN — CYCLOPENTOLATE HYDROCHLORIDE 1 DROP: 10 SOLUTION OPHTHALMIC at 07:15

## 2025-08-04 RX ADMIN — LIDOCAINE HYDROCHLORIDE 1 APPLICATION: 20 JELLY TOPICAL at 07:30

## 2025-08-04 RX ADMIN — PHENYLEPHRINE HYDROCHLORIDE 1 DROP: 25 SOLUTION OPHTHALMIC at 07:46

## 2025-08-04 RX ADMIN — LIDOCAINE HYDROCHLORIDE 1 APPLICATION: 20 JELLY TOPICAL at 07:47

## 2025-08-04 RX ADMIN — TETRACAINE HYDROCHLORIDE 1 DROP: 5 SOLUTION OPHTHALMIC at 07:15

## 2025-08-04 RX ADMIN — KETOROLAC TROMETHAMINE 1 DROP: 5 SOLUTION OPHTHALMIC at 07:47

## 2025-08-04 RX ADMIN — KETOROLAC TROMETHAMINE 1 DROP: 5 SOLUTION OPHTHALMIC at 08:00

## 2025-08-04 RX ADMIN — GLYCOPYRROLATE 0.2 MG: 0.2 INJECTION, SOLUTION INTRAMUSCULAR; INTRAVENOUS at 08:21

## 2025-08-04 RX ADMIN — PHENYLEPHRINE HYDROCHLORIDE 1 DROP: 25 SOLUTION OPHTHALMIC at 08:00

## 2025-08-04 RX ADMIN — LIDOCAINE HYDROCHLORIDE 1 APPLICATION: 20 JELLY TOPICAL at 07:15

## 2025-08-05 ENCOUNTER — RESULTS FOLLOW-UP (OUTPATIENT)
Dept: ENDOCRINOLOGY | Facility: CLINIC | Age: 60
End: 2025-08-05

## 2025-08-05 DIAGNOSIS — R79.89 LOW SERUM SODIUM: Primary | ICD-10-CM

## 2025-08-11 ENCOUNTER — RESULTS FOLLOW-UP (OUTPATIENT)
Dept: ENDOCRINOLOGY | Facility: CLINIC | Age: 60
End: 2025-08-11

## 2025-08-11 ENCOUNTER — APPOINTMENT (OUTPATIENT)
Dept: LAB | Facility: CLINIC | Age: 60
End: 2025-08-11
Payer: MEDICARE

## 2025-08-16 DIAGNOSIS — F41.1 GENERALIZED ANXIETY DISORDER: ICD-10-CM

## 2025-08-16 DIAGNOSIS — F41.0 PANIC ATTACKS: ICD-10-CM

## 2025-08-20 RX ORDER — CLONAZEPAM 0.5 MG/1
0.5 TABLET ORAL
Qty: 30 TABLET | Refills: 0 | Status: SHIPPED | OUTPATIENT
Start: 2025-08-20

## (undated) DEVICE — AIR INJECT CANNULA 27GA: Brand: OPHTHALMIC CANNULA

## (undated) DEVICE — PACK UNIVERSAL ARTHRSCOPY PBDS

## (undated) DEVICE — B-H IRRIGATING CAN 19GA FLAT ANGLED 8MM: Brand: OPHTHALMIC CANNULA

## (undated) DEVICE — CHLORAPREP HI-LITE 26ML ORANGE

## (undated) DEVICE — GLOVE SRG BIOGEL 8.5

## (undated) DEVICE — MICROSURGICAL INSTRUMENT IRR. CYSTITOME 25GA STRAIGHT-REVERSE CUTTING: Brand: ALCON

## (undated) DEVICE — THE MONARCH® "D" CARTRIDGE IS A SINGLE-USE POLYPROPYLENE CARTRIDGE FOR POSTERIOR CHAMBER IOL DELIVERY: Brand: MONARCH® III

## (undated) DEVICE — GLOVE SRG BIOGEL 7.5

## (undated) DEVICE — Device

## (undated) DEVICE — GAUZE SPONGES,16 PLY: Brand: CURITY

## (undated) DEVICE — ABDOMINAL PAD: Brand: DERMACEA

## (undated) DEVICE — LIGHT GLOVE GREEN

## (undated) DEVICE — CLEARCUT® SLIT KNIFE INTREPID MICRO-COAXIAL SYSTEM 2.4 SB: Brand: CLEARCUT®; INTREPID

## (undated) DEVICE — PAD CAST 6 IN COTTON NON STERILE

## (undated) DEVICE — DRAPE C-ARM X-RAY

## (undated) DEVICE — PLASTIC ADHESIVE BANDAGE: Brand: CURITY

## (undated) DEVICE — CENTURION VISION SYSTEM FMS

## (undated) DEVICE — TRAY EPIDURAL PERIFIX 20GA X 3.5IN TUOHY 8ML

## (undated) DEVICE — CHLORAPREP APPLICATOR TINTED 10.5ML ONE-STEP

## (undated) DEVICE — GLOVE INDICATOR PI UNDERGLOVE SZ 8.5 BLUE

## (undated) DEVICE — DISPOSABLE HIB PAC INCLUDES 3                                    GUIDEWIRES AND 2 ARTHROSCOPY NEEDLES

## (undated) DEVICE — INTREPID® TRANSFORMER IA HP: Brand: INTREPID®

## (undated) DEVICE — VAPR COOLPULSE 90 ELECTRODE 90 DEGREES SUCTION WITH INTEGRATED HANDPIECE: Brand: VAPR COOLPULSE

## (undated) DEVICE — PUDDLE VAC

## (undated) DEVICE — TRAY PAIN SUPPORT

## (undated) DEVICE — SYRINGE 5ML LL

## (undated) DEVICE — TUBING SUCTION 5MM X 12 FT

## (undated) DEVICE — RADIOLOGY STERILE LABELS: Brand: CENTURION

## (undated) DEVICE — SYRINGE EPI 8ML LUER SLIP LOSS OF RESISTANCE PLASTIC PERFIX

## (undated) DEVICE — STERI STRIP 1/2 INCH

## (undated) DEVICE — SYRINGE 20ML LL

## (undated) DEVICE — 3M™ MICROFOAM™ SURGICAL TAPE 4 ROLLS/CARTON 6 CARTONS/CASE 1528-3: Brand: 3M™ MICROFOAM™

## (undated) DEVICE — ARTHROSCOPY FLOOR MAT

## (undated) DEVICE — TUBING ARTHROSCOPY REDUCE PATIENT

## (undated) DEVICE — TOWEL SET X-RAY

## (undated) DEVICE — ACTIVE FMS W/ INTREPID* ULTRA SLEEVES, 0.9MM 45° ABS* INTREPID* BALANCED TIP: Brand: ALCON

## (undated) DEVICE — EYE PACK CUSTOM -FINNEGAN

## (undated) DEVICE — 3M™ STERI-STRIP™ REINFORCED ADHESIVE SKIN CLOSURES, R1547, 1/2 IN X 4 IN (12 MM X 100 MM), 6 STRIPS/ENVELOPE: Brand: 3M™ STERI-STRIP™

## (undated) DEVICE — LIGHT HANDLE COVER SLEEVE DISP BLUE STELLAR

## (undated) DEVICE — INTENDED FOR TISSUE SEPARATION, AND OTHER PROCEDURES THAT REQUIRE A SHARP SURGICAL BLADE TO PUNCTURE OR CUT.: Brand: BARD-PARKER ® CARBON RIB-BACK BLADES

## (undated) DEVICE — SUT MONOCRYL 3-0 PS-2 18 IN Y497G

## (undated) DEVICE — IV SET EXT SM BORE CARESITE 8IN

## (undated) DEVICE — 6617 IOBAN II PATIENT ISOLATION DRAPE 5/BX,4BX/CS: Brand: STERI-DRAPE™ IOBAN™ 2